# Patient Record
Sex: FEMALE | Race: WHITE | NOT HISPANIC OR LATINO | Employment: OTHER | URBAN - METROPOLITAN AREA
[De-identification: names, ages, dates, MRNs, and addresses within clinical notes are randomized per-mention and may not be internally consistent; named-entity substitution may affect disease eponyms.]

---

## 2017-01-06 ENCOUNTER — HOSPITAL ENCOUNTER (OUTPATIENT)
Dept: PHYSICAL THERAPY | Facility: CLINIC | Age: 47
Setting detail: THERAPIES SERIES
End: 2017-01-06
Attending: PHYSICIAN ASSISTANT
Payer: COMMERCIAL

## 2017-01-06 PROCEDURE — 40000718 ZZHC STATISTIC PT DEPARTMENT ORTHO VISIT: Performed by: PHYSICAL THERAPIST

## 2017-01-06 PROCEDURE — 97110 THERAPEUTIC EXERCISES: CPT | Mod: GP | Performed by: PHYSICAL THERAPIST

## 2017-01-06 PROCEDURE — 97140 MANUAL THERAPY 1/> REGIONS: CPT | Mod: GP | Performed by: PHYSICAL THERAPIST

## 2017-01-12 ENCOUNTER — OFFICE VISIT (OUTPATIENT)
Dept: DERMATOLOGY | Facility: CLINIC | Age: 47
End: 2017-01-12
Payer: COMMERCIAL

## 2017-01-12 DIAGNOSIS — L82.1 SEBORRHEIC KERATOSIS: ICD-10-CM

## 2017-01-12 DIAGNOSIS — Z85.828 HISTORY OF NONMELANOMA SKIN CANCER: Primary | ICD-10-CM

## 2017-01-12 DIAGNOSIS — L57.0 ACTINIC KERATOSIS: ICD-10-CM

## 2017-01-12 DIAGNOSIS — L98.9 SKIN LESION: ICD-10-CM

## 2017-01-12 PROCEDURE — 17003 DESTRUCT PREMALG LES 2-14: CPT | Performed by: DERMATOLOGY

## 2017-01-12 PROCEDURE — 17000 DESTRUCT PREMALG LESION: CPT | Performed by: DERMATOLOGY

## 2017-01-12 PROCEDURE — 99213 OFFICE O/P EST LOW 20 MIN: CPT | Mod: 25 | Performed by: DERMATOLOGY

## 2017-01-12 NOTE — MR AVS SNAPSHOT
After Visit Summary   1/12/2017    Jayashree MURRAY Alex    MRN: [id number]           Patient Information     Date Of Birth          1970        Visit Information        Provider Department      1/12/2017 2:15 PM Natalie Kern MD [facility]        Today's Diagnoses     History of nonmelanoma skin cancer    -  1     Actinic keratosis         Seborrheic keratosis         Skin lesion           Care Instructions    Cryotherapy    What is it?    Use of a very cold liquid, such as liquid nitrogen, to freeze and destroy abnormal skin cells that need to be removed    What should I expect?    Tenderness and redness    A small blister that might grow and fill with dark purple blood. There may be crusting.    More than one treatment may be needed if the lesions do not go away.    How do I care for the treated area?    Gently wash the area with your hands when bathing.    Use a thin layer of Vaseline to help with healing. You may use a Band-Aid.     The area should heal within 7-10 days and may leave behind a pink or lighter color.     Do not use an antibiotic or Neosporin ointment.     You may take acetaminophen (Tylenol) for pain.     Call your Doctor if you have:    Severe pain    Signs of infection (warmth, redness, cloudy yellow drainage, and or a bad smell)    Questions or concerns    Who should I call with questions?       [facility]: [phone]       [facility]: [phone]       For urgent needs outside of business hours call the [facility] at [phone]        and ask for the dermatology resident on call          Follow-ups after your visit        Your next 10 appointments already scheduled     Jan 16, 2017  1:45 PM   Treatment 45 with Jayashree Whalen, PT   [facility] ([facility])    [address] [place] [address]   [phone]            Jan 19, 2017  1:30 PM    Treatment 45 with Jayashree Whalen, PT   [facility] ([facility])    [address] [place] [address]   [phone]            Jan 23, 2017  1:30 PM   Treatment 45 with Jayashree Whalen, PT   [facility] ([facility])    [address] [place] [address]   [phone]            Jan 26, 2017  1:30 PM   Treatment 45 with Jayashree Whalen, PT   [facility] ([facility])    [address] [place] [address]   [phone]            May 16, 2017  2:00 PM   [facility]    [address] [place] [id number] [phone]            Jun 28, 2017 10:00 AM   [facility]I [facility]   [facility]    [address] [place] [id number] [phone]            Jun 28, 2017 11:00 AM   MA SCREENING BILATERAL W/ NAHED with MGMA1, MG MA TECH   [facility]    [address] [place] [address] [phone]           Do not use any powder, lotion or deodorant under your arms or on your breast. If you do, we will ask you to remove it before your exam.  Wear comfortable, two-piece clothing.  If you have any allergies, tell your care team.  Bring any previous mammograms from other facilities or have them mailed to the breast center.              Who to contact     If you have questions or need follow up information about today's clinic visit or your schedule please contact [facility] directly at [phone].  Normal or non-critical lab and imaging results will be communicated to you by MyChart, letter or phone within 4 business days after the clinic has received the results. If you do not hear from us within 7 days, please contact  the clinic through [employer] or phone. If you have a critical or abnormal lab result, we will notify you by phone as soon as possible.  Submit refill requests through [employer] or call your pharmacy and they will forward the refill request to us. Please allow 3 business days for your refill to be completed.          Additional Information About Your Visit        [employer] Information     [employer] gives you secure access to your electronic health record. If you see a primary care provider, you can also send messages to your care team and make appointments. If you have questions, please call your primary care clinic.  If you do not have a primary care provider, please call [phone] and they will assist you.      [employer] is an electronic gateway that provides easy, online access to your medical records. With [employer], you can request a clinic appointment, read your test results, renew a prescription or communicate with your care team.     To access your existing account, please contact your [facility] or call [phone] for assistance.        Care EveryWhere ID     This is your Care EveryWhere ID. This could be used by other organizations to access your [facility] medical records  [id number]        Your Vitals Were     Last Period                   03/17/2003            Blood Pressure from Last 3 Encounters:   11/02/16 137/83   10/12/16 140/79   08/22/16 138/78    Weight from Last 3 Encounters:   10/12/16 71.623 kg (157 lb 14.4 oz)   08/22/16 68.04 kg (150 lb)   06/22/16 67.631 kg (149 lb 1.6 oz)              We Performed the Following     DESTRUCT PREMALIGNANT LESION, 2-14     DESTRUCT PREMALIGNANT LESION, FIRST          Today's Medication Changes          These changes are accurate as of: 1/12/17  2:22 PM.  If you have any questions, ask your nurse or doctor.               These medicines have changed or have updated prescriptions.        Dose/Directions    DULoxetine 60 MG EC capsule    Commonly known as:  CYMBALTA   This may have changed:  additional instructions   Used for:  Depression        90 mg PO daily. Please dispense 60mg tabs and 30 mg tabs to equal 90mg daily.   Quantity:  90 capsule   Refills:  0                Primary Care Provider Office Phone # Fax #    Aneudy Jackson [phone]       [facility] [address] [place] [address]        Thank you!     Thank you for choosing [facility]  for your care. Our goal is always to provide you with excellent care. Hearing back from our patients is one way we can continue to improve our services. Please take a few minutes to complete the written survey that you may receive in the mail after your visit with us. Thank you!             Your Updated Medication List - Protect others around you: Learn how to safely use, store and throw away your medicines at www.disposemymeds.org.          This list is accurate as of: 1/12/17  2:22 PM.  Always use your most recent med list.                   Brand Name Dispense Instructions for use    DULoxetine 60 MG EC capsule    CYMBALTA    90 capsule    90 mg PO daily. Please dispense 60mg tabs and 30 mg tabs to equal 90mg daily.       fluticasone 50 MCG/ACT spray    FLONASE    16 g    Spray 1-2 sprays into both nostrils daily       loratadine 10 MG tablet    CLARITIN    30 tablet    Take 1 tablet (10 mg) by mouth daily       MULTI-VITAMIN DAILY PO      Take 1 tablet by mouth daily       temazepam 30 MG capsule    RESTORIL    30 capsule    Take 1 capsule (30 mg) by mouth nightly as needed for sleep       UNABLE TO FIND      Take 2 capsules by mouth 2 times daily Digestive enzyme       VITAMIN B-12 PO      Take 1 tablet by mouth daily       VITAMIN D-3 PO      Take 1 capsule by mouth daily       VITRON-C PO      Take 1 tablet by mouth       XANAX PO      Take 0.25 mg by mouth At Bedtime
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
## 2017-01-12 NOTE — PATIENT INSTRUCTIONS
Cryotherapy    What is it?    Use of a very cold liquid, such as liquid nitrogen, to freeze and destroy abnormal skin cells that need to be removed    What should I expect?    Tenderness and redness    A small blister that might grow and fill with dark purple blood. There may be crusting.    More than one treatment may be needed if the lesions do not go away.    How do I care for the treated area?    Gently wash the area with your hands when bathing.    Use a thin layer of Vaseline to help with healing. You may use a Band-Aid.     The area should heal within 7-10 days and may leave behind a pink or lighter color.     Do not use an antibiotic or Neosporin ointment.     You may take acetaminophen (Tylenol) for pain.     Call your Doctor if you have:    Severe pain    Signs of infection (warmth, redness, cloudy yellow drainage, and or a bad smell)    Questions or concerns    Who should I call with questions?       [facility]: [phone]       [facility]: [phone]       For urgent needs outside of business hours call the [facility] at [phone]        and ask for the dermatology resident on call

## 2017-01-12 NOTE — NURSING NOTE
Dermatology Rooming Note    Jayashree Johnson's goals for this visit include:   Chief Complaint   Patient presents with     Derm Problem     1 yr check up. concerned about mole on RT bikini area, LT back shoulder area, eyebrow area.        Is a scribe okay for this visit:YES    Are records needed for this visit(If yes, obtain release of information): No     Vitals: LMP 03/17/2003    Referring Provider:  ESTABLISHED PATIENT  No address on file
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
## 2017-01-16 ENCOUNTER — HOSPITAL ENCOUNTER (OUTPATIENT)
Dept: PHYSICAL THERAPY | Facility: CLINIC | Age: 47
Setting detail: THERAPIES SERIES
End: 2017-01-16
Attending: PHYSICIAN ASSISTANT
Payer: COMMERCIAL

## 2017-01-16 PROCEDURE — 40000718 ZZHC STATISTIC PT DEPARTMENT ORTHO VISIT: Performed by: PHYSICAL THERAPIST

## 2017-01-16 PROCEDURE — 97140 MANUAL THERAPY 1/> REGIONS: CPT | Mod: GP | Performed by: PHYSICAL THERAPIST

## 2017-01-23 ENCOUNTER — HOSPITAL ENCOUNTER (OUTPATIENT)
Dept: PHYSICAL THERAPY | Facility: CLINIC | Age: 47
Setting detail: THERAPIES SERIES
End: 2017-01-23
Attending: PHYSICIAN ASSISTANT
Payer: COMMERCIAL

## 2017-01-23 PROCEDURE — 97140 MANUAL THERAPY 1/> REGIONS: CPT | Mod: GP | Performed by: PHYSICAL THERAPIST

## 2017-01-23 PROCEDURE — 40000718 ZZHC STATISTIC PT DEPARTMENT ORTHO VISIT: Performed by: PHYSICAL THERAPIST

## 2017-01-26 ENCOUNTER — HOSPITAL ENCOUNTER (OUTPATIENT)
Dept: PHYSICAL THERAPY | Facility: CLINIC | Age: 47
Setting detail: THERAPIES SERIES
End: 2017-01-26
Attending: PHYSICIAN ASSISTANT
Payer: COMMERCIAL

## 2017-01-26 PROCEDURE — 40000718 ZZHC STATISTIC PT DEPARTMENT ORTHO VISIT: Performed by: PHYSICAL THERAPIST

## 2017-01-26 PROCEDURE — 97140 MANUAL THERAPY 1/> REGIONS: CPT | Mod: GP | Performed by: PHYSICAL THERAPIST

## 2017-02-13 DIAGNOSIS — J34.89 NASAL OBSTRUCTION: ICD-10-CM

## 2017-02-13 RX ORDER — LORATADINE 10 MG/1
10 TABLET ORAL DAILY
Qty: 30 TABLET | Refills: 1 | Status: SHIPPED | OUTPATIENT
Start: 2017-02-13 | End: 2017-05-02

## 2017-02-13 RX ORDER — ALPRAZOLAM 0.25 MG
0.25 TABLET ORAL AT BEDTIME
Qty: 90 TABLET | OUTPATIENT
Start: 2017-02-13

## 2017-02-13 NOTE — TELEPHONE ENCOUNTER
Loratidine      Last Written Prescription Date: 11/4/16  Last Fill Quantity: 30,  # refills: 1   Last Office Visit with G, [facility] or [facility] prescribing provider: 1/12/17

## 2017-02-17 ENCOUNTER — HOSPITAL ENCOUNTER (OUTPATIENT)
Dept: PHYSICAL THERAPY | Facility: CLINIC | Age: 47
Setting detail: THERAPIES SERIES
End: 2017-02-17
Attending: PHYSICIAN ASSISTANT
Payer: COMMERCIAL

## 2017-02-17 PROCEDURE — 97140 MANUAL THERAPY 1/> REGIONS: CPT | Mod: GP | Performed by: PHYSICAL THERAPIST

## 2017-02-17 PROCEDURE — 40000718 ZZHC STATISTIC PT DEPARTMENT ORTHO VISIT: Performed by: PHYSICAL THERAPIST

## 2017-02-17 NOTE — PROGRESS NOTES
[facility]      OUTPATIENT PHYSICAL THERAPY  PLAN OF TREATMENT FOR OUTPATIENT REHABILITATION    Patient's Last Name, First Name, M.I.                YOB: 1970  Jayashree Johnson                        Provider's Name  [facility] Medical Record No.  [id number]                               Onset Date: 1-1-2009 noted cervical tension   Start of Care Date: 9-22-16   Type:     _X_PT   ___OT   ___SLP Medical Diagnosis: Neck pain/cervicalgia, radiculitis/neuritis, spinal stenosis                       PT Diagnosis: neck pain with radiculopathy      _________________________________________________________________________________  Plan of Treatment:    Frequency/Duration: 1-2 times per week x 4 weeks/up to 8 visits     Goals:  Goal Identifier Driving   Goal Description Melissa will be able to drive to [place] with 1-2/10 increase in symptoms and with 1-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  (She has been driving to [place] for son, increased symptom)   Target Date 12/30/16   Date Met      Progress:     Goal Identifier Sleep   Goal Description Melissa will be able to use movement, hot/cool packs as needed and positioning so she can slee 6+ hours at night (Had been improving, varies at this time)   Target Date 12/30/16   Date Met      Progress:     Goal Identifier Symptoms   Goal Description Melissa will be able to center her L arm symptoms with exercise and positioning so she can complete her house and work tasks without increasing her symptoms.  (Improving, not constant)   Target Date 01/02/17   Date Met      Progress:           Progress Toward Goals:   Progress this reporting period: Over all, Melissa has been slowly improving. She has had to miss PT appts due to family medical issues. She has been working toward home exercise program including walking on her treadmill. She  is not reporting as much L arm symptoms. She has tightness into R SB and rotation. THere continues to be tightness in the L upper back and lower neck area.           Certification date from 2-16-17 to 3-17-17.    Jayashree Whalen, PT          I CERTIFY THE NEED FOR THESE SERVICES FURNISHED UNDER        THIS PLAN OF TREATMENT AND WHILE UNDER MY CARE .             Physician Signature               Date    X_____________________________________________________                      Referring Provider: Roque Araiza PAC

## 2017-02-17 NOTE — PROGRESS NOTES
Outpatient Physical Therapy Progress Note     Patient: Jayashree Johnson  : 1970    Beginning/End Dates of Reporting Period:  2 visits between 17 and 17 for a total of 13 visits.     Referring Provider: GABRIELA Joseph    Therapy Diagnosis: Neck pain with radiculopathy     Client Self Report: She walked on the [place] today x 10 minutes. She tries to do this daily. She is reporting neck pain at 3/10 and requested manual therapy.     Objective Measurements:  Objective Measure: Symptoms: 3/10 neck     Objective Measure: Cervical Seated AROM is still tight with R sidebending and rotation. She has a slight L SB in good posture.        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  (She has been driving to [place] for son, increased symptom)   Target Date 16   Date Met      Progress:     Goal Identifier Sleep   Goal Description Melissa will be able to use movement, hot/cool packs as needed and positioning so she can slee 6+ hours at night (Had been improving, varies at this time)   Target Date 16   Date Met      Progress:     Goal Identifier Symptoms   Goal Description Melissa will be able to center her L arm symptoms with exercise and positioning so she can complete her house and work tasks without increasing her symptoms.  (Improving, not constant)   Target Date 17   Date Met      Progress:       Progress Toward Goals:   Progress this reporting period: Over all, Melissa has been slowly improving. She has had to miss PT appts due to family medical issues. She has been working toward home exercise program including walking on her treadmill. She is not reporting as much L arm symptoms. She has tightness into R SB and rotation. THere continues to be tightness in the L upper back and lower neck area.           Plan:  Continue therapy per current  plan of care.    Discharge:   No    Thank you for referring Melissa  to [facility]    Jayashree Whalen, PT  [phone]

## 2017-03-09 ENCOUNTER — HOSPITAL ENCOUNTER (OUTPATIENT)
Dept: PHYSICAL THERAPY | Facility: CLINIC | Age: 47
Setting detail: THERAPIES SERIES
End: 2017-03-09
Attending: PHYSICIAN ASSISTANT
Payer: COMMERCIAL

## 2017-03-09 PROCEDURE — 97140 MANUAL THERAPY 1/> REGIONS: CPT | Mod: GP | Performed by: PHYSICAL THERAPIST

## 2017-03-09 PROCEDURE — 40000718 ZZHC STATISTIC PT DEPARTMENT ORTHO VISIT: Performed by: PHYSICAL THERAPIST

## 2017-03-22 DIAGNOSIS — J34.89 NASAL OBSTRUCTION: ICD-10-CM

## 2017-03-22 NOTE — TELEPHONE ENCOUNTER
Fluticasone nasal spray   Last Written Prescription Date: 11/4/16  Last Fill Quantity: 16,  # refills: 2   Last Office Visit with [facility], [facility] or [facility] prescribing provider: 1/2/17                                         Next 5 appointments (look out 90 days)     May 04, 2017 12:15 PM CDT   Return Visit with Natalie Kern MD   [facility] ([facility]    [address] [place] [id number] [phone]

## 2017-03-24 RX ORDER — FLUTICASONE PROPIONATE 50 MCG
1-2 SPRAY, SUSPENSION (ML) NASAL DAILY
Qty: 16 G | Refills: 2 | Status: ON HOLD | OUTPATIENT
Start: 2017-03-24 | End: 2017-05-30

## 2017-04-05 ENCOUNTER — HOSPITAL ENCOUNTER (OUTPATIENT)
Dept: PHYSICAL THERAPY | Facility: CLINIC | Age: 47
Setting detail: THERAPIES SERIES
End: 2017-04-05
Attending: PHYSICIAN ASSISTANT
Payer: COMMERCIAL

## 2017-04-05 PROCEDURE — 97140 MANUAL THERAPY 1/> REGIONS: CPT | Mod: GP | Performed by: PHYSICAL THERAPIST

## 2017-04-05 PROCEDURE — 40000718 ZZHC STATISTIC PT DEPARTMENT ORTHO VISIT: Performed by: PHYSICAL THERAPIST

## 2017-04-05 PROCEDURE — 97110 THERAPEUTIC EXERCISES: CPT | Mod: GP | Performed by: PHYSICAL THERAPIST

## 2017-04-06 NOTE — PROGRESS NOTES
Outpatient Physical Therapy Progress Note     Patient: Jayashree Johnson  : 1970    Beginning/End Dates of Reporting Period:  4 visits between 17 and 17 for a total of 18 visits    Referring Provider: GABRIELA Joseph Diagnosis: Neck pain with radiculopathy         Client Self Report: Melissa reports that her L arm symptoms have resolved. She is having midback andneck symptoms as well as headaches. She reports symptoms from the R neck across her chest into her L trunk.     Objective Measurements:  Objective Measure: Symptoms - Headache and Neck pain     Objective Measure: Cervical seated AROM: flexion: 30% pulling below the blades; extension: 30% wtih pressure; [id number]% R and 30% L pain and locking with L>R; SB bilaterally 30% with more difficulty L.      Objective Measure: SPecial tests: Spurlings: L is negative, R causes symptomson right that run deep and across the L chest. Distraction: positive - decreased right sided pressure.      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  (Being met)   Target Date 17   Date Met      Progress:     Goal Identifier Sleep   Goal Description Melissa will be able to use movement, hot/cool packs as needed and positioning so she can slee 6+ hours at night (improving - 6-7 hours of sleep)   Target Date 17   Date Met      Progress:     Goal Identifier Symptoms   Goal Description Melissa will be able to center her L arm symptoms with exercise and positioning so she can complete her house and work tasks without increasing her symptoms.  (Met - no L shoulder symptoms)   Target Date 17   Date Met      Progress:     Progress Toward Goals:   Progress this reporting period: Melissa has been attending PT  intermittently.She has been with her family member who has recently passed. She reports the relaxation techniques have been helpful. She is slowly working into her home program and adding weights as able. She is now reporting no L arm symptoms. She reports symptoms in the upper back. We discussed where cervical spine can refer symptoms and she was asked to continue with the cervical retraction exercises.          Plan:  Continue therapy per current plan of care.    Discharge:  No    Thank you for referring Jayashree  to [facility]    Jayashree Whalen, PT  [phone]

## 2017-04-06 NOTE — PROGRESS NOTES
[facility]      OUTPATIENT PHYSICAL THERAPY  PLAN OF TREATMENT FOR OUTPATIENT REHABILITATION    Patient's Last Name, First Name, M.I.                YOB: 1970  Jayashree Johnson                        Provider's Name  [facility] Medical Record No.  [id number]                               Onset Date: 1-1-2009 noted cervical tension   Start of Care Date: 9-22-16   Type:     _X_PT   ___OT   ___SLP Medical Diagnosis: Neck pain/cervicalgia, radiculitis/neuritis, spinal stenosis                       PT Diagnosis: neck pain with radiculopathy    _________________________________________________________________________________  Plan of Treatment:    Frequency/Duration:1-2 days per week x 4 weeks/8 visits   Goals:  Goal Identifier Driving   Goal Description Melissa will be able to drive to [employer] with 1-2/10 increase in symptoms and with 1-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  (Being met)   Target Date 04/30/17   Date Met      Progress:     Goal Identifier Sleep   Goal Description Melissa will be able to use movement, hot/cool packs as needed and positioning so she can slee 6+ hours at night (improving - 6-7 hours of sleep)   Target Date 04/30/17   Date Met      Progress:     Goal Identifier Symptoms   Goal Description Melissa will be able to center her L arm symptoms with exercise and positioning so she can complete her house and work tasks without increasing her symptoms.  (Met - no L shoulder symptoms)   Target Date 01/02/17   Date Met      Progress:         Progress Toward Goals:   Progress this reporting period: Melissa has been attending PT intermittently.She has been with her family member who has recently passed. She reports the relaxation techniques have been helpful. She is slowly working into her home program and adding weights as able. She is now reporting no L  arm symptoms. She reports symptoms in the upper back. We discussed where cervical spine can refer symptoms and she was asked to continue with the cervical retraction exercises.    Please note she has not been consistently in PT as she was with a family member who was passing.         Certification date from 4-5-17 to 5-4-17    Jayashree Whalen, PT          I CERTIFY THE NEED FOR THESE SERVICES FURNISHED UNDER        THIS PLAN OF TREATMENT AND WHILE UNDER MY CARE .             Physician Signature               Date    X_____________________________________________________                      Referring Provider: Roque Araiza PAC

## 2017-04-10 ENCOUNTER — HOSPITAL ENCOUNTER (OUTPATIENT)
Dept: PHYSICAL THERAPY | Facility: CLINIC | Age: 47
Setting detail: THERAPIES SERIES
End: 2017-04-10
Attending: PHYSICIAN ASSISTANT
Payer: COMMERCIAL

## 2017-04-10 PROCEDURE — 40000718 ZZHC STATISTIC PT DEPARTMENT ORTHO VISIT: Performed by: PHYSICAL THERAPIST

## 2017-04-10 PROCEDURE — 97140 MANUAL THERAPY 1/> REGIONS: CPT | Mod: GP | Performed by: PHYSICAL THERAPIST

## 2017-04-12 ENCOUNTER — OFFICE VISIT (OUTPATIENT)
Dept: FAMILY MEDICINE | Facility: OTHER | Age: 47
End: 2017-04-12
Payer: COMMERCIAL

## 2017-04-12 VITALS
BODY MASS INDEX: 25.31 KG/M2 | HEIGHT: 67 IN | OXYGEN SATURATION: 96 % | TEMPERATURE: 98.8 F | SYSTOLIC BLOOD PRESSURE: 132 MMHG | DIASTOLIC BLOOD PRESSURE: 80 MMHG | HEART RATE: 91 BPM | WEIGHT: 161.3 LBS

## 2017-04-12 DIAGNOSIS — F41.8 DEPRESSION WITH ANXIETY: Primary | ICD-10-CM

## 2017-04-12 DIAGNOSIS — E16.2 HYPOGLYCEMIA: ICD-10-CM

## 2017-04-12 PROCEDURE — 99213 OFFICE O/P EST LOW 20 MIN: CPT | Performed by: INTERNAL MEDICINE

## 2017-04-12 ASSESSMENT — ANXIETY QUESTIONNAIRES
3. WORRYING TOO MUCH ABOUT DIFFERENT THINGS: MORE THAN HALF THE DAYS
5. BEING SO RESTLESS THAT IT IS HARD TO SIT STILL: SEVERAL DAYS
2. NOT BEING ABLE TO STOP OR CONTROL WORRYING: NEARLY EVERY DAY
6. BECOMING EASILY ANNOYED OR IRRITABLE: NEARLY EVERY DAY
IF YOU CHECKED OFF ANY PROBLEMS ON THIS QUESTIONNAIRE, HOW DIFFICULT HAVE THESE PROBLEMS MADE IT FOR YOU TO DO YOUR WORK, TAKE CARE OF THINGS AT HOME, OR GET ALONG WITH OTHER PEOPLE: VERY DIFFICULT
GAD7 TOTAL SCORE: 16
7. FEELING AFRAID AS IF SOMETHING AWFUL MIGHT HAPPEN: NEARLY EVERY DAY
1. FEELING NERVOUS, ANXIOUS, OR ON EDGE: SEVERAL DAYS

## 2017-04-12 ASSESSMENT — PATIENT HEALTH QUESTIONNAIRE - PHQ9: 5. POOR APPETITE OR OVEREATING: NEARLY EVERY DAY

## 2017-04-12 ASSESSMENT — PAIN SCALES - GENERAL: PAINLEVEL: SEVERE PAIN (6)

## 2017-04-12 NOTE — NURSING NOTE
"Chief Complaint   Patient presents with     Establish Care       Initial /80 (BP Location: Right arm, Patient Position: Chair, Cuff Size: Adult Regular)  Pulse 91  Temp 98.8  F (37.1  C) (Temporal)  Ht 5' 7\" (1.702 m)  Wt 161 lb 4.8 oz (73.2 kg)  LMP 03/17/2003  SpO2 96%  BMI 25.26 kg/m2 Estimated body mass index is 25.26 kg/(m^2) as calculated from the following:    Height as of this encounter: 5' 7\" (1.702 m).    Weight as of this encounter: 161 lb 4.8 oz (73.2 kg).  Medication Reconciliation: complete  Elisa KING     "
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
## 2017-04-12 NOTE — LETTER
My Depression Action Plan  Name: Jayashree Johnson   Date of Birth 1970  Date: 4/12/2017    My doctor: Aneudy Jackson   My clinic: [place] [address] [facility] [address] [phone]          GREEN    ZONE   Good Control    What it looks like:     Things are going generally well. You have normal up s and down s. You may even feel depressed from time to time, but bad moods usually last less than a day.   What you need to do:  1. Continue to care for yourself (see self care plan)  2. Check your depression survival kit and update it as needed  3. Follow your physician s recommendations including any medication.  4. Do not stop taking medication unless you consult with your physician first.           YELLOW         ZONE Getting Worse    What it looks like:     Depression is starting to interfere with your life.     It may be hard to get out of bed; you may be starting to isolate yourself from others.    Symptoms of depression are starting to last most all day and this has happened for several days.     You may have suicidal thoughts but they are not constant.   What you need to do:     1. Call your care team, your response to treatment will improve if you keep your care team informed of your progress. Yellow periods are signs an adjustment may need to be made.     2. Continue your self-care, even if you have to fake it!    3. Talk to someone in your support network    4. Open up your depression survival kit           RED    ZONE Medical Alert - Get Help    What it looks like:     Depression is seriously interfering with your life.     You may experience these or other symptoms: You can t get out of bed most days, can t work or engage in other necessary activities, you have trouble taking care of basic hygiene, or basic responsibilities, thoughts of suicide or death that will not go away, self-injurious behavior.     What you need to do:  1. Call your care team and request a  same-day appointment. If they are not available (weekends or after hours) call your local crisis line, emergency room or 911.      Electronically signed by: Elisa Corrigan, April 12, 2017    Depression Self Care Plan / Survival Kit    Self-Care for Depression  Here s the deal. Your body and mind are really not as separate as most people think.  What you do and think affects how you feel and how you feel influences what you do and think. This means if you do things that people who feel good do, it will help you feel better.  Sometimes this is all it takes.  There is also a place for medication and therapy depending on how severe your depression is, so be sure to consult with your medical provider and/ or Behavioral Health Consultant if your symptoms are worsening or not improving.     In order to better manage my stress, I will:    Exercise  Get some form of exercise, every day. This will help reduce pain and release endorphins, the  feel good  chemicals in your brain. This is almost as good as taking antidepressants!  This is not the same as joining a gym and then never going! (they count on that by the way ) It can be as simple as just going for a walk or doing some gardening, anything that will get you moving.      Hygiene   Maintain good hygiene (Get out of bed in the morning, Make your bed, Brush your teeth, Take a shower, and Get dressed like you were going to work, even if you are unemployed).  If your clothes don't fit try to get ones that do.    Diet  I will strive to eat foods that are good for me, drink plenty of water, and avoid excessive sugar, caffeine, alcohol, and other mood-altering substances.  Some foods that are helpful in depression are: complex carbohydrates, B vitamins, flaxseed, fish or fish oil, fresh fruits and vegetables.    Psychotherapy  I agree to participate in Individual Therapy (if recommended).    Medication  If prescribed medications, I agree to take them.  Missing doses can result  in serious side effects.  I understand that drinking alcohol, or other illicit drug use, may cause potential side effects.  I will not stop my medication abruptly without first discussing it with my provider.    Staying Connected With Others  I will stay in touch with my friends, family members, and my primary care provider/team.    Use your imagination  Be creative.  We all have a creative side; it doesn t matter if it s oil painting, sand castles, or mud pies! This will also kick up the endorphins.    Witness Beauty  (AKA stop and smell the roses) Take a look outside, even in mid-winter. Notice colors, textures. Watch the squirrels and birds.     Service to others  Be of service to others.  There is always someone else in need.  By helping others we can  get out of ourselves  and remember the really important things.  This also provides opportunities for practicing all the other parts of the program.    Humor  Laugh and be silly!  Adjust your TV habits for less news and crime-drama and more comedy.    Control your stress  Try breathing deep, massage therapy, biofeedback, and meditation. Find time to relax each day.     My support system    Clinic Contact:  Phone number:    Contact 1:  Phone number:    Contact 2:  Phone number:    Jainism/:  Phone number:    Therapist:  Phone number:    Local crisis center:    Phone number:    Other community support:  Phone number:

## 2017-04-12 NOTE — LETTER
May 18, 2017      Jayashree Johnson  [address] [address] [place] [id number]              Dear Jayashree Johnson,    You are due for some labs. Please make a lab appt and come in fasting.     Sincerely,    Dr. Jackson / care team

## 2017-04-13 ASSESSMENT — PATIENT HEALTH QUESTIONNAIRE - PHQ9: SUM OF ALL RESPONSES TO PHQ QUESTIONS 1-9: 18

## 2017-04-13 ASSESSMENT — ANXIETY QUESTIONNAIRES: GAD7 TOTAL SCORE: 16

## 2017-04-18 ENCOUNTER — HOSPITAL ENCOUNTER (OUTPATIENT)
Dept: PHYSICAL THERAPY | Facility: CLINIC | Age: 47
Setting detail: THERAPIES SERIES
End: 2017-04-18
Attending: PHYSICIAN ASSISTANT
Payer: COMMERCIAL

## 2017-04-18 PROCEDURE — 97140 MANUAL THERAPY 1/> REGIONS: CPT | Mod: GP | Performed by: PHYSICAL THERAPIST

## 2017-04-18 PROCEDURE — 40000718 ZZHC STATISTIC PT DEPARTMENT ORTHO VISIT: Performed by: PHYSICAL THERAPIST

## 2017-04-21 ENCOUNTER — HOSPITAL ENCOUNTER (OUTPATIENT)
Dept: PHYSICAL THERAPY | Facility: CLINIC | Age: 47
Setting detail: THERAPIES SERIES
End: 2017-04-21
Attending: PHYSICIAN ASSISTANT
Payer: COMMERCIAL

## 2017-04-21 PROCEDURE — 97112 NEUROMUSCULAR REEDUCATION: CPT | Mod: GP | Performed by: PHYSICAL THERAPIST

## 2017-04-21 PROCEDURE — 97140 MANUAL THERAPY 1/> REGIONS: CPT | Mod: GP | Performed by: PHYSICAL THERAPIST

## 2017-04-21 PROCEDURE — 40000718 ZZHC STATISTIC PT DEPARTMENT ORTHO VISIT: Performed by: PHYSICAL THERAPIST

## 2017-04-24 ENCOUNTER — TELEPHONE (OUTPATIENT)
Dept: FAMILY MEDICINE | Facility: OTHER | Age: 47
End: 2017-04-24

## 2017-04-24 NOTE — TELEPHONE ENCOUNTER
The patient was recently seen and unfortunately left before stopping for her lab work.  Standing orders are in the queue.  Could she please stop by the time of her convenience for the appropriate lab work?.  Thank you.  Isaías

## 2017-04-25 ENCOUNTER — HOSPITAL ENCOUNTER (OUTPATIENT)
Dept: PHYSICAL THERAPY | Facility: CLINIC | Age: 47
Setting detail: THERAPIES SERIES
End: 2017-04-25
Attending: PHYSICIAN ASSISTANT
Payer: COMMERCIAL

## 2017-04-25 PROCEDURE — 97140 MANUAL THERAPY 1/> REGIONS: CPT | Mod: GP | Performed by: PHYSICAL THERAPIST

## 2017-04-25 PROCEDURE — 40000718 ZZHC STATISTIC PT DEPARTMENT ORTHO VISIT: Performed by: PHYSICAL THERAPIST

## 2017-04-27 ENCOUNTER — HOSPITAL ENCOUNTER (OUTPATIENT)
Dept: PHYSICAL THERAPY | Facility: CLINIC | Age: 47
Setting detail: THERAPIES SERIES
End: 2017-04-27
Attending: PHYSICIAN ASSISTANT
Payer: COMMERCIAL

## 2017-04-27 ENCOUNTER — OFFICE VISIT (OUTPATIENT)
Dept: NEUROSURGERY | Facility: CLINIC | Age: 47
End: 2017-04-27
Payer: COMMERCIAL

## 2017-04-27 VITALS — WEIGHT: 163.3 LBS | HEIGHT: 67 IN | BODY MASS INDEX: 25.63 KG/M2 | TEMPERATURE: 97.9 F

## 2017-04-27 DIAGNOSIS — M48.02 SPINAL STENOSIS IN CERVICAL REGION: Primary | ICD-10-CM

## 2017-04-27 PROCEDURE — 99204 OFFICE O/P NEW MOD 45 MIN: CPT | Performed by: NEUROLOGICAL SURGERY

## 2017-04-27 PROCEDURE — 40000718 ZZHC STATISTIC PT DEPARTMENT ORTHO VISIT: Performed by: PHYSICAL THERAPIST

## 2017-04-27 PROCEDURE — 97140 MANUAL THERAPY 1/> REGIONS: CPT | Mod: GP | Performed by: PHYSICAL THERAPIST

## 2017-04-27 ASSESSMENT — PAIN SCALES - GENERAL: PAINLEVEL: SEVERE PAIN (6)

## 2017-04-27 NOTE — NURSING NOTE
Pre-operative Education    Education included but not limited to:  - Pre-operative physical with primary care physician within 30 days of surgical date.   - Pre-operative clearance (cardiology, hematology, etc).   - Discontinue Aspirin, NSAIDs, Diclofenac x 7 days prior to surgical date.   -Do not begin taking Non-Steroidal Anti-Inflammatory Drugs or NSAIDs (Advil,Motrin, Ibuprofen,Nuprin, Diclofenac,Meloxicam, Aleve, Celebrex, Aspirin, etc.) until 6 weeks after surgery if you had a fusion. May cause bleeding and interfere with bone healing.    -May try tylenol for pain 1000 mg three times per day for pain    -Patient is not a smoker  -Forms to be completed: 6 weeks     -Surgical risks: blood clots in the leg or lung, problems urinating, nerve damage, drainage from the incision, infection, stiffness.    -Preparation timeline   When to start NPO   Special bathing procedure.    -Hospital stay:   Checking in   Surgery   Recovery room   Hospital room     - Managing pain   - Likely 1-2 night hospitalization  - Post operative incisional pain x 1-2 weeks which will require pain medications and muscle relaxants.   -Do NOT drive while taking narcotic pain medication.  -Post operative incision care:   Keep your incision clean and dry at all times. OK to remove dressing on postop day 2. OK to shower on postop day 3 and allow water to run over incision, pat dry after shower.    No bathing, swimming or submerging in water. Call immediately or come to ED if any drainage occurs, or you develop new pain.   Watch for signs of infection: redness, swelling, warmth, drainage, and fever of 101 degrees or higher. Notify clinic.   - Post operative activity limitations: 6-8 weeks, no lifting > 10 pounds, no bending, twisting, or overhead reaching.  -If a brace is required per Dr. Escalante, [facility] will fit you for the brace in the hospital. Brace type and length of time to wear it will be determined by Dr. Escalante.   - Follow up appointments: 6  week post op, 3 months post op, 6 months post op, 1 year post op. You will need to an xray before each appointment. Please call to schedule follow up appointment at [phone].   - Education book was also given to the patient for further review.      Patient verbalized understanding of above instructions. All questions were answered to the best of my ability and the patient's satisfaction. Patient advised to call with any additional questions or concerns.

## 2017-04-27 NOTE — MR AVS SNAPSHOT
After Visit Summary   4/27/2017    Jayashree Johnson    MRN: [id number]           Patient Information     Date Of Birth          1970        Visit Information        Provider Department      4/27/2017 2:20 PM Willard Escalante MD [facility]    Surgery scheduled at [facility] for C5-6 ACDF (anterior cervical discectomy and fusion)       Pre-Operative:  -Surgical risks: blood clots in the leg or lung, problems urinating, nerve damage, drainage from the incision, infection, stiffness.  - Pre-operative physical with primary care physician within 30 days of surgical date.   -Stop all solid foods 8 hours before surgery.  -Keep drinking clear liquids until 4 hours before surgery. Clear liquids include water, clear juice, black coffee, or clear tea without milk, Gatorade, clear soda.   -Shower procedure: Please shower with antibacterial soap the night before surgery and the morning of surgery. Refer to information sheet in folder.   - Discontinue Aspirin, NSAIDs (Advil/Ibuprofen, Naproxen,Nuprin, Diclofenac,Meloxicam, Aleve, Celebrex) x 7 days prior to surgical date. Do not begin taking until 6 weeks after surgery. May cause bleeding and interfere with bone healing.  - May try Tylenol for pain 1000 mg three times per day for pain.      Post-Operative:  -1-2 night hospitalization.   - Post operative incisional pain x 1-2 weeks which will require pain medications and muscle relaxants. You will receive medication upon discharge.  -Do NOT drive while taking narcotic pain medication.  -Post operative incision care- Watch for signs of infection: redness, swelling, warmth, drainage, and fever of 101 degrees or higher. Notify clinic [phone].  -No submerging incision in water such as pools, hot tubs, baths for at least 8 weeks or until incision is healed. Showers are fine.   - Post operative activity limitations: 6-8 weeks, no lifting > 10 pounds, no  bending, twisting, or overhead reaching.  -If a brace is required per Dr. Escalante, [facility] will fit you for the brace in the hospital. Brace type and length of time to wear it will be determined by Dr. Escalante.   -If you are currently employed, you will need to be off work for 4-6 weeks for post op recovery and healing.  - Follow up appointments: 6 week post op, 3 months post op, 6 months post op, 1 year post op. You will need to an xray before each appointment. Please call to schedule these appointments at [phone].              Follow-ups after your visit        Your next 10 appointments already scheduled     Apr 27, 2017  3:15 PM CDT   Treatment 45 with Jayashree Whalen, PT   [facility] ([address] [place] [address]   [phone]            May 02, 2017  3:15 PM CDT   Treatment 45 with Jayashree Whalen PT   [facility] ([facility])    [address] [place] [address] [place] [address] [phone]            May 04, 2017 12:15 PM CDT   [facility] Visit with Natalie Kern MD   [facility]    [address] [place] [id number] [phone]            Jun 28, 2017 10:00 AM CDT   Return Visit with MIMI Carver   [facility])    [address] [place] [id number] [phone]            Jun 28, 2017 11:00 AM CDT   MA SCREENING BILATERAL W/ NAHED with MGMA1, MG MA TECH   [facility] ([facility])    [address] [address] [place] [id number] [phone]           Do not use any powder, lotion or deodorant under your arms or on your breast. If you do, we will ask you to remove it before your exam.  Wear comfortable, two-piece clothing.  If you have any allergies, tell your care team.  Bring any previous mammograms from other facilities or have  "them mailed to the breast center.              Who to contact     If you have questions or need follow up information about today's clinic visit or your schedule please contact [facility] directly at [phone].  Normal or non-critical lab and imaging results will be communicated to you by MyChart, letter or phone within 4 business days after the clinic has received the results. If you do not hear from us within 7 days, please contact the clinic through MyChart or phone. If you have a critical or abnormal lab result, we will notify you by phone as soon as possible.  Submit refill requests through [employer] or call your pharmacy and they will forward the refill request to us. Please allow 3 business days for your refill to be completed.          Additional Information About Your Visit        [employer] Information     [employer] gives you secure access to your electronic health record. If you see a primary care provider, you can also send messages to your care team and make appointments. If you have questions, please call your primary care clinic.  If you do not have a primary care provider, please call [phone] and they will assist you.        Care EveryWhere ID     This is your Care EveryWhere ID. This could be used by other organizations to access your [facility] medical records  [id number]        Your Vitals Were     Temperature Height Last Period BMI (Body Mass Index)          97.9  F (36.6  C) (Temporal) 1.702 m (5' 7\") 03/17/2003 25.58 kg/m2         Blood Pressure from Last 3 Encounters:   04/12/17 132/80   11/02/16 137/83   10/12/16 140/79    Weight from Last 3 Encounters:   04/27/17 74.1 kg (163 lb 4.8 oz)   04/12/17 73.2 kg (161 lb 4.8 oz)   10/12/16 71.6 kg (157 lb 14.4 oz)              Today, you had the following     No orders found for display         Today's Medication Changes          These changes are accurate as of: 4/27/17  3:01 PM.  If you have any questions, ask your nurse or " doctor.               These medicines have changed or have updated prescriptions.        Dose/Directions    DULoxetine 60 MG EC capsule   Commonly known as:  CYMBALTA   This may have changed:  additional instructions   Used for:  Depression        90 mg PO daily. Please dispense 60mg tabs and 30 mg tabs to equal 90mg daily.   Quantity:  90 capsule   Refills:  0                Primary Care Provider Office Phone # Fax #    Aneudy Jackson [phone]       [facility] [address] [place] [address]        Thank you!     Thank you for choosing [facility]  for your care. Our goal is always to provide you with excellent care. Hearing back from our patients is one way we can continue to improve our services. Please take a few minutes to complete the written survey that you may receive in the mail after your visit with us. Thank you!             Your Updated Medication List - Protect others around you: Learn how to safely use, store and throw away your medicines at www.disposemymeds.org.          This list is accurate as of: 4/27/17  3:01 PM.  Always use your most recent med list.                   Brand Name Dispense Instructions for use    DULoxetine 60 MG EC capsule    CYMBALTA    90 capsule    90 mg PO daily. Please dispense 60mg tabs and 30 mg tabs to equal 90mg daily.       fluticasone 50 MCG/ACT spray    FLONASE    16 g    Spray 1-2 sprays into both nostrils daily       loratadine 10 MG tablet    CLARITIN    30 tablet    Take 1 tablet (10 mg) by mouth daily       MULTI-VITAMIN DAILY PO      Take 1 tablet by mouth daily       temazepam 30 MG capsule    RESTORIL    30 capsule    Take 1 capsule (30 mg) by mouth nightly as needed for sleep       UNABLE TO FIND      Take 2 capsules by mouth 2 times daily Digestive enzyme       VITAMIN B-12 PO      Take 1 tablet by mouth daily       VITAMIN D-3 PO      Take 1 capsule by mouth daily       VITRON-C PO      Take 1 tablet by  mouth       XANAX PO      Take 0.25 mg by mouth At Bedtime

## 2017-04-27 NOTE — PROGRESS NOTES
"Jayashree Johnson is a 46 year old female who presents for:  Chief Complaint   Patient presents with     Neurologic Problem     neck pain        Initial Vitals:  Temp 97.9  F (36.6  C) (Temporal)  Ht 1.702 m (5' 7\")  Wt 74.1 kg (163 lb 4.8 oz)  LMP 03/17/2003  BMI 25.58 kg/m2 Estimated body mass index is 25.58 kg/(m^2) as calculated from the following:    Height as of this encounter: 1.702 m (5' 7\").    Weight as of this encounter: 74.1 kg (163 lb 4.8 oz).. Body surface area is 1.87 meters squared. BP completed using cuff size: NA (Not Taken)  Severe Pain (6)    Do you feel safe in your environment?  Yes  Do you need any refills today? No    Nursing Comments:         Pooja Billy CMA ([facility])      "

## 2017-04-27 NOTE — PATIENT INSTRUCTIONS
Surgery scheduled at [facility] for C5-6 ACDF (anterior cervical discectomy and fusion)       Pre-Operative:  -Surgical risks: blood clots in the leg or lung, problems urinating, nerve damage, drainage from the incision, infection, stiffness.  - Pre-operative physical with primary care physician within 30 days of surgical date.   -Stop all solid foods 8 hours before surgery.  -Keep drinking clear liquids until 4 hours before surgery. Clear liquids include water, clear juice, black coffee, or clear tea without milk, Gatorade, clear soda.   -Shower procedure: Please shower with antibacterial soap the night before surgery and the morning of surgery. Refer to information sheet in folder.   - Discontinue Aspirin, NSAIDs (Advil/Ibuprofen, Naproxen,Nuprin, Diclofenac,Meloxicam, Aleve, Celebrex) x 7 days prior to surgical date. Do not begin taking until 6 weeks after surgery. May cause bleeding and interfere with bone healing.  - May try Tylenol for pain 1000 mg three times per day for pain.      Post-Operative:  -1-2 night hospitalization.   - Post operative incisional pain x 1-2 weeks which will require pain medications and muscle relaxants. You will receive medication upon discharge.  -Do NOT drive while taking narcotic pain medication.  -Post operative incision care- Watch for signs of infection: redness, swelling, warmth, drainage, and fever of 101 degrees or higher. Notify clinic [phone].  -No submerging incision in water such as pools, hot tubs, baths for at least 8 weeks or until incision is healed. Showers are fine.   - Post operative activity limitations: 6-8 weeks, no lifting > 10 pounds, no bending, twisting, or overhead reaching.  -If a brace is required per Dr. Escalante, [facility] will fit you for the brace in the hospital. Brace type and length of time to wear it will be determined by Dr. Escalante.   -If you are currently employed, you will need to be off work for 4-6 weeks for post op recovery  and healing.  - Follow up appointments: 6 week post op, 3 months post op, 6 months post op, 1 year post op. You will need to an xray before each appointment. Please call to schedule these appointments at [phone].
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
## 2017-04-28 ENCOUNTER — TELEPHONE (OUTPATIENT)
Dept: NEUROSURGERY | Facility: CLINIC | Age: 47
End: 2017-04-28

## 2017-04-28 NOTE — TELEPHONE ENCOUNTER
Surgery Scheduled    Date of Surgery 6/14 Time of Surgery   Procedure: C5-6 ACDF  Hospital/Surgical Facility: [facility]  Surgeon: Boris  Type of Anesthesia : General  Pre-op 5/24 with Dr. Jackson  2 week post op:7/27 with SHANNON Machado        Surgery packet given to patient in clinic. Patients instructed to arrive 1.5 hours prior to surgery. Patient understood and agrees to plan.    Jory Us  Surgical Scheduler

## 2017-05-02 DIAGNOSIS — J34.89 NASAL OBSTRUCTION: ICD-10-CM

## 2017-05-03 RX ORDER — LORATADINE 10 MG/1
10 TABLET ORAL DAILY
Qty: 30 TABLET | Refills: 1 | Status: ON HOLD | OUTPATIENT
Start: 2017-05-03 | End: 2017-05-30

## 2017-05-03 NOTE — TELEPHONE ENCOUNTER
Loratadine      Last Written Prescription Date: 2/13/17  Last Fill Quantity: 30,  # refills: 1   Last Office Visit with G, P or [facility] prescribing provider: 4/27/17                                         Next 5 appointments (look out 90 days)     May 04, 2017 12:15 PM CDT   [facility]    [address] [place] [id number]   [phone]            May 24, 2017  9:30 AM CDT   Pre-Op physical with Aneudy Jackson MD   [facility]    [address] [place] [id number] [phone]            Jun 28, 2017 10:00 AM CDT   [facility]    [address] [place] [id number]   [phone]            Jul 27, 2017  1:00 PM CDT   Return Visit with Teresa Machado PA-C   [facility]    [address] [facility] [id number] [phone]

## 2017-05-15 ENCOUNTER — HOSPITAL ENCOUNTER (OUTPATIENT)
Dept: PHYSICAL THERAPY | Facility: CLINIC | Age: 47
Setting detail: THERAPIES SERIES
End: 2017-05-15
Attending: PHYSICIAN ASSISTANT
Payer: COMMERCIAL

## 2017-05-15 PROCEDURE — 40000718 ZZHC STATISTIC PT DEPARTMENT ORTHO VISIT: Performed by: PHYSICAL THERAPIST

## 2017-05-15 PROCEDURE — 97140 MANUAL THERAPY 1/> REGIONS: CPT | Mod: GP | Performed by: PHYSICAL THERAPIST

## 2017-05-16 NOTE — PROGRESS NOTES
Outpatient Physical Therapy Progress Note     Patient: Jayashree Johnson  : 1970    Beginning/End Dates of Reporting Period:  4 visits between 4-10-17 and 5-15-17 for a total of 23 visits.     Referring Provider: Dr. Willard Escalante    Therapy Diagnosis: Cervical stenosis and C5-6 disc     Client Self Report: She did see Dr. Escalante and there is a surgery date for 17. In the meantime, she would like to continue with PT as she reports the treatment decreases the acute pain she experiences. She would like information about sleep positioning.    Objective Measurements:  Objective Measure: Symptoms: 5-6/10, neck     Objective Measure: Cervical seated AROM: 20% flexion, extension: 50%; rotation R: 55%, l: 40 %; SB R 30% and L: 20% approximately.      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  (Being met)   Target Date 17   Date Met      Progress:     Goal Identifier Sleep   Goal Description Melissa will be able to use movement, hot/cool packs as needed and positioning so she can slee 6+ hours at night (improving - 6-7 hours of sleep)   Target Date 17   Date Met      Progress:     Goal Identifier Symptoms   Goal Description Melissa will be able to center her L arm symptoms with exercise and positioning so she can complete her house and work tasks without increasing her symptoms.  (Met - no L shoulder symptoms)   Target Date 17   Date Met      Progress:     Goal Identifier     Goal Description     Target Date     Date Met      Progress:     Goal Identifier     Goal Description     Target Date     Date Met      Progress:     Goal Identifier     Goal Description     Target Date     Date Met      Progress:     Goal Identifier     Goal Description     Target Date     Date Met      Progress:     Goal Identifier     Goal Description      Target Date     Date Met      Progress:     Progress Toward Goals:   Progress this reporting period: Limited. She does report temporary relief with PT.  We discussed insurance, goals and plan of care. She has not had much improvement overall at this time. She did have decreased L arm symptoms early in the PT sessions. She has had gaps in her PT sessions due to family emergency.     Plan:  Continue therapy per current plan of care. She would like to continue with PT 1-2 times per week until she has surgery to assist with the pain. We agreed to work on positioning and posture.     She plans to continue her neck care under Dr. Escalante as Dr. Ayoub is no longer available.   Discharge:  No    Jayashree Whalen, PT  [phone]

## 2017-05-22 ENCOUNTER — HOSPITAL ENCOUNTER (OUTPATIENT)
Dept: PHYSICAL THERAPY | Facility: CLINIC | Age: 47
Setting detail: THERAPIES SERIES
End: 2017-05-22
Attending: PHYSICIAN ASSISTANT
Payer: COMMERCIAL

## 2017-05-22 PROCEDURE — 40000718 ZZHC STATISTIC PT DEPARTMENT ORTHO VISIT: Performed by: PHYSICAL THERAPIST

## 2017-05-22 PROCEDURE — 97140 MANUAL THERAPY 1/> REGIONS: CPT | Mod: GP | Performed by: PHYSICAL THERAPIST

## 2017-05-24 ENCOUNTER — OFFICE VISIT (OUTPATIENT)
Dept: INTERNAL MEDICINE | Facility: CLINIC | Age: 47
End: 2017-05-24
Payer: COMMERCIAL

## 2017-05-24 VITALS
WEIGHT: 160 LBS | SYSTOLIC BLOOD PRESSURE: 138 MMHG | RESPIRATION RATE: 16 BRPM | OXYGEN SATURATION: 100 % | TEMPERATURE: 97 F | BODY MASS INDEX: 25.06 KG/M2 | DIASTOLIC BLOOD PRESSURE: 84 MMHG | HEART RATE: 90 BPM

## 2017-05-24 DIAGNOSIS — Z01.818 PREOP GENERAL PHYSICAL EXAM: Primary | ICD-10-CM

## 2017-05-24 DIAGNOSIS — R14.0 ABDOMINAL BLOATING: ICD-10-CM

## 2017-05-24 DIAGNOSIS — M54.2 CERVICALGIA: ICD-10-CM

## 2017-05-24 DIAGNOSIS — E16.2 HYPOGLYCEMIA: ICD-10-CM

## 2017-05-24 LAB
ANION GAP SERPL CALCULATED.3IONS-SCNC: 7 MMOL/L (ref 3–14)
BUN SERPL-MCNC: 10 MG/DL (ref 7–30)
CALCIUM SERPL-MCNC: 8.9 MG/DL (ref 8.5–10.1)
CHLORIDE SERPL-SCNC: 105 MMOL/L (ref 94–109)
CHOLEST SERPL-MCNC: 201 MG/DL
CO2 SERPL-SCNC: 28 MMOL/L (ref 20–32)
CREAT SERPL-MCNC: 0.7 MG/DL (ref 0.52–1.04)
CREAT UR-MCNC: 144 MG/DL
ERYTHROCYTE [DISTWIDTH] IN BLOOD BY AUTOMATED COUNT: 14 % (ref 10–15)
GFR SERPL CREATININE-BSD FRML MDRD: 89 ML/MIN/1.7M2
GLUCOSE SERPL-MCNC: 100 MG/DL (ref 70–99)
HBA1C MFR BLD: 5.5 % (ref 4.3–6)
HCT VFR BLD AUTO: 40.9 % (ref 35–47)
HDLC SERPL-MCNC: 58 MG/DL
HGB BLD-MCNC: 13.4 G/DL (ref 11.7–15.7)
LDLC SERPL CALC-MCNC: 105 MG/DL
MCH RBC QN AUTO: 29.3 PG (ref 26.5–33)
MCHC RBC AUTO-ENTMCNC: 32.8 G/DL (ref 31.5–36.5)
MCV RBC AUTO: 90 FL (ref 78–100)
MICROALBUMIN UR-MCNC: 7 MG/L
MICROALBUMIN/CREAT UR: 4.52 MG/G CR (ref 0–25)
NONHDLC SERPL-MCNC: 143 MG/DL
PLATELET # BLD AUTO: 330 10E9/L (ref 150–450)
POTASSIUM SERPL-SCNC: 4.2 MMOL/L (ref 3.4–5.3)
RBC # BLD AUTO: 4.57 10E12/L (ref 3.8–5.2)
SODIUM SERPL-SCNC: 140 MMOL/L (ref 133–144)
T3FREE SERPL-MCNC: 2.5 PG/ML (ref 2.3–4.2)
T4 FREE SERPL-MCNC: 0.91 NG/DL (ref 0.76–1.46)
TRIGL SERPL-MCNC: 191 MG/DL
TSH SERPL DL<=0.05 MIU/L-ACNC: 1.1 MU/L (ref 0.4–4)
WBC # BLD AUTO: 7 10E9/L (ref 4–11)

## 2017-05-24 PROCEDURE — 36415 COLL VENOUS BLD VENIPUNCTURE: CPT | Performed by: INTERNAL MEDICINE

## 2017-05-24 PROCEDURE — 82043 UR ALBUMIN QUANTITATIVE: CPT | Performed by: INTERNAL MEDICINE

## 2017-05-24 PROCEDURE — 84439 ASSAY OF FREE THYROXINE: CPT | Performed by: INTERNAL MEDICINE

## 2017-05-24 PROCEDURE — 84481 FREE ASSAY (FT-3): CPT | Performed by: INTERNAL MEDICINE

## 2017-05-24 PROCEDURE — 85027 COMPLETE CBC AUTOMATED: CPT | Performed by: INTERNAL MEDICINE

## 2017-05-24 PROCEDURE — 84443 ASSAY THYROID STIM HORMONE: CPT | Performed by: INTERNAL MEDICINE

## 2017-05-24 PROCEDURE — 80061 LIPID PANEL: CPT | Performed by: INTERNAL MEDICINE

## 2017-05-24 PROCEDURE — 83036 HEMOGLOBIN GLYCOSYLATED A1C: CPT | Performed by: INTERNAL MEDICINE

## 2017-05-24 PROCEDURE — 99214 OFFICE O/P EST MOD 30 MIN: CPT | Performed by: INTERNAL MEDICINE

## 2017-05-24 PROCEDURE — 80048 BASIC METABOLIC PNL TOTAL CA: CPT | Performed by: INTERNAL MEDICINE

## 2017-05-24 ASSESSMENT — PAIN SCALES - GENERAL: PAINLEVEL: MODERATE PAIN (4)
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
## 2017-05-24 NOTE — MR AVS SNAPSHOT
After Visit Summary   5/24/2017    Jayashree Johnson    MRN: [id number]           Patient Information     Date Of Birth          1970        Visit Information        Provider Department      5/24/2017 9:30 AM Aneudy Jackson MD [facility]        Today's Diagnoses     Preop general physical exam    -  1      Care Instructions      Before Your Surgery      Call your surgeon if there is any change in your health. This includes signs of a cold or flu (such as a sore throat, runny nose, cough, rash or fever).    Do not smoke, drink alcohol or take over the counter medicine (unless your surgeon or primary care doctor tells you to) for the 24 hours before and after surgery.    If you take prescribed drugs: Follow your doctor s orders about which medicines to take and which to stop until after surgery.    Eating and drinking prior to surgery: follow the instructions from your surgeon    Take a shower or bath the night before surgery. Use the soap your surgeon gave you to gently clean your skin. If you do not have soap from your surgeon, use your regular soap. Do not shave or scrub the surgery site.  Wear clean pajamas and have clean sheets on your bed.           Follow-ups after your visit        Your next 10 appointments already scheduled     May 24, 2017  9:30 AM CDT   [facility] physical with [facility]y [facility]   [facility]    [address] [place] [address]   [phone]            May 30, 2017   Procedure with Willard Escalante MD   [facility] (--)    [address]  [facility] [address]   [phone]            May 31, 2017  1:45 PM CDT   Ortho Treatment with Jayashree Whalen PT   [facility] ([facility])    [address] [place] [address]   [phone]            Jun 07, 2017  3:15 PM CDT   Ortho Treatment with Jayashree Whalen PT   [facility] ([facility])    [address] [place] [address]   [phone]            Jun 13, 2017  3:15 PM CDT   Ortho Treatment with Jayashree Whalen PT   [facility] ([facility])    [address] [place] [address]   [phone]            Jun 28, 2017 10:00 AM CDT   Return Visit with MIMI Carver   [facility]    [address] [place] [id number] [phone]            Jun 28, 2017 11:00 AM CDT   MA SCREENING BILATERAL W/ NAHED with MGMADick, [facility] MA [facility])    [address] [place] [address] [phone]           Do not use any powder, lotion or deodorant under your arms or on your breast. If you do, we will ask you to remove it before your exam.  Wear comfortable, two-piece clothing.  If you have any allergies, tell your care team.  Bring any previous mammograms from other facilities or have them mailed to the breast center.            Jul 27, 2017  1:00 PM CDT   [facility] Visit with Teresa Machado PA-C   [facility]    [address] [place] [id number] [phone]            Aug 10, 2017 11:00 AM CDT   Return Visit with Natalie Kern MD   [facility])    [address] [place] [address] [phone]              Who to contact     If you have questions or need follow up information about today's clinic visit or your schedule please contact [facility] directly at [phone].  Normal or non-critical lab and imaging results will be communicated to you by MyChart, letter or phone within 4 business days after the clinic has received the results. If you do not hear from us within 7 days, please contact the clinic through MyChart or  phone. If you have a critical or abnormal lab result, we will notify you by phone as soon as possible.  Submit refill requests through [email or web address] or call your pharmacy and they will forward the refill request to us. Please allow 3 business days for your refill to be completed.          Additional Information About Your Visit        [employer] Information     [email or web address] gives you secure access to your electronic health record. If you see a primary care provider, you can also send messages to your care team and make appointments. If you have questions, please call your primary care clinic.  If you do not have a primary care provider, please call [phone] and they will assist you.        Care EveryWhere ID     This is your Care EveryWhere ID. This could be used by other organizations to access your [facility] medical records  [id number]        Your Vitals Were     Pulse Temperature Respirations Last Period Pulse Oximetry BMI (Body Mass Index)    90 97  F (36.1  C) (Temporal) 16 03/17/2003 100% 25.06 kg/m2       Blood Pressure from Last 3 Encounters:   05/24/17 138/84   04/12/17 132/80   11/02/16 137/83    Weight from Last 3 Encounters:   05/24/17 160 lb (72.6 kg)   04/27/17 163 lb 4.8 oz (74.1 kg)   04/12/17 161 lb 4.8 oz (73.2 kg)              Today, you had the following     No orders found for display         Today's Medication Changes          These changes are accurate as of: 5/24/17  9:28 AM.  If you have any questions, ask your nurse or doctor.               These medicines have changed or have updated prescriptions.        Dose/Directions    DULoxetine 60 MG EC capsule   Commonly known as:  CYMBALTA   This may have changed:  additional instructions   Used for:  Depression        90 mg PO daily. Please dispense 60mg tabs and 30 mg tabs to equal 90mg daily.   Quantity:  90 capsule   Refills:  0                Primary Care Provider Office Phone # Fax #    Aneudy Jackson [phone]       [place]  [facility] [address] [place] [address]        Thank you!     Thank you for choosing [facility]  for your care. Our goal is always to provide you with excellent care. Hearing back from our patients is one way we can continue to improve our services. Please take a few minutes to complete the written survey that you may receive in the mail after your visit with us. Thank you!             Your Updated Medication List - Protect others around you: Learn how to safely use, store and throw away your medicines at www.disposemymeds.org.          This list is accurate as of: 5/24/17  9:28 AM.  Always use your most recent med list.                   Brand Name Dispense Instructions for use    DULoxetine 60 MG EC capsule    CYMBALTA    90 capsule    90 mg PO daily. Please dispense 60mg tabs and 30 mg tabs to equal 90mg daily.       fluticasone 50 MCG/ACT spray    FLONASE    16 g    Spray 1-2 sprays into both nostrils daily       loratadine 10 MG tablet    CLARITIN    30 tablet    Take 1 tablet (10 mg) by mouth daily       MULTI-VITAMIN DAILY PO      Take 1 tablet by mouth daily       temazepam 30 MG capsule    RESTORIL    30 capsule    Take 1 capsule (30 mg) by mouth nightly as needed for sleep       UNABLE TO FIND      Take 2 capsules by mouth 2 times daily Digestive enzyme       VITAMIN B-12 PO      Take 1 tablet by mouth daily       VITAMIN D-3 PO      Take 1 capsule by mouth daily       VITRON-C PO      Take 1 tablet by mouth       XANAX PO      Take 0.25 mg by mouth At Bedtime

## 2017-05-24 NOTE — NURSING NOTE
"Chief Complaint   Patient presents with     Pre-Op Exam       Initial /84  Pulse 90  Temp 97  F (36.1  C) (Temporal)  Resp 16  Wt 160 lb (72.6 kg)  LMP 03/17/2003  SpO2 100%  BMI 25.06 kg/m2 Estimated body mass index is 25.06 kg/(m^2) as calculated from the following:    Height as of 4/27/17: 5' 7\" (1.702 m).    Weight as of this encounter: 160 lb (72.6 kg).  Medication Reconciliation: complete    "
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
## 2017-05-29 ENCOUNTER — MYC MEDICAL ADVICE (OUTPATIENT)
Dept: INTERNAL MEDICINE | Facility: CLINIC | Age: 47
End: 2017-05-29

## 2017-05-30 ENCOUNTER — HOSPITAL ENCOUNTER (OUTPATIENT)
Facility: CLINIC | Age: 47
Discharge: HOME | End: 2017-05-31
Attending: NEUROLOGICAL SURGERY | Admitting: NEUROLOGICAL SURGERY
Payer: COMMERCIAL

## 2017-05-30 ENCOUNTER — SURGERY (OUTPATIENT)
Age: 47
End: 2017-05-30

## 2017-05-30 ENCOUNTER — ANESTHESIA EVENT (OUTPATIENT)
Dept: SURGERY | Facility: CLINIC | Age: 47
End: 2017-05-30
Payer: COMMERCIAL

## 2017-05-30 ENCOUNTER — APPOINTMENT (OUTPATIENT)
Dept: GENERAL RADIOLOGY | Facility: CLINIC | Age: 47
End: 2017-05-30
Attending: NEUROLOGICAL SURGERY
Payer: COMMERCIAL

## 2017-05-30 ENCOUNTER — ANESTHESIA (OUTPATIENT)
Dept: SURGERY | Facility: CLINIC | Age: 47
End: 2017-05-30
Payer: COMMERCIAL

## 2017-05-30 DIAGNOSIS — Z98.1 S/P CERVICAL SPINAL FUSION: Primary | ICD-10-CM

## 2017-05-30 PROCEDURE — 71000012 ZZH RECOVERY PHASE 1 LEVEL 1 FIRST HR: Performed by: NEUROLOGICAL SURGERY

## 2017-05-30 PROCEDURE — 25000125 ZZHC RX 250: Performed by: ANESTHESIOLOGY

## 2017-05-30 PROCEDURE — 22853 INSJ BIOMECHANICAL DEVICE: CPT | Performed by: NEUROLOGICAL SURGERY

## 2017-05-30 PROCEDURE — 37000009 ZZH ANESTHESIA TECHNICAL FEE, EACH ADDTL 15 MIN: Performed by: NEUROLOGICAL SURGERY

## 2017-05-30 PROCEDURE — 37000008 ZZH ANESTHESIA TECHNICAL FEE, 1ST 30 MIN: Performed by: NEUROLOGICAL SURGERY

## 2017-05-30 PROCEDURE — 36000071 ZZH SURGERY LEVEL 5 W FLUORO 1ST 30 MIN: Performed by: NEUROLOGICAL SURGERY

## 2017-05-30 PROCEDURE — 27210794 ZZH OR GENERAL SUPPLY STERILE: Performed by: NEUROLOGICAL SURGERY

## 2017-05-30 PROCEDURE — 22551 ARTHRD ANT NTRBDY CERVICAL: CPT | Performed by: NEUROLOGICAL SURGERY

## 2017-05-30 PROCEDURE — 40000169 ZZH STATISTIC PRE-PROCEDURE ASSESSMENT I: Performed by: NEUROLOGICAL SURGERY

## 2017-05-30 PROCEDURE — 25000128 H RX IP 250 OP 636: Performed by: NURSE ANESTHETIST, CERTIFIED REGISTERED

## 2017-05-30 PROCEDURE — 71000013 ZZH RECOVERY PHASE 1 LEVEL 1 EA ADDTL HR: Performed by: NEUROLOGICAL SURGERY

## 2017-05-30 PROCEDURE — 25000128 H RX IP 250 OP 636: Performed by: ANESTHESIOLOGY

## 2017-05-30 PROCEDURE — 27210995 ZZH RX 272: Performed by: NEUROLOGICAL SURGERY

## 2017-05-30 PROCEDURE — 25000128 H RX IP 250 OP 636: Performed by: NEUROLOGICAL SURGERY

## 2017-05-30 PROCEDURE — 25000301 ZZH OR RX SURGIFLO W/THROMBIN KIT 2ML 1991 OPNP: Performed by: NEUROLOGICAL SURGERY

## 2017-05-30 PROCEDURE — 25000125 ZZHC RX 250: Performed by: NURSE ANESTHETIST, CERTIFIED REGISTERED

## 2017-05-30 PROCEDURE — 27810322 ZZHC OR SPINE - CAGE/SPACER/DISK/CORD/CONNECTOR OPNP: Performed by: NEUROLOGICAL SURGERY

## 2017-05-30 PROCEDURE — 40000277 XR SURGERY CARM FLUORO LESS THAN 5 MIN W STILLS

## 2017-05-30 PROCEDURE — 25000125 ZZHC RX 250: Performed by: NEUROLOGICAL SURGERY

## 2017-05-30 PROCEDURE — C1763 CONN TISS, NON-HUMAN: HCPCS | Performed by: NEUROLOGICAL SURGERY

## 2017-05-30 PROCEDURE — 25000128 H RX IP 250 OP 636: Performed by: PHYSICIAN ASSISTANT

## 2017-05-30 PROCEDURE — 25000132 ZZH RX MED GY IP 250 OP 250 PS 637: Performed by: ANESTHESIOLOGY

## 2017-05-30 PROCEDURE — 25000566 ZZH SEVOFLURANE, EA 15 MIN: Performed by: NEUROLOGICAL SURGERY

## 2017-05-30 PROCEDURE — 22551 ARTHRD ANT NTRBDY CERVICAL: CPT | Mod: AS | Performed by: PHYSICIAN ASSISTANT

## 2017-05-30 PROCEDURE — 12000000 ZZH R&B MED SURG/OB

## 2017-05-30 PROCEDURE — 25000128 H RX IP 250 OP 636

## 2017-05-30 PROCEDURE — 22853 INSJ BIOMECHANICAL DEVICE: CPT | Mod: AS | Performed by: PHYSICIAN ASSISTANT

## 2017-05-30 PROCEDURE — 36000069 ZZH SURGERY LEVEL 5 EA 15 ADDTL MIN: Performed by: NEUROLOGICAL SURGERY

## 2017-05-30 DEVICE — IMPLANTABLE DEVICE: Type: IMPLANTABLE DEVICE | Site: SPINE CERVICAL | Status: FUNCTIONAL

## 2017-05-30 RX ORDER — DULOXETIN HYDROCHLORIDE 30 MG/1
60 CAPSULE, DELAYED RELEASE ORAL DAILY
Status: DISCONTINUED | OUTPATIENT
Start: 2017-05-30 | End: 2017-05-31 | Stop reason: HOSPADM

## 2017-05-30 RX ORDER — ACETAMINOPHEN 325 MG/1
650 TABLET ORAL EVERY 4 HOURS PRN
Status: DISCONTINUED | OUTPATIENT
Start: 2017-06-02 | End: 2017-05-31 | Stop reason: HOSPADM

## 2017-05-30 RX ORDER — AMOXICILLIN 250 MG
1-2 CAPSULE ORAL 2 TIMES DAILY
Status: DISCONTINUED | OUTPATIENT
Start: 2017-05-30 | End: 2017-05-31 | Stop reason: HOSPADM

## 2017-05-30 RX ORDER — SCOLOPAMINE TRANSDERMAL SYSTEM 1 MG/1
1 PATCH, EXTENDED RELEASE TRANSDERMAL
Status: DISCONTINUED | OUTPATIENT
Start: 2017-05-30 | End: 2017-05-30 | Stop reason: HOSPADM

## 2017-05-30 RX ORDER — FENTANYL CITRATE 50 UG/ML
25-50 INJECTION, SOLUTION INTRAMUSCULAR; INTRAVENOUS
Status: DISCONTINUED | OUTPATIENT
Start: 2017-05-30 | End: 2017-05-30 | Stop reason: HOSPADM

## 2017-05-30 RX ORDER — ONDANSETRON 4 MG/1
4 TABLET, ORALLY DISINTEGRATING ORAL EVERY 6 HOURS PRN
Status: DISCONTINUED | OUTPATIENT
Start: 2017-05-30 | End: 2017-05-31 | Stop reason: HOSPADM

## 2017-05-30 RX ORDER — ACETAMINOPHEN 325 MG/1
975 TABLET ORAL EVERY 8 HOURS
Status: DISCONTINUED | OUTPATIENT
Start: 2017-05-30 | End: 2017-05-31 | Stop reason: HOSPADM

## 2017-05-30 RX ORDER — ONDANSETRON 2 MG/ML
4 INJECTION INTRAMUSCULAR; INTRAVENOUS EVERY 30 MIN PRN
Status: DISCONTINUED | OUTPATIENT
Start: 2017-05-30 | End: 2017-05-30 | Stop reason: HOSPADM

## 2017-05-30 RX ORDER — SODIUM CHLORIDE, SODIUM LACTATE, POTASSIUM CHLORIDE, CALCIUM CHLORIDE 600; 310; 30; 20 MG/100ML; MG/100ML; MG/100ML; MG/100ML
INJECTION, SOLUTION INTRAVENOUS CONTINUOUS
Status: DISCONTINUED | OUTPATIENT
Start: 2017-05-30 | End: 2017-05-30 | Stop reason: HOSPADM

## 2017-05-30 RX ORDER — CHOLECALCIFEROL (VITAMIN D3) 10(400)/ML
DROPS ORAL 2 TIMES DAILY
Status: DISCONTINUED | OUTPATIENT
Start: 2017-05-30 | End: 2017-05-30 | Stop reason: CLARIF

## 2017-05-30 RX ORDER — PROPOFOL 10 MG/ML
INJECTION, EMULSION INTRAVENOUS CONTINUOUS PRN
Status: DISCONTINUED | OUTPATIENT
Start: 2017-05-30 | End: 2017-05-30

## 2017-05-30 RX ORDER — HYDROMORPHONE HYDROCHLORIDE 1 MG/ML
.3-.5 INJECTION, SOLUTION INTRAMUSCULAR; INTRAVENOUS; SUBCUTANEOUS EVERY 5 MIN PRN
Status: DISCONTINUED | OUTPATIENT
Start: 2017-05-30 | End: 2017-05-30 | Stop reason: HOSPADM

## 2017-05-30 RX ORDER — ALPRAZOLAM 0.5 MG/1
.125-.25 TABLET, EXTENDED RELEASE ORAL
Status: DISCONTINUED | OUTPATIENT
Start: 2017-05-30 | End: 2017-05-31 | Stop reason: HOSPADM

## 2017-05-30 RX ORDER — FENTANYL CITRATE 50 UG/ML
50-100 INJECTION, SOLUTION INTRAMUSCULAR; INTRAVENOUS
Status: COMPLETED | OUTPATIENT
Start: 2017-05-30 | End: 2017-05-30

## 2017-05-30 RX ORDER — FLUTICASONE PROPIONATE 50 MCG
1 SPRAY, SUSPENSION (ML) NASAL DAILY PRN
COMMUNITY
End: 2017-09-27

## 2017-05-30 RX ORDER — HYDROXYZINE HYDROCHLORIDE 25 MG/1
25 TABLET, FILM COATED ORAL EVERY 6 HOURS PRN
Status: DISCONTINUED | OUTPATIENT
Start: 2017-05-30 | End: 2017-05-31 | Stop reason: HOSPADM

## 2017-05-30 RX ORDER — METOCLOPRAMIDE HYDROCHLORIDE 5 MG/ML
10 INJECTION INTRAMUSCULAR; INTRAVENOUS EVERY 6 HOURS PRN
Status: DISCONTINUED | OUTPATIENT
Start: 2017-05-30 | End: 2017-05-31 | Stop reason: HOSPADM

## 2017-05-30 RX ORDER — LIDOCAINE HYDROCHLORIDE 20 MG/ML
INJECTION, SOLUTION INFILTRATION; PERINEURAL PRN
Status: DISCONTINUED | OUTPATIENT
Start: 2017-05-30 | End: 2017-05-30

## 2017-05-30 RX ORDER — MULTIVITAMIN,THERAPEUTIC
1 TABLET ORAL DAILY
Status: DISCONTINUED | OUTPATIENT
Start: 2017-05-30 | End: 2017-05-31 | Stop reason: HOSPADM

## 2017-05-30 RX ORDER — ACETAMINOPHEN 325 MG/1
975 TABLET ORAL ONCE
Status: COMPLETED | OUTPATIENT
Start: 2017-05-30 | End: 2017-05-30

## 2017-05-30 RX ORDER — SODIUM CHLORIDE 9 MG/ML
INJECTION, SOLUTION INTRAVENOUS CONTINUOUS
Status: DISCONTINUED | OUTPATIENT
Start: 2017-05-30 | End: 2017-05-31 | Stop reason: HOSPADM

## 2017-05-30 RX ORDER — NEOSTIGMINE METHYLSULFATE 1 MG/ML
VIAL (ML) INJECTION PRN
Status: DISCONTINUED | OUTPATIENT
Start: 2017-05-30 | End: 2017-05-30

## 2017-05-30 RX ORDER — HYDROMORPHONE HYDROCHLORIDE 1 MG/ML
INJECTION, SOLUTION INTRAMUSCULAR; INTRAVENOUS; SUBCUTANEOUS
Status: COMPLETED
Start: 2017-05-30 | End: 2017-05-30

## 2017-05-30 RX ORDER — GLYCOPYRROLATE 0.2 MG/ML
INJECTION, SOLUTION INTRAMUSCULAR; INTRAVENOUS PRN
Status: DISCONTINUED | OUTPATIENT
Start: 2017-05-30 | End: 2017-05-30

## 2017-05-30 RX ORDER — NALOXONE HYDROCHLORIDE 0.4 MG/ML
.1-.4 INJECTION, SOLUTION INTRAMUSCULAR; INTRAVENOUS; SUBCUTANEOUS
Status: DISCONTINUED | OUTPATIENT
Start: 2017-05-30 | End: 2017-05-31 | Stop reason: HOSPADM

## 2017-05-30 RX ORDER — PROCHLORPERAZINE MALEATE 5 MG
5-10 TABLET ORAL EVERY 6 HOURS PRN
Status: DISCONTINUED | OUTPATIENT
Start: 2017-05-30 | End: 2017-05-31 | Stop reason: HOSPADM

## 2017-05-30 RX ORDER — LIDOCAINE 40 MG/G
CREAM TOPICAL
Status: DISCONTINUED | OUTPATIENT
Start: 2017-05-30 | End: 2017-05-31 | Stop reason: HOSPADM

## 2017-05-30 RX ORDER — METOCLOPRAMIDE 10 MG/1
10 TABLET ORAL EVERY 6 HOURS PRN
Status: DISCONTINUED | OUTPATIENT
Start: 2017-05-30 | End: 2017-05-31 | Stop reason: HOSPADM

## 2017-05-30 RX ORDER — PROPOFOL 10 MG/ML
INJECTION, EMULSION INTRAVENOUS PRN
Status: DISCONTINUED | OUTPATIENT
Start: 2017-05-30 | End: 2017-05-30

## 2017-05-30 RX ORDER — CEFAZOLIN SODIUM 2 G/100ML
2 INJECTION, SOLUTION INTRAVENOUS
Status: COMPLETED | OUTPATIENT
Start: 2017-05-30 | End: 2017-05-30

## 2017-05-30 RX ORDER — ONDANSETRON 2 MG/ML
INJECTION INTRAMUSCULAR; INTRAVENOUS PRN
Status: DISCONTINUED | OUTPATIENT
Start: 2017-05-30 | End: 2017-05-30

## 2017-05-30 RX ORDER — METHOCARBAMOL 750 MG/1
750 TABLET, FILM COATED ORAL EVERY 6 HOURS PRN
Status: DISCONTINUED | OUTPATIENT
Start: 2017-05-30 | End: 2017-05-31 | Stop reason: HOSPADM

## 2017-05-30 RX ORDER — ONDANSETRON 2 MG/ML
4 INJECTION INTRAMUSCULAR; INTRAVENOUS EVERY 6 HOURS PRN
Status: DISCONTINUED | OUTPATIENT
Start: 2017-05-30 | End: 2017-05-31 | Stop reason: HOSPADM

## 2017-05-30 RX ORDER — CALCIUM CARBONATE 500 MG/1
500-1000 TABLET, CHEWABLE ORAL 4 TIMES DAILY PRN
Status: DISCONTINUED | OUTPATIENT
Start: 2017-05-30 | End: 2017-05-31 | Stop reason: HOSPADM

## 2017-05-30 RX ORDER — OXYCODONE HYDROCHLORIDE 5 MG/1
5-10 TABLET ORAL EVERY 4 HOURS PRN
Qty: 80 TABLET | Refills: 0 | Status: SHIPPED | OUTPATIENT
Start: 2017-05-30 | End: 2017-08-16

## 2017-05-30 RX ORDER — CEFAZOLIN SODIUM 1 G/3ML
1 INJECTION, POWDER, FOR SOLUTION INTRAMUSCULAR; INTRAVENOUS SEE ADMIN INSTRUCTIONS
Status: DISCONTINUED | OUTPATIENT
Start: 2017-05-30 | End: 2017-05-30 | Stop reason: HOSPADM

## 2017-05-30 RX ORDER — ONDANSETRON 4 MG/1
4 TABLET, ORALLY DISINTEGRATING ORAL EVERY 30 MIN PRN
Status: DISCONTINUED | OUTPATIENT
Start: 2017-05-30 | End: 2017-05-30 | Stop reason: HOSPADM

## 2017-05-30 RX ORDER — HYDROMORPHONE HYDROCHLORIDE 1 MG/ML
.3-.5 INJECTION, SOLUTION INTRAMUSCULAR; INTRAVENOUS; SUBCUTANEOUS
Status: DISCONTINUED | OUTPATIENT
Start: 2017-05-30 | End: 2017-05-31 | Stop reason: HOSPADM

## 2017-05-30 RX ORDER — OXYCODONE HYDROCHLORIDE 5 MG/1
5-10 TABLET ORAL
Status: DISCONTINUED | OUTPATIENT
Start: 2017-05-30 | End: 2017-05-31 | Stop reason: HOSPADM

## 2017-05-30 RX ORDER — GABAPENTIN 300 MG/1
300 CAPSULE ORAL ONCE
Status: COMPLETED | OUTPATIENT
Start: 2017-05-30 | End: 2017-05-30

## 2017-05-30 RX ORDER — CYCLOBENZAPRINE HCL 10 MG
10 TABLET ORAL 3 TIMES DAILY PRN
Qty: 80 TABLET | Refills: 1 | Status: SHIPPED | OUTPATIENT
Start: 2017-05-30 | End: 2017-08-16

## 2017-05-30 RX ADMIN — THROMBIN, TOPICAL (BOVINE) 5000 UNITS: KIT at 12:22

## 2017-05-30 RX ADMIN — DEXMEDETOMIDINE HYDROCHLORIDE 0.3 MCG/KG/HR: 4 INJECTION, SOLUTION INTRAVENOUS at 11:22

## 2017-05-30 RX ADMIN — FENTANYL CITRATE 200 MCG: 50 INJECTION, SOLUTION INTRAMUSCULAR; INTRAVENOUS at 11:19

## 2017-05-30 RX ADMIN — SODIUM CHLORIDE: 9 INJECTION, SOLUTION INTRAVENOUS at 16:33

## 2017-05-30 RX ADMIN — ACETAMINOPHEN 975 MG: 325 TABLET, FILM COATED ORAL at 10:03

## 2017-05-30 RX ADMIN — GENTAMICIN SULFATE 1000 ML: 40 INJECTION, SOLUTION INTRAMUSCULAR; INTRAVENOUS at 12:23

## 2017-05-30 RX ADMIN — ROCURONIUM BROMIDE 10 MG: 10 INJECTION INTRAVENOUS at 12:19

## 2017-05-30 RX ADMIN — KETAMINE HCL-NACL SOLN PREF SY 50 MG/5ML-0.9% (10MG/ML) 10 MG: 10 SOLUTION PREFILLED SYRINGE at 12:30

## 2017-05-30 RX ADMIN — SCOPALAMINE 1 PATCH: 1 PATCH, EXTENDED RELEASE TRANSDERMAL at 10:03

## 2017-05-30 RX ADMIN — SODIUM CHLORIDE, POTASSIUM CHLORIDE, SODIUM LACTATE AND CALCIUM CHLORIDE: 600; 310; 30; 20 INJECTION, SOLUTION INTRAVENOUS at 10:04

## 2017-05-30 RX ADMIN — FENTANYL CITRATE 50 MCG: 50 INJECTION, SOLUTION INTRAMUSCULAR; INTRAVENOUS at 12:30

## 2017-05-30 RX ADMIN — CEFAZOLIN SODIUM 2 G: 2 INJECTION, SOLUTION INTRAVENOUS at 11:30

## 2017-05-30 RX ADMIN — MIDAZOLAM HYDROCHLORIDE 2 MG: 1 INJECTION, SOLUTION INTRAMUSCULAR; INTRAVENOUS at 11:10

## 2017-05-30 RX ADMIN — HYDROMORPHONE HYDROCHLORIDE 0.5 MG: 1 INJECTION, SOLUTION INTRAMUSCULAR; INTRAVENOUS; SUBCUTANEOUS at 22:43

## 2017-05-30 RX ADMIN — HYDROMORPHONE HYDROCHLORIDE 0.5 MG: 1 INJECTION, SOLUTION INTRAMUSCULAR; INTRAVENOUS; SUBCUTANEOUS at 20:29

## 2017-05-30 RX ADMIN — HYDROMORPHONE HYDROCHLORIDE 0.5 MG: 1 INJECTION, SOLUTION INTRAMUSCULAR; INTRAVENOUS; SUBCUTANEOUS at 16:18

## 2017-05-30 RX ADMIN — GABAPENTIN 300 MG: 300 CAPSULE ORAL at 10:04

## 2017-05-30 RX ADMIN — ROCURONIUM BROMIDE 10 MG: 10 INJECTION INTRAVENOUS at 12:45

## 2017-05-30 RX ADMIN — THROMBIN, TOPICAL (BOVINE) 5000 UNITS: KIT at 12:10

## 2017-05-30 RX ADMIN — MIDAZOLAM HYDROCHLORIDE 1 MG: 1 INJECTION, SOLUTION INTRAMUSCULAR; INTRAVENOUS at 10:05

## 2017-05-30 RX ADMIN — FENTANYL CITRATE 25 MCG: 50 INJECTION, SOLUTION INTRAMUSCULAR; INTRAVENOUS at 14:58

## 2017-05-30 RX ADMIN — LIDOCAINE HYDROCHLORIDE 80 MG: 20 INJECTION, SOLUTION INFILTRATION; PERINEURAL at 11:19

## 2017-05-30 RX ADMIN — NEOSTIGMINE METHYLSULFATE 3.5 MG: 1 INJECTION INTRAMUSCULAR; INTRAVENOUS; SUBCUTANEOUS at 13:13

## 2017-05-30 RX ADMIN — GLYCOPYRROLATE 0.7 MG: 0.2 INJECTION, SOLUTION INTRAMUSCULAR; INTRAVENOUS at 13:13

## 2017-05-30 RX ADMIN — HYDROMORPHONE HYDROCHLORIDE 0.5 MG: 1 INJECTION, SOLUTION INTRAMUSCULAR; INTRAVENOUS; SUBCUTANEOUS at 14:10

## 2017-05-30 RX ADMIN — HYDROMORPHONE HYDROCHLORIDE 0.5 MG: 1 INJECTION, SOLUTION INTRAMUSCULAR; INTRAVENOUS; SUBCUTANEOUS at 14:01

## 2017-05-30 RX ADMIN — PROPOFOL 100 MCG/KG/MIN: 10 INJECTION, EMULSION INTRAVENOUS at 11:22

## 2017-05-30 RX ADMIN — HYDROMORPHONE HYDROCHLORIDE 0.5 MG: 1 INJECTION, SOLUTION INTRAMUSCULAR; INTRAVENOUS; SUBCUTANEOUS at 14:31

## 2017-05-30 RX ADMIN — FENTANYL CITRATE 50 MCG: 50 INJECTION, SOLUTION INTRAMUSCULAR; INTRAVENOUS at 12:05

## 2017-05-30 RX ADMIN — ROCURONIUM BROMIDE 50 MG: 10 INJECTION INTRAVENOUS at 11:19

## 2017-05-30 RX ADMIN — SODIUM CHLORIDE, POTASSIUM CHLORIDE, SODIUM LACTATE AND CALCIUM CHLORIDE: 600; 310; 30; 20 INJECTION, SOLUTION INTRAVENOUS at 12:06

## 2017-05-30 RX ADMIN — HYDROMORPHONE HYDROCHLORIDE 0.5 MG: 1 INJECTION, SOLUTION INTRAMUSCULAR; INTRAVENOUS; SUBCUTANEOUS at 18:18

## 2017-05-30 RX ADMIN — ONDANSETRON 4 MG: 2 INJECTION INTRAMUSCULAR; INTRAVENOUS at 13:03

## 2017-05-30 RX ADMIN — PROPOFOL 150 MG: 10 INJECTION, EMULSION INTRAVENOUS at 11:19

## 2017-05-30 ASSESSMENT — COPD QUESTIONNAIRES: COPD: 0

## 2017-05-30 ASSESSMENT — LIFESTYLE VARIABLES: TOBACCO_USE: 0

## 2017-05-30 NOTE — PROGRESS NOTES
Admission medication history interview status for the 5/30/2017  admission is complete. See EPIC admission navigator for prior to admission medications     Medication history source reliability:Good    Medication history interview source(s):Patient    Medication history resources (including written lists, pill bottles, clinic record):None    [facility]    Additional medication history information not noted on PTA med list :None    Time spent in this activity: 45 minutes    Prior to Admission medications    Medication Sig Last Dose Taking? Auth Provider   DULOXETINE HCL PO Take 60 mg by mouth daily (Takes 2 x 30mg capsule = 60mg dose) 5/29/2017 at AM Yes Reported, Patient   fluticasone (FLONASE) 50 MCG/ACT spray Spray 1 spray into both nostrils daily as needed for rhinitis or allergies Past Week at PRN Yes Reported, Patient   LORATADINE PO Take 10 mg by mouth daily 5/29/2017 at AM Yes Reported, Patient   TEMAZEPAM PO Take 15-30 mg by mouth nightly as needed for sleep 5/29/2017 at 2000 Yes Reported, Patient   Digestive Enzymes (DIGESTIVE ENZYME PO) Take 2 capsules by mouth 2 times daily 5/23/2017 Yes Reported, Patient   Polyethyl Glycol-Propyl Glycol (SYSTANE OP) Place 1-2 drops into both eyes daily as needed 5/27/2017 at PRN Yes Reported, Patient   ALPRAZolam (XANAX PO) Take 0.125-0.25 mg by mouth nightly as needed  5/29/2017 at 1800 Yes Reported, Patient   Multiple Vitamin (MULTI-VITAMIN DAILY PO) Take 1 tablet by mouth daily 5/23/2017 at AM Yes Reported, Patient   Cyanocobalamin (VITAMIN B-12 PO) Take 1 tablet by mouth daily 5/23/2017 at AM Yes Reported, Patient   Cholecalciferol (VITAMIN D-3 PO) Take 1 capsule by mouth daily 5/23/2017 at AM Yes Reported, Patient

## 2017-05-30 NOTE — ANESTHESIA PREPROCEDURE EVALUATION
Anesthesia Evaluation     . Pt has had prior anesthetic. Type: General    History of anesthetic complications   - difficult intubation and PONV        ROS/MED HX    ENT/Pulmonary:      (-) tobacco use, asthma, COPD and sleep apnea   Neurologic:     (+)neuropathy - left arm radiculopathy,    (-) CVA   Cardiovascular:        (-) hypertension, CAD and dyslipidemia   METS/Exercise Tolerance:     Hematologic:         Musculoskeletal:         GI/Hepatic:     (+) Other GI/Hepatic ulcerative colitis     (-) GERD and liver disease   Renal/Genitourinary:      (-) renal disease   Endo:      (-) Type I DM and Type II DM   Psychiatric:     (+) psychiatric history anxiety and depression      Infectious Disease:         Malignancy:         Other:                     Physical Exam  Normal systems: cardiovascular, pulmonary and dental    Airway   Mallampati: IV  TM distance: >3 FB  Neck ROM: full    Dental     Cardiovascular       Pulmonary                     Anesthesia Plan      History & Physical Review  History and physical reviewed and following examination; no interval change.    ASA Status:  2 .    NPO Status:  > 8 hours    Plan for General and ETT with Intravenous induction. Maintenance will be TIVA.    PONV prophylaxis:  Ondansetron (or other 5HT-3)  Additional equipment: Videolaryngoscope No steroid  precedex and propofol infusions  Glidescope  zofran      Postoperative Care  Postoperative pain management:  Multi-modal analgesia.      Consents  Anesthetic plan, risks, benefits and alternatives discussed with:  Patient..                          .

## 2017-05-30 NOTE — IP AVS SNAPSHOT
MRN:[id number]                      After Visit Summary   5/30/2017    Jayashree Johnson    MRN: [id number]           Thank you!     Thank you for choosing [facility] for your care. Our goal is always to provide you with excellent care. Hearing back from our patients is one way we can continue to improve our services. Please take a few minutes to complete the written survey that you may receive in the mail after you visit with us. Thank you!        Patient Information     Date Of Birth          1970        Designated Caregiver       Most Recent Value    Caregiver    Will someone help with your care after discharge? yes    Name of designated caregiver Wojciech    Phone number of caregiver [phone]    Caregiver address [address] [address] [place] [address]      About your hospital stay     You were admitted on:  May 30, 2017 You last received care in the:  [facility] [address] [facility] [place]    You were discharged on:  May 31, 2017        Reason for your hospital stay       You were in the hospital for a cervical fusion.            Reason for your hospital stay       You were in the hospital for a cervical fusion.                  Who to Call     For medical emergencies, please call 911.  For non-urgent questions about your medical care, please call your primary care provider or clinic, [phone]  For questions related to your surgery, please call your surgery clinic        Attending Provider     Provider Specialty    Willard Escalante MD Neurosurgery       Primary Care Provider Office Phone # Fax #    Aneudy Jackson [phone]      After Care Instructions     Activity       Discharge instructions:  No lifting of more than 10 pounds, no bending, no twisting until follow up visit.    Ok to shampoo hair, shower or bathe, but, do not scrub or submerge incision under water until evaluated post-operatively in clinic.    Ok to walk as tolerated, avoid bed rest and prolonged  sitting.    No contact sports until after follow up visit  No high impact activities such as; running/jogging, snowmobile or 4 kowalski riding or any other recreational vehicles.    Do not take NSAIDS (ibuprofen, advil, aleve, naproxen, etc) for pain control.    Do NOT drive while taking narcotic pain medication.    Call the [facility] at [phone] for increasing redness, swelling or pus draining from the incision, increased pain or any other questions and concerns.            Activity       Discharge instructions:  No lifting of more than 10 pounds, no bending, no twisting until follow up visit.  Wear brace when out of bed.    Ok to shampoo hair, shower or bathe, but, do not scrub or submerge incision under water until evaluated post-operatively in clinic.    Ok to walk as tolerated, avoid bed rest and prolonged sitting.    No contact sports until after follow up visit  No high impact activities such as; running/jogging, snowmobile or 4 kowalski riding or any other recreational vehicles.    Do not take NSAIDS (ibuprofen, advil, aleve, naproxen, etc) for pain control.    Do NOT drive while taking narcotic pain medication.    Call the [facility] at [phone] for increasing redness, swelling or pus draining from the incision, increased pain or any other questions and concerns.            Diet       Follow this diet upon discharge: None            Diet       Follow this diet upon discharge: Regular diet.            Wound care and dressings       Keep your incision clean and dry at all times.  OK to remove dressing on postop day 2.  OK to shower on postop day 3 and allow water to run over incision, pat dry after shower.  No bathing swimming or submerging in water.  Call immediately or come to ED if any drainage occurs, or you develop new pain, redness, or swelling.            Wound care and dressings       Keep your incision clean and dry at all times.  OK to remove dressing on postop day 2.   OK to shower on postop day 3 and allow water to run over incision, pat dry after shower.  No bathing swimming or submerging in water.  Call immediately or come to ED if any drainage occurs, or you develop new pain, redness, or swelling.                  Follow-up Appointments     Follow-up and recommended labs and tests        Please follow up at [facility] in 6 weeks for routine post op check.  Please call the clinic at [phone] to schedule your appointment with Teresa Machado PA-C or William Albert PA-C.            Follow-up and recommended labs and tests        Please follow up at the [facility] in 6 weeks for routine post op with xray prior.  Please call the clinic at [phone] to schedule your appointment with Teresa Machado PA-C or William Albert PA-C.                  Your next 10 appointments already scheduled     Jun 28, 2017 10:00 AM CDT   [facility]    [address] [place] [address] [phone]            Jun 28, 2017 11:00 AM CDT   MA SCREENING BILATERAL W/ NAHED with MGMA1, MG MA TECH   [facility]    [address] [place] [address] [phone]           Do not use any powder, lotion or deodorant under your arms or on your breast. If you do, we will ask you to remove it before your exam.  Wear comfortable, two-piece clothing.  If you have any allergies, tell your care team.  Bring any previous mammograms from other facilities or have them mailed to the breast center.            Jul 27, 2017  1:00 PM CDT   Return Visit with Teresa Machado PA-C   [facility]    [address] [place] [id number] [phone]            Aug 10, 2017 11:00 AM CDT   [facility] Visit with Natalie Kern MD   [facility]     [address] [place] [address] [phone]              Future tests that were ordered for you     XR Cervical Spine 2/3 Views                 Further instructions from your care team       [facility]  Dr. Escalante Discharge Instructions Following Spine Surgery  [phone]  Monday - Friday; 8:00 AM - 4:00 PM    In General:   After you have had surgery on your spine, remember do not twist, or excessively flex or extend the area that you had surgery.  These activities can prevent healing.  Pain is normal and to be expected following surgery.  Please call our office to schedule your appointment follow up appointment.      Bowel Care:  Many people have constipation (hard stools) after surgery.  To help prevent constipation: Drink plenty of fluid (8-10 glasses/day); Eat more fiber, such as whole grain bread, bran cereal, and fruits and vegetables; Stay active by walking; Over the counter stool softener may also help.      Medications:  Spine surgery and pain management is unique to all patients.  You will generally be given medications for pain, muscle spasms or tightness, and for constipation during the immediate post op period.  It is important that you use these as prescribed.  Please remember to bring your pill bottles to all of your appointments. Avoid alcoholic beverages while taking narcotic pain medications. You can use ice to areas of pain as needed, 20 minutes at a time.  Changing positions and walking will help loosen your muscles as well.    Driving:  No driving while on narcotic pain medications.  It is state law not to drive while under the influence of a drug to a degree which renders you incapable of safely driving.  The narcotic medication you will be taking after surgery falls under this category.     Activity:   After surgery, most people feel less pain than they have had in a long time.  Walking and light activities will help you regain  the use of your muscles.  You are encouraged to walk: start with short walks 5-10 minutes at a time for 4-5 times per day and increase as tolerated.  Stair climbing as tolerated, we recommend you use the railing. No lifting greater than 10 pounds: approximately equal to one gallon of milk. No twisting, bending in the area you have had surgery. No housework, vacuuming, laundry, leaf raking, lawn mowing, or snow removal. Wear your brace (if ordered) as directed.    Showers:  If you have sutures or staples you may shower two days after surgery. It is ok to let water run over your incision but do not touch or scrub on the incision. Pat dry immediately after showering. If there is a dressing in place, you may remove it 2 days after surgery. If you were closed with Derma melendez (glue), you may shower without covering the incision. No baths, hot tubs, or pool activity for at least 6 weeks.     Nutrition:  In general, your diet restrictions will not change with your surgery.  You may need to eat small frequent meals initially until your appetite returns.  Eat plenty of high fiber foods and drink plenty of fluids. If you do not have a fluid restriction from or prior to surgery, we recommend 6-8 (8oz) glasses of water per day. Other fluids are fine, but water is best. Nausea is not uncommon; it is a common side effect to many pain medications.  We recommend that you take the pain medications with food, if this does not improve your symptoms, please call us.     Smoking:  For proper healing it is required that you quit using all tobacco products.  This includes smoking, chewing, nicotine gums, and nicotine patches.  Call Dr. Escalante if these occur: Drainage from your incision, increased pain/redness/swelling, temperatures greater than 101.5, increased leg pain or swelling or unrelieved headaches    Go to the nearest Emergency Room if you experience: chest pain, shortness of breath, neck swelling or swallowing  problems            Pending Results     No orders found for last 3 day(s).            Statement of Approval     Ordered          05/31/17 0950  I have reviewed and agree with all the recommendations and orders detailed in this document.  EFFECTIVE NOW     Approved and electronically signed by:  Teresa Machado PA-C             Admission Information     Date & Time Provider Department Dept. Phone    5/30/2017 Willard Escalante MD [address] [facility] [phone]      Your Vitals Were     Blood Pressure Pulse Temperature Respirations Last Period Pulse Oximetry    121/69 (BP Location: Left arm) 76 97.9  F (36.6  C) (Oral) 14 03/17/2003 96%      MyChart Information     [email or web address] gives you secure access to your electronic health record. If you see a primary care provider, you can also send messages to your care team and make appointments. If you have questions, please call your primary care clinic.  If you do not have a primary care provider, please call [phone] and they will assist you.        Care EveryWhere ID     This is your Care EveryWhere ID. This could be used by other organizations to access your [facility] medical records  [id number]           Review of your medicines      START taking        Dose / Directions    cyclobenzaprine 10 MG tablet   Commonly known as:  FLEXERIL        Dose:  10 mg   Take 1 tablet (10 mg) by mouth 3 times daily as needed for muscle spasms   Quantity:  80 tablet   Refills:  1       oxyCODONE 5 MG IR tablet   Commonly known as:  ROXICODONE        Dose:  5-10 mg   Take 1-2 tablets (5-10 mg) by mouth every 4 hours as needed for pain   Quantity:  80 tablet   Refills:  0         CONTINUE these medicines which have NOT CHANGED        Dose / Directions    DIGESTIVE ENZYME PO        Dose:  2 capsule   Take 2 capsules by mouth 2 times daily   Refills:  0       DULOXETINE HCL PO        Dose:  60 mg   Take 60 mg by mouth daily (Takes 2 x 30mg capsule = 60mg dose)    Refills:  0       fluticasone 50 MCG/ACT spray   Commonly known as:  FLONASE        Dose:  1 spray   Spray 1 spray into both nostrils daily as needed for rhinitis or allergies   Refills:  0       LORATADINE PO        Dose:  10 mg   Take 10 mg by mouth daily   Refills:  0       MULTI-VITAMIN DAILY PO        Dose:  1 tablet   Take 1 tablet by mouth daily   Refills:  0       SYSTANE OP        Dose:  1-2 drop   Place 1-2 drops into both eyes daily as needed   Refills:  0       TEMAZEPAM PO        Dose:  15-30 mg   Take 15-30 mg by mouth nightly as needed for sleep   Refills:  0       VITAMIN B-12 PO        Dose:  1 tablet   Take 1 tablet by mouth daily   Refills:  0       VITAMIN D-3 PO        Dose:  1 capsule   Take 1 capsule by mouth daily   Refills:  0       XANAX PO   Indication:  Trouble Sleeping        Dose:  0.125-0.25 mg   Take 0.125-0.25 mg by mouth nightly as needed for sleep   Refills:  0            Where to get your medicines      These medications were sent to [facility] - [address]  [address] [facility] [address], [place] [address]     Phone:  [phone]     cyclobenzaprine 10 MG tablet         Some of these will need a paper prescription and others can be bought over the counter. Ask your nurse if you have questions.     Bring a paper prescription for each of these medications     oxyCODONE 5 MG IR tablet                Protect others around you: Learn how to safely use, store and throw away your medicines at www.disposemymeds.org.             Medication List: This is a list of all your medications and when to take them. Check marks below indicate your daily home schedule. Keep this list as a reference.      Medications           Morning Afternoon Evening Bedtime As Needed    cyclobenzaprine 10 MG tablet   Commonly known as:  FLEXERIL   Take 1 tablet (10 mg) by mouth 3 times daily as needed for muscle spasms                                   DIGESTIVE ENZYME PO   Take 2  capsules by mouth 2 times daily   Next Dose Due:  Resume home schedule                                      DULOXETINE HCL PO   Take 60 mg by mouth daily (Takes 2 x 30mg capsule = 60mg dose)   Last time this was given:  60 mg on 5/31/2017  9:01 AM   Next Dose Due:  Tomorrow AM, 9 AM                                   fluticasone 50 MCG/ACT spray   Commonly known as:  FLONASE   Spray 1 spray into both nostrils daily as needed for rhinitis or allergies                                   LORATADINE PO   Take 10 mg by mouth daily   Next Dose Due:  Resume home schedule                                   MULTI-VITAMIN DAILY PO   Take 1 tablet by mouth daily   Next Dose Due:  Tomorrow AM, 9 AM                                   oxyCODONE 5 MG IR tablet   Commonly known as:  ROXICODONE   Take 1-2 tablets (5-10 mg) by mouth every 4 hours as needed for pain   Last time this was given:  10 mg on 5/31/2017 12:17 PM   Next Dose Due:  4:15 PM if needed                                   SYSTANE OP   Place 1-2 drops into both eyes daily as needed                                   TEMAZEPAM PO   Take 15-30 mg by mouth nightly as needed for sleep                                      VITAMIN B-12 PO   Take 1 tablet by mouth daily   Next Dose Due:  Tomorrow AM, Resume home schedule                                   VITAMIN D-3 PO   Take 1 capsule by mouth daily   Last time this was given:  5/31/2017 @ 9:04 AM   Next Dose Due:  Tomorrow AM, 9 AM                                   XANAX PO   Take 0.125-0.25 mg by mouth nightly as needed for sleep

## 2017-05-30 NOTE — ANESTHESIA CARE TRANSFER NOTE
Patient: Jayashree Johnson    Procedure(s):  CERVICAL FIVE TO CERVICAL SIX  ANTERIOR CERVICAL DISCECTOMY AND FUSION  - Wound Class: I-Clean    Diagnosis: CERVICAL STENOSIS   Diagnosis Additional Information: No value filed.    Anesthesia Type:   General, ETT     Note:  Airway :Face Mask  Patient transferred to:PACU  Comments: Transferred to PACU, spontaneous respirations, 10L oxygen via facemask.  All monitors and alarms on and functioning, VSS.  Patient awake, comfortable.  Report to PACU RN.      Vitals: (Last set prior to Anesthesia Care Transfer)    CRNA VITALS  5/30/2017 1303 - 5/30/2017 1340      5/30/2017             Pulse: 81    SpO2: 99 %    Resp Rate (observed): (!)  6                Electronically Signed By: MIMI Pike CRNA  May 30, 2017  1:40 PM

## 2017-05-30 NOTE — PHARMACY-PHARMACOTHERAPY NOTE
"The following home medications were NOT continued on inpatient admission per \"Discontinuation of nonessential home medications during hospitalization\" policy: DIGESTIVE ENZYMES    If a therapeutic holiday is deemed inappropriate per the prescriber, please notify the pharmacist regarding the medication order.    The pharmacist is available to answer any questions and/or concerns the patient may have regarding discontinuation of non-essential medications.    Please ensure that these medications are restarted as needed upon discharge via the medication reconciliation discharge process and included on the discharge medication reconciliation report.    Thank you,  Corina Bledsoe    "

## 2017-05-30 NOTE — IP AVS SNAPSHOT
[address] [facility]    [address]    [place] [address]    Phone:  [phone]                                       After Visit Summary   5/30/2017    Jayashree Johnson    MRN: [id number]           After Visit Summary Signature Page     I have received my discharge instructions, and my questions have been answered. I have discussed any challenges I see with this plan with the nurse or doctor.    ..........................................................................................................................................  Patient/Patient Representative Signature      ..........................................................................................................................................  Patient Representative Print Name and Relationship to Patient    ..................................................               ................................................  Date                                            Time    ..........................................................................................................................................  Reviewed by Signature/Title    ...................................................              ..............................................  Date                                                            Time

## 2017-05-30 NOTE — ANESTHESIA POSTPROCEDURE EVALUATION
Patient: Jayashree Johnson    Procedure(s):  CERVICAL FIVE TO CERVICAL SIX  ANTERIOR CERVICAL DISCECTOMY AND FUSION  - Wound Class: I-Clean    Diagnosis:CERVICAL STENOSIS   Diagnosis Additional Information: No value filed.    Anesthesia Type:  General, ETT    Note:  Anesthesia Post Evaluation    Patient location during evaluation: PACU  Patient participation: Able to fully participate in evaluation  Level of consciousness: awake and alert  Pain management: satisfactory to patient  Airway patency: patent  Cardiovascular status: hemodynamically stable  Respiratory status: acceptable and unassisted  Hydration status: balanced  PONV: none     Anesthetic complications: None          Last vitals:  Vitals:    05/30/17 1530 05/30/17 1540 05/30/17 1550   Resp: 14 14 12   SpO2:            Electronically Signed By: Zhang Atkinson MD  May 30, 2017  4:12 PM

## 2017-05-30 NOTE — BRIEF OP NOTE
[facility]   Brief Operative Note    Pre-operative diagnosis: CERVICAL STENOSIS    Post-operative diagnosis Same   Procedure: Procedure(s):  CERVICAL FIVE TO CERVICAL SIX  ANTERIOR CERVICAL DISCECTOMY AND FUSION  - Wound Class: I-Clean   Surgeon(s): Surgeon(s) and Role:     * Willard Escalante MD - Primary     * Teresa Machado PA-C - Assisting   Estimated blood loss: 25 mL    Specimens: * No specimens in log *   Findings: See op note

## 2017-05-31 ENCOUNTER — APPOINTMENT (OUTPATIENT)
Dept: PHYSICAL THERAPY | Facility: CLINIC | Age: 47
End: 2017-05-31
Attending: PHYSICIAN ASSISTANT
Payer: COMMERCIAL

## 2017-05-31 VITALS
DIASTOLIC BLOOD PRESSURE: 69 MMHG | SYSTOLIC BLOOD PRESSURE: 121 MMHG | OXYGEN SATURATION: 96 % | RESPIRATION RATE: 14 BRPM | TEMPERATURE: 97.9 F | HEART RATE: 76 BPM

## 2017-05-31 LAB — GLUCOSE BLDC GLUCOMTR-MCNC: 110 MG/DL (ref 70–99)

## 2017-05-31 PROCEDURE — 25000132 ZZH RX MED GY IP 250 OP 250 PS 637: Performed by: NEUROLOGICAL SURGERY

## 2017-05-31 PROCEDURE — 97161 PT EVAL LOW COMPLEX 20 MIN: CPT | Mod: GP

## 2017-05-31 PROCEDURE — 82962 GLUCOSE BLOOD TEST: CPT

## 2017-05-31 PROCEDURE — 25000132 ZZH RX MED GY IP 250 OP 250 PS 637: Performed by: PHYSICIAN ASSISTANT

## 2017-05-31 PROCEDURE — 25000128 H RX IP 250 OP 636: Performed by: PHYSICIAN ASSISTANT

## 2017-05-31 PROCEDURE — 97530 THERAPEUTIC ACTIVITIES: CPT | Mod: GP

## 2017-05-31 PROCEDURE — 40000193 ZZH STATISTIC PT WARD VISIT

## 2017-05-31 PROCEDURE — 40000894 ZZH STATISTIC OT IP EVAL DEFER

## 2017-05-31 RX ADMIN — OXYCODONE HYDROCHLORIDE 5 MG: 5 TABLET ORAL at 05:32

## 2017-05-31 RX ADMIN — ACETAMINOPHEN 975 MG: 325 TABLET, FILM COATED ORAL at 05:34

## 2017-05-31 RX ADMIN — OXYCODONE HYDROCHLORIDE 5 MG: 5 TABLET ORAL at 08:59

## 2017-05-31 RX ADMIN — SENNOSIDES AND DOCUSATE SODIUM 2 TABLET: 8.6; 5 TABLET ORAL at 09:00

## 2017-05-31 RX ADMIN — SODIUM CHLORIDE: 9 INJECTION, SOLUTION INTRAVENOUS at 03:17

## 2017-05-31 RX ADMIN — DULOXETINE 60 MG: 30 CAPSULE, DELAYED RELEASE ORAL at 09:01

## 2017-05-31 RX ADMIN — HYDROMORPHONE HYDROCHLORIDE 0.5 MG: 1 INJECTION, SOLUTION INTRAMUSCULAR; INTRAVENOUS; SUBCUTANEOUS at 01:01

## 2017-05-31 RX ADMIN — VITAMIN D, TAB 1000IU (100/BT) 1000 UNITS: 25 TAB at 09:02

## 2017-05-31 RX ADMIN — HYDROMORPHONE HYDROCHLORIDE 0.5 MG: 1 INJECTION, SOLUTION INTRAMUSCULAR; INTRAVENOUS; SUBCUTANEOUS at 03:16

## 2017-05-31 RX ADMIN — OXYCODONE HYDROCHLORIDE 10 MG: 5 TABLET ORAL at 12:17

## 2017-05-31 RX ADMIN — OXYCODONE HYDROCHLORIDE 5 MG: 5 TABLET ORAL at 06:08

## 2017-05-31 RX ADMIN — SALINE NASAL SPRAY 2 SPRAY: 1.5 SOLUTION NASAL at 09:09

## 2017-05-31 RX ADMIN — RANITIDINE 150 MG: 150 TABLET ORAL at 09:03

## 2017-05-31 NOTE — PLAN OF CARE
Problem: Goal Outcome Summary  Goal: Goal Outcome Summary  OT: Order received and chart reviewed. Pt admitted under outpatient status for cervical spine fusion. Per discussion with PT, pt presents with no skilled OT needs. PT advised to discontinue OT order at this time.

## 2017-05-31 NOTE — PLAN OF CARE
Problem: Goal Outcome Summary  Goal: Goal Outcome Summary  Initial evaluation completed and treatment initiated.  Pt is s/p C5-C6 anterior cervical discectomy and fusion.  Pt lives with her significant other and states he will be able to assist, however he works during the day.  Prior to admission pt was IND with mobility and ADL's.  Pt very lethargic at time of evaluation and was limited by pain and dizziness with ambulation.       Discharge Planner PT   Patient plan for discharge: Home  Current status: SBA with bed mobility, transfers, and gait up to 40'.  Pt unable to ambulate further due to dizziness and declined further gait or trialing stairs after rest.    Barriers to return to prior living situation: pain, 15 stairs with R handrail to access home, significant other works during the day so unable to provide assist at all times, dizziness with ambulation.      Recommendations for discharge: Home with assist.    Rationale for recommendations: pain, 15 stairs with R handrail to access home, dizziness with ambulation.         Entered by: Danielle Mitchell 05/31/2017 12:56 PM

## 2017-05-31 NOTE — DISCHARGE SUMMARY
[facility]    Discharge Summary  Neurosurgery    Date of Admission:  5/30/2017  Date of Discharge:  5/31/2017  Discharging Provider: Teresa Machado  Date of Service (when I saw the patient): 05/31/17    Discharge Diagnoses   Active Problems:    S/P cervical spinal fusion      History of Present Illness   Jayashree Johnson is an 46 year old female who presented with severe neck and arm pain, C5 6 disc herniation and stenosis.  Several months of worsening, seven out of 10, aching, neck pain, radiating to the left greater than right arm.  Worse with neck range of motion.  Has done extensive physical therapy without improvement. Underwent C5-6 ACDF with Dr. Willard Escalante on 5/30/2017. Today, she is lying in bed and states she has some incisional site pain and difficulty swallowing, but her radicular symptoms have resolved. She is able to move all extremities equally, and pain is well controlled with current pain regimen. Denies nausea. Likely to d/c to home today pending ability to tolerate regular diet. She would like a cervical collar to go home with as she would feel more comfortable with this stability.     Hospital Course   Jayashree Johnson was admitted on 5/30/2017.  The following problems were addressed during her hospitalization:    Active Problems:    S/P cervical spinal fusion    Assessment: stable s/p     Plan:   -D/c to home today  -[facility] with xray praior        I have discussed the following assessment and plan with Dr. Escalante who is in agreement with initial plan and will follow up with further consultation recommendations.    Teresa Machado PA-C  [facility]  [address] [address]    Tel [phone]  Pager [phone]      Pending Results   These results will be followed up by Dr. Escalante  Unresulted Labs Ordered in the Past 30 Days of this Admission     No orders found for last 61 day(s).           Code Status   Full Code    Primary Care Physician   Aneudy Jackson    Physical Exam   Temp: 98  F (36.7  C) Temp src: Oral BP: 139/72 Pulse: 76 Heart Rate: 81 Resp: 16 SpO2: 96 % O2 Device: None (Room air) Oxygen Delivery: 1 LPM  There were no vitals filed for this visit.  Vital Signs with Ranges  Temp:  [97.5  F (36.4  C)-98.3  F (36.8  C)] 98  F (36.7  C)  Pulse:  [] 76  Heart Rate:  [65-90] 81  Resp:  [12-20] 16  BP: (117-139)/(63-76) 139/72  SpO2:  [92 %-100 %] 96 %  I/O last 3 completed shifts:  In: 3618.75 [P.O.:740; I.V.:2878.75]  Out: 125 [Urine:100; Blood:25]    Constitutional: Awake, alert, cooperative, no apparent distress, and appears stated age.  Eyes: Lids and lashes normal, pupils equal, round, extra ocular muscles intact  ENT: Normocephalic, without obvious abnormality, atraumatic  Respiratory: No increased work of breathing  Skin: No bruising or bleeding, normal skin color, texture, turgor, no redness, warmth, or swelling.  Incision covered with dressing is clean, dry, and intact.  Musculoskeletal: There is no redness, warmth, or swelling of the joints.  Full range of motion noted.  Motor strength is 5 out of 5 all extremities bilaterally.  Tone is normal.  Neurologic: Awake, alert, oriented to name, place and time.  Cranial nerves II-XII are grossly intact.  Motor is 5 out of 5 bilaterally.     Neuropsychiatric: Calm, normal eye contact, alert, normal affect, oriented to self, place, time and situation, memory for past and recent events intact and thought process normal.       Time Spent on this Encounter   I, Teresa Machado, personally saw the patient today and spent less than or equal to 30 minutes discharging this patient.    Discharge Disposition   Discharged to home  Condition at discharge: Stable    Consultations This Hospital Stay   OCCUPATIONAL THERAPY ADULT IP CONSULT  PHYSICAL THERAPY ADULT IP CONSULT    Discharge Orders     XR Cervical Spine 2/3 Views     Reason for your  hospital stay   You were in the hospital for a cervical fusion.     Follow-up and recommended labs and tests    Please follow up at [facility] in 6 weeks for routine post op check.  Please call the clinic at [phone] to schedule your appointment with Teresa Machado PA-C or William Albert PA-C.     Activity   Discharge instructions:  No lifting of more than 10 pounds, no bending, no twisting until follow up visit.    Ok to shampoo hair, shower or bathe, but, do not scrub or submerge incision under water until evaluated post-operatively in clinic.    Ok to walk as tolerated, avoid bed rest and prolonged sitting.    No contact sports until after follow up visit  No high impact activities such as; running/jogging, snowmobile or 4 kowalski riding or any other recreational vehicles.    Do not take NSAIDS (ibuprofen, advil, aleve, naproxen, etc) for pain control.    Do NOT drive while taking narcotic pain medication.    Call the [facility] at [phone] for increasing redness, swelling or pus draining from the incision, increased pain or any other questions and concerns.     Wound care and dressings   Keep your incision clean and dry at all times.  OK to remove dressing on postop day 2.  OK to shower on postop day 3 and allow water to run over incision, pat dry after shower.  No bathing swimming or submerging in water.  Call immediately or come to ED if any drainage occurs, or you develop new pain, redness, or swelling.     Reason for your hospital stay   You were in the hospital for a cervical fusion.     Follow-up and recommended labs and tests    Please follow up at the [facility] in 6 weeks for routine post op with xray prior.  Please call the clinic at [phone] to schedule your appointment with Teresa Machado PA-C or William Albert PA-C.     Activity   Discharge instructions:  No lifting of more than 10 pounds, no bending, no twisting until follow up visit.  Wear brace when out of  bed.    Ok to shampoo hair, shower or bathe, but, do not scrub or submerge incision under water until evaluated post-operatively in clinic.    Ok to walk as tolerated, avoid bed rest and prolonged sitting.    No contact sports until after follow up visit  No high impact activities such as; running/jogging, snowmobile or 4 kowalski riding or any other recreational vehicles.    Do not take NSAIDS (ibuprofen, advil, aleve, naproxen, etc) for pain control.    Do NOT drive while taking narcotic pain medication.    Call the [facility] at [phone] for increasing redness, swelling or pus draining from the incision, increased pain or any other questions and concerns.     Wound care and dressings   Keep your incision clean and dry at all times.  OK to remove dressing on postop day 2.  OK to shower on postop day 3 and allow water to run over incision, pat dry after shower.  No bathing swimming or submerging in water.  Call immediately or come to ED if any drainage occurs, or you develop new pain, redness, or swelling.     Full Code     Full Code     Diet   Follow this diet upon discharge: None     Diet   Follow this diet upon discharge: Regular diet.       Discharge Medications   Current Discharge Medication List      START taking these medications    Details   oxyCODONE (ROXICODONE) 5 MG IR tablet Take 1-2 tablets (5-10 mg) by mouth every 4 hours as needed for pain  Qty: 80 tablet, Refills: 0    Associated Diagnoses: S/P cervical spinal fusion      cyclobenzaprine (FLEXERIL) 10 MG tablet Take 1 tablet (10 mg) by mouth 3 times daily as needed for muscle spasms  Qty: 80 tablet, Refills: 1    Associated Diagnoses: S/P cervical spinal fusion         CONTINUE these medications which have NOT CHANGED    Details   DULOXETINE HCL PO Take 60 mg by mouth daily (Takes 2 x 30mg capsule = 60mg dose)      fluticasone (FLONASE) 50 MCG/ACT spray Spray 1 spray into both nostrils daily as needed for rhinitis or allergies       LORATADINE PO Take 10 mg by mouth daily      TEMAZEPAM PO Take 15-30 mg by mouth nightly as needed for sleep      Digestive Enzymes (DIGESTIVE ENZYME PO) Take 2 capsules by mouth 2 times daily      Polyethyl Glycol-Propyl Glycol (SYSTANE OP) Place 1-2 drops into both eyes daily as needed      ALPRAZolam (XANAX PO) Take 0.125-0.25 mg by mouth nightly as needed for sleep       Multiple Vitamin (MULTI-VITAMIN DAILY PO) Take 1 tablet by mouth daily      Cyanocobalamin (VITAMIN B-12 PO) Take 1 tablet by mouth daily      Cholecalciferol (VITAMIN D-3 PO) Take 1 capsule by mouth daily           Allergies   Allergies   Allergen Reactions     No Known Drug Allergies

## 2017-05-31 NOTE — PLAN OF CARE
Problem: Goal Outcome Summary  Goal: Goal Outcome Summary  Outcome: Improving  A&Ox4. CMS intact. LUE slightly weaker. BS present, + flatus. Dressing CDI. VSS, 1L O2 via NC. Full liquid diet. Up with SBA. C/o neck pain, decreased with IV dilaudid. Will continue to monitor.

## 2017-05-31 NOTE — PROGRESS NOTES
Met with patient to inform her that she was changed from inpatient status to outpatient status and gave her observation notice. Verbalized understanding.

## 2017-05-31 NOTE — PLAN OF CARE
Problem: Goal Outcome Summary  Goal: Goal Outcome Summary  Outcome: Adequate for Discharge Date Met:  05/31/17  A/O. VSS. CMS intact except Left UE weakness. Pain controlled with tylenol and oxycodone as needed. Tolerating full liquid diet fair. Vdg. +bowel sounds, -flatus. Discharged to home with significant other. Education reviewed. Sent with belongings and medications.

## 2017-05-31 NOTE — PROGRESS NOTES
05/31/17 1000   Quick Adds   Type of Visit Initial PT Evaluation   Living Environment   Lives With significant other   Living Arrangements house   Home Accessibility stairs within home   Number of Stairs to Enter Home 0   Number of Stairs Within Home 15   Stair Railings at Home inside, present on right side   Transportation Available car;family or friend will provide   Living Environment Comment pt lives with her significant other and states he will be able to assist, however he works during the day.    Self-Care   Dominant Hand right   Usual Activity Tolerance good   Current Activity Tolerance poor   Regular Exercise yes   Activity/Exercise Type walking   Exercise Amount/Frequency other (see comments)  (5x/week )   Equipment Currently Used at Home none   Functional Level Prior   Ambulation 0-->independent   Transferring 0-->independent   Toileting 0-->independent   Bathing 0-->independent   Dressing 0-->independent   Eating 0-->independent   Communication 0-->understands/communicates without difficulty   Swallowing 0-->swallows foods/liquids without difficulty   Cognition 0 - no cognition issues reported   Fall history within last six months no   Which of the above functional risks had a recent onset or change? ambulation;transferring   General Information   Onset of Illness/Injury or Date of Surgery - Date 05/30/17   Referring Physician Teresa Machado PA-C   Patient/Family Goals Statement pt states she does not have any goals.  Says she does not quite feel ready to go home but would probably get more rest there.    Pertinent History of Current Problem (include personal factors and/or comorbidities that impact the POC) C5-C6 anterior cervical discectomy and fusion.   Precautions/Limitations fall precautions;spinal precautions   General Observations pt supine in bed upon arrival and agreeable to PT session.     General Info Comments Activity: Ambulate    Cognitive Status Examination   Orientation orientation  "to person, place and time   Level of Consciousness lethargic/somnolent   Follows Commands and Answers Questions 100% of the time   Personal Safety and Judgment intact   Pain Assessment   Patient Currently in Pain Yes, see Vital Sign flowsheet  (5/10 )   Strength   Strength Comments functional weakness as pt has difficulty with transfers and gait.   Bed Mobility   Bed Mobility Comments SBA   Transfer Skills   Transfer Comments SBA   Gait   Gait Comments SBA   Balance   Balance Comments mild instability with dynamic tasks but no overt LOB.   General Therapy Interventions   Planned Therapy Interventions bed mobility training;gait training;strengthening;transfer training;progressive activity/exercise   Clinical Impression   Criteria for Skilled Therapeutic Intervention yes, treatment indicated   PT Diagnosis difficulty with transfers and gait.   Influenced by the following impairments pain s/p C5-C6 anterior cervical discectomy and fusion.   Functional limitations due to impairments impaired functional mobility and impaired functional activity tolerance.   Clinical Presentation Stable/Uncomplicated   Clinical Presentation Rationale pt's condition is stable.   Clinical Decision Making (Complexity) Low complexity   Therapy Frequency` daily   Predicted Duration of Therapy Intervention (days/wks) 5 days   Anticipated Equipment Needs at Discharge other (see comments)  (none anticipated )   Anticipated Discharge Disposition Home with Assist   Risk & Benefits of therapy have been explained Yes   Patient, Family & other staff in agreement with plan of care Yes   [facility] [employer] TM \"6 Clicks\"   2016, Trustees of [facility], under license to [employer].  All rights reserved.   6 Clicks Short Forms Basic Mobility Inpatient Short Form   [facility] [employer]  \"6 Clicks\" V.2 Basic Mobility Inpatient Short Form   1. Turning from your back to your side while in a flat bed without using bedrails? 3 - A Little   2. " Moving from lying on your back to sitting on the side of a flat bed without using bedrails? 3 - A Little   3. Moving to and from a bed to a chair (including a wheelchair)? 3 - A Little   4. Standing up from a chair using your arms (e.g., wheelchair, or bedside chair)? 3 - A Little   5. To walk in hospital room? 3 - A Little   6. Climbing 3-5 steps with a railing? 3 - A Little   Basic Mobility Raw Score (Score out of 24.Lower scores equate to lower levels of function) 18   Total Evaluation Time   Total Evaluation Time (Minutes) 10

## 2017-05-31 NOTE — PLAN OF CARE
Problem: Goal Outcome Summary  Goal: Goal Outcome Summary  Outcome: No Change  Pt is intermittently lethargic and ox4 with VS, using 1L o2 to maintain sats in high 90%. C-spine dressing CDI. Up in room with SBA. Pain managed with IVP dilaudid and ice. LUE 4/5 with mod grasp, no n/t. BS+ and minimal gas but h/o PONV with scop patch in place on L, refusing all oral meds this alcon and only taking minimal bites of clear to full liquids. Voiding in BR. D/c plan pending.

## 2017-05-31 NOTE — OP NOTE
DATE OF PROCEDURE:  05/30/2017      SURGEON:  Willard Escalante MD      ASSISTANT:  NAVA Curtis    Note Teresa Machado was present for and assisted with the entire surgery and her role as an assistant was crucial for her aid in positioning, exposure, suctioning, retraction and closure.      PREOPERATIVE DIAGNOSIS:  Cervical radiculopathy.      POSTOPERATIVE DIAGNOSIS:  Cervical radiculopathy.      PROCEDURES:   1.  C5-C6 anterior diskectomy and interbody arthrodesis.   2.  Insertion of Alex Aero-C intervertebral graft at C5-C6.   3.  Anterior instrumentation C5-C6 with insertion of titanium anchors via titanium plating.   4.  Use of intraoperative microscope and fluoroscopy.      ESTIMATED BLOOD LOSS:  25 mL.      INDICATIONS:  Ms. Jayashree Johnson is a 46-year-old female who presented with progressive intractable neck and left arm pain.  She underwent extensive nonoperative management with failure to improve.  MRI demonstrated stenosis at C5-C6 with severe left-sided foraminal stenosis.  Risks, benefits, indications and alternatives were discussed with the patient and her family in detail all their questions answered and they wished to proceed with surgery.      DESCRIPTION OF PROCEDURE:  The patient was positioned supine.  Sterile prepping and draping procedures were performed.  Antibiotics were administered and timeout was performed.  The #15 blade was used to perform a right horizontal neck incision, monopolar was used to divide the platysma and the Metzenbaum scissors were used to create a plane in the investing fascia medial to the sternocleidomastoid muscle.  Blunt dissection was used to come down upon the anterior cervical spine.  The spinal needle was used to verify the correct level.  The monopolar was used to elevate the longus colli and the Trimline retractor was inserted.  The #15 blade was used to perform an annulotomy in the disk space at C5-C6 and a complete diskectomy was performed with a combination of the  high speed drill, pituitary rongeurs and curets.  The posterior osteophytes were removed with the high speed drill and the Kerrison rongeur was used to remove the posterior longitudinal ligament with complete bilateral neural foraminal decompression.  A Alex Aero-C intervertebral graft was delivered into the disk space.  Anterior instrumentation C5-6 was performed with insertion of titanium anchors via the titanium plating.  Fluoroscopy demonstrated excellent positioning of the hardware.  Antibiotic irrigation was performed.  The platysma and dermal layer were closed with 3-0 Vicryl sutures and the skin was closed with a running subcuticular stitch.  There were no intraprocedural complications.         YAN LUCIANO MD             D: 2017 13:15   T: 2017 22:13   MT: AMANUEL#[id number]      Name:     JEREL ORTIZ   MRN:      [id number]        Account:        [id number]   :      1970           Procedure Date: 2017      Document: [id number]

## 2017-05-31 NOTE — DISCHARGE INSTRUCTIONS
[facility]  Dr. Escalante Discharge Instructions Following Spine Surgery  [phone]  Monday - Friday; 8:00 AM - 4:00 PM    In General:   After you have had surgery on your spine, remember do not twist, or excessively flex or extend the area that you had surgery.  These activities can prevent healing.  Pain is normal and to be expected following surgery.  Please call our office to schedule your appointment follow up appointment.      Bowel Care:  Many people have constipation (hard stools) after surgery.  To help prevent constipation: Drink plenty of fluid (8-10 glasses/day); Eat more fiber, such as whole grain bread, bran cereal, and fruits and vegetables; Stay active by walking; Over the counter stool softener may also help.      Medications:  Spine surgery and pain management is unique to all patients.  You will generally be given medications for pain, muscle spasms or tightness, and for constipation during the immediate post op period.  It is important that you use these as prescribed.  Please remember to bring your pill bottles to all of your appointments. Avoid alcoholic beverages while taking narcotic pain medications. You can use ice to areas of pain as needed, 20 minutes at a time.  Changing positions and walking will help loosen your muscles as well.    Driving:  No driving while on narcotic pain medications.  It is state law not to drive while under the influence of a drug to a degree which renders you incapable of safely driving.  The narcotic medication you will be taking after surgery falls under this category.     Activity:   After surgery, most people feel less pain than they have had in a long time.  Walking and light activities will help you regain the use of your muscles.  You are encouraged to walk: start with short walks 5-10 minutes at a time for 4-5 times per day and increase as tolerated.  Stair climbing as tolerated, we recommend you use the railing.  No lifting greater than 10 pounds: approximately equal to one gallon of milk. No twisting, bending in the area you have had surgery. No housework, vacuuming, laundry, leaf raking, lawn mowing, or snow removal. Wear your brace (if ordered) as directed.    Showers:  If you have sutures or staples you may shower two days after surgery. It is ok to let water run over your incision but do not touch or scrub on the incision. Pat dry immediately after showering. If there is a dressing in place, you may remove it 2 days after surgery. If you were closed with Derma melendez (glue), you may shower without covering the incision. No baths, hot tubs, or pool activity for at least 6 weeks.     Nutrition:  In general, your diet restrictions will not change with your surgery.  You may need to eat small frequent meals initially until your appetite returns.  Eat plenty of high fiber foods and drink plenty of fluids. If you do not have a fluid restriction from or prior to surgery, we recommend 6-8 (8oz) glasses of water per day. Other fluids are fine, but water is best. Nausea is not uncommon; it is a common side effect to many pain medications.  We recommend that you take the pain medications with food, if this does not improve your symptoms, please call us.     Smoking:  For proper healing it is required that you quit using all tobacco products.  This includes smoking, chewing, nicotine gums, and nicotine patches.  Call Dr. Escalante if these occur: Drainage from your incision, increased pain/redness/swelling, temperatures greater than 101.5, increased leg pain or swelling or unrelieved headaches    Go to the nearest Emergency Room if you experience: chest pain, shortness of breath, neck swelling or swallowing problems

## 2017-06-01 NOTE — ADDENDUM NOTE
Encounter addended by: Jayashree Whalen PT on: 6/1/2017 11:44 AM<BR>     Actions taken: Sign clinical note, Episode resolved

## 2017-06-01 NOTE — PLAN OF CARE
Problem: Goal Outcome Summary  Goal: Goal Outcome Summary  Physical Therapy Discharge Summary     Reason for therapy discharge:    Discharged to home.     Progress towards therapy goal(s). See goals on Care Plan in [facility] electronic health record for goal details.  Goals not met.  Barriers to achieving goals:   discharge on same date as initial evaluation.     Therapy recommendation(s):    No further therapy is recommended.

## 2017-06-01 NOTE — PROGRESS NOTES
Dear Jayashree, your recent test results are attached.  Your microalbumin was normal.  Your cholesterol is minimally elevated with an LDL of 105.  Your blood sugar as tested was normal.  Your kidney tests are normal.    Feel free to contact me via the office or My Chart if you have any questions regarding the above.  Sincerely,  Brandyn Metz DO FACOI

## 2017-06-01 NOTE — DISCHARGE SUMMARY
Outpatient Physical Therapy Discharge Note     Patient: Jayashree Johnson  : 1970    Beginning/End Dates of Reporting Period:  1 visit - 17 for a total of 24 visits.     Referring Provider: Dr. Willard Escalante    Therapy Diagnosis: Cervical stenosis and C5-6 disc     Client Self Report: Please see 17 note. She has undergone cervical surgery so she did not make her last appt.     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  (Being met)   Target Date 17   Date Met      Progress:     Goal Identifier Sleep   Goal Description Melissa will be able to use movement, hot/cool packs as needed and positioning so she can slee 6+ hours at night (improving - 6-7 hours of sleep)   Target Date 17   Date Met      Progress:     Goal Identifier Symptoms   Goal Description Melissa will be able to center her L arm symptoms with exercise and positioning so she can complete her house and work tasks without increasing her symptoms.  (Met - no L shoulder symptoms)   Target Date 17   Date Met      Progress:     Progress Toward Goals:   Not assessed this period.    Plan:  Discharge from therapy.    Discharge:    Reason for Discharge: Underwent cervical surgery    Equipment Issued: none    Discharge Plan: Per her surgeon's recommendations.     Thank you for referring Melissa  to [facility]    Jayashree Whalen, PT  [phone]

## 2017-06-27 ENCOUNTER — MYC MEDICAL ADVICE (OUTPATIENT)
Dept: OBGYN | Facility: CLINIC | Age: 47
End: 2017-06-27

## 2017-06-27 DIAGNOSIS — N83.209 OVARIAN CYST: Primary | ICD-10-CM

## 2017-06-28 NOTE — TELEPHONE ENCOUNTER
Orders entered for pelvic ultrasound will mychart a message to let patient know Dr. Tim mata'd order.

## 2017-07-13 ENCOUNTER — TELEPHONE (OUTPATIENT)
Dept: NEUROSURGERY | Facility: CLINIC | Age: 47
End: 2017-07-13

## 2017-07-13 NOTE — TELEPHONE ENCOUNTER
Patient called worried, because she is having a very hard time swallowing and is having sharp pain all around her neck front/back. She is wondering if she can still get her xray done tomorrow even though we are not in clinic tomorrow in .

## 2017-07-13 NOTE — TELEPHONE ENCOUNTER
Spoke with patient. She is 6 weeks s/p ACDF. She reports that she fell yesterday 7/12/17 but did not call the clinic or visit ED. She feels as though she's experiencing muscular pain.     Patient also reports some difficulty swallowing that started approximately a week ago. No difficulties breathing or hoarseness. Dr. Escalante would recommend to continue to monitor and f/u with PA next week.    Cervical XR scheduled for tomorrow at 1pm, we will call with results.     Patient will call our clinic if symptoms change or worsen.

## 2017-07-14 ENCOUNTER — HOSPITAL ENCOUNTER (OUTPATIENT)
Dept: GENERAL RADIOLOGY | Facility: CLINIC | Age: 47
Discharge: HOME | End: 2017-07-14
Attending: PHYSICIAN ASSISTANT | Admitting: PHYSICIAN ASSISTANT
Payer: COMMERCIAL

## 2017-07-14 DIAGNOSIS — Z98.1 S/P CERVICAL SPINAL FUSION: ICD-10-CM

## 2017-07-14 PROCEDURE — 72040 X-RAY EXAM NECK SPINE 2-3 VW: CPT | Mod: TC

## 2017-07-14 NOTE — TELEPHONE ENCOUNTER
Larisa Allen CNP reviewed patient's cervical XR and reports hardware is intact. Advised patient to continue with current activity restrictions and use pain meds/muscle relaxants as needed. She will f/u in clinic on 7/20/17. Patient notified and voiced understanding.

## 2017-07-17 ENCOUNTER — TELEPHONE (OUTPATIENT)
Dept: NEUROSURGERY | Facility: CLINIC | Age: 47
End: 2017-07-17

## 2017-07-17 DIAGNOSIS — Z98.1 S/P CERVICAL SPINAL FUSION: Primary | ICD-10-CM

## 2017-07-17 NOTE — TELEPHONE ENCOUNTER
Patient is s/p ACDF on 5/30/17. Reports hoarseness and difficulty swallowing. Dr. Escalante recommends referral to Dr. Pandya for evaluation. She will call to schedule appt. She's also scheduled to f/u with Teresa Machado PA-C on 7/20/17.  Patient contacted and voiced agreement with plan.

## 2017-07-20 ENCOUNTER — OFFICE VISIT (OUTPATIENT)
Dept: NEUROSURGERY | Facility: CLINIC | Age: 47
End: 2017-07-20
Payer: COMMERCIAL

## 2017-07-20 VITALS — WEIGHT: 158 LBS | BODY MASS INDEX: 24.8 KG/M2 | TEMPERATURE: 98.3 F | HEIGHT: 67 IN

## 2017-07-20 DIAGNOSIS — Z98.1 STATUS POST CERVICAL SPINAL ARTHRODESIS: Primary | ICD-10-CM

## 2017-07-20 PROCEDURE — 99024 POSTOP FOLLOW-UP VISIT: CPT | Performed by: PHYSICIAN ASSISTANT

## 2017-07-20 NOTE — NURSING NOTE
"Jayashree Johnson is a 47 year old female who presents for:  Chief Complaint   Patient presents with     Surgical Followup     CERVICAL FIVE TO CERVICAL SIX  ANTERIOR CERVICAL DISCECTOMY AND FUSION DOS 05/30/17     Neurologic Problem        Initial Vitals:  Temp 98.3  F (36.8  C) (Temporal)  Ht 1.702 m (5' 7\")  Wt 71.7 kg (158 lb)  LMP 03/17/2003  BMI 24.75 kg/m2 Estimated body mass index is 24.75 kg/(m^2) as calculated from the following:    Height as of this encounter: 1.702 m (5' 7\").    Weight as of this encounter: 71.7 kg (158 lb).. Body surface area is 1.84 meters squared. BP completed using cuff size: NA (Not Taken)  Data Unavailable    Do you feel safe in your environment?  Yes  Do you need any refills today? No    Nursing Comments:         Guanakito Farris    "

## 2017-07-20 NOTE — MR AVS SNAPSHOT
After Visit Summary   7/20/2017    Jayashree Johnson    MRN: [id number]           Patient Information     Date Of Birth          1970        Visit Information        Provider Department      7/20/2017 2:20 PM Teresa Machado PA-C [facility]        Today's Diagnoses     Status post cervical spinal arthrodesis    -  1      Care Instructions    - May increase lifting restriction to 20 pounds until 3 months post-op.  - followup in 6 weeks with xray prior     - Call the clinic at [phone] for increased pain or any other questions and concerns.          Follow-ups after your visit        Your next 10 appointments already scheduled     Aug 10, 2017 11:00 AM CDT   Return Visit with Natalie Kern MD   [facility] ([facility])    [address] [place] [id number] [phone]            Aug 16, 2017 10:30 AM CDT   ULTRASOUND with [facility]   [facility] ([facility])    [facility] [address] [place] [id number] [phone]            Aug 16, 2017 11:00 AM CDT   Return Visit with Lisa Dumont MD   [facility] ([facility])    [facility] [address]  [address] [place] [id number] [phone]              Future tests that were ordered for you today     Open Future Orders        Priority Expected Expires Ordered    XR Cervical Spine 2/3 Views Routine 8/31/2017 7/20/2018 7/20/2017            Who to contact     If you have questions or need follow up information about today's clinic visit or your schedule please contact [facility] directly at [phone].  Normal or non-critical lab and imaging results will be communicated to you by MyChart, letter or phone within 4 business days after the clinic has received the results. If you do not hear from us within 7 days,  "please contact the clinic through Allmoxy or phone. If you have a critical or abnormal lab result, we will notify you by phone as soon as possible.  Submit refill requests through Allmoxy or call your pharmacy and they will forward the refill request to us. Please allow 3 business days for your refill to be completed.          Additional Information About Your Visit        SoFihart Information     Allmoxy gives you secure access to your electronic health record. If you see a primary care provider, you can also send messages to your care team and make appointments. If you have questions, please call your primary care clinic.  If you do not have a primary care provider, please call [phone] and they will assist you.        Care EveryWhere ID     This is your Care EveryWhere ID. This could be used by other organizations to access your [facility] medical records  [id number]        Your Vitals Were     Temperature Height Last Period BMI (Body Mass Index)          98.3  F (36.8  C) (Temporal) 5' 7\" (1.702 m) 03/17/2003 24.75 kg/m2         Blood Pressure from Last 3 Encounters:   05/31/17 121/69   05/24/17 138/84   04/12/17 132/80    Weight from Last 3 Encounters:   07/20/17 158 lb (71.7 kg)   05/24/17 160 lb (72.6 kg)   04/27/17 163 lb 4.8 oz (74.1 kg)               Primary Care Provider Office Phone # Fax #    Aneudy Jackson [phone]       [facility] [address] [place] [address]        Equal Access to Services     [facility] AH: Hadii aad ku hadasho Soomaali, waaxda luqadaha, qaybta kaalmada adeegyada, waxay hemanth trevino. So [facility] [phone].    ATENCIÓN: Si habla español, tiene a torres disposición servicios gratuitos de asistencia lingüística. Llame al [phone].    We comply with applicable federal civil rights laws and [place] laws. We do not discriminate on the basis of race, color, national origin, age, disability sex, sexual orientation or " gender identity.            Thank you!     Thank you for choosing [facility]  for your care. Our goal is always to provide you with excellent care. Hearing back from our patients is one way we can continue to improve our services. Please take a few minutes to complete the written survey that you may receive in the mail after your visit with us. Thank you!             Your Updated Medication List - Protect others around you: Learn how to safely use, store and throw away your medicines at www.disposemymeds.org.          This list is accurate as of: 7/20/17  2:34 PM.  Always use your most recent med list.                   Brand Name Dispense Instructions for use Diagnosis    cyclobenzaprine 10 MG tablet    FLEXERIL    80 tablet    Take 1 tablet (10 mg) by mouth 3 times daily as needed for muscle spasms    S/P cervical spinal fusion       DIGESTIVE ENZYME PO      Take 2 capsules by mouth 2 times daily        DULOXETINE HCL PO      Take 60 mg by mouth daily (Takes 2 x 30mg capsule = 60mg dose)        fluticasone 50 MCG/ACT spray    FLONASE     Spray 1 spray into both nostrils daily as needed for rhinitis or allergies        LORATADINE PO      Take 10 mg by mouth daily        MULTI-VITAMIN DAILY PO      Take 1 tablet by mouth daily        oxyCODONE 5 MG IR tablet    ROXICODONE    80 tablet    Take 1-2 tablets (5-10 mg) by mouth every 4 hours as needed for pain    S/P cervical spinal fusion       SYSTANE OP      Place 1-2 drops into both eyes daily as needed        TEMAZEPAM PO      Take 15-30 mg by mouth nightly as needed for sleep        VITAMIN B-12 PO      Take 1 tablet by mouth daily        VITAMIN D-3 PO      Take 1 capsule by mouth daily        XANAX PO      Take 0.125-0.25 mg by mouth nightly as needed for sleep

## 2017-07-20 NOTE — PROGRESS NOTES
[facility]  Neurosurgery followup:    HPI: 6 weeks s/p C5-6 ACDF. She continues to have some stiffness in the neck and deltoid region, but denies radicular symptoms. She states she feels as though she is moving in the right direction, but is frustrated how slow she is moving.    Exam:  Constitutional:  Alert, well nourished, NAD.  HEENT: Normocephalic, atraumatic.   Pulm:  Without shortness of breath   CV:  No pitting edema of BLE.     Neurological:  Awake  Alert  Oriented x 3  Motor exam:     Shoulder Abduction:  Right:  5/5    Left:  5/5  Biceps:                      Right:  5/5    Left:  5/5  Triceps:                     Right:  5/5    Left:  5/5  Wrist Extensors:       Right:  5/5    Left:  5/5  Wrist Flexors:           Right:  5/5    Left:  5/5  Intrinsics:                  Right:  5/5    Left:  5/5     Able to spontaneously move U/E bilaterally  Sensation intact throughout all U/E dermatomes  Incision: Healing nicely.  Imaging: AP and lateral cervical films reveal stable arthrodesis.  A/P: 6 weeks s/p C5-6 ACDF. Overall, she continues to improve even though it is at a rate slower than she would like. Imaging looks good.   - May increase lifting restriction to 20 pounds until 3 months post-op.  - followup in 6 weeks with xray prior     - Call the clinic at [phone] for increased pain or any other questions and concerns.        Teresa Machado PA-C  [facility]  [address] [address]    Tel [phone]  Pager [phone]

## 2017-07-20 NOTE — PATIENT INSTRUCTIONS
- May increase lifting restriction to 20 pounds until 3 months post-op.  - followup in 6 weeks with xray prior     - Call the clinic at [phone] for increased pain or any other questions and concerns.

## 2017-08-15 ENCOUNTER — MYC MEDICAL ADVICE (OUTPATIENT)
Dept: INTERNAL MEDICINE | Facility: CLINIC | Age: 47
End: 2017-08-15

## 2017-08-16 ENCOUNTER — OFFICE VISIT (OUTPATIENT)
Dept: OBGYN | Facility: CLINIC | Age: 47
End: 2017-08-16
Attending: OBSTETRICS & GYNECOLOGY
Payer: COMMERCIAL

## 2017-08-16 VITALS — BODY MASS INDEX: 25.25 KG/M2 | HEIGHT: 67 IN | WEIGHT: 160.9 LBS

## 2017-08-16 DIAGNOSIS — N83.209 OVARIAN CYST: ICD-10-CM

## 2017-08-16 DIAGNOSIS — Z01.419 ENCOUNTER FOR GYNECOLOGICAL EXAMINATION WITHOUT ABNORMAL FINDING: ICD-10-CM

## 2017-08-16 DIAGNOSIS — Z80.3 FAMILY HISTORY OF MALIGNANT NEOPLASM OF BREAST: Primary | ICD-10-CM

## 2017-08-16 PROCEDURE — 76830 TRANSVAGINAL US NON-OB: CPT | Mod: ZF

## 2017-08-16 PROCEDURE — 99212 OFFICE O/P EST SF 10 MIN: CPT | Mod: ZF

## 2017-08-16 NOTE — LETTER
8/16/2017       RE: Jayashree Johnson  [id number] [place] [id number]     Dear Colleague,    Thank you for referring your patient, Jayashree Johnson, to the [facility] at [facility]. Please see a copy of my visit note below.    47 year old female presents for gynecologic ultrasound indicated by family h/o breast and ovarian cancer, h/o left ovarian cyst.  This study was done transvaginally.    Uterine findings: surgically absent       Pelvic findings:    Right Adnexa: Normal   Left Adnexa: Normal   Bladder:  Normal         Cul - de - sac fluid: None    Ovarian follicles:   Right ovary:  2.8x2.4x1.5cm.     2 follicles     Size(s):  Less than 1cm     Left ovary:  2.7x1.4x1.1cm.     2 follicles     Size(s):  7 x 6 x 7mm, 11 x 10 x 9mm      Comments: No sonographic abnormalities.  Small anechoic cysts noted on both ovaries consistent with follicles.     DHEERAJ Tejeda MD        Again, thank you for allowing me to participate in the care of your patient.      Sincerely,    [facility]

## 2017-08-16 NOTE — MR AVS SNAPSHOT
After Visit Summary   8/16/2017    Jayashree Johnson    MRN: [id number]           Patient Information     Date Of Birth          1970        Visit Information        Provider Department      8/16/2017 11:00 AM Lisa Dumont MD [facility]        Today's Diagnoses     Family history of malignant neoplasm of breast    -  1    Encounter for gynecological examination without abnormal finding           Follow-ups after your visit        Follow-up notes from your care team     Return in 1 year (on 8/16/2018) for Preventative Health Visit.      Your next 10 appointments already scheduled     Aug 16, 2017  2:15 PM CDT   [facility] Visit with Alondra Borja MD   [facility]    [address] [place] [id number] [phone]            Dec 05, 2017  3:45 PM CST   Return Visit with Natalie Kern MD   [facility])    [address] [place] [address] [phone]              Future tests that were ordered for you today     Open Future Orders        Priority Expected Expires Ordered    Mammo Screening digital (bilateral) Routine  8/16/2018 8/16/2017            Who to contact     Please call your clinic at [phone] to:    Ask questions about your health    Make or cancel appointments    Discuss your medicines    Learn about your test results    Speak to your doctor   If you have compliments or concerns about an experience at your clinic, or if you wish to file a complaint, please contact [facility] Patient Relations at [phone] or email us at [email or web address]         Additional Information About Your Visit        MyChart Information     EverPowerhart gives you secure access to your electronic health record. If you see a primary care provider, you can also send messages to your care team and make appointments. If you have  "questions, please call your primary care clinic.  If you do not have a primary care provider, please call [phone] and they will assist you.      Oris4 is an electronic gateway that provides easy, online access to your medical records. With Oris4, you can request a clinic appointment, read your test results, renew a prescription or communicate with your care team.     To access your existing account, please contact your [facility] or call [phone] for assistance.        Care EveryWhere ID     This is your Care EveryWhere ID. This could be used by other organizations to access your [facility] medical records  [id number]        Your Vitals Were     Height Last Period BMI (Body Mass Index)             1.702 m (5' 7\") 03/17/2003 25.2 kg/m2          Blood Pressure from Last 3 Encounters:   05/31/17 121/69   05/24/17 138/84   04/12/17 132/80    Weight from Last 3 Encounters:   08/16/17 73 kg (160 lb 14.4 oz)   07/20/17 71.7 kg (158 lb)   05/24/17 72.6 kg (160 lb)              We Performed the Following     Pelvic and Breast Exam Procedure []          Today's Medication Changes          These changes are accurate as of: 8/16/17  1:21 PM.  If you have any questions, ask your nurse or doctor.               Stop taking these medicines if you haven't already. Please contact your care team if you have questions.     cyclobenzaprine 10 MG tablet   Commonly known as:  FLEXERIL   Stopped by:  Lisa Dumont MD           oxyCODONE 5 MG IR tablet   Commonly known as:  ROXICODONE   Stopped by:  Lisa Dumont MD                    Primary Care Provider Office Phone # Fax #    Aneudy Jackson [phone]       [facility] [address] [place] [address]        Equal Access to Services     [facility] AH: Hadii sal bustillos hadasho Soomaali, waaxda luqadaha, qaybta kaalmada adeegyada, hilary trevino. So wa " [phone].    ATENCIÓN: Si lashawn denise, tiene a torres disposición servicios gratuitos de asistencia lingüística. Enrico oliver [phone].    We comply with applicable federal civil rights laws and [place] laws. We do not discriminate on the basis of race, color, national origin, age, disability sex, sexual orientation or gender identity.            Thank you!     Thank you for choosing [facility]  for your care. Our goal is always to provide you with excellent care. Hearing back from our patients is one way we can continue to improve our services. Please take a few minutes to complete the written survey that you may receive in the mail after your visit with us. Thank you!             Your Updated Medication List - Protect others around you: Learn how to safely use, store and throw away your medicines at www.disposemymeds.org.          This list is accurate as of: 8/16/17  1:21 PM.  Always use your most recent med list.                   Brand Name Dispense Instructions for use Diagnosis    DIGESTIVE ENZYME PO      Take 2 capsules by mouth 2 times daily        DULOXETINE HCL PO      Take 60 mg by mouth daily (Takes 2 x 30mg capsule = 60mg dose)        fluticasone 50 MCG/ACT spray    FLONASE     Spray 1 spray into both nostrils daily as needed for rhinitis or allergies        LORATADINE PO      Take 10 mg by mouth daily        MULTI-VITAMIN DAILY PO      Take 1 tablet by mouth daily        SYSTANE OP      Place 1-2 drops into both eyes daily as needed        TEMAZEPAM PO      Take 15-30 mg by mouth nightly as needed for sleep        VITAMIN B-12 PO      Take 1 tablet by mouth daily        VITAMIN D-3 PO      Take 1 capsule by mouth daily        XANAX PO      Take 0.125-0.25 mg by mouth nightly as needed for sleep

## 2017-08-16 NOTE — PROGRESS NOTES
47 year old female presents for gynecologic ultrasound indicated by family h/o breast and ovarian cancer, h/o left ovarian cyst.  This study was done transvaginally.    Uterine findings: surgically absent       Pelvic findings:    Right Adnexa: Normal   Left Adnexa: Normal   Bladder:  Normal         Cul - de - sac fluid: None    Ovarian follicles:   Right ovary:  2.8x2.4x1.5cm.     2 follicles     Size(s):  Less than 1cm     Left ovary:  2.7x1.4x1.1cm.     2 follicles     Size(s):  7 x 6 x 7mm, 11 x 10 x 9mm      Comments: No sonographic abnormalities.  Small anechoic cysts noted on both ovaries consistent with follicles.     DHEERAJ Tejeda MD
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
## 2017-08-16 NOTE — MR AVS SNAPSHOT
After Visit Summary   8/16/2017    Jayashree Johnson    MRN: [id number]           Patient Information     Date Of Birth          1970        Visit Information        Provider Department      8/16/2017 10:30 AM [facility] ULTRASOUND [facility]        Today's Diagnoses     Ovarian cyst           Follow-ups after your visit        Your next 10 appointments already scheduled     Dec 05, 2017  3:45 PM CST   Return Visit with Natalie Kern MD   [facility] ([facility])    [address] [place] [id number] [phone]              Who to contact     Please call your clinic at [phone] to:    Ask questions about your health    Make or cancel appointments    Discuss your medicines    Learn about your test results    Speak to your doctor   If you have compliments or concerns about an experience at your clinic, or if you wish to file a complaint, please contact [facility] Patient Relations at [phone] or email us at [email or web address]         Additional Information About Your Visit        MyChart Information     [employer] gives you secure access to your electronic health record. If you see a primary care provider, you can also send messages to your care team and make appointments. If you have questions, please call your primary care clinic.  If you do not have a primary care provider, please call [phone] and they will assist you.      [employer] is an electronic gateway that provides easy, online access to your medical records. With [employer], you can request a clinic appointment, read your test results, renew a prescription or communicate with your care team.     To access your existing account, please contact your [facility] or call [phone] for assistance.        Care EveryWhere ID     This is your Care EveryWhere ID. This could be used by other organizations  to access your [place] medical records  [id number]        Your Vitals Were     Last Period                   03/17/2003            Blood Pressure from Last 3 Encounters:   05/31/17 121/69   05/24/17 138/84   04/12/17 132/80    Weight from Last 3 Encounters:   08/16/17 73 kg (160 lb 14.4 oz)   07/20/17 71.7 kg (158 lb)   05/24/17 72.6 kg (160 lb)              We Performed the Following     US GYN Complete Transvaginal - 15371 (In Clinic)          Today's Medication Changes          These changes are accurate as of: 8/16/17 11:59 PM.  If you have any questions, ask your nurse or doctor.               Stop taking these medicines if you haven't already. Please contact your care team if you have questions.     cyclobenzaprine 10 MG tablet   Commonly known as:  FLEXERIL   Stopped by:  Lisa Dumont MD           oxyCODONE 5 MG IR tablet   Commonly known as:  ROXICODONE   Stopped by:  Lisa Dumont MD                    Primary Care Provider Office Phone # Fax #    Aneudy Jackson [phone]       [facility] [place] [facility] [address] [place] [address]        Equal Access to Services     KRISTI CUNNINGHAM AH: Hadii sal bustillos hadasho Soomaali, waaxda luqadaha, qaybta kaalmada adeegyada, hilary albaradon suzan trevino. So [facility] [place] [phone].    ATENCIÓN: Si habla español, tiene a torres disposición servicios gratuitos de asistencia lingüística. Enrico al [phone].    We comply with applicable federal civil rights laws and [place] laws. We do not discriminate on the basis of race, color, national origin, age, disability sex, sexual orientation or gender identity.            Thank you!     Thank you for choosing [facility]  for your care. Our goal is always to provide you with excellent care. Hearing back from our patients is one way we can continue to improve our services. Please take a few minutes to complete the written survey that you may receive in the mail  after your visit with us. Thank you!             Your Updated Medication List - Protect others around you: Learn how to safely use, store and throw away your medicines at www.disposemymeds.org.          This list is accurate as of: 8/16/17 11:59 PM.  Always use your most recent med list.                   Brand Name Dispense Instructions for use Diagnosis    DIGESTIVE ENZYME PO      Take 2 capsules by mouth 2 times daily        DULOXETINE HCL PO      Take 60 mg by mouth daily (Takes 2 x 30mg capsule = 60mg dose)        fluticasone 50 MCG/ACT spray    FLONASE     Spray 1 spray into both nostrils daily as needed for rhinitis or allergies        LORATADINE PO      Take 10 mg by mouth daily        MULTI-VITAMIN DAILY PO      Take 1 tablet by mouth daily        SYSTANE OP      Place 1-2 drops into both eyes daily as needed        TEMAZEPAM PO      Take 15-30 mg by mouth nightly as needed for sleep        VITAMIN B-12 PO      Take 1 tablet by mouth daily        VITAMIN D-3 PO      Take 1 capsule by mouth daily        XANAX PO      Take 0.125-0.25 mg by mouth nightly as needed for sleep

## 2017-09-07 ENCOUNTER — OFFICE VISIT (OUTPATIENT)
Dept: NEUROSURGERY | Facility: CLINIC | Age: 47
End: 2017-09-07
Payer: COMMERCIAL

## 2017-09-07 ENCOUNTER — HOSPITAL ENCOUNTER (OUTPATIENT)
Dept: GENERAL RADIOLOGY | Facility: CLINIC | Age: 47
End: 2017-09-07
Attending: PHYSICIAN ASSISTANT
Payer: COMMERCIAL

## 2017-09-07 VITALS — BODY MASS INDEX: 25.07 KG/M2 | TEMPERATURE: 97.8 F | HEIGHT: 67 IN | WEIGHT: 159.7 LBS

## 2017-09-07 DIAGNOSIS — R13.10 DYSPHAGIA: ICD-10-CM

## 2017-09-07 DIAGNOSIS — J30.89 CHRONIC NONSEASONAL ALLERGIC RHINITIS DUE TO POLLEN: Primary | ICD-10-CM

## 2017-09-07 DIAGNOSIS — Z98.1 STATUS POST CERVICAL SPINAL ARTHRODESIS: ICD-10-CM

## 2017-09-07 DIAGNOSIS — Z98.1 S/P CERVICAL SPINAL FUSION: Primary | ICD-10-CM

## 2017-09-07 PROCEDURE — 72040 X-RAY EXAM NECK SPINE 2-3 VW: CPT | Mod: TC

## 2017-09-07 PROCEDURE — 99213 OFFICE O/P EST LOW 20 MIN: CPT | Performed by: NEUROLOGICAL SURGERY

## 2017-09-07 ASSESSMENT — PAIN SCALES - GENERAL: PAINLEVEL: MILD PAIN (3)

## 2017-09-07 NOTE — TELEPHONE ENCOUNTER
Loratadine 10mg      Last Written Prescription Date: 06/23/2017  Last Fill Quantity: 30,  # refills: 1   Last Office Visit with [facility], [facility] or [facility] prescribing provider: 05/24/2017                                         Next 5 appointments (look out 90 days)     Dec 05, 2017  3:45 PM CST   Return Visit with Natalie Kern MD   [facility] ([facility])    [address] [place] [address] [phone]                  Madelin Jackson, Pharmacy Technician  [facility]  [phone]

## 2017-09-07 NOTE — MR AVS SNAPSHOT
"              After Visit Summary   9/7/2017    Jayashree Johnson    MRN: [id number]           Patient Information     Date Of Birth          1970        Visit Information        Provider Department      9/7/2017 1:00 PM Willard Escalante MD [facility]        Today's Diagnoses     S/P cervical spinal fusion    -  1      Care Instructions    Referrals for:    Physical therapy  Speech/Swallow therapy    Follow up in 3 months with repeat xray. Call our clinic with any questions or concerns: [phone]          Follow-ups after your visit        Additional Services     PHYSICAL THERAPY REFERRAL       *This therapy referral will be filtered to a centralized scheduling office at [facility] and the patient will receive a call to schedule an appointment at a [facility] location most convenient for them. *     [facility] provides Physical Therapy evaluation and treatment and many specialty services across the [facility] system.  If requesting a specialty program, please choose from the list below.    If you have not heard from the scheduling office within 2 business days, please call [phone] for all locations, with the exception of [place], please call [phone].  Treatment: Evaluation & Treatment  Special Instructions/Modalities:   Special Programs: None    Please be aware that coverage of these services is subject to the terms and limitations of your health insurance plan.  Call member services at your health plan with any benefit or coverage questions.      **Note to Provider:  If you are referring outside of [facility] for the therapy appointment, please list the name of the location in the \"special instructions\" above, print the referral and give to the patient to schedule the appointment.            SPEECH THERAPY REFERRAL       *This therapy referral will be filtered to a centralized scheduling office at [facility] and the patient " "will receive a call to schedule an appointment at a [facility] location most convenient for them. *     [facility] provides Speech Therapy evaluation and treatment and many specialty services across the [facility] system.  If requesting a specialty program, please choose from the list below.  If you have not heard from the scheduling office within 2 business days, please call [phone] for all locations, with the exception of [place], please call [phone].       Treatment: Evaluation & Treatment  Speech Treatment Diagnosis: s/p cervical fusion  Special Instructions: speech and swallow therapy  Special Programs:     Please be aware that coverage of these services is subject to the terms and limitations of your health insurance plan.  Call member services at your health plan with any benefit or coverage questions.      **Note to Provider:  If you are referring outside of [place] for the therapy appointment, please list the name of the location in the \"special instructions\" above, print the referral and give to the patient to schedule the appointment.                  Your next 10 appointments already scheduled     Dec 05, 2017  3:45 PM CST   Return Visit with Natalie Kern MD   [facility]    [address] [place] [id number] [phone]              Who to contact     If you have questions or need follow up information about today's clinic visit or your schedule please contact [facility] directly at [phone].  Normal or non-critical lab and imaging results will be communicated to you by MyChart, letter or phone within 4 business days after the clinic has received the results. If you do not hear from us within 7 days, please contact the clinic through MyChart or phone. If you have a critical or abnormal lab result, we will notify you by phone as soon as possible.  Submit refill requests through [employer] or call " "your pharmacy and they will forward the refill request to us. Please allow 3 business days for your refill to be completed.          Additional Information About Your Visit        MyChart Information     [employer] gives you secure access to your electronic health record. If you see a primary care provider, you can also send messages to your care team and make appointments. If you have questions, please call your primary care clinic.  If you do not have a primary care provider, please call [phone] and they will assist you.        Care EveryWhere ID     This is your Care EveryWhere ID. This could be used by other organizations to access your [facility] medical records  [id number]        Your Vitals Were     Temperature Height Last Period BMI (Body Mass Index)          97.8  F (36.6  C) (Temporal) 5' 7\" (1.702 m) 03/17/2003 25.01 kg/m2         Blood Pressure from Last 3 Encounters:   05/31/17 121/69   05/24/17 138/84   04/12/17 132/80    Weight from Last 3 Encounters:   09/07/17 159 lb 11.2 oz (72.4 kg)   08/16/17 160 lb 14.4 oz (73 kg)   07/20/17 158 lb (71.7 kg)              We Performed the Following     PHYSICAL THERAPY REFERRAL     SPEECH THERAPY REFERRAL        Primary Care Provider Office Phone # Fax #    Aneudy Jackson [phone]       [facility] [address] [place] [address]        Equal Access to Services     [facility] Hospital[facility]ALE[facility] AH: Hadii aad ku hadasho Soomaali, waaxda luqadaha, qaybta kaalmada adeegyada, hilary hardin ah. So [facility] [phone].    ATENCIÓN: Si habla español, tiene a torres disposición servicios gratuitos de asistencia lingüística. Enrico al [phone].    We comply with applicable federal civil rights laws and [place] laws. We do not discriminate on the basis of race, color, national origin, age, disability sex, sexual orientation or gender identity.            Thank you!     Thank you for choosing [facility]  for your " care. Our goal is always to provide you with excellent care. Hearing back from our patients is one way we can continue to improve our services. Please take a few minutes to complete the written survey that you may receive in the mail after your visit with us. Thank you!             Your Updated Medication List - Protect others around you: Learn how to safely use, store and throw away your medicines at www.disposemymeds.org.          This list is accurate as of: 9/7/17  1:26 PM.  Always use your most recent med list.                   Brand Name Dispense Instructions for use Diagnosis    DIGESTIVE ENZYME PO      Take 2 capsules by mouth 2 times daily        DULOXETINE HCL PO      Take 60 mg by mouth daily (Takes 2 x 30mg capsule = 60mg dose)        fluticasone 50 MCG/ACT spray    FLONASE     Spray 1 spray into both nostrils daily as needed for rhinitis or allergies        LORATADINE PO      Take 10 mg by mouth daily        MULTI-VITAMIN DAILY PO      Take 1 tablet by mouth daily        SYSTANE OP      Place 1-2 drops into both eyes daily as needed        TEMAZEPAM PO      Take 15-30 mg by mouth nightly as needed for sleep        VITAMIN B-12 PO      Take 1 tablet by mouth daily        VITAMIN D-3 PO      Take 1 capsule by mouth daily        XANAX PO      Take 0.125-0.25 mg by mouth nightly as needed for sleep

## 2017-09-07 NOTE — PROGRESS NOTES
"Jayashree Johnson is a 47 year old female who presents for:  Chief Complaint   Patient presents with     Surgical Followup     s/p cervical fusion DOS: 5/30/17        Initial Vitals:  Temp 97.8  F (36.6  C) (Temporal)  Ht 1.702 m (5' 7\")  Wt 72.4 kg (159 lb 11.2 oz)  LMP 03/17/2003  BMI 25.01 kg/m2 Estimated body mass index is 25.01 kg/(m^2) as calculated from the following:    Height as of this encounter: 1.702 m (5' 7\").    Weight as of this encounter: 72.4 kg (159 lb 11.2 oz).. Body surface area is 1.85 meters squared. BP completed using cuff size: NA (Not Taken)  Mild Pain (3)    Do you feel safe in your environment?  Yes  Do you need any refills today? No    Nursing Comments:         Pooja Billy CMA    "
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
## 2017-09-07 NOTE — PATIENT INSTRUCTIONS
Referrals for:    Physical therapy  Speech/Swallow therapy    Follow up in 3 months with repeat xray. Call our clinic with any questions or concerns: [phone]

## 2017-09-11 RX ORDER — LORATADINE 10 MG/1
10 TABLET ORAL DAILY
Qty: 30 TABLET | Refills: 3 | Status: SHIPPED | OUTPATIENT
Start: 2017-09-11 | End: 2018-04-18

## 2017-09-11 NOTE — TELEPHONE ENCOUNTER
Routing refill request to provider for review/approval because:  Medication is reported/historical    Nia Cleveland RN

## 2017-09-12 ENCOUNTER — TELEPHONE (OUTPATIENT)
Dept: ONCOLOGY | Facility: CLINIC | Age: 47
End: 2017-09-12

## 2017-09-12 NOTE — TELEPHONE ENCOUNTER
Per In basket patient needs a follow up appointment with Sherice Vincent and mammo scheduled, I have called this pt on several occasions and left messages about the appointments needing to be schedule pt has not returned any phone calls to schedule.

## 2017-09-27 ENCOUNTER — HOSPITAL ENCOUNTER (OUTPATIENT)
Dept: PHYSICAL THERAPY | Facility: CLINIC | Age: 47
Setting detail: THERAPIES SERIES
End: 2017-09-27
Attending: NEUROLOGICAL SURGERY
Payer: COMMERCIAL

## 2017-09-27 ENCOUNTER — TELEPHONE (OUTPATIENT)
Dept: FAMILY MEDICINE | Facility: CLINIC | Age: 47
End: 2017-09-27

## 2017-09-27 DIAGNOSIS — J34.89 NASAL OBSTRUCTION: Primary | ICD-10-CM

## 2017-09-27 PROCEDURE — 97161 PT EVAL LOW COMPLEX 20 MIN: CPT | Mod: GP | Performed by: PHYSICAL THERAPIST

## 2017-09-27 PROCEDURE — 97140 MANUAL THERAPY 1/> REGIONS: CPT | Mod: GP | Performed by: PHYSICAL THERAPIST

## 2017-09-27 PROCEDURE — 40000718 ZZHC STATISTIC PT DEPARTMENT ORTHO VISIT: Performed by: PHYSICAL THERAPIST

## 2017-09-27 RX ORDER — FLUTICASONE PROPIONATE 50 MCG
1 SPRAY, SUSPENSION (ML) NASAL DAILY PRN
Qty: 1 BOTTLE | Refills: 2 | Status: SHIPPED | OUTPATIENT
Start: 2017-09-27 | End: 2018-04-18

## 2017-09-27 NOTE — PROGRESS NOTES
09/27/17 1415   General Information   Type of Visit Initial OP Ortho PT Evaluation   Start of Care Date 09/27/17   Referring Physician Dr. Willard Escalante   Patient/Family Goals Statement Return to work, decrease pain, normal swallowing, drive   Orders Evaluate and Treat   Date of Order 09/07/17   Insurance Type Other   Insurance Comments/Visits Authorized [employer] 26 visits   Medical Diagnosis SP cervical spinal fusion   Surgical/Medical history reviewed Yes   Precautions/Limitations no known precautions/limitations   Weight-Bearing Status - LUE full weight-bearing   Weight-Bearing Status - RUE full weight-bearing   Weight-Bearing Status - LLE full weight-bearing   Weight-Bearing Status - RLE full weight-bearing       Present No   Body Part(s)   Body Part(s) Cervical Spine   Presentation and Etiology   Pertinent history of current problem (include personal factors and/or comorbidities that impact the POC) 48 yo female with a ACDF C5-6 on May 30, 2017 by Dr. Lamar. She had been in PT prior to the surgery with limited success. She is feeling overall better with arm symptoms with activity. She is on a 20# lift restriction. She is walking the dog but no other activity. Difficulty with swallowing and vocal changes - has SLP referral   Impairments A. Pain;C. Swelling   Functional Limitations perform activities of daily living;perform required work activities;perform desired leisure / sports activities   Symptom Location cervical paraspinal muscles, tightness in throat  - hard to swallow pills and vocal changes   How/Where did it occur Other  (surgery)   Onset date of current episode/exacerbation 05/30/17   Chronicity New   Pain/symptoms are: Other   Pain symptoms comment activity related   Pain/symptoms exacerbated by K. Home tasks;J. ADL;I. Bending;C. Lifting;D. Carrying;L. Work tasks   Pain/symptoms eased by C. Rest;I. OTC medication(s);H. Cold;G. Heat  (biofreeze)   Prior Level of Function    Functional Level Prior Comment independent driving 62 miles one way to work and working oncomputer   Current Level of Function   Current Community Support Family/friend caregiver   Patient role/employment history A. Employed   Employment Comments realestate   Living environment House/townhome   Home/community accessibility no concerns other than driving to work site   Current equipment-Gait/Locomotion None   Current equipment-ADL None   Fall Risk Screen   Fall screen completed by PT   Per patient - Fall 2 or more times in past year? No   Per patient - Fall with injury in past year? No   Is patient a fall risk? No   Functional Scales   Functional Scales Other   Other Scales  NDI: 58   Cervical Spine   Observation no acute distress - turn body to look at PT straight   Integumentary  well healed but tight in the anterior incision, hyoid pulled up to the R   Posture slight forward head   Cervical Flexion ROM 34 degrees   Cervical Extension ROM 14 degrees - painful and locks   Cervical Right Side Bending ROM 30 degrees    Cervical Left Side Bending ROM 26 degrees   Cervical Right Rotation ROM 60%    Cervical Left Rotation ROM 20%   Shoulder/Wrist/Hand Strength Comments WNL except triceps 4-/5. 4/5 wrist flexion and extension, 4+/5 ulnar and radial deviation   Neurological Testing Comments equal sensation to light touch.   Planned Therapy Interventions   Planned Therapy Interventions Comment Manual therapy, ROM, endurance, strength, pain education   Planned Modality Interventions   Planned Modality Interventions Comments as needed   Clinical Impression   Criteria for Skilled Therapeutic Interventions Met yes, treatment indicated   PT Diagnosis Decreased AROM, strength and endurance following neck surgery   Influenced by the following impairments chronic cervical symptoms with weakness   Functional limitations due to impairments ROM of neck driving   Clinical Presentation Stable/Uncomplicated   Clinical Presentation  Rationale neck surgery   Clinical Decision Making (Complexity) Low complexity   Predicted Duration of Therapy Intervention (days/wks) 2 times per week x 6 weeks   Risk & Benefits of therapy have been explained Yes   Patient, Family & other staff in agreement with plan of care Yes   Education Assessment   Preferred Learning Style Reading   Barriers to Learning No barriers   ORTHO GOALS   PT Ortho Eval Goals 1;2;3   Ortho Goal 1   Goal Identifier Driving   Goal Description Melissa will be able to drive to her job site x 62 miles one way without symptoms increasing and using good positioning with 1-2 breaks->no breaks   Target Date 12/25/17   Ortho Goal 2   Goal Identifier and when navigating her e   Goal Description Melissa will maximize AROM for the her neck for looking around when driving for safety, and when navigating around her community and home especially to look down   Target Date 11/15/17   Ortho Goal 3   Goal Identifier Endurance   Goal Description Melissa will improve her endurance so she can drive to work, work and return home without feeling fatigued, be able to walk her dog, use her computer for work 30 + minutes at a time without increasing symptoms   Target Date 11/30/17   Total Evaluation Time   Total Evaluation Time 25     Thank you for referring Melissa  to [facility]    Jayashree Whalen, PT  [phone]

## 2017-09-27 NOTE — TELEPHONE ENCOUNTER
Patient requested refill on Flonase. Last filled 6-26-17, thanks    Tony Washburn  Pharmacy [facility]  On Behalf of [facility]

## 2017-10-02 ENCOUNTER — HOSPITAL ENCOUNTER (OUTPATIENT)
Dept: PHYSICAL THERAPY | Facility: CLINIC | Age: 47
Setting detail: THERAPIES SERIES
End: 2017-10-02
Attending: NEUROLOGICAL SURGERY
Payer: COMMERCIAL

## 2017-10-02 ENCOUNTER — HOSPITAL ENCOUNTER (OUTPATIENT)
Dept: SPEECH THERAPY | Facility: CLINIC | Age: 47
Setting detail: THERAPIES SERIES
End: 2017-10-02
Attending: NEUROLOGICAL SURGERY
Payer: COMMERCIAL

## 2017-10-02 PROCEDURE — 97010 HOT OR COLD PACKS THERAPY: CPT | Mod: GP | Performed by: PHYSICAL THERAPIST

## 2017-10-02 PROCEDURE — 92610 EVALUATE SWALLOWING FUNCTION: CPT | Mod: GN | Performed by: SPEECH-LANGUAGE PATHOLOGIST

## 2017-10-02 PROCEDURE — 92526 ORAL FUNCTION THERAPY: CPT | Mod: GN | Performed by: SPEECH-LANGUAGE PATHOLOGIST

## 2017-10-02 PROCEDURE — 97110 THERAPEUTIC EXERCISES: CPT | Mod: GP | Performed by: PHYSICAL THERAPIST

## 2017-10-02 PROCEDURE — 97530 THERAPEUTIC ACTIVITIES: CPT | Mod: GP | Performed by: PHYSICAL THERAPIST

## 2017-10-02 PROCEDURE — 40000718 ZZHC STATISTIC PT DEPARTMENT ORTHO VISIT: Performed by: PHYSICAL THERAPIST

## 2017-10-02 PROCEDURE — 40000211 ZZHC STATISTIC SLP  DEPARTMENT VISIT: Performed by: SPEECH-LANGUAGE PATHOLOGIST

## 2017-10-11 ENCOUNTER — HOSPITAL ENCOUNTER (OUTPATIENT)
Dept: PHYSICAL THERAPY | Facility: CLINIC | Age: 47
Setting detail: THERAPIES SERIES
End: 2017-10-11
Attending: NEUROLOGICAL SURGERY
Payer: COMMERCIAL

## 2017-10-11 ENCOUNTER — HOSPITAL ENCOUNTER (OUTPATIENT)
Dept: SPEECH THERAPY | Facility: CLINIC | Age: 47
Setting detail: THERAPIES SERIES
End: 2017-10-11
Attending: NEUROLOGICAL SURGERY
Payer: COMMERCIAL

## 2017-10-11 PROCEDURE — 40000211 ZZHC STATISTIC SLP  DEPARTMENT VISIT: Performed by: SPEECH-LANGUAGE PATHOLOGIST

## 2017-10-11 PROCEDURE — 92507 TX SP LANG VOICE COMM INDIV: CPT | Mod: GN | Performed by: SPEECH-LANGUAGE PATHOLOGIST

## 2017-10-11 PROCEDURE — 97140 MANUAL THERAPY 1/> REGIONS: CPT | Mod: GP | Performed by: PHYSICAL THERAPIST

## 2017-10-11 PROCEDURE — 40000718 ZZHC STATISTIC PT DEPARTMENT ORTHO VISIT: Performed by: PHYSICAL THERAPIST

## 2017-10-18 ENCOUNTER — HOSPITAL ENCOUNTER (OUTPATIENT)
Dept: PHYSICAL THERAPY | Facility: CLINIC | Age: 47
Setting detail: THERAPIES SERIES
End: 2017-10-18
Attending: NEUROLOGICAL SURGERY
Payer: COMMERCIAL

## 2017-10-18 ENCOUNTER — HOSPITAL ENCOUNTER (OUTPATIENT)
Dept: SPEECH THERAPY | Facility: CLINIC | Age: 47
Setting detail: THERAPIES SERIES
End: 2017-10-18
Attending: NEUROLOGICAL SURGERY
Payer: COMMERCIAL

## 2017-10-18 PROCEDURE — 97140 MANUAL THERAPY 1/> REGIONS: CPT | Mod: GP | Performed by: PHYSICAL THERAPIST

## 2017-10-18 PROCEDURE — 40000718 ZZHC STATISTIC PT DEPARTMENT ORTHO VISIT: Performed by: PHYSICAL THERAPIST

## 2017-10-18 PROCEDURE — 92507 TX SP LANG VOICE COMM INDIV: CPT | Mod: GN | Performed by: SPEECH-LANGUAGE PATHOLOGIST

## 2017-10-18 PROCEDURE — 40000211 ZZHC STATISTIC SLP  DEPARTMENT VISIT: Performed by: SPEECH-LANGUAGE PATHOLOGIST

## 2017-10-23 ENCOUNTER — HOSPITAL ENCOUNTER (OUTPATIENT)
Dept: SPEECH THERAPY | Facility: CLINIC | Age: 47
Setting detail: THERAPIES SERIES
End: 2017-10-23
Attending: NEUROLOGICAL SURGERY
Payer: COMMERCIAL

## 2017-10-23 ENCOUNTER — HOSPITAL ENCOUNTER (OUTPATIENT)
Dept: PHYSICAL THERAPY | Facility: CLINIC | Age: 47
Setting detail: THERAPIES SERIES
End: 2017-10-23
Attending: NEUROLOGICAL SURGERY
Payer: COMMERCIAL

## 2017-10-23 PROCEDURE — 40000211 ZZHC STATISTIC SLP  DEPARTMENT VISIT: Performed by: SPEECH-LANGUAGE PATHOLOGIST

## 2017-10-23 PROCEDURE — 97140 MANUAL THERAPY 1/> REGIONS: CPT | Mod: GP | Performed by: PHYSICAL THERAPIST

## 2017-10-23 PROCEDURE — 97110 THERAPEUTIC EXERCISES: CPT | Mod: GP | Performed by: PHYSICAL THERAPIST

## 2017-10-23 PROCEDURE — 92507 TX SP LANG VOICE COMM INDIV: CPT | Mod: GN | Performed by: SPEECH-LANGUAGE PATHOLOGIST

## 2017-10-23 PROCEDURE — 40000718 ZZHC STATISTIC PT DEPARTMENT ORTHO VISIT: Performed by: PHYSICAL THERAPIST

## 2017-11-07 ENCOUNTER — HOSPITAL ENCOUNTER (OUTPATIENT)
Dept: PHYSICAL THERAPY | Facility: CLINIC | Age: 47
Setting detail: THERAPIES SERIES
End: 2017-11-07
Attending: NEUROLOGICAL SURGERY
Payer: COMMERCIAL

## 2017-11-07 PROCEDURE — 97110 THERAPEUTIC EXERCISES: CPT | Mod: GP | Performed by: PHYSICAL THERAPIST

## 2017-11-07 PROCEDURE — 40000718 ZZHC STATISTIC PT DEPARTMENT ORTHO VISIT: Performed by: PHYSICAL THERAPIST

## 2017-11-07 NOTE — PROGRESS NOTES
Outpatient Physical Therapy Progress Note     Patient: Jayashree Johnson  : 1970    Beginning/End Dates of Reporting Period:  6 visits between 17 and 17    Referring Provider: Dr. Willard Escalante    Therapy Diagnosis: Decreased AROM, strength and endurance following neck surgery     Client Self Report: She is noting it harder to keep shoulders down with cold weather. Her headaches are back. SHe feels her strength is improving and her ROM for driving is increased. She is able to drive 62 miles to work and move her head easier taking one break but has pain after driving. She is able to complete work, work on computer as noted in goal #3, but it does increase her symptoms. Flexion is still difficult. Neck pain: 4/10, range: 2/10 increasing to 8/10. Increases at the end of the day.     Objective Measurements:  Objective Measure: Seated cervical AROM: Flexion: 20 degrees; Extension: 30 degrees, Side bending: R: 25 degrees, L: 22 degrees and feels tighter today; rotation: R: 35 degrees, L: 30 degrees     Objective Measure: NDI  Details: 56 today improved from 70       Goal Identifier Cervical AROM   Goal Description Melissa will maximize AROM for the her neck for looking around when driving for safety, and when navigating around her community and home especially to look down (Improving, flexion is still terrible)   Target Date 11/15/17   Date Met      Progress:     Goal Identifier Endurance   Goal Description Melissa will improve her endurance so she can drive to work, work and return home without feeling fatigued, be able to walk her dog, use her computer for work 30 + minutes at a time without increasing symptoms (She has completed these but notes increase in symptoms)   Target Date 17   Date Met      Progress:         Progress Toward Goals:   Progress this reporting period: Melissa has been working on her exercises. The exercises were written tonight and we worked on lifting positions in sitting and standing.  She was to nudge symptoms but not push through, use pillows for support at night to relax muscles from long days. She is very tight with scapular elevation. Her NDI improved. She is able to complete her home exercises. She plans to work on her exercises x 2 weeks and then will contact PT as needed for questions.     Plan:  Changes to therapy plan of care: Will recheck in 2 weeks as she works on her home program.     Discharge:  No    Thank you for referring Melissa  to [facility]    Jayashree Whalen, PT  [phone]

## 2017-12-05 ENCOUNTER — OFFICE VISIT (OUTPATIENT)
Dept: DERMATOLOGY | Facility: CLINIC | Age: 47
End: 2017-12-05
Payer: COMMERCIAL

## 2017-12-05 DIAGNOSIS — L81.4 SOLAR LENTIGINOSIS: ICD-10-CM

## 2017-12-05 DIAGNOSIS — L21.9 SEBORRHEIC DERMATITIS: Primary | ICD-10-CM

## 2017-12-05 DIAGNOSIS — Z85.828 HISTORY OF NONMELANOMA SKIN CANCER: ICD-10-CM

## 2017-12-05 DIAGNOSIS — L82.1 SEBORRHEIC KERATOSIS: ICD-10-CM

## 2017-12-05 PROCEDURE — 99213 OFFICE O/P EST LOW 20 MIN: CPT | Performed by: DERMATOLOGY

## 2017-12-05 RX ORDER — HYDROCORTISONE 2.5 %
CREAM (GRAM) TOPICAL
Qty: 30 G | Refills: 1 | Status: SHIPPED | OUTPATIENT
Start: 2017-12-05 | End: 2020-05-26
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
## 2017-12-05 NOTE — MR AVS SNAPSHOT
"              After Visit Summary   12/5/2017    Jayashree Johnson    MRN: [id number]           Patient Information     Date Of Birth          1970        Visit Information        Provider Department      12/5/2017 3:45 PM Natalie Kern MD [facility]        Today's Diagnoses     Seborrheic dermatitis    -  1    History of nonmelanoma skin cancer        Seborrheic keratosis        Solar lentiginosis           Follow-ups after your visit        Follow-up notes from your care team     Return in about 1 year (around 12/5/2018) for hx of NMSC.      Your next 10 appointments already scheduled     Dec 13, 2017  1:30 PM CST   Return Visit with MIMI Carver   [facility]    [address] [place] [id number] [phone]            Dec 13, 2017  2:30 PM CST   MA SCREENING DIGITAL BILATERAL with MGMA1, MG MA TECH   [facility] ([facility])    [address] [place] [id number] [phone]           Do not use any powder, lotion or deodorant under your arms or on your breast. If you do, we will ask you to remove it before your exam.  Wear comfortable, two-piece clothing.  If you have any allergies, tell your care team.  Bring any previous mammograms from other facilities or have them mailed to the breast center. Three-dimensional (3D) mammograms are available at [facility] locations in [facility], [place], [place], [facility], [place], [place], and [place]. [facility] locations include [place] [facility] in [place]. Benefits of 3D mammograms include: - Improved rate of cancer detection - Decreases your chance of having to go back for more tests, which means fewer: - \"False-positive\" results (This means that there is an abnormal area but it isn't cancer.) - Invasive testing procedures, such as a biopsy or surgery - Can provide clearer images " of the breast if you have dense breast tissue. 3D mammography is an optional exam that anyone can have with a 2D mammogram. It doesn't replace or take the place of a 2D mammogram. 2D mammograms remain an effective screening test for all women.  Not all insurance companies cover the cost of a 3D mammogram. Check with your insurance.              Who to contact     If you have questions or need follow up information about today's clinic visit or your schedule please contact [facility] directly at [phone].  Normal or non-critical lab and imaging results will be communicated to you by Mera[employer] India[employer], letter or phone within 4 business days after the clinic has received the results. If you do not hear from us within 7 days, please contact the clinic through Appiriot or phone. If you have a critical or abnormal lab result, we will notify you by phone as soon as possible.  Submit refill requests through P[employer]Desk or call your pharmacy and they will forward the refill request to us. Please allow 3 business days for your refill to be completed.          Additional Information About Your Visit        P[employer]Desk Information     P[employer]Desk gives you secure access to your electronic health record. If you see a primary care provider, you can also send messages to your care team and make appointments. If you have questions, please call your primary care clinic.  If you do not have a primary care provider, please call [phone] and they will assist you.      P[employer]Desk is an electronic gateway that provides easy, online access to your medical records. With P[employer]Desk, you can request a clinic appointment, read your test results, renew a prescription or communicate with your care team.     To access your existing account, please contact your [facility] or call [phone] for assistance.        Care EveryWhere ID     This is your Care EveryWhere ID. This could be used by other organizations to  access your [facility] medical records  [id number]        Your Vitals Were     Last Period                   03/17/2003            Blood Pressure from Last 3 Encounters:   05/31/17 121/69   05/24/17 138/84   04/12/17 132/80    Weight from Last 3 Encounters:   09/07/17 72.4 kg (159 lb 11.2 oz)   08/16/17 73 kg (160 lb 14.4 oz)   07/20/17 71.7 kg (158 lb)              Today, you had the following     No orders found for display         Today's Medication Changes          These changes are accurate as of: 12/5/17  4:25 PM.  If you have any questions, ask your nurse or doctor.               Start taking these medicines.        Dose/Directions    hydrocortisone 2.5 % cream   Used for:  Seborrheic dermatitis   Started by:  Natalie Kern MD        Apply two times daily to the red areas on the face and body and the scalp for 1 week, take breaks from use   Quantity:  30 g   Refills:  1            Where to get your medicines      These medications were sent to [facility] [address]   [address] [place] [address] [facility] [address]     Phone:  [phone]     hydrocortisone 2.5 % cream                Primary Care Provider Office Phone # Fax #    Aneudy Jackson [phone]       [address]   [facility] [address]        Equal Access to Services     BOB CUNNINGHAM AH: Hadii sal bustillos hadasho Soloali, waaxda luqadaha, qaybta kaalmada tricia, hilary trevino. So [facility] [phone].    ATENCIÓN: Si habla español, tiene a torres disposición servicios gratuitos de asistencia lingüística. Llame al [phone].    We comply with applicable federal civil rights laws and [place] laws. We do not discriminate on the basis of race, color, national origin, age, disability, sex, sexual orientation, or gender identity.            Thank you!     Thank you for choosing [facility]  for your care. Our goal is always to provide you with  excellent care. Hearing back from our patients is one way we can continue to improve our services. Please take a few minutes to complete the written survey that you may receive in the mail after your visit with us. Thank you!             Your Updated Medication List - Protect others around you: Learn how to safely use, store and throw away your medicines at www.disposemymeds.org.          This list is accurate as of: 12/5/17  4:25 PM.  Always use your most recent med list.                   Brand Name Dispense Instructions for use Diagnosis    DIGESTIVE ENZYME PO      Take 2 capsules by mouth 2 times daily        DULOXETINE HCL PO      Take 60 mg by mouth daily (Takes 2 x 30mg capsule = 60mg dose)        fluticasone 50 MCG/ACT spray    FLONASE    1 Bottle    Spray 1 spray into both nostrils daily as needed for rhinitis or allergies    Nasal obstruction       hydrocortisone 2.5 % cream     30 g    Apply two times daily to the red areas on the face and body and the scalp for 1 week, take breaks from use    Seborrheic dermatitis       loratadine 10 MG tablet    CLARITIN    30 tablet    Take 1 tablet (10 mg) by mouth daily    Chronic nonseasonal allergic rhinitis due to pollen       MULTI-VITAMIN DAILY PO      Take 1 tablet by mouth daily        SYSTANE OP      Place 1-2 drops into both eyes daily as needed        TEMAZEPAM PO      Take 15-30 mg by mouth nightly as needed for sleep        VITAMIN B-12 PO      Take 1 tablet by mouth daily        VITAMIN D-3 PO      Take 1 capsule by mouth daily        XANAX PO      Take 0.125-0.25 mg by mouth nightly as needed for sleep

## 2017-12-05 NOTE — NURSING NOTE
Dermatology Rooming Note    Jayashree Johnson's goals for this visit include:   Chief Complaint   Patient presents with     Skin Check     areas of concern eye brows and right cheek and right inner thigh hx SCC       Is a scribe okay for this visit:YES    Are records needed for this visit(If yes, obtain release of information): No     Vitals: LMP 03/17/2003    Referring Provider:  ESTABLISHED PATIENT  No address on file    Maria Del Rosario Carmona LPN

## 2017-12-22 ENCOUNTER — HEALTH MAINTENANCE LETTER (OUTPATIENT)
Age: 47
End: 2017-12-22

## 2018-01-15 ENCOUNTER — OFFICE VISIT (OUTPATIENT)
Dept: OTOLARYNGOLOGY | Facility: CLINIC | Age: 48
End: 2018-01-15
Payer: COMMERCIAL

## 2018-01-15 VITALS — HEART RATE: 95 BPM | OXYGEN SATURATION: 100 %

## 2018-01-15 DIAGNOSIS — R49.0 DYSPHONIA: ICD-10-CM

## 2018-01-15 DIAGNOSIS — R13.10 DYSPHAGIA, UNSPECIFIED TYPE: Primary | ICD-10-CM

## 2018-01-15 PROCEDURE — 99204 OFFICE O/P NEW MOD 45 MIN: CPT | Mod: 25 | Performed by: OTOLARYNGOLOGY

## 2018-01-15 PROCEDURE — 31575 DIAGNOSTIC LARYNGOSCOPY: CPT | Performed by: OTOLARYNGOLOGY
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
## 2018-01-15 NOTE — NURSING NOTE
"Chief Complaint   Patient presents with     Consult     Referring      Throat Problem     Trouble swollowing        Initial Pulse 95  LMP 03/17/2003  SpO2 100% Estimated body mass index is 25.01 kg/(m^2) as calculated from the following:    Height as of 9/7/17: 1.702 m (5' 7\").    Weight as of 9/7/17: 72.4 kg (159 lb 11.2 oz).  Medication Reconciliation: complete  "

## 2018-01-15 NOTE — MR AVS SNAPSHOT
"              After Visit Summary   1/15/2018    Jayashree Johnson    MRN: [id number]           Patient Information     Date Of Birth          1970        Visit Information        Provider Department      1/15/2018 3:00 PM Harpal Pandya MD [facility]        Today's Diagnoses     Dysphagia, unspecified type    -  1    Dysphonia           Follow-ups after your visit        Additional Services     SPEECH THERAPY REFERRAL       *This therapy referral will be filtered to a centralized scheduling office at [facility] and the patient will receive a call to schedule an appointment at a [place] location most convenient for them. *     [facility] provides Speech Therapy evaluation and treatment and many specialty services across the [place] system.  If requesting a specialty program, please choose from the list below.  If you have not heard from the scheduling office within 2 business days, please call [phone] for all locations, with the exception of [place], please call [phone].       Treatment: Evaluation & Treatment  Speech Treatment Diagnosis: Dysphonia  Special Instructions: deconditioning therapy  Special Programs: Voice     Please be aware that coverage of these services is subject to the terms and limitations of your health insurance plan.  Call member services at your health plan with any benefit or coverage questions.      **Note to Provider:  If you are referring outside of [place] for the therapy appointment, please list the name of the location in the \"special instructions\" above, print the referral and give to the patient to schedule the appointment.            Speech Therapy Referral       *This order will print in the [facility] Central Scheduling Office*    [facility] provides Speech Therapy evaluation and treatment and many specialty services across the [place] system.  If requesting a " specialty program, please choose from the list below.    Call [phone] to schedule [facility] at all locations, with the exception of [facility], please call [phone].     Treatment: Evaluation & Treatment  Speech Treatment Diagnosis: Dysphagia  Special Instructions: post cervical disc surgery dysphagia  Special Programs: Video Swallow Study    Please be aware that coverage of these services is subject to the terms and limitations of your health insurance plan.  Call member services at your health plan with any benefit or coverage questions.      **Note to Provider** To refer patients to therapy outside of the location list, change the order class to External Referral in the order composer.                  Future tests that were ordered for you today     Open Future Orders        Priority Expected Expires Ordered    XR Video Swallow w/o Esophagram - Order with Speech Therapy Referral Routine 1/15/2018 1/15/2019 1/15/2018            Who to contact     If you have questions or need follow up information about today's clinic visit or your schedule please contact [facility] directly at [phone].  Normal or non-critical lab and imaging results will be communicated to you by [employer], letter or phone within 4 business days after the clinic has received the results. If you do not hear from us within 7 days, please contact the clinic through [employer] or phone. If you have a critical or abnormal lab result, we will notify you by phone as soon as possible.  Submit refill requests through [employer] or call your pharmacy and they will forward the refill request to us. Please allow 3 business days for your refill to be completed.          Additional Information About Your Visit        [employer] Information     [employer] gives you secure access to your electronic health record. If you see a primary care provider, you can also send messages to your care team and make appointments.  If you have questions, please call your primary care clinic.  If you do not have a primary care provider, please call [phone] and they will assist you.        Care EveryWhere ID     This is your Care EveryWhere ID. This could be used by other organizations to access your [place] medical records  [id number]        Your Vitals Were     Pulse Last Period Pulse Oximetry             95 03/17/2003 100%          Blood Pressure from Last 3 Encounters:   05/31/17 121/69   05/24/17 138/84   04/12/17 132/80    Weight from Last 3 Encounters:   09/07/17 72.4 kg (159 lb 11.2 oz)   08/16/17 73 kg (160 lb 14.4 oz)   07/20/17 71.7 kg (158 lb)              We Performed the Following     Laryngoscopy, Fiber     Speech Therapy Referral     SPEECH THERAPY REFERRAL        Primary Care Provider Office Phone # Fax #    Aneudy Jackson [phone]       [facility] [address] [place] [address]        Equal Access to Services     BOB CUNNINGHAM : Hadii aad ku hadasho Soomaali, waaxda luqadaha, qaybta kaalmada adeegyada, waxay idiin hayaan suzan quintanillaaramiguel hardin . So [facility] [phone].    ATENCIÓN: Si habla español, tiene a torres disposición servicios gratuitos de asistencia lingüística. [facility] [phone].    We comply with applicable federal civil rights laws and [place] laws. We do not discriminate on the basis of race, color, national origin, age, disability, sex, sexual orientation, or gender identity.            Thank you!     Thank you for choosing [facility]  for your care. Our goal is always to provide you with excellent care. Hearing back from our patients is one way we can continue to improve our services. Please take a few minutes to complete the written survey that you may receive in the mail after your visit with us. Thank you!             Your Updated Medication List - Protect others around you: Learn how to safely use, store and throw away your medicines at  www.disposemymeds.org.          This list is accurate as of: 1/15/18  4:23 PM.  Always use your most recent med list.                   Brand Name Dispense Instructions for use Diagnosis    DIGESTIVE ENZYME PO      Take 2 capsules by mouth 2 times daily        DULOXETINE HCL PO      Take 60 mg by mouth daily (Takes 2 x 30mg capsule = 60mg dose)        fluticasone 50 MCG/ACT spray    FLONASE    1 Bottle    Spray 1 spray into both nostrils daily as needed for rhinitis or allergies    Nasal obstruction       hydrocortisone 2.5 % cream     30 g    Apply two times daily to the red areas on the face and body and the scalp for 1 week, take breaks from use    Seborrheic dermatitis       loratadine 10 MG tablet    CLARITIN    30 tablet    Take 1 tablet (10 mg) by mouth daily    Chronic nonseasonal allergic rhinitis due to pollen       MULTI-VITAMIN DAILY PO      Take 1 tablet by mouth daily        SYSTANE OP      Place 1-2 drops into both eyes daily as needed        TEMAZEPAM PO      Take 15-30 mg by mouth nightly as needed for sleep        VITAMIN B-12 PO      Take 1 tablet by mouth daily        VITAMIN D-3 PO      Take 1 capsule by mouth daily        XANAX PO      Take 0.125-0.25 mg by mouth nightly as needed for sleep
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
## 2018-01-23 ENCOUNTER — HOSPITAL ENCOUNTER (EMERGENCY)
Facility: CLINIC | Age: 48
Discharge: HOME | End: 2018-01-23
Attending: EMERGENCY MEDICINE | Admitting: EMERGENCY MEDICINE
Payer: COMMERCIAL

## 2018-01-23 VITALS
TEMPERATURE: 98.5 F | BODY MASS INDEX: 25.06 KG/M2 | DIASTOLIC BLOOD PRESSURE: 100 MMHG | WEIGHT: 160 LBS | OXYGEN SATURATION: 99 % | HEART RATE: 89 BPM | SYSTOLIC BLOOD PRESSURE: 165 MMHG | RESPIRATION RATE: 18 BRPM

## 2018-01-23 DIAGNOSIS — J02.9 PHARYNGITIS, UNSPECIFIED ETIOLOGY: ICD-10-CM

## 2018-01-23 LAB
DEPRECATED S PYO AG THROAT QL EIA: NORMAL
SPECIMEN SOURCE: NORMAL

## 2018-01-23 PROCEDURE — 99283 EMERGENCY DEPT VISIT LOW MDM: CPT | Performed by: EMERGENCY MEDICINE

## 2018-01-23 PROCEDURE — 99282 EMERGENCY DEPT VISIT SF MDM: CPT | Mod: Z6 | Performed by: EMERGENCY MEDICINE

## 2018-01-23 PROCEDURE — 87880 STREP A ASSAY W/OPTIC: CPT | Performed by: FAMILY MEDICINE

## 2018-01-23 PROCEDURE — 87081 CULTURE SCREEN ONLY: CPT | Performed by: FAMILY MEDICINE

## 2018-01-23 NOTE — ED AVS SNAPSHOT
[facility]    [address] [place] [address]    Phone:  [phone]    Fax:  [phone]                                       Jayashree Johnson   MRN: [id number]    Department:  [facility]   Date of Visit:  1/23/2018           After Visit Summary Signature Page     I have received my discharge instructions, and my questions have been answered. I have discussed any challenges I see with this plan with the nurse or doctor.    ..........................................................................................................................................  Patient/Patient Representative Signature      ..........................................................................................................................................  Patient Representative Print Name and Relationship to Patient    ..................................................               ................................................  Date                                            Time    ..........................................................................................................................................  Reviewed by Signature/Title    ...................................................              ..............................................  Date                                                            Time

## 2018-01-23 NOTE — ED AVS SNAPSHOT
[facility] Emergency Department    [address] [place] [address]    Phone:  [phone]    Fax:  [phone]                                       Jayashree Johnson   MRN: [id number]    Department:  [facility] Emergency Department   Date of Visit:  1/23/2018           Patient Information     Date Of Birth          1970        Your diagnoses for this visit were:     Pharyngitis, unspecified etiology        You were seen by Nikita Briseno MD.      Follow-up Information     Follow up with Aneudy Jackson MD.    Specialty:  Internal Medicine    Why:  If not improving in 2 days    Contact information:    [facility]  [address] [place] [address] [phone]        Discharge References/Attachments     PHARYNGITIS, VIRAL (ENGLISH)      Future Appointments        Provider Department Dept Phone Center    2/2/2018 1:45 PM Allison E. Alpers, SLP [place] -[phone] [facility]    2/9/2018 9:15 AM [place] [facility] Speech Therapy [phone] [place]    2/9/2018 9:15 AM  Radiologist; [facility] [phone] [facility]      24 Hour Appointment Hotline       To make an appointment at any [facility], call [phone] ([phone]). If you don't have a family doctor or clinic, we will help you find one. [facility] are conveniently located to serve the needs of you and your family.             Review of your medicines      Our records show that you are taking the medicines listed below. If these are incorrect, please call your family doctor or clinic.        Dose / Directions Last dose taken    DIGESTIVE ENZYME PO   Dose:  2 capsule        Take 2 capsules by mouth 2 times daily   Refills:  0        DULOXETINE HCL PO   Dose:  60 mg        Take 60 mg by mouth daily (Takes 2 x 30mg capsule = 60mg dose)   Refills:  0        fluticasone 50 MCG/ACT spray   Commonly known as:   FLONASE   Dose:  1 spray   Quantity:  1 Bottle        Spray 1 spray into both nostrils daily as needed for rhinitis or allergies   Refills:  2        hydrocortisone 2.5 % cream   Quantity:  30 g        Apply two times daily to the red areas on the face and body and the scalp for 1 week, take breaks from use   Refills:  1        loratadine 10 MG tablet   Commonly known as:  CLARITIN   Dose:  10 mg   Quantity:  30 tablet        Take 1 tablet (10 mg) by mouth daily   Refills:  3        MULTI-VITAMIN DAILY PO   Dose:  1 tablet        Take 1 tablet by mouth daily   Refills:  0        SYSTANE OP   Dose:  1-2 drop        Place 1-2 drops into both eyes daily as needed   Refills:  0        TEMAZEPAM PO   Dose:  15-30 mg        Take 15-30 mg by mouth nightly as needed for sleep   Refills:  0        VITAMIN B-12 PO   Dose:  1 tablet        Take 1 tablet by mouth daily   Refills:  0        VITAMIN D-3 PO   Dose:  1 capsule        Take 1 capsule by mouth daily   Refills:  0        XANAX PO   Dose:  0.125-0.25 mg   Indication:  Trouble Sleeping        Take 0.125-0.25 mg by mouth nightly as needed for sleep   Refills:  0                Procedures and tests performed during your visit     Beta strep group A culture    Rapid strep screen      Orders Needing Specimen Collection     None      Pending Results     Date and Time Order Name Status Description    1/23/2018 2208 Beta strep group A culture In process             Pending Culture Results     Date and Time Order Name Status Description    1/23/2018 2208 Beta strep group A culture In process             Pending Results Instructions     If you had any lab results that were not finalized at the time of your Discharge, you can call the ED Lab Result RN at [phone]. You will be contacted by this team for any positive Lab results or changes in treatment. The nurses are available 7 days a week from 10A to 6:30P.  You can leave a message 24 hours per day and they will return your  call.        Thank you for choosing [facility]       Thank you for choosing [facility] for your care. Our goal is always to provide you with excellent care. Hearing back from our patients is one way we can continue to improve our services. Please take a few minutes to complete the written survey that you may receive in the mail after you visit with us. Thank you!        [facility] Information     [facility] gives you secure access to your electronic health record. If you see a primary care provider, you can also send messages to your care team and make appointments. If you have questions, please call your primary care clinic.  If you do not have a primary care provider, please call [phone] and they will assist you.        Care EveryWhere ID     This is your Care EveryWhere ID. This could be used by other organizations to access your [place] medical records  [id number]        Equal Access to Services     BOB [place] : Nirali Tse, lazara moreno, hilary luque. So [facility] [phone].    ATENCIÓN: Si habla español, tiene a torres disposición servicios gratuitos de asistencia lingüística. Llame al [phone].    We comply with applicable federal civil rights laws and [place] laws. We do not discriminate on the basis of race, color, national origin, age, disability, sex, sexual orientation, or gender identity.            After Visit Summary       This is your record. Keep this with you and show to your community pharmacist(s) and doctor(s) at your next visit.

## 2018-01-24 LAB
BACTERIA SPEC CULT: NORMAL
SPECIMEN SOURCE: NORMAL
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
## 2018-01-25 ENCOUNTER — MYC MEDICAL ADVICE (OUTPATIENT)
Dept: INTERNAL MEDICINE | Facility: CLINIC | Age: 48
End: 2018-01-25

## 2018-01-25 NOTE — TELEPHONE ENCOUNTER
Eileen Blanc, My name is Kristy Corrigan RN. I just wanted to let you know Dr. Jackson is NOT in clinic on Thursdays. He will be in tomorrow. I will forward your message to him. You may want to call and schedule an appt with the float nurse , to come have your blood pressure checked today. Just because it was high while in the ER, doesn't mean it stays high. It fluctuates throughout the day.  IF you are having a severe headache or blurred vision, feeling confused or drowsy, or having chest pains then you need to FU in the ER.......................LEYDI Alanis

## 2018-02-07 ENCOUNTER — TELEPHONE (OUTPATIENT)
Dept: FAMILY MEDICINE | Facility: CLINIC | Age: 48
End: 2018-02-07

## 2018-02-07 NOTE — TELEPHONE ENCOUNTER
The medication the pt is requesting is not on medication list or on D/C list. Routed to Dr. Jackson to advise.

## 2018-02-07 NOTE — TELEPHONE ENCOUNTER
Patient is trying to get a script for sumatriptan 50 mg. Any questions please contact patient at [phone]      Thank You,  Kannan García, Pharmacy [facility]

## 2018-02-07 NOTE — TELEPHONE ENCOUNTER
I don't see her on this before. She could do an e visit and then see why she needs it, what for, warn her of side effects and do a script.

## 2018-02-09 ENCOUNTER — HOSPITAL ENCOUNTER (OUTPATIENT)
Dept: GENERAL RADIOLOGY | Facility: CLINIC | Age: 48
Discharge: HOME | End: 2018-02-09
Attending: OTOLARYNGOLOGY | Admitting: OTOLARYNGOLOGY
Payer: COMMERCIAL

## 2018-02-09 ENCOUNTER — HOSPITAL ENCOUNTER (OUTPATIENT)
Dept: SPEECH THERAPY | Facility: CLINIC | Age: 48
Setting detail: THERAPIES SERIES
End: 2018-02-09
Attending: OTOLARYNGOLOGY
Payer: COMMERCIAL

## 2018-02-09 DIAGNOSIS — R13.10 DYSPHAGIA, UNSPECIFIED TYPE: ICD-10-CM

## 2018-02-09 PROCEDURE — 40000211 ZZHC STATISTIC SLP  DEPARTMENT VISIT

## 2018-02-09 PROCEDURE — 92611 MOTION FLUOROSCOPY/SWALLOW: CPT | Mod: GN

## 2018-02-09 PROCEDURE — 74230 X-RAY XM SWLNG FUNCJ C+: CPT | Mod: TC

## 2018-02-09 RX ORDER — BARIUM SULFATE 400 MG/ML
SUSPENSION ORAL ONCE
Status: COMPLETED | OUTPATIENT
Start: 2018-02-09 | End: 2018-02-09

## 2018-02-09 RX ADMIN — BARIUM SULFATE 1 ML: 400 SUSPENSION ORAL at 09:39

## 2018-02-09 RX ADMIN — BARIUM SULFATE 1 ML: 400 SUSPENSION ORAL at 09:42

## 2018-02-09 NOTE — PROGRESS NOTES
Impressions: The patient trialed thin, nectar, honey, pudding, pill and cracker. Swallow was WNL in today's exam. The patient reports esophageal symptoms including regurgitation of food/drink back into the pharyngeal and oral cavity. It is recommended the patient follow up with you PCP to determine a plan of care for these symptoms.      02/09/18  VFSS Evaluation        Present No   General Information   Type Of Visit Initial   Start Of Care Date 02/09/18   Referring Physician Harpal Pandya MD   Orders Evaluate And Treat   Orders Comment S/p cervical disc fusion.    Medical Diagnosis Dysphagia   Onset Of Illness/injury Or Date Of Surgery 05/31/17  (Per patient report. )   Precautions/limitations No Known Precautions/limitations   Hearing WFL   Pertinent History of Current Problem/OT: Additional Occupational Profile Info The patient had a C4 C5 fusion in May of 2017. She reports that following this procedure, she had increased difficulty with swallowing. The patient reports that she is not able to swallow dry or tough foods. She also reports that since the surgery she has regurgitated foods back into her pharyngeal and oral cavity.    Respiratory Status Room air   Pain Assessment   Pain Reported Yes   Pain Location throat when swallowing on occasion.    Fall Risk Screen   Fall screen completed by SLP   Have you fallen 2 or more times in the past year? Yes   Have you fallen and had an injury in the past year? No   Is patient a fall risk? Yes;Referral initiated   Fall screen comments The patient reports that she has fallen several times over last year. No energies aside from bruising sustained.    Clinical Swallow Evaluation   Oral Musculature generally intact   Structural Abnormalities none present   Dentition present and adequate   Mucosal Quality good   Mandibular Strength and Mobility intact   Oral Labial Strength and Mobility WFL   Lingual Strength and Mobility WFL   Velar Elevation  intact   Buccal Strength and Mobility intact   Laryngeal Function Swallow;Voicing initiated   VFSS Evaluation   VFSS Additional Documentation Yes   VFSS Eval: Radiology   Views Taken left lateral   Physical Location of Procedure [facility]   VFSS Eval: Thin Liquid Texture Trial   Mode of Presentation, Thin Liquid cup;self-fed   Order of Presentation 1   Preparatory Phase WFL   Oral Phase, Thin Liquid WFL   Pharyngeal Phase, Thin Liquid WFL   Rosenbek's Penetration Aspiration Scale: Thin Liquid Trial Results 1 - no aspiration, contrast does not enter airway   Diagnostic Statement The patient trialed thin liquids with a swallow that is WNL. No penetration, aspiration, residue, or premature entry noted.    VFSS Eval: Nectar Thick Liquid Texture Trial   Mode of Presentation, Nectar cup   Order of Presentation 2   Preparatory Phase WFL   Oral Phase, Nectar WFL   Pharyngeal Phase, [place] WFL   Rosenbek's Penetration Aspiration Scale: Nectar-Thick Liquid Trial Results 1 - no aspiration, contrast does not enter airway   Diagnostic Statement The patient trialed nectar liquids with a swallow that is WNL. No penetration, aspiration, residue, or premature entry noted.    VFSS Eval: Honey Thick Texture Trial   Mode of Presentation, Honey cup   Order of Presentation 3   Preparatory Phase WFL   Oral Phase, Honey WFL   Pharyngeal Phase, Honey WFL   Rosenbek's Penetration Aspiration Scale: Honey Trial Results 1 - no aspiration, contrast does not enter airway   Diagnostic Statement The patient honey liquids with a swallow that is WNL. No penetration, aspiration, residue, or premature entry noted.    VFSS Eval: Pudding Thick Liquid Texture Trial   Mode of Presentation, Pudding spoon;self-fed   Order of Presentation 4   Preparatory Phase WFL   Oral Phase, Pudding WFL   Pharyngeal Phase, Pudding WFL   Rosenbek's Penetration Aspiration Scale: Pudding-Thick Liquid Trial Results 1 - no aspiration, contrast does not enter airway    Diagnostic Statement The patient trialed pudding liquids with a swallow that is WNL. No penetration, aspiration, residue, or premature entry noted.    VFSS Eval: Solid Food Texture Trial   Mode of Presentation, Solid self-fed   Order of Presentation 5   Preparatory Phase WFL   Oral Phase, Solid WFL   Pharyngeal Phase, Solid WFL   Rosenbek's Penetration Aspiration Scale: Solid Food Trial Results 1 - no aspiration, contrast does not enter airway   Diagnostic Statement The patient trialed pill and cracker consistency with a swallow that is WNL. No penetration, aspiration, residue, or premature entry noted.    FEES Evaluation   Additional Documentation No   Swallow Compensations   Swallow Compensations No compensations were used   Results No difficulties noted   Educational Assessment   Barriers to Learning No barriers   Preferred Learning Style Pictures/video   Esophageal Phase of Swallow   Patient reports or presents with symptoms of esophageal dysphagia Yes   Esophageal sweep performed during today s vidofluoroscopic exam  No;Other (Comments)  (Need orders for esophagram)   Swallow Eval: Clinical Impressions   Skilled Criteria for Therapy Intervention No problems identified which require skilled intervention   Functional Assessment Scale (FAS) 7   Dysphagia Outcome Severity Scale (SALLY) Level 7 - SALLY   Diet texture recommendations Regular diet;Thin liquids   Recommended Feeding/Eating Techniques maintain upright posture during/after eating for 30 mins   Demonstrates Need for Referral to Another Service physical therapy   Anticipated Discharge Disposition home   Risks and Benefits of Treatment have been explained. Yes   Patient, family and/or staff in agreement with Plan of Care Yes   Clinical Impression Comments The patient trialed thin, nectar, honey, pudding, pills and cracker. Swallow was WNL in today's exam. The patient reports esophageal symptoms including regurgitation of food/drink back into the pharyngeal  and oral cavity. It is recommended the patient follow up with you PCP to determine a plan of care for these symptoms.    Total Session Time   Total Session Time 0   Total Evaluation Time 45

## 2018-02-28 ENCOUNTER — TELEPHONE (OUTPATIENT)
Dept: INTERNAL MEDICINE | Facility: CLINIC | Age: 48
End: 2018-02-28

## 2018-02-28 ENCOUNTER — ALLIED HEALTH/NURSE VISIT (OUTPATIENT)
Dept: FAMILY MEDICINE | Facility: CLINIC | Age: 48
End: 2018-02-28
Payer: COMMERCIAL

## 2018-02-28 DIAGNOSIS — Z01.30 BLOOD PRESSURE CHECK: Primary | ICD-10-CM

## 2018-02-28 PROCEDURE — 99207 ZZC NO CHARGE NURSE ONLY: CPT

## 2018-02-28 NOTE — TELEPHONE ENCOUNTER
Patient was in today for a blood pressure check.  Appointment schedule for 3/2/2018 with Dr. Jackson.  The patient was informed of the appointment.

## 2018-02-28 NOTE — MR AVS SNAPSHOT
After Visit Summary   2/28/2018    Jayashree Johnson    MRN: [id number]           Patient Information     Date Of Birth          1970        Visit Information        Provider Department      2/28/2018 1:45 PM ED JU[facility]RE[facility]        Today's Diagnoses     Blood pressure check    -  1       Follow-ups after your visit        Your next 10 appointments already scheduled     Mar 02, 2018  8:15 AM CST   Voice Eval with GUERO Goetz   [facility] ([facility])    [address] [place] [id number] [phone]            Mar 02, 2018 10:15 AM CST   Office Visit with Aneudy Jackson MD   [facility]    [address] [address] [phone]           Bring a current list of meds and any records pertaining to this visit. For Physicals, please bring immunization records and any forms needing to be filled out. Please arrive 10 minutes early to complete paperwork.              Who to contact     If you have questions or need follow up information about today's clinic visit or your schedule please contact [facility] directly at [phone].  Normal or non-critical lab and imaging results will be communicated to you by MyChart, letter or phone within 4 business days after the clinic has received the results. If you do not hear from us within 7 days, please contact the clinic through [employer] or phone. If you have a critical or abnormal lab result, we will notify you by phone as soon as possible.  Submit refill requests through [employer] or call your pharmacy and they will forward the refill request to us. Please allow 3 business days for your refill to be completed.          Additional Information About Your Visit        MyChart Information     [employer] gives you secure access to your electronic health record. If you see a primary care provider, you can also send  messages to your care team and make appointments. If you have questions, please call your primary care clinic.  If you do not have a primary care provider, please call [phone] and they will assist you.        Care EveryWhere ID     This is your Care EveryWhere ID. This could be used by other organizations to access your [place] medical records  [id number]        Your Vitals Were     Last Period                   03/17/2003            Blood Pressure from Last 3 Encounters:   02/28/18 (P) 140/80   01/23/18 (!) 165/100   05/31/17 121/69    Weight from Last 3 Encounters:   01/23/18 160 lb (72.6 kg)   09/07/17 159 lb 11.2 oz (72.4 kg)   08/16/17 160 lb 14.4 oz (73 kg)              Today, you had the following     No orders found for display       Primary Care Provider Office Phone # Fax #    Aneudy Jackson [phone]       [facility] [address] [place] [address]        Equal Access to Services     BOB CNUNINGHAM : Hadii aad ku hadasho Soomaali, waaxda luqadaha, qaybta kaalmada adeegyada, waxay idiin hayaan suzan hardin . So [facility] [phone].    ATENCIÓN: Si habla español, tiene a torres disposición servicios gratuitos de asistencia lingüística. Llame al [phone].    We comply with applicable federal civil rights laws and [place] laws. We do not discriminate on the basis of race, color, national origin, age, disability, sex, sexual orientation, or gender identity.            Thank you!     Thank you for choosing [facility]  for your care. Our goal is always to provide you with excellent care. Hearing back from our patients is one way we can continue to improve our services. Please take a few minutes to complete the written survey that you may receive in the mail after your visit with us. Thank you!             Your Updated Medication List - Protect others around you: Learn how to safely use, store and throw away your medicines at  www.disposemymeds.org.          This list is accurate as of 2/28/18  2:07 PM.  Always use your most recent med list.                   Brand Name Dispense Instructions for use Diagnosis    DIGESTIVE ENZYME PO      Take 2 capsules by mouth 2 times daily        DULOXETINE HCL PO      Take 60 mg by mouth daily (Takes 2 x 30mg capsule = 60mg dose)        fluticasone 50 MCG/ACT spray    FLONASE    1 Bottle    Spray 1 spray into both nostrils daily as needed for rhinitis or allergies    Nasal obstruction       hydrocortisone 2.5 % cream     30 g    Apply two times daily to the red areas on the face and body and the scalp for 1 week, take breaks from use    Seborrheic dermatitis       loratadine 10 MG tablet    CLARITIN    30 tablet    Take 1 tablet (10 mg) by mouth daily    Chronic nonseasonal allergic rhinitis due to pollen       MULTI-VITAMIN DAILY PO      Take 1 tablet by mouth daily        SYSTANE OP      Place 1-2 drops into both eyes daily as needed        TEMAZEPAM PO      Take 15-30 mg by mouth nightly as needed for sleep        VITAMIN B-12 PO      Take 1 tablet by mouth daily        VITAMIN D-3 PO      Take 1 capsule by mouth daily        XANAX PO      Take 0.125-0.25 mg by mouth nightly as needed for sleep

## 2018-02-28 NOTE — TELEPHONE ENCOUNTER
"Patient is calling to report she was in to the ED on 1/23/18 for chest pain and SOB.  She reports her BP was high in the ED (175/105), but they told her that her symptoms were viral and nothing to worry about.    Notes from ED:  Assessments & Plan (with Medical Decision Making)  47-year-old female with a sore throat that started today.  No fever today.  Rapid strep negative.  Breathing well and handling secretions without difficulty.  This may be viral. Recommended follow-up if not improved in 2 days.    Palpitations over the last 4-5 months she states.  Cardiac auscultation normal.  Offered further workup which she declined.  Recommend follow-up in primary care.    Patient is reporting having the same symptoms continually from the ED.  She states she is not going back to the ED as they were not worried about it before.  She is offered an appointment today, but states she cannot make it in at the time offered.  She states she can come in for a BP check as she is driving through [employer] at 1:45.  She is reporting she has been feeling \"off\" every since she went to the ED.  She is again stating she is not going to go to the ED as they were not worried about her high BP before.  She is informed a message can be sent to PCP about working her in on Friday as she will be in the clinic at 9:00 for a swallow study that is supposed to last 30 minutes, so she is hoping for an appointment close to this time.  Routing to PCP for further advice.    Kelly Burgess RN      "

## 2018-02-28 NOTE — NURSING NOTE
I talked with Dr. Jackson.  Per Dr. Jackson keep appointment on Friday and will check blood pressure again.

## 2018-03-02 ENCOUNTER — OFFICE VISIT (OUTPATIENT)
Dept: INTERNAL MEDICINE | Facility: CLINIC | Age: 48
End: 2018-03-02
Payer: COMMERCIAL

## 2018-03-02 ENCOUNTER — HOSPITAL ENCOUNTER (OUTPATIENT)
Dept: SPEECH THERAPY | Facility: CLINIC | Age: 48
Setting detail: THERAPIES SERIES
End: 2018-03-02
Attending: OTOLARYNGOLOGY
Payer: COMMERCIAL

## 2018-03-02 VITALS
HEART RATE: 100 BPM | WEIGHT: 165 LBS | OXYGEN SATURATION: 98 % | BODY MASS INDEX: 25.9 KG/M2 | RESPIRATION RATE: 20 BRPM | SYSTOLIC BLOOD PRESSURE: 136 MMHG | DIASTOLIC BLOOD PRESSURE: 74 MMHG | TEMPERATURE: 98.4 F | HEIGHT: 67 IN

## 2018-03-02 DIAGNOSIS — R07.89 ATYPICAL CHEST PAIN: Primary | ICD-10-CM

## 2018-03-02 DIAGNOSIS — R63.5 WEIGHT GAIN: ICD-10-CM

## 2018-03-02 DIAGNOSIS — N95.1 SYMPTOMATIC MENOPAUSAL OR FEMALE CLIMACTERIC STATES: ICD-10-CM

## 2018-03-02 LAB
ALBUMIN SERPL-MCNC: 4.1 G/DL (ref 3.4–5)
ALP SERPL-CCNC: 110 U/L (ref 40–150)
ALT SERPL W P-5'-P-CCNC: 16 U/L (ref 0–50)
ANION GAP SERPL CALCULATED.3IONS-SCNC: 9 MMOL/L (ref 3–14)
AST SERPL W P-5'-P-CCNC: 36 U/L (ref 0–45)
BILIRUB SERPL-MCNC: 0.3 MG/DL (ref 0.2–1.3)
BUN SERPL-MCNC: 6 MG/DL (ref 7–30)
CALCIUM SERPL-MCNC: 8.9 MG/DL (ref 8.5–10.1)
CHLORIDE SERPL-SCNC: 103 MMOL/L (ref 94–109)
CO2 SERPL-SCNC: 27 MMOL/L (ref 20–32)
CREAT SERPL-MCNC: 0.66 MG/DL (ref 0.52–1.04)
ERYTHROCYTE [DISTWIDTH] IN BLOOD BY AUTOMATED COUNT: 14.8 % (ref 10–15)
FSH SERPL-ACNC: 8.1 IU/L
GFR SERPL CREATININE-BSD FRML MDRD: >90 ML/MIN/1.7M2
GLUCOSE SERPL-MCNC: 107 MG/DL (ref 70–99)
HCT VFR BLD AUTO: 42.7 % (ref 35–47)
HGB BLD-MCNC: 13.7 G/DL (ref 11.7–15.7)
LH SERPL-ACNC: 4 IU/L
MCH RBC QN AUTO: 29.1 PG (ref 26.5–33)
MCHC RBC AUTO-ENTMCNC: 32.1 G/DL (ref 31.5–36.5)
MCV RBC AUTO: 91 FL (ref 78–100)
PLATELET # BLD AUTO: 348 10E9/L (ref 150–450)
POTASSIUM SERPL-SCNC: 3.7 MMOL/L (ref 3.4–5.3)
PROT SERPL-MCNC: 7.8 G/DL (ref 6.8–8.8)
RBC # BLD AUTO: 4.71 10E12/L (ref 3.8–5.2)
SODIUM SERPL-SCNC: 139 MMOL/L (ref 133–144)
TSH SERPL DL<=0.005 MIU/L-ACNC: 0.64 MU/L (ref 0.4–4)
WBC # BLD AUTO: 9.2 10E9/L (ref 4–11)

## 2018-03-02 PROCEDURE — 85027 COMPLETE CBC AUTOMATED: CPT | Performed by: INTERNAL MEDICINE

## 2018-03-02 PROCEDURE — 80053 COMPREHEN METABOLIC PANEL: CPT | Performed by: INTERNAL MEDICINE

## 2018-03-02 PROCEDURE — 99214 OFFICE O/P EST MOD 30 MIN: CPT | Performed by: INTERNAL MEDICINE

## 2018-03-02 PROCEDURE — 92524 BEHAVRAL QUALIT ANALYS VOICE: CPT | Mod: GN | Performed by: SPEECH-LANGUAGE PATHOLOGIST

## 2018-03-02 PROCEDURE — 92507 TX SP LANG VOICE COMM INDIV: CPT | Mod: GN | Performed by: SPEECH-LANGUAGE PATHOLOGIST

## 2018-03-02 PROCEDURE — 36415 COLL VENOUS BLD VENIPUNCTURE: CPT | Performed by: INTERNAL MEDICINE

## 2018-03-02 PROCEDURE — 84443 ASSAY THYROID STIM HORMONE: CPT | Performed by: INTERNAL MEDICINE

## 2018-03-02 PROCEDURE — 83002 ASSAY OF GONADOTROPIN (LH): CPT | Performed by: INTERNAL MEDICINE

## 2018-03-02 PROCEDURE — 40000211 ZZHC STATISTIC SLP  DEPARTMENT VISIT: Performed by: SPEECH-LANGUAGE PATHOLOGIST

## 2018-03-02 PROCEDURE — 83001 ASSAY OF GONADOTROPIN (FSH): CPT | Performed by: INTERNAL MEDICINE

## 2018-03-02 ASSESSMENT — ANXIETY QUESTIONNAIRES
7. FEELING AFRAID AS IF SOMETHING AWFUL MIGHT HAPPEN: SEVERAL DAYS
6. BECOMING EASILY ANNOYED OR IRRITABLE: NOT AT ALL
GAD7 TOTAL SCORE: 6
1. FEELING NERVOUS, ANXIOUS, OR ON EDGE: SEVERAL DAYS
2. NOT BEING ABLE TO STOP OR CONTROL WORRYING: SEVERAL DAYS
3. WORRYING TOO MUCH ABOUT DIFFERENT THINGS: SEVERAL DAYS
IF YOU CHECKED OFF ANY PROBLEMS ON THIS QUESTIONNAIRE, HOW DIFFICULT HAVE THESE PROBLEMS MADE IT FOR YOU TO DO YOUR WORK, TAKE CARE OF THINGS AT HOME, OR GET ALONG WITH OTHER PEOPLE: SOMEWHAT DIFFICULT
5. BEING SO RESTLESS THAT IT IS HARD TO SIT STILL: NOT AT ALL

## 2018-03-02 ASSESSMENT — PATIENT HEALTH QUESTIONNAIRE - PHQ9: 5. POOR APPETITE OR OVEREATING: MORE THAN HALF THE DAYS

## 2018-03-02 ASSESSMENT — PAIN SCALES - GENERAL: PAINLEVEL: MODERATE PAIN (4)

## 2018-03-02 NOTE — ADDENDUM NOTE
Encounter addended by: Daniela Lazo, SLP on: 3/2/2018 10:33 AM<BR>     Actions taken: Pend clinical note, Sign clinical note
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
## 2018-03-02 NOTE — NURSING NOTE
"Estimated body mass index is 25.06 kg/(m^2) as calculated from the following:    Height as of 9/7/17: 5' 7\" (1.702 m).    Weight as of 1/23/18: 160 lb (72.6 kg).  BP Readings from Last 1 Encounters:   02/28/18 (P) 140/80   ]  BP cuff size:  regular  Do you feel safe in your environment?  Yes  Does the patient need any medication refills today? no    "

## 2018-03-02 NOTE — MR AVS SNAPSHOT
After Visit Summary   3/2/2018    Jayashree Johnson    MRN: [id number]           Patient Information     Date Of Birth          1970        Visit Information        Provider Department      3/2/2018 10:15 AM Aneudy Jackson MD [facility]        Today's Diagnoses     Atypical chest pain    -  1    Weight gain        Symptomatic menopausal or female climacteric states           Follow-ups after your visit        Your next 10 appointments already scheduled     Mar 07, 2018 10:00 AM CST   Ech Stress Test with PHECHR2   [facility] ([facility])    [address] [place] [address]   [phone]           1. Please bring or wear a comfortable two-piece outfit and walking shoes. 2. Stop eating 3 hours before the test. You may drink water or juice. 3. Stop all caffeine 12 hours before the test. This includes coffee, tea, soda pop, chocolate and certain medicines (such as Anacin and Excederin). Also avoid decaf coffee and tea, as these contain small amounts of caffeine. 4. No alcohol, smoking or use of other tobacco products for 12 hours before the test. 5. Refer to your provider instructions to see if you need to stop any medications (such as beta-blockers or nitrates) for this test. 6. For patients with diabetes: - If you take insulin, call your diabetes care team. Ask if you should take a   dose the morning of your test. - If you take diabetes medicine by mouth, dont take it on the morning of your test. Bring it with you to take after the test. (If you have questions, call your diabetes care team) 7. When you arrive, please tell us if: - You have diabetes. - You have taken Viagra, Cialis or Levitra in the past 48 hours. 8. For any questions that cannot be answered, please contact the ordering physician            Mar 23, 2018  1:00 PM CDT   Voice Treatment with GUERO Goetz   [facility] ([facility])    [address] [place] [address]   [phone]            Apr 02, 2018  9:45 AM CDT   Voice Treatment with Daniela Lazo, SLP   [facility] ([facility])    [address] [place] [address]   [phone]            Apr 09, 2018  1:45 PM CDT   Voice Treatment with Daniela Lazo, SLP   [facility] ([facility])    [address] [place] [address]   [phone]            Apr 16, 2018  1:45 PM CDT   Voice Treatment with Daniela Lazo, SLP   [facility] ([facility])    [address] [place] [address]   [phone]            Apr 23, 2018  1:45 PM CDT   Voice Treatment with Daniela Lazo, SLP   [facility] ([facility])    [address] [place] [address]   [phone]              Future tests that were ordered for you today     Open Future Orders        Priority Expected Expires Ordered    Exercise Stress Echocardiogram Routine  3/2/2019 3/2/2018            Who to contact     If you have questions or need follow up information about today's clinic visit or your schedule please contact [facility] directly at [phone].  Normal or non-critical lab and imaging results will be communicated to you by [employer], letter or phone within 4 business days after the clinic has received the results. If you do not hear from us within 7 days, please contact the clinic through [employer] or phone. If you have a critical or abnormal lab result, we will notify you by phone as soon as possible.  Submit refill requests through [employer] or call your pharmacy and they will forward the refill request to us. Please allow 3 business days for your refill to be completed.          Additional Information About Your Visit        [employer] Information     [employer] gives you secure access to your electronic health record. If  "you see a primary care provider, you can also send messages to your care team and make appointments. If you have questions, please call your primary care clinic.  If you do not have a primary care provider, please call [phone] and they will assist you.        Care EveryWhere ID     This is your Care EveryWhere ID. This could be used by other organizations to access your [facility] medical records  [id number]        Your Vitals Were     Pulse Temperature Respirations Height Last Period Pulse Oximetry    100 98.4  F (36.9  C) (Temporal) 20 5' 7\" (1.702 m) 03/17/2003 98%    Breastfeeding? BMI (Body Mass Index)                No 25.84 kg/m2           Blood Pressure from Last 3 Encounters:   03/02/18 136/74   02/28/18 (P) 140/80   01/23/18 (!) 165/100    Weight from Last 3 Encounters:   03/02/18 165 lb (74.8 kg)   01/23/18 160 lb (72.6 kg)   09/07/17 159 lb 11.2 oz (72.4 kg)              We Performed the Following     CBC with platelets     Comprehensive metabolic panel     Follicle stimulating hormone     Lutropin     TSH        Primary Care Provider Office Phone # Fax #    Aneudy Jackson [phone]       [facility] [address] [place] [address]        Equal Access to Services     BOB CUNNINGHAM : Hadii aad ku hadasho Soomaali, waaxda luqadaha, qaybta kaalmada adeegyada, waxay idiin hayaan adenano hardin . So [facility] [phone].    ATENCIÓN: Si habla español, tiene a torres disposición servicios gratuitos de asistencia lingüística. Llame al [phone].    We comply with applicable federal civil rights laws and [place] laws. We do not discriminate on the basis of race, color, national origin, age, disability, sex, sexual orientation, or gender identity.            Thank you!     Thank you for choosing [facility]  for your care. Our goal is always to provide you with excellent care. Hearing back from our patients is one way we can continue to improve our " services. Please take a few minutes to complete the written survey that you may receive in the mail after your visit with us. Thank you!             Your Updated Medication List - Protect others around you: Learn how to safely use, store and throw away your medicines at www.disposemymeds.org.          This list is accurate as of 3/2/18 11:14 AM.  Always use your most recent med list.                   Brand Name Dispense Instructions for use Diagnosis    DIGESTIVE ENZYME PO      Take 2 capsules by mouth 2 times daily        DULOXETINE HCL PO      Take 60 mg by mouth daily (Takes 2 x 30mg capsule = 60mg dose)        fluticasone 50 MCG/ACT spray    FLONASE    1 Bottle    Spray 1 spray into both nostrils daily as needed for rhinitis or allergies    Nasal obstruction       hydrocortisone 2.5 % cream     30 g    Apply two times daily to the red areas on the face and body and the scalp for 1 week, take breaks from use    Seborrheic dermatitis       loratadine 10 MG tablet    CLARITIN    30 tablet    Take 1 tablet (10 mg) by mouth daily    Chronic nonseasonal allergic rhinitis due to pollen       MULTI-VITAMIN DAILY PO      Take 1 tablet by mouth daily        SYSTANE OP      Place 1-2 drops into both eyes daily as needed        TEMAZEPAM PO      Take 15-30 mg by mouth nightly as needed for sleep        VITAMIN B-12 PO      Take 1 tablet by mouth daily        VITAMIN D-3 PO      Take 1 capsule by mouth daily        XANAX PO      Take 0.125-0.25 mg by mouth nightly as needed for sleep

## 2018-03-02 NOTE — PROGRESS NOTES
"Outpatient Speech Language Pathology Discharge Note     Patient: Jayashree Johnson  : 1970    Beginning/End Dates of Reporting Period:  10/23/2017 to 3/2/2018    Referring Provider: Dr. Harpal Pandya    Therapy Diagnosis: Dysphagia, Dysphonia    Client Self Report: Melissa reports that her pain is 3/10 and that she feels that her voice is improving.  Sh feels she is \"definetly benefiting from PT and Voice therapy.\"    Objective Measurements:      Goals:  Goal Identifier Vocal Fold Hygiene   Goal Description Patient will be able to discuss and demonstrate knowledge of good vocal fold hygiene and use without cues   Target Date 17   Date Met NA     Progress: Patient did not return for therapy.     Goal Identifier Voice   Goal Description Patient will produce desired voice with 90% accuracy using voicing strategies at conversation level   Target Date 17   Date Met NA     Progress:  Patient did not return for therapy.     Goal Identifier Voice   Goal Description Patient will complete straw voicing techniquies to maximize effectiveness of voicing to 90% accuracy with minimal cues   Target Date 17   Date Met NA     Progress:  Patient did not return for therapy.       Progress Toward Goals:   Progress limited due to patient not returning to  Therapy.    Plan:  Discharge from therapy.    Discharge:    Reason for Discharge: Patient chooses to discontinue therapy.    Equipment Issued: none    Discharge Plan: Patient to continue home program.  "

## 2018-03-03 ENCOUNTER — MYC MEDICAL ADVICE (OUTPATIENT)
Dept: INTERNAL MEDICINE | Facility: CLINIC | Age: 48
End: 2018-03-03

## 2018-03-03 ASSESSMENT — PATIENT HEALTH QUESTIONNAIRE - PHQ9: SUM OF ALL RESPONSES TO PHQ QUESTIONS 1-9: 14

## 2018-03-03 ASSESSMENT — ANXIETY QUESTIONNAIRES: GAD7 TOTAL SCORE: 6

## 2018-03-05 ENCOUNTER — TELEPHONE (OUTPATIENT)
Dept: OTOLARYNGOLOGY | Facility: CLINIC | Age: 48
End: 2018-03-05

## 2018-03-05 DIAGNOSIS — R49.0 DYSPHONIA: Primary | ICD-10-CM

## 2018-03-05 NOTE — TELEPHONE ENCOUNTER
I talked to Mary, speech therapist about pt. Pt did see her on 3/2/18. Petra reviewed the swallow study results with her in depth. Pt's dysphonia is resolved or her progress has plateaued. Pt will need to continue to see therapist for her voice.  Mary would like an order placed for voice eval and treat. LEYDI Swift

## 2018-03-06 NOTE — DISCHARGE SUMMARY
Outpatient Physical Therapy Discharge Note     Patient: Jayashree Johnson  : 1970    Beginning/End Dates of Reporting Period:  6 visits between 17 and 17    Referring Provider:Dr. Willard Escalante    Therapy Diagnosis:  Decreased AROM, strength and endurance following neck surgery     Client Self Report: PLease see 17 note    Objective Measurements:  Please see 17 note    Goals:  Goal Identifier Driving   Goal Description Melissa will be able to drive to her job site x 62 miles one way without symptoms increasing and using good positioning with 1-2 breaks->no breaks (62 miles with 1 break, increased symptoms after the drive)   Target Date 17   Date Met      Progress:     Goal Identifier Cervical AROM   Goal Description Melissa will maximize AROM for the her neck for looking around when driving for safety, and when navigating around her community and home especially to look down (Improving, flexion is still terrible)   Target Date 11/15/17   Date Met      Progress:     Goal Identifier Endurance   Goal Description Melissa will improve her endurance so she can drive to work, work and return home without feeling fatigued, be able to walk her dog, use her computer for work 30 + minutes at a time without increasing symptoms (She has completed these but notes increase in symptoms)   Target Date 17   Date Met      Progress:       Progress Toward Goals:   Not assessed this period.    Plan:  Discharge from therapy.    Discharge:    Reason for Discharge: canceled future appts and did not reschedule.     Equipment Issued: none    Discharge Plan: Patient to continue home program.    Thank you for referring Melissa  to [facility]    Jayashree Whalen, PT  [phone]

## 2018-03-06 NOTE — ADDENDUM NOTE
Encounter addended by: Jayashree Whalen PT on: 3/6/2018 10:57 AM<BR>     Actions taken: Sign clinical note, Episode resolved

## 2018-03-07 ENCOUNTER — HOSPITAL ENCOUNTER (OUTPATIENT)
Dept: CARDIOLOGY | Facility: CLINIC | Age: 48
Discharge: HOME | End: 2018-03-07
Attending: INTERNAL MEDICINE | Admitting: INTERNAL MEDICINE
Payer: COMMERCIAL

## 2018-03-07 ENCOUNTER — MYC MEDICAL ADVICE (OUTPATIENT)
Dept: INTERNAL MEDICINE | Facility: CLINIC | Age: 48
End: 2018-03-07

## 2018-03-07 DIAGNOSIS — R07.89 ATYPICAL CHEST PAIN: ICD-10-CM

## 2018-03-07 PROCEDURE — 93350 STRESS TTE ONLY: CPT | Mod: 26 | Performed by: INTERNAL MEDICINE

## 2018-03-07 PROCEDURE — 93325 DOPPLER ECHO COLOR FLOW MAPG: CPT | Mod: 26 | Performed by: INTERNAL MEDICINE

## 2018-03-07 PROCEDURE — 93321 DOPPLER ECHO F-UP/LMTD STD: CPT | Mod: 26 | Performed by: INTERNAL MEDICINE

## 2018-03-07 PROCEDURE — 93325 DOPPLER ECHO COLOR FLOW MAPG: CPT | Mod: TC

## 2018-03-07 PROCEDURE — 93018 CV STRESS TEST I&R ONLY: CPT | Performed by: INTERNAL MEDICINE

## 2018-03-07 PROCEDURE — 93017 CV STRESS TEST TRACING ONLY: CPT | Performed by: REHABILITATION PRACTITIONER

## 2018-03-07 PROCEDURE — 93016 CV STRESS TEST SUPVJ ONLY: CPT | Performed by: INTERNAL MEDICINE

## 2018-03-07 NOTE — TELEPHONE ENCOUNTER
Dr. Pandya I am aware you put in order for speech therapy but it states it is for dysphagia. The speech therapist feels she has dealt with the dysphagia. She requested this new order for voice eval and treat. LEYDI Swift

## 2018-03-07 NOTE — TELEPHONE ENCOUNTER
Per the speech therapist, pt has plateaued with her dysphagia issue. Pt now is having some sort of voice concern and the therapist is helping her with that but just needs the correct order put in.LEYDI Swift

## 2018-04-09 ENCOUNTER — HOSPITAL ENCOUNTER (OUTPATIENT)
Dept: SPEECH THERAPY | Facility: CLINIC | Age: 48
Setting detail: THERAPIES SERIES
End: 2018-04-09
Attending: OTOLARYNGOLOGY
Payer: COMMERCIAL

## 2018-04-09 PROCEDURE — 40000211 ZZHC STATISTIC SLP  DEPARTMENT VISIT: Performed by: SPEECH-LANGUAGE PATHOLOGIST

## 2018-04-09 PROCEDURE — 92507 TX SP LANG VOICE COMM INDIV: CPT | Mod: GN | Performed by: SPEECH-LANGUAGE PATHOLOGIST

## 2018-04-18 ENCOUNTER — OFFICE VISIT (OUTPATIENT)
Dept: INTERNAL MEDICINE | Facility: CLINIC | Age: 48
End: 2018-04-18
Payer: COMMERCIAL

## 2018-04-18 VITALS
RESPIRATION RATE: 16 BRPM | BODY MASS INDEX: 26.16 KG/M2 | DIASTOLIC BLOOD PRESSURE: 84 MMHG | OXYGEN SATURATION: 98 % | HEART RATE: 86 BPM | TEMPERATURE: 96.7 F | SYSTOLIC BLOOD PRESSURE: 138 MMHG | WEIGHT: 167 LBS

## 2018-04-18 DIAGNOSIS — J34.89 NASAL OBSTRUCTION: ICD-10-CM

## 2018-04-18 DIAGNOSIS — M79.7 FIBROMYALGIA: Primary | ICD-10-CM

## 2018-04-18 DIAGNOSIS — J30.89 CHRONIC NONSEASONAL ALLERGIC RHINITIS DUE TO POLLEN: ICD-10-CM

## 2018-04-18 PROCEDURE — 99214 OFFICE O/P EST MOD 30 MIN: CPT | Performed by: INTERNAL MEDICINE

## 2018-04-18 RX ORDER — FLUTICASONE PROPIONATE 50 MCG
1 SPRAY, SUSPENSION (ML) NASAL DAILY PRN
Qty: 1 BOTTLE | Refills: 2 | Status: SHIPPED | OUTPATIENT
Start: 2018-04-18 | End: 2018-08-09

## 2018-04-18 RX ORDER — LORATADINE 10 MG/1
10 TABLET ORAL DAILY
Qty: 30 TABLET | Refills: 3 | Status: SHIPPED | OUTPATIENT
Start: 2018-04-18 | End: 2018-09-11

## 2018-04-18 RX ORDER — PREDNISONE 20 MG/1
TABLET ORAL
Qty: 21 TABLET | Refills: 0 | Status: SHIPPED | OUTPATIENT
Start: 2018-04-18 | End: 2019-05-06

## 2018-04-18 ASSESSMENT — PAIN SCALES - GENERAL: PAINLEVEL: MODERATE PAIN (4)
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
## 2018-04-18 NOTE — NURSING NOTE
"Chief Complaint   Patient presents with     RECHECK     follow up on labs and next step       Initial /84  Pulse 86  Temp 96.7  F (35.9  C) (Temporal)  Resp 16  Wt 167 lb (75.8 kg)  LMP 03/17/2003  SpO2 98%  BMI 26.16 kg/m2 Estimated body mass index is 26.16 kg/(m^2) as calculated from the following:    Height as of 3/2/18: 5' 7\" (1.702 m).    Weight as of this encounter: 167 lb (75.8 kg).  Medication Reconciliation: complete    "

## 2018-04-18 NOTE — MR AVS SNAPSHOT
After Visit Summary   4/18/2018    Jayashree Johnson    MRN: [id number]           Patient Information     Date Of Birth          1970        Visit Information        Provider Department      4/18/2018 10:00 AM Aneudy Jackson MD [facility]         Follow-ups after your visit        Your next 10 appointments already scheduled     Apr 19, 2018  3:00 PM CDT   Return Visit with Willard Escalante MD   [facility] ([facility]    [address] [facility] [id number] [phone]            Apr 23, 2018  1:45 PM CDT   Voice Treatment with GUERO Goetz   [facility] ([facility] [place])    [address] [place] [id number] [phone]              Who to contact     If you have questions or need follow up information about today's clinic visit or your schedule please contact [facility] directly at [phone].  Normal or non-critical lab and imaging results will be communicated to you by LocaModahart, letter or phone within 4 business days after the clinic has received the results. If you do not hear from us within 7 days, please contact the clinic through Karma Snapt or phone. If you have a critical or abnormal lab result, we will notify you by phone as soon as possible.  Submit refill requests through [facility] or call your pharmacy and they will forward the refill request to us. Please allow 3 business days for your refill to be completed.          Additional Information About Your Visit        MyChart Information     [facility] gives you secure access to your electronic health record. If you see a primary care provider, you can also send messages to your care team and make appointments. If you have questions, please call your primary care clinic.  If you do not have a primary care provider, please call [phone] and they will assist you.        Care EveryWhere ID     This is your Care EveryWhere  ID. This could be used by other organizations to access your [place] medical records  [id number]        Your Vitals Were     Pulse Temperature Respirations Last Period Pulse Oximetry BMI (Body Mass Index)    86 96.7  F (35.9  C) (Temporal) 16 03/17/2003 98% 26.16 kg/m2       Blood Pressure from Last 3 Encounters:   04/18/18 138/84   03/02/18 136/74   02/28/18 (P) 140/80    Weight from Last 3 Encounters:   04/18/18 167 lb (75.8 kg)   03/02/18 165 lb (74.8 kg)   01/23/18 160 lb (72.6 kg)              Today, you had the following     No orders found for display       Primary Care Provider Office Phone # Fax #    Aneudy Jackson [phone] [address] [facility] [address]        Equal Access to Services     [facility] : Hadii sal bustillos hadasho Soomaali, waaxda luqadaha, qaybta kaalmada adeegyada, hilary saxena hayyony hardin . So [facility] [phone].    ATENCIÓN: Si habla español, tiene a torres disposición servicios gratuitos de asistencia lingüística. Llame al [phone].    We comply with applicable federal civil rights laws and [place] laws. We do not discriminate on the basis of race, color, national origin, age, disability, sex, sexual orientation, or gender identity.            Thank you!     Thank you for choosing [facility]  for your care. Our goal is always to provide you with excellent care. Hearing back from our patients is one way we can continue to improve our services. Please take a few minutes to complete the written survey that you may receive in the mail after your visit with us. Thank you!             Your Updated Medication List - Protect others around you: Learn how to safely use, store and throw away your medicines at www.disposemymeds.org.          This list is accurate as of 4/18/18 10:18 AM.  Always use your most recent med list.                   Brand Name Dispense Instructions for use Diagnosis    DIGESTIVE ENZYME PO      Take 2  capsules by mouth 2 times daily        DULOXETINE HCL PO      Take 60 mg by mouth daily (Takes 2 x 30mg capsule = 60mg dose)        fluticasone 50 MCG/ACT spray    FLONASE    1 Bottle    Spray 1 spray into both nostrils daily as needed for rhinitis or allergies    Nasal obstruction       hydrocortisone 2.5 % cream     30 g    Apply two times daily to the red areas on the face and body and the scalp for 1 week, take breaks from use    Seborrheic dermatitis       loratadine 10 MG tablet    CLARITIN    30 tablet    Take 1 tablet (10 mg) by mouth daily    Chronic nonseasonal allergic rhinitis due to pollen       MULTI-VITAMIN DAILY PO      Take 1 tablet by mouth daily        SYSTANE OP      Place 1-2 drops into both eyes daily as needed        TEMAZEPAM PO      Take 15-30 mg by mouth nightly as needed for sleep        VITAMIN B-12 PO      Take 1 tablet by mouth daily        VITAMIN D-3 PO      Take 1 capsule by mouth daily        XANAX PO      Take 0.125-0.25 mg by mouth nightly as needed for sleep

## 2018-04-19 ENCOUNTER — OFFICE VISIT (OUTPATIENT)
Dept: NEUROSURGERY | Facility: CLINIC | Age: 48
End: 2018-04-19
Payer: COMMERCIAL

## 2018-04-19 ENCOUNTER — RADIANT APPOINTMENT (OUTPATIENT)
Dept: GENERAL RADIOLOGY | Facility: CLINIC | Age: 48
End: 2018-04-19
Attending: NEUROLOGICAL SURGERY
Payer: COMMERCIAL

## 2018-04-19 VITALS
SYSTOLIC BLOOD PRESSURE: 122 MMHG | HEIGHT: 67 IN | BODY MASS INDEX: 26.21 KG/M2 | WEIGHT: 167 LBS | DIASTOLIC BLOOD PRESSURE: 80 MMHG | TEMPERATURE: 98.4 F

## 2018-04-19 DIAGNOSIS — Z98.1 S/P CERVICAL SPINAL FUSION: Primary | ICD-10-CM

## 2018-04-19 DIAGNOSIS — Z98.1 S/P CERVICAL SPINAL FUSION: ICD-10-CM

## 2018-04-19 PROCEDURE — 99213 OFFICE O/P EST LOW 20 MIN: CPT | Performed by: NEUROLOGICAL SURGERY

## 2018-04-19 PROCEDURE — 72040 X-RAY EXAM NECK SPINE 2-3 VW: CPT | Mod: TC

## 2018-04-19 NOTE — PATIENT INSTRUCTIONS
1. Referral to [facility]. Here is their number: [phone]  2. Please call our clinic with any questions or concerns: [phone]

## 2018-04-19 NOTE — NURSING NOTE
"Jayashree Johnson is a 47 year old female who presents for:  Chief Complaint   Patient presents with     Neurologic Problem     C5-6 DOS 05/30/17        Initial Vitals:  /80  Temp 98.4  F (36.9  C) (Temporal)  Ht 5' 7\" (1.702 m)  Wt 167 lb (75.8 kg)  LMP 03/17/2003  BMI 26.16 kg/m2 Estimated body mass index is 26.16 kg/(m^2) as calculated from the following:    Height as of this encounter: 5' 7\" (1.702 m).    Weight as of this encounter: 167 lb (75.8 kg).. Body surface area is 1.89 meters squared. BP completed using cuff size: regular  Data Unavailable    Do you feel safe in your environment?  Yes  Do you need any refills today? No    Nursing Comments:         Guanakito Farris    "

## 2018-04-19 NOTE — MR AVS SNAPSHOT
After Visit Summary   4/19/2018    Jayashree Johnson    MRN: [id number]           Patient Information     Date Of Birth          1970        Visit Information        Provider Department      4/19/2018 2:00 PM Willard Escalante MD [facility]        Today's Diagnoses     S/P cervical spinal fusion    -  1      Care Instructions    1. Referral to [facility]. Here is their number: [phone]  2. Routine follow up in 3 months with xray prior  3. Please call our clinic with any questions or concerns: [phone]            Follow-ups after your visit        Additional Services     PHYSICAL THERAPY REFERRAL       Referral to [facility]                  Your next 10 appointments already scheduled     Apr 23, 2018  1:45 PM CDT   Voice Treatment with GUERO Goetz   [facility] Speech [facility] ([facility])    [address] [place] [id number] [phone]              Who to contact     If you have questions or need follow up information about today's clinic visit or your schedule please contact [facility] directly at [phone].  Normal or non-critical lab and imaging results will be communicated to you by OyaGenhart, letter or phone within 4 business days after the clinic has received the results. If you do not hear from us within 7 days, please contact the clinic through OyaGenhart or phone. If you have a critical or abnormal lab result, we will notify you by phone as soon as possible.  Submit refill requests through [employer]Be[employer] or call your pharmacy and they will forward the refill request to us. Please allow 3 business days for your refill to be completed.          Additional Information About Your Visit        MyChart Information     [employer] gives you secure access to your electronic health record. If you see a primary care provider, you can also send messages to your care team and make appointments. If you have questions, please call your primary care  "clinic.  If you do not have a primary care provider, please call [phone] and they will assist you.        Care EveryWhere ID     This is your Care EveryWhere ID. This could be used by other organizations to access your [place] medical records  [id number]        Your Vitals Were     Temperature Height Last Period BMI (Body Mass Index)          98.4  F (36.9  C) (Temporal) 5' 7\" (1.702 m) 03/17/2003 26.16 kg/m2         Blood Pressure from Last 3 Encounters:   04/19/18 122/80   04/18/18 138/84   03/02/18 136/74    Weight from Last 3 Encounters:   04/19/18 167 lb (75.8 kg)   04/18/18 167 lb (75.8 kg)   03/02/18 165 lb (74.8 kg)              We Performed the Following     PHYSICAL THERAPY REFERRAL        Primary Care Provider Office Phone # Fax #    Aneudy Jackson [phone]       [address] [facility] [address]        Equal Access to Services     [facility] : Hadii aad ku hadasho Soomaali, waaxda luqadaha, qaybta kaalmada adeegyada, waxay hemanth hayyony hardin . So [facility] [phone].    ATENCIÓN: Si habla español, tiene a torres disposición servicios gratuitos de asistencia lingüística. Ll[facility] [phone].    We comply with applicable federal civil rights laws and [place] laws. We do not discriminate on the basis of race, color, national origin, age, disability, sex, sexual orientation, or gender identity.            Thank you!     Thank you for choosing [facility]  for your care. Our goal is always to provide you with excellent care. Hearing back from our patients is one way we can continue to improve our services. Please take a few minutes to complete the written survey that you may receive in the mail after your visit with us. Thank you!             Your Updated Medication List - Protect others around you: Learn how to safely use, store and throw away your medicines at www.disposemymeds.org.          This list is accurate as of 4/19/18  2:44 PM.  " Always use your most recent med list.                   Brand Name Dispense Instructions for use Diagnosis    DIGESTIVE ENZYME PO      Take 2 capsules by mouth 2 times daily        DULOXETINE HCL PO      Take 60 mg by mouth daily (Takes 2 x 30mg capsule = 60mg dose)        fluticasone 50 MCG/ACT spray    FLONASE    1 Bottle    Spray 1 spray into both nostrils daily as needed for rhinitis or allergies    Nasal obstruction       hydrocortisone 2.5 % cream     30 g    Apply two times daily to the red areas on the face and body and the scalp for 1 week, take breaks from use    Seborrheic dermatitis       loratadine 10 MG tablet    CLARITIN    30 tablet    Take 1 tablet (10 mg) by mouth daily    Chronic nonseasonal allergic rhinitis due to pollen       MULTI-VITAMIN DAILY PO      Take 1 tablet by mouth daily        predniSONE 20 MG tablet    DELTASONE    21 tablet    2 a day for a week then 1 a day for a week    Fibromyalgia       SYSTANE OP      Place 1-2 drops into both eyes daily as needed        TEMAZEPAM PO      Take 15-30 mg by mouth nightly as needed for sleep        VITAMIN B-12 PO      Take 1 tablet by mouth daily        VITAMIN D-3 PO      Take 1 capsule by mouth daily        XANAX PO      Take 0.125-0.25 mg by mouth nightly as needed for sleep
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
## 2018-04-25 NOTE — PROGRESS NOTES
Outpatient Speech Language Pathology Discharge Note     Patient: Jayashree Johnson  : 1970    Beginning/End Dates of Reporting Period:  2018 to 2018    Referring Provider: Dr. Harpal Pandya    Therapy Diagnosis: Dysphonia    Client Self Report: Melissa reports that she is wanting PT and waiting for orders for MD for eval and treat.  She reports significant pain in neck and tension as well and feels PT would be beneficial.  Patient is being discharged from therapy at this time due to missed/cancelled sessions.    Objective Measurements:     Goals:  Goal Identifier Vocal Fold Hygiene   Goal Description Patient will be able to discuss and demonstrate knowledge of good vocal fold hygiene , including voice conservation, to 90% accuracy without cues   Target Date 18   Date Met  18   Progress: Goal met      Goal Identifier Vocal Quality   Goal Description Patient will produce good voice with 90% accuracy using voicing strategies at conversation level   Target Date 18   Date Met      Progress: Did not meet goal prior to discharge     Goal Identifier Voice Fold Adduction   Goal Description Patient will complete straw voicing techniquies to maximize vocal fold adduction to 90% accuracy with minimal cues   Target Date 18   Date Met  18   Progress: Goal met     Goal Identifier Vocal Volume   Goal Description Patient will be able to produce voice to average 75 dB at sentence level to 90% accuracy with visual cues   Target Date 18   Date Met      Progress: Did not meet goal prior to discharge     Goal Identifier Vocal Range   Goal Description Patient will be able to achieve 10 note vocal range with good quality to 90% accuracy without cues   Target Date 18   Date Met      Progress:Did not meet goal prior to discharge       Progress Toward Goals:   Progress limited due to missed/cancelled sessions.    Plan:  Discharge from therapy.    Discharge:    Reason for Discharge:  Patient chooses to discontinue therapy.  Patient has failed to schedule further appointments.    Equipment Issued: none    Discharge Plan: unknown; patient failed to return

## 2018-05-03 ENCOUNTER — MYC MEDICAL ADVICE (OUTPATIENT)
Dept: INTERNAL MEDICINE | Facility: CLINIC | Age: 48
End: 2018-05-03

## 2018-05-04 DIAGNOSIS — B00.9 HSV (HERPES SIMPLEX VIRUS) INFECTION: ICD-10-CM

## 2018-05-04 RX ORDER — VALACYCLOVIR HYDROCHLORIDE 1 G/1
1000 TABLET, FILM COATED ORAL 2 TIMES DAILY
Qty: 20 TABLET | Refills: 6 | Status: SHIPPED | OUTPATIENT
Start: 2018-05-04 | End: 2019-05-06

## 2018-05-04 NOTE — TELEPHONE ENCOUNTER
Spoke with Melissa who is currently having outbreak and needing refill of Valtrex. Dr. Dumont approved in clinic. Rx sent.

## 2018-05-21 ENCOUNTER — TRANSFERRED RECORDS (OUTPATIENT)
Dept: HEALTH INFORMATION MANAGEMENT | Facility: CLINIC | Age: 48
End: 2018-05-21

## 2018-09-11 DIAGNOSIS — J30.89 CHRONIC NONSEASONAL ALLERGIC RHINITIS DUE TO POLLEN: ICD-10-CM

## 2018-09-11 NOTE — TELEPHONE ENCOUNTER
"Requested Prescriptions   Pending Prescriptions Disp Refills     loratadine (CLARITIN) 10 MG tablet [Pharmacy Med Name: LORATADINE 10MG TABS] 30 tablet 3    Last Written Prescription Date:  4/18/18  Last Fill Quantity: 30,  # refills: 3   Last office visit: 4/18/2018 with prescribing provider:  3/2/18  Future Office Visit:     Sig: TAKE ONE TABLET BY MOUTH EVERY DAY    Antihistamines Protocol Passed    9/11/2018 11:03 AM       Passed - Patient is 3-64 years of age    Apply weight-based dosing for peds patients age 3 - 12 years of age.    Forward request to provider for patients under the age of 3 or over the age of 64.         Passed - Recent (12 mo) or future (30 days) visit within the authorizing provider's specialty    Patient had office visit in the last 12 months or has a visit in the next 30 days with authorizing provider or within the authorizing provider's specialty.  See \"Patient Info\" tab in inbasket, or \"Choose Columns\" in Meds & Orders section of the refill encounter.              "

## 2018-09-12 RX ORDER — LORATADINE 10 MG/1
TABLET ORAL
Qty: 90 TABLET | Refills: 1 | Status: SHIPPED | OUTPATIENT
Start: 2018-09-12 | End: 2019-05-07

## 2018-09-12 NOTE — TELEPHONE ENCOUNTER
Prescription approved per [facility] Refill Protocol.  Sissy Ratliff RN. . .  9/12/2018, 2:26 PM

## 2019-01-02 DIAGNOSIS — Z98.1 S/P CERVICAL SPINAL FUSION: Primary | ICD-10-CM

## 2019-01-07 ENCOUNTER — MYC MEDICAL ADVICE (OUTPATIENT)
Dept: INTERNAL MEDICINE | Facility: CLINIC | Age: 49
End: 2019-01-07

## 2019-01-11 ENCOUNTER — HOSPITAL ENCOUNTER (OUTPATIENT)
Dept: MAMMOGRAPHY | Facility: CLINIC | Age: 49
Discharge: HOME | End: 2019-01-11
Attending: INTERNAL MEDICINE | Admitting: INTERNAL MEDICINE
Payer: COMMERCIAL

## 2019-01-11 ENCOUNTER — OFFICE VISIT (OUTPATIENT)
Dept: NEUROSURGERY | Facility: CLINIC | Age: 49
End: 2019-01-11
Payer: COMMERCIAL

## 2019-01-11 ENCOUNTER — ANCILLARY PROCEDURE (OUTPATIENT)
Dept: GENERAL RADIOLOGY | Facility: CLINIC | Age: 49
End: 2019-01-11
Payer: COMMERCIAL

## 2019-01-11 DIAGNOSIS — Z12.31 VISIT FOR SCREENING MAMMOGRAM: ICD-10-CM

## 2019-01-11 DIAGNOSIS — Z98.1 S/P CERVICAL SPINAL FUSION: ICD-10-CM

## 2019-01-11 DIAGNOSIS — M54.2 CERVICALGIA: ICD-10-CM

## 2019-01-11 DIAGNOSIS — Z98.1 S/P CERVICAL SPINAL FUSION: Primary | ICD-10-CM

## 2019-01-11 PROCEDURE — 77063 BREAST TOMOSYNTHESIS BI: CPT

## 2019-01-11 PROCEDURE — 99214 OFFICE O/P EST MOD 30 MIN: CPT | Performed by: PHYSICIAN ASSISTANT

## 2019-01-11 PROCEDURE — 72040 X-RAY EXAM NECK SPINE 2-3 VW: CPT | Mod: TC

## 2019-01-11 NOTE — LETTER
1/11/2019         RE: Jayashree Johnson  [address]  [place] [address]        Dear Colleague,    Thank you for referring your patient, Jayashree Johnson, to the [facility]. Please see a copy of my visit note below.    [facility]  Neurosurgery:    HPI: Jayashree Johnson is a 48 year old female who presents for follow up in regards to neck pain.  She is more than 18 months out from C5-6 ACDF with Dr. Escalante.  She continues to have anterior neck pain.  She also gets some numbness in her hands she notes when she is reading.  She has worked with physical therapy with no symptomatic improvement.    The patient denies any changes in bowel or bladder function, or any new problems with balance or coordination.     ROS negative for fever, chills, chest pain or shortness of breath.     Exam:  LMP 03/17/2003   Constitutional:  Alert, well nourished, NAD.  HEENT: Normocephalic, atraumatic.   Pulm:  Without shortness of breath, normal effort.   CV:  No pitting edema of BLE.   Skin: No rashes or lesions.     Neurological:  Awake  Alert  Oriented x 3  CN II-XII grossly intact.     Motor exam:     Shoulder Abduction:  Right:  5/5    Left:  5/5  Biceps:                      Right:  5/5    Left:  5/5  Triceps:                     Right:  5/5    Left:  5/5  Wrist Extensors:       Right:  5/5    Left:  5/5  Wrist Flexors:           Right:  5/5    Left:  5/5  Intrinsics:                  Right:  5/5    Left:  5/5     Able to spontaneously move U/E bilaterally  Sensation intact throughout all U/E dermatomes    There is no tenderness to palpation of the cervical/lumbar paraspinous musculature. No tenderness to SI joints or greater trochanters bilaterally.     Imaging:   AP and lateral views show the C5-6 instrumentation to be in stable position, with no concern for complication.    A/P:   1) cervicalgia  2) 18 months out from C5-6 ACDF      I did have a discussion with the patient regarding her symptoms.  She is  more than 18 months out from C5-6 ACDF.  She does continue with some anterior neck pain, she feels mostly around the incision.  She has done some physical therapy, and has had some massage and some myofascial release.  I did encourage her to schedule consultation with Dr. Eason, who can possibly provide her with some trigger points or some additional therapies.  She did wish to proceed with this option.  If she does not improve symptomatically she will return to our office, and we can then consider a new cervical spine MRI.    The patient voiced agreement and understanding of the plan of care. All questions were answered today.         William Albert PA-C  [facility]  [address] [address]    Tel [phone]      Again, thank you for allowing me to participate in the care of your patient.        Sincerely,        William Albert PA-C

## 2019-01-11 NOTE — PROGRESS NOTES
[facility]  Neurosurgery:    HPI: Jayashree Johnson is a 48 year old female who presents for follow up in regards to neck pain.  She is more than 18 months out from C5-6 ACDF with Dr. Escalante.  She continues to have anterior neck pain.  She also gets some numbness in her hands she notes when she is reading.  She has worked with physical therapy with no symptomatic improvement.    The patient denies any changes in bowel or bladder function, or any new problems with balance or coordination.     ROS negative for fever, chills, chest pain or shortness of breath.     Exam:  LMP 03/17/2003   Constitutional:  Alert, well nourished, NAD.  HEENT: Normocephalic, atraumatic.   Pulm:  Without shortness of breath, normal effort.   CV:  No pitting edema of BLE.   Skin: No rashes or lesions.     Neurological:  Awake  Alert  Oriented x 3  CN II-XII grossly intact.     Motor exam:     Shoulder Abduction:  Right:  5/5    Left:  5/5  Biceps:                      Right:  5/5    Left:  5/5  Triceps:                     Right:  5/5    Left:  5/5  Wrist Extensors:       Right:  5/5    Left:  5/5  Wrist Flexors:           Right:  5/5    Left:  5/5  Intrinsics:                  Right:  5/5    Left:  5/5     Able to spontaneously move U/E bilaterally  Sensation intact throughout all U/E dermatomes    There is no tenderness to palpation of the cervical/lumbar paraspinous musculature. No tenderness to SI joints or greater trochanters bilaterally.     Imaging:   AP and lateral views show the C5-6 instrumentation to be in stable position, with no concern for complication.    A/P:   1) cervicalgia  2) 18 months out from C5-6 ACDF      I did have a discussion with the patient regarding her symptoms.  She is more than 18 months out from C5-6 ACDF.  She does continue with some anterior neck pain, she feels mostly around the incision.  She has done some physical therapy, and has had some massage and some myofascial release.  I did encourage her  to schedule consultation with Dr. Eason, who can possibly provide her with some trigger points or some additional therapies.  She did wish to proceed with this option.  If she does not improve symptomatically she will return to our office, and we can then consider a new cervical spine MRI.    The patient voiced agreement and understanding of the plan of care. All questions were answered today.         William Albert PA-C  [facility]  [address] [address]    Tel [phone]

## 2019-01-15 ENCOUNTER — MYC MEDICAL ADVICE (OUTPATIENT)
Dept: NEUROSURGERY | Facility: CLINIC | Age: 49
End: 2019-01-15

## 2019-01-16 ENCOUNTER — MYC MEDICAL ADVICE (OUTPATIENT)
Dept: NEUROSURGERY | Facility: CLINIC | Age: 49
End: 2019-01-16

## 2019-01-16 DIAGNOSIS — M79.601 PARESTHESIA AND PAIN OF BOTH UPPER EXTREMITIES: Primary | ICD-10-CM

## 2019-01-16 DIAGNOSIS — M79.602 PARESTHESIA AND PAIN OF BOTH UPPER EXTREMITIES: Primary | ICD-10-CM

## 2019-01-16 DIAGNOSIS — R20.2 PARESTHESIA AND PAIN OF BOTH UPPER EXTREMITIES: Primary | ICD-10-CM

## 2019-01-23 ENCOUNTER — HOSPITAL ENCOUNTER (OUTPATIENT)
Dept: MRI IMAGING | Facility: CLINIC | Age: 49
Discharge: HOME | End: 2019-01-23
Admitting: PHYSICIAN ASSISTANT
Payer: COMMERCIAL

## 2019-01-23 DIAGNOSIS — M79.602 PARESTHESIA AND PAIN OF BOTH UPPER EXTREMITIES: ICD-10-CM

## 2019-01-23 DIAGNOSIS — M79.601 PARESTHESIA AND PAIN OF BOTH UPPER EXTREMITIES: ICD-10-CM

## 2019-01-23 DIAGNOSIS — R20.2 PARESTHESIA AND PAIN OF BOTH UPPER EXTREMITIES: ICD-10-CM

## 2019-01-23 PROCEDURE — A9585 GADOBUTROL INJECTION: HCPCS | Performed by: RADIOLOGY

## 2019-01-23 PROCEDURE — 72156 MRI NECK SPINE W/O & W/DYE: CPT

## 2019-01-23 PROCEDURE — 25500064 ZZH RX 255 OP 636: Performed by: RADIOLOGY

## 2019-01-23 RX ORDER — GADOBUTROL 604.72 MG/ML
7.5 INJECTION INTRAVENOUS ONCE
Status: COMPLETED | OUTPATIENT
Start: 2019-01-23 | End: 2019-01-23

## 2019-01-23 RX ADMIN — GADOBUTROL 7.5 ML: 604.72 INJECTION INTRAVENOUS at 14:44

## 2019-01-24 ENCOUNTER — MYC MEDICAL ADVICE (OUTPATIENT)
Dept: INTERNAL MEDICINE | Facility: CLINIC | Age: 49
End: 2019-01-24

## 2019-01-31 ENCOUNTER — DOCUMENTATION ONLY (OUTPATIENT)
Dept: OBGYN | Facility: CLINIC | Age: 49
End: 2019-01-31

## 2019-01-31 NOTE — PROGRESS NOTES
Updating old messages from genetic counselor:  BSO not indicated  No need to repeat genetic testing from 2012 unless new familial disorder emerges.   Lisa Dumont

## 2019-04-05 DIAGNOSIS — J34.89 NASAL OBSTRUCTION: ICD-10-CM

## 2019-04-08 RX ORDER — FLUTICASONE PROPIONATE 50 MCG
SPRAY, SUSPENSION (ML) NASAL
Qty: 16 G | Refills: 0 | Status: SHIPPED | OUTPATIENT
Start: 2019-04-08 | End: 2019-05-20

## 2019-04-08 NOTE — TELEPHONE ENCOUNTER
"Juani refill given per RN protocol.   Please contact patient to have them schedule the following:  Due for office visit: due after 4/18/2019  Torri Couch RN, BSN      Flonase  Last Written Prescription Date:  8/13/2018  Last Fill Quantity: 16 g,  # refills: 7   Last office visit: 4/18/2018 with prescribing provider:  4/18/2018   Future Office Visit:      Requested Prescriptions   Pending Prescriptions Disp Refills     fluticasone (FLONASE) 50 MCG/ACT nasal spray [Pharmacy Med Name: FLUTICASONE PROPIONATE 50 SUSP] 16 g 2     Sig: USE ONE SPRAY IN EACH NOSTRIL EVERY DAY AS NEEDED FOR RHINITIS OR ALLERGIES       Inhaled Steroids Protocol Passed - 4/5/2019 11:51 AM        Passed - Patient is age 12 or older        Passed - Recent (12 mo) or future (30 days) visit within the authorizing provider's specialty     Patient had office visit in the last 12 months or has a visit in the next 30 days with authorizing provider or within the authorizing provider's specialty.  See \"Patient Info\" tab in inbasket, or \"Choose Columns\" in Meds & Orders section of the refill encounter.              Passed - Medication is active on med list          Torri Couch RN on 4/8/2019 at 2:57 PM  "

## 2019-04-24 DIAGNOSIS — Z80.3 FAMILY HISTORY OF MALIGNANT NEOPLASM OF BREAST: Primary | ICD-10-CM

## 2019-05-06 ENCOUNTER — OFFICE VISIT (OUTPATIENT)
Dept: ORTHOPEDICS | Facility: CLINIC | Age: 49
End: 2019-05-06
Payer: COMMERCIAL

## 2019-05-06 ENCOUNTER — ANCILLARY PROCEDURE (OUTPATIENT)
Dept: GENERAL RADIOLOGY | Facility: CLINIC | Age: 49
End: 2019-05-06
Attending: PHYSICIAN ASSISTANT
Payer: COMMERCIAL

## 2019-05-06 VITALS
BODY MASS INDEX: 25.74 KG/M2 | WEIGHT: 164 LBS | DIASTOLIC BLOOD PRESSURE: 84 MMHG | HEART RATE: 82 BPM | HEIGHT: 67 IN | SYSTOLIC BLOOD PRESSURE: 142 MMHG

## 2019-05-06 DIAGNOSIS — M25.511 RIGHT SHOULDER PAIN, UNSPECIFIED CHRONICITY: ICD-10-CM

## 2019-05-06 DIAGNOSIS — M54.2 CERVICALGIA: ICD-10-CM

## 2019-05-06 DIAGNOSIS — M79.18 ACUTE MYOFASCIAL PAIN: ICD-10-CM

## 2019-05-06 DIAGNOSIS — M25.511 RIGHT SHOULDER PAIN, UNSPECIFIED CHRONICITY: Primary | ICD-10-CM

## 2019-05-06 PROCEDURE — 99203 OFFICE O/P NEW LOW 30 MIN: CPT | Performed by: PHYSICIAN ASSISTANT

## 2019-05-06 PROCEDURE — 73030 X-RAY EXAM OF SHOULDER: CPT | Mod: TC | Performed by: FAMILY MEDICINE

## 2019-05-06 RX ORDER — ZOLPIDEM TARTRATE 10 MG/1
10 TABLET ORAL
COMMUNITY
End: 2021-10-25

## 2019-05-06 ASSESSMENT — PAIN SCALES - GENERAL: PAINLEVEL: MILD PAIN (2)

## 2019-05-06 ASSESSMENT — MIFFLIN-ST. JEOR: SCORE: 1396.53
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
## 2019-05-06 NOTE — LETTER
May 6, 2019      Jayashree Johnson  [address] [address] [place] [id number]        To Whom It May Concern:    Jayashree Johnson was seen in our clinic today 5/6/2019. She may return to working half of her normal scheduled hours for the next 6 weeks to attend needed treatments.      Sincerely,        Diana Kinney PA-C, ANNA MARIE
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
## 2019-05-14 ENCOUNTER — HOSPITAL ENCOUNTER (OUTPATIENT)
Dept: PHYSICAL THERAPY | Facility: OTHER | Age: 49
Setting detail: THERAPIES SERIES
End: 2019-05-14
Attending: PHYSICIAN ASSISTANT
Payer: COMMERCIAL

## 2019-05-14 DIAGNOSIS — M25.511 RIGHT SHOULDER PAIN, UNSPECIFIED CHRONICITY: ICD-10-CM

## 2019-05-14 DIAGNOSIS — M54.2 CERVICALGIA: ICD-10-CM

## 2019-05-14 PROCEDURE — 97162 PT EVAL MOD COMPLEX 30 MIN: CPT | Mod: GP | Performed by: PHYSICAL THERAPIST

## 2019-05-14 PROCEDURE — 97110 THERAPEUTIC EXERCISES: CPT | Mod: GP | Performed by: PHYSICAL THERAPIST

## 2019-05-14 PROCEDURE — 97140 MANUAL THERAPY 1/> REGIONS: CPT | Mod: GP | Performed by: PHYSICAL THERAPIST

## 2019-05-15 NOTE — PROGRESS NOTES
"   05/14/19 1405   General Information   Type of Visit Initial OP Ortho PT Evaluation   Start of Care Date 05/14/19   Referring Physician Diana Kinney PAC   Patient/Family Goals Statement get strength and motion back in her shoulder   Orders Evaluate and Treat   Orders Comment cranio therapy, myofascial release for periscapular area   Date of Order 05/14/19   Certification Required? No   Medical Diagnosis Right shoulder pain, unspecified chronicity M25.511 , Cervicalgia M54.2    Surgical/Medical history reviewed Yes   Precautions/Limitations no known precautions/limitations   Weight-Bearing Status - LUE full weight-bearing   Weight-Bearing Status - RUE full weight-bearing   Weight-Bearing Status - LLE full weight-bearing   Weight-Bearing Status - RLE full weight-bearing   General Information Comments history cervical surgery       Present No   Body Part(s)   Body Part(s) Shoulder   Presentation and Etiology   Pertinent history of current problem (include personal factors and/or comorbidities that impact the POC) 47 yo female slipped and fell in the mud and tried to catch herself. Her neighbors cows got into her yd and left ruts. Massage therapy 1 x per week, chiropractor for accupuncture and \"realign\" muscles and bones every other week. She is completing exercises at home: wall climbs/spiders, wall push ups and pectoralis stretches in corner/doorway and continuing with ehr daily activities such as writing. She is having issues fixing her hair and other activities as she is R handed. She is to return health provider in 2 months. She is using heat, cold and ibuprofen. No change with swallowing, no change with deep breathing, no nausea or vomiting, tinnitus, no visual changes. She drives a distance to work and sits at computer/stands at computer and this increases her symptoms as well. She has difficulty with reading when she has to hold her papers up. She has a board to hold papers at home " "but is not using it at this time. She plans to try it. She wants soft tissue work as she is completing her exercises at home. She has increased pain with the wall slides. She does not feel she should be moving her arm much at this time.    Impairments A. Pain;D. Decreased ROM;E. Decreased flexibility;F. Decreased strength and endurance;J. Burning;K. Numbness;L. Tingling;B. Decreased WB tolerance   Functional Limitations perform activities of daily living;perform required work activities;perform desired leisure / sports activities   Symptom Location R shoulder: upper shoulder and anterior shoulder, across the R side of upper chest, the L upper chest is tight, Symptoms go into 2-5 fingers R and not 1 st. 5th digit is numb on the L. HEadaches: back of head and frontal - 2 -3 times per week and last until she \"takes something\". Scapular tightness L and pain more on the right   How/Where did it occur With a fall   Onset date of current episode/exacerbation   (4-2-19 or 4-3-19)   Chronicity Chronic   Pain rating (0-10 point scale) Other   Pain rating comment Neck: now: 3/10, range: 1/10 to 8/10. R Shoulder: now: 2/10, range: 2/10 to 10/10   Pain quality   (see above, very sharp pain with use of the R shoulder)   Frequency of pain/symptoms A. Constant   Pain/symptoms are: Other   Pain symptoms comment activity and position related   Pain/symptoms exacerbated by M. Other   Pain/symptoms eased by K. Other;I. OTC medication(s);G. Heat;H. Cold   Pain eased by comment Chiropractic care and massage - hurts but heps in the long run.    Progression of symptoms since onset: Improved  (now that it loosened up last week)   Current / Previous Interventions   Diagnostic Tests: X-ray  (R shoulder)   X-ray Results unremarkable   Prior Level of Function   Functional Level Prior Comment independent   Current Level of Function   Current Community Support Family/friend caregiver   Patient role/employment history A. Employed   Employment " Comments real estate/sales   [facility]   Home/community accessibility independent with pain noted since fall with driving   Current equipment-Gait/Locomotion None   Fall Risk Screen   Fall screen completed by PT   Have you fallen 2 or more times in the past year? No   Have you fallen and had an injury in the past year? Yes   Is patient a fall risk? Yes   Fall screen comments situational - mud   Abuse Screen (yes response referral indicated)   Feels Unsafe at Home or Work/School no   Feels Threatened by Someone yes   Does Anyone Try to Keep You From Having Contact with Others or Doing Things Outside Your Home? no   Physical Signs of Abuse Present no   Safety Plan no plan - neighbor is a concern   Functional Scales   Functional Scales Other   Other Scales  NDI/SPADI   Shoulder Objective Findings   Observation no acute distress noted   Integumentary  well healed incision cervical spine   Posture fair with slight cervical retraction, R shoulder protraction more than the L in sitting and standing   Cervical Screen (ROM, quadrant) held today as she was more concerned about her R shoulder issues   Shoulder ROM Comment Standing shoulder AROM L/R: flexion: 0-148 degrees/96 degrees; extension: 0-51 degrees/46 degrees; abduction: 0-169 degrees/103 degrees   Scapulothoracic Rhythm decreased ability to contract the lower trapezius R>L   Pec Minor (supine) Flexibility moderte tightness R   Palpation tender along biceps, tightness from R suboccipital base into the R T 8-9 level   Planned Therapy Interventions   Planned Therapy Interventions Comment complete neck assessment, advance home program - ROM, scapular stabilization, core strengthening, neck positioning, posture, manual therapy    Planned Modality Interventions   Planned Modality Interventions Comments as needed   Clinical Impression   Criteria for Skilled Therapeutic Interventions Met yes, treatment indicated   PT Diagnosis cervical and R shoulder  decreased AROM, biceps issues and pain following slip.    Influenced by the following impairments pain, decreased AROM neck and R shoulder    Functional limitations due to impairments reaching, dressing, driving, holding items, ADLS   Clinical Presentation Evolving/Changing   Clinical Presentation Rationale chronic, history of cervical surgery, pain with movement and she is not sure she should move it too much   Clinical Decision Making (Complexity) Moderate complexity   Predicted Duration of Therapy Intervention (days/wks) 1-2 times per week based on her other appts for treatment x 6 weeks   Risk & Benefits of therapy have been explained Yes   Patient, Family & other staff in agreement with plan of care Yes   Clinical Impression Comments Melissa is looking for passive modalities such as massage to help her symptoms. She feels that the exercises she is completing at home is enough. However, these exercises are painful (specifically the wall climb) and may be feeding into her sensitivity. We discussed the importance of moving her R shoulder in ways that are graded. She was willing to try table slides vs wall slides to increase ROM without increased pain.    Education Assessment   Preferred Learning Style Listening;Demonstration   Barriers to Learning No barriers   ORTHO GOALS   PT Ortho Eval Goals 1;2   Ortho Goal 1   Goal Identifier Shoulder AROM   Goal Description Melissa will be able to report a decrease in her acute symptoms so she can achieve full R shoulder AROM compared to her L side   Target Date 06/05/19   Ortho Goal 2   Goal Identifier Cervical assessment   Goal Description Melissa will have completed her cervical assessment next session   Total Evaluation Time   PT Derian Moderate Complexity Minutes ([id number]) 22     Thank you for referring Melissa  to [facility] - Geovanna Whalen, PT  [phone]

## 2019-05-16 ENCOUNTER — TELEPHONE (OUTPATIENT)
Dept: INTERNAL MEDICINE | Facility: CLINIC | Age: 49
End: 2019-05-16

## 2019-05-16 NOTE — TELEPHONE ENCOUNTER
Reason for Call:  Same Day Appointment, Requested Provider:  Aneudy Jackson MD    PCP: Aneudy Jackson    Reason for visit: Physical/ Med Check      Duration of symptoms:      Have you been treated for this in the past?     Additional comments: Patient calling stating she has a physical scheduled on Monday the 20th and has a mandatory work meeting. Patient requesting an appt after 1230pm on 05/20/2019 or later in the week. Please advise on reschedule     Can we leave a detailed message on this number? YES    Phone number patient can be reached at: Cell number on file:    Telephone Information:   Mobile [phone]       Best Time: any     Call taken on 5/16/2019 at 3:46 PM by Larisa Santizo

## 2019-05-20 ENCOUNTER — OFFICE VISIT (OUTPATIENT)
Dept: INTERNAL MEDICINE | Facility: CLINIC | Age: 49
End: 2019-05-20
Payer: COMMERCIAL

## 2019-05-20 VITALS
SYSTOLIC BLOOD PRESSURE: 128 MMHG | TEMPERATURE: 97.1 F | OXYGEN SATURATION: 97 % | BODY MASS INDEX: 25.84 KG/M2 | HEART RATE: 84 BPM | DIASTOLIC BLOOD PRESSURE: 84 MMHG | RESPIRATION RATE: 16 BRPM | WEIGHT: 165 LBS

## 2019-05-20 DIAGNOSIS — J30.1 SEASONAL ALLERGIC RHINITIS DUE TO POLLEN: ICD-10-CM

## 2019-05-20 DIAGNOSIS — J30.89 CHRONIC NONSEASONAL ALLERGIC RHINITIS DUE TO POLLEN: ICD-10-CM

## 2019-05-20 DIAGNOSIS — J34.89 NASAL OBSTRUCTION: ICD-10-CM

## 2019-05-20 DIAGNOSIS — R73.09 ELEVATED GLUCOSE: ICD-10-CM

## 2019-05-20 DIAGNOSIS — F41.8 DEPRESSION WITH ANXIETY: ICD-10-CM

## 2019-05-20 DIAGNOSIS — Z00.00 ENCOUNTER FOR ROUTINE ADULT HEALTH EXAMINATION WITHOUT ABNORMAL FINDINGS: Primary | ICD-10-CM

## 2019-05-20 DIAGNOSIS — M79.7 FIBROMYALGIA: ICD-10-CM

## 2019-05-20 LAB
ALBUMIN SERPL-MCNC: 4 G/DL (ref 3.4–5)
ALP SERPL-CCNC: 116 U/L (ref 40–150)
ALT SERPL W P-5'-P-CCNC: 23 U/L (ref 0–50)
ANION GAP SERPL CALCULATED.3IONS-SCNC: 7 MMOL/L (ref 3–14)
AST SERPL W P-5'-P-CCNC: 34 U/L (ref 0–45)
BILIRUB SERPL-MCNC: 0.3 MG/DL (ref 0.2–1.3)
BUN SERPL-MCNC: 8 MG/DL (ref 7–30)
CALCIUM SERPL-MCNC: 8.5 MG/DL (ref 8.5–10.1)
CHLORIDE SERPL-SCNC: 106 MMOL/L (ref 94–109)
CHOLEST SERPL-MCNC: 179 MG/DL
CO2 SERPL-SCNC: 27 MMOL/L (ref 20–32)
CREAT SERPL-MCNC: 0.69 MG/DL (ref 0.52–1.04)
GFR SERPL CREATININE-BSD FRML MDRD: >90 ML/MIN/{1.73_M2}
GLUCOSE SERPL-MCNC: 90 MG/DL (ref 70–99)
HBA1C MFR BLD: 5.5 % (ref 0–5.6)
HDLC SERPL-MCNC: 50 MG/DL
LDLC SERPL CALC-MCNC: 84 MG/DL
NONHDLC SERPL-MCNC: 129 MG/DL
POTASSIUM SERPL-SCNC: 3.8 MMOL/L (ref 3.4–5.3)
PROT SERPL-MCNC: 7.6 G/DL (ref 6.8–8.8)
SODIUM SERPL-SCNC: 140 MMOL/L (ref 133–144)
TRIGL SERPL-MCNC: 227 MG/DL

## 2019-05-20 PROCEDURE — 83036 HEMOGLOBIN GLYCOSYLATED A1C: CPT | Performed by: INTERNAL MEDICINE

## 2019-05-20 PROCEDURE — 80053 COMPREHEN METABOLIC PANEL: CPT | Performed by: INTERNAL MEDICINE

## 2019-05-20 PROCEDURE — 36415 COLL VENOUS BLD VENIPUNCTURE: CPT | Performed by: INTERNAL MEDICINE

## 2019-05-20 PROCEDURE — 80061 LIPID PANEL: CPT | Performed by: INTERNAL MEDICINE

## 2019-05-20 PROCEDURE — 99396 PREV VISIT EST AGE 40-64: CPT | Performed by: INTERNAL MEDICINE

## 2019-05-20 RX ORDER — LORATADINE 10 MG/1
1 TABLET ORAL DAILY
Qty: 30 TABLET | Refills: 11 | Status: SHIPPED | OUTPATIENT
Start: 2019-05-20 | End: 2020-06-15

## 2019-05-20 RX ORDER — AZELASTINE 1 MG/ML
1 SPRAY, METERED NASAL 2 TIMES DAILY
Qty: 1 BOTTLE | Refills: 1 | Status: SHIPPED | OUTPATIENT
Start: 2019-05-20 | End: 2024-09-04

## 2019-05-20 RX ORDER — FLUTICASONE PROPIONATE 50 MCG
SPRAY, SUSPENSION (ML) NASAL
Qty: 16 G | Refills: 11 | Status: SHIPPED | OUTPATIENT
Start: 2019-05-20 | End: 2022-03-29

## 2019-05-20 ASSESSMENT — ENCOUNTER SYMPTOMS
DIZZINESS: 1
PARESTHESIAS: 0
MYALGIAS: 1
HEMATOCHEZIA: 0
CHILLS: 1
ARTHRALGIAS: 1
HEADACHES: 1
HEARTBURN: 0
CONSTIPATION: 1
HEMATURIA: 0
PALPITATIONS: 1
BREAST MASS: 0
FEVER: 0
FREQUENCY: 1
JOINT SWELLING: 1
ABDOMINAL PAIN: 1
NERVOUS/ANXIOUS: 1
SHORTNESS OF BREATH: 1
EYE PAIN: 0
NAUSEA: 0
DIARRHEA: 0
SORE THROAT: 1
COUGH: 1
WEAKNESS: 0

## 2019-05-20 ASSESSMENT — PATIENT HEALTH QUESTIONNAIRE - PHQ9: SUM OF ALL RESPONSES TO PHQ QUESTIONS 1-9: 10

## 2019-05-20 ASSESSMENT — PAIN SCALES - GENERAL: PAINLEVEL: NO PAIN (0)

## 2019-05-20 NOTE — PROGRESS NOTES
SUBJECTIVE:   CC: Jayashree Johnson is an 48 year old woman who presents for preventive health visit.     Healthy Habits:     Getting at least 3 servings of Calcium per day:  NO    Bi-annual eye exam:  NO    Dental care twice a year:  NO    Sleep apnea or symptoms of sleep apnea:  Daytime drowsiness    Diet:  Gluten-free/reduced    Frequency of exercise:  4-5 days/week    Duration of exercise:  15-30 minutes    Taking medications regularly:  Yes    Medication side effects:  Muscle aches and Lightheadedness    PHQ-2 Total Score: 2    Additional concerns today:  Yes    Seasonal allergies but takes claritin almost everyday.  Gets tired, would like more treatment.    Fell and had right frozen shoulder, working with massage, cupping, PT, and chiropracter.      Neck pains after fusion still. Taking tylenol and ibuprofen.  Does 650 mg tylenol and 600mg ibuprofen.     Seeing psychiatry and Albert, cymbalta, xanax, and ambien.      Bowels are constipated and then diarrhea. Wants to wait on Linzess.    Active with exercise dog and cows.      Today's PHQ-2 Score:   PHQ-2 ( 1999 [employer]) 5/20/2019   Q1: Little interest or pleasure in doing things 1   Q2: Feeling down, depressed or hopeless 1   PHQ-2 Score 2   Q1: Little interest or pleasure in doing things Several days   Q2: Feeling down, depressed or hopeless Several days   PHQ-2 Score 2     Abuse: Current or Past(Physical, Sexual or Emotional)- No  Do you feel safe in your environment? Yes    Social History     Tobacco Use     Smoking status: Never Smoker     Smokeless tobacco: Never Used   Substance Use Topics     Alcohol use: No     Alcohol/week: 0.0 oz     If you drink alcohol do you typically have >3 drinks per day or >7 drinks per week? No    Alcohol Use 5/20/2019   Prescreen: >3 drinks/day or >7 drinks/week? No     Reviewed orders with patient.  Reviewed health maintenance and updated orders accordingly - Yes      Mammogram Screening: Patient over age 50, mutual  decision to screen reflected in health maintenance.    Pertinent mammograms are reviewed under the imaging tab.  History of abnormal Pap smear: seeing gyn  PAP / HPV 1/29/2015 9/19/2012 5/3/2011   PAP NIL NIL NIL   HPVSUR RESULT - - -     Reviewed and updated as needed this visit by clinical staff  Allergies         Reviewed and updated as needed this visit by Provider            Review of Systems   Constitutional: Positive for chills. Negative for fever.   HENT: Positive for ear pain and sore throat. Negative for congestion and hearing loss.    Eyes: Positive for visual disturbance. Negative for pain.   Respiratory: Positive for cough and shortness of breath.    Cardiovascular: Positive for palpitations and peripheral edema. Negative for chest pain.   Gastrointestinal: Positive for abdominal pain and constipation. Negative for diarrhea, heartburn, hematochezia and nausea.   Breasts:  Negative for tenderness, breast mass and discharge.   Genitourinary: Positive for frequency and urgency. Negative for genital sores, hematuria, pelvic pain, vaginal bleeding and vaginal discharge.   Musculoskeletal: Positive for arthralgias, joint swelling and myalgias.   Skin: Negative for rash.   Neurological: Positive for dizziness and headaches. Negative for weakness and paresthesias.   Psychiatric/Behavioral: Positive for mood changes. The patient is nervous/anxious.         OBJECTIVE:   LMP 03/17/2003   Physical Exam  GENERAL: healthy, alert and no distress  EYES: Eyes grossly normal to inspection, PERRL and conjunctivae and sclerae normal  HENT: ear canals and TM's normal, nose and mouth without ulcers or lesions  NECK: no adenopathy, no asymmetry, masses, or scars and thyroid normal to palpation  RESP: lungs clear to auscultation - no rales, rhonchi or wheezes  CV: regular rate and rhythm, normal S1 S2, no S3 or S4, no murmur, click or rub, no peripheral edema and peripheral pulses strong  ABDOMEN: soft, nontender, no  "hepatosplenomegaly, no masses and bowel sounds normal  MS: no gross musculoskeletal defects noted, no edema  SKIN: no suspicious lesions or rashes  NEURO: Normal strength and tone, mentation intact and speech normal  PSYCH: mentation appears normal, affect normal/bright        ASSESSMENT/PLAN:       ICD-10-CM    1. Encounter for routine adult health examination without abnormal findings Z00.00    2. Seasonal allergic rhinitis due to pollen J30.1 azelastine (ASTELIN) 0.1 % nasal spray   3. Nasal obstruction J34.89 fluticasone (FLONASE) 50 MCG/ACT nasal spray   4. Chronic nonseasonal allergic rhinitis due to pollen J30.89 loratadine (CLARITIN) 10 MG tablet   5. Elevated glucose R73.09 Lipid Profile     Comprehensive metabolic panel     Hemoglobin A1c   6. Fibromyalgia M79.7    7. Depression with anxiety F41.8      For seasonal allergies she will continue Claritin, she will continue Flonase and we will try her on Astelin to assist with those medications.    Depression she is followed by psychiatry she is on Cymbalta, Ambien Xanax as needed.    Fibromyalgia she is stable continues to have some issues with irritable bowel does not want to try Linzess she is dealing with her fibromyalgia pain appropriately.    COUNSELING:  Reviewed preventive health counseling, as reflected in patient instructions       Regular exercise       Healthy diet/nutrition    Estimated body mass index is 25.69 kg/m  as calculated from the following:    Height as of 5/6/19: 1.702 m (5' 7\").    Weight as of 5/6/19: 74.4 kg (164 lb).         reports that she has never smoked. She has never used smokeless tobacco.      Counseling Resources:  ATP IV Guidelines  Pooled Cohorts Equation Calculator  Breast Cancer Risk Calculator  FRAX Risk Assessment  ICSI Preventive Guidelines  Dietary Guidelines for Americans, 2010  USDA's MyPlate  ASA Prophylaxis  Lung CA Screening    Aneudy Jackson MD  [employer]  "

## 2019-06-06 DIAGNOSIS — G43.909 MIGRAINE WITHOUT STATUS MIGRAINOSUS, NOT INTRACTABLE, UNSPECIFIED MIGRAINE TYPE: Primary | ICD-10-CM

## 2019-06-06 NOTE — TELEPHONE ENCOUNTER
Patient is requesting refill on imitrex - previously written by Psych provider. They have denied refill and requested it sent to [facility]. Previously received 50 mg tablets #6

## 2019-06-06 NOTE — TELEPHONE ENCOUNTER
Dr. Jackson is out of office tomorrow, will forward to Dr. Heath to review  And approve or deny refill request.    Imitrex 50 mg- originally prescribed by Psychologist Routing refill request to provider for review/approval because: LOV with Dr. Jackson was 5/20/19 for Physical exam  Drug not active on patient's medication list  Pt is asking for Rx from Dr. Jackson for her migraines.  LEYDI Alanis

## 2019-06-07 RX ORDER — SUMATRIPTAN 50 MG/1
50 TABLET, FILM COATED ORAL
Qty: 8 TABLET | Refills: 6 | Status: SHIPPED | OUTPATIENT
Start: 2019-06-07

## 2019-06-25 ENCOUNTER — MYC MEDICAL ADVICE (OUTPATIENT)
Dept: INTERNAL MEDICINE | Facility: CLINIC | Age: 49
End: 2019-06-25

## 2019-06-25 DIAGNOSIS — H81.90 VESTIBULOPATHY, UNSPECIFIED LATERALITY: Primary | ICD-10-CM

## 2019-06-26 RX ORDER — PREDNISONE 20 MG/1
40 TABLET ORAL DAILY
Qty: 14 TABLET | Refills: 0 | Status: SHIPPED | OUTPATIENT
Start: 2019-06-26 | End: 2020-03-04

## 2019-07-17 NOTE — DISCHARGE SUMMARY
Outpatient Physical Therapy Discharge Note     Patient: Jayashree Johnson  : 1970    Beginning/End Dates of Reporting Period:  One visit - 19    Referring Provider: Diana Kinney PAC    Therapy Diagnosis: cervical and R shoulder decreased AROM, biceps issues and pain following slip     Client Self Report: please see initial evaluation    Objective Measurements:  Please see initial evaluation    Goals:  Goal Identifier Shoulder AROM   Goal Description Melissa will be able to report a decrease in her acute symptoms so she can achieve full R shoulder AROM compared to her L side   Target Date 19   Date Met      Progress:     Goal Identifier Cervical assessment   Goal Description Melissa will have completed her cervical assessment next session   Target Date     Date Met      Progress:     Progress Toward Goals:   Not assessed this period.    Plan:  Discharge from therapy.    Discharge:    Reason for Discharge: Patient has failed to schedule further appointments.    Equipment Issued: none    Discharge Plan: Patient to continue home program.    Thank you for referring Melissa  to [facility]    Jayashree Whalen, PT  [phone]

## 2019-07-17 NOTE — ADDENDUM NOTE
Encounter addended by: Jayashree Whalen, PT on: 7/17/2019 2:14 PM   Actions taken: Sign clinical note, Episode resolved

## 2019-08-09 ENCOUNTER — OFFICE VISIT (OUTPATIENT)
Dept: OBGYN | Facility: CLINIC | Age: 49
End: 2019-08-09
Attending: OBSTETRICS & GYNECOLOGY
Payer: COMMERCIAL

## 2019-08-09 VITALS — DIASTOLIC BLOOD PRESSURE: 94 MMHG | HEART RATE: 81 BPM | SYSTOLIC BLOOD PRESSURE: 171 MMHG

## 2019-08-09 DIAGNOSIS — K50.819 CROHN'S DISEASE OF SMALL AND LARGE INTESTINES WITH COMPLICATION (H): Primary | ICD-10-CM

## 2019-08-09 DIAGNOSIS — Z12.4 SCREENING FOR MALIGNANT NEOPLASM OF CERVIX: ICD-10-CM

## 2019-08-09 PROCEDURE — G0145 SCR C/V CYTO,THINLAYER,RESCR: HCPCS | Performed by: OBSTETRICS & GYNECOLOGY

## 2019-08-09 PROCEDURE — G0476 HPV COMBO ASSAY CA SCREEN: HCPCS | Performed by: OBSTETRICS & GYNECOLOGY

## 2019-08-09 PROCEDURE — G0463 HOSPITAL OUTPT CLINIC VISIT: HCPCS

## 2019-08-09 ASSESSMENT — ANXIETY QUESTIONNAIRES
5. BEING SO RESTLESS THAT IT IS HARD TO SIT STILL: NOT AT ALL
GAD7 TOTAL SCORE: 7
6. BECOMING EASILY ANNOYED OR IRRITABLE: NOT AT ALL
2. NOT BEING ABLE TO STOP OR CONTROL WORRYING: SEVERAL DAYS
1. FEELING NERVOUS, ANXIOUS, OR ON EDGE: SEVERAL DAYS
7. FEELING AFRAID AS IF SOMETHING AWFUL MIGHT HAPPEN: SEVERAL DAYS
3. WORRYING TOO MUCH ABOUT DIFFERENT THINGS: SEVERAL DAYS

## 2019-08-09 ASSESSMENT — PATIENT HEALTH QUESTIONNAIRE - PHQ9: 5. POOR APPETITE OR OVEREATING: NEARLY EVERY DAY
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
## 2019-08-10 ASSESSMENT — ANXIETY QUESTIONNAIRES: GAD7 TOTAL SCORE: 7

## 2019-08-14 ENCOUNTER — TELEPHONE (OUTPATIENT)
Dept: PSYCHOLOGY | Facility: CLINIC | Age: 49
End: 2019-08-14

## 2019-08-14 LAB
COPATH REPORT: NORMAL
PAP: NORMAL

## 2019-08-14 NOTE — TELEPHONE ENCOUNTER
Called pt per request, Dr Dumont.  Provided pt info re therapist referral (tele-therapy) and also suggestions re contacting her insurance carrier for further info and referrals.  Pt expressed understanding, appreciation; Intent to followup.  Provided pt with my direct # to provide any further assistance as needed.

## 2019-08-16 ENCOUNTER — MYC MEDICAL ADVICE (OUTPATIENT)
Dept: INTERNAL MEDICINE | Facility: CLINIC | Age: 49
End: 2019-08-16

## 2019-08-16 LAB
FINAL DIAGNOSIS: NORMAL
HPV HR 12 DNA CVX QL NAA+PROBE: NEGATIVE
HPV16 DNA SPEC QL NAA+PROBE: NEGATIVE
HPV18 DNA SPEC QL NAA+PROBE: NEGATIVE
SPECIMEN DESCRIPTION: NORMAL
SPECIMEN SOURCE CVX/VAG CYTO: NORMAL

## 2019-08-21 ENCOUNTER — E-VISIT (OUTPATIENT)
Dept: INTERNAL MEDICINE | Facility: CLINIC | Age: 49
End: 2019-08-21
Payer: COMMERCIAL

## 2019-08-21 DIAGNOSIS — I10 ESSENTIAL HYPERTENSION: ICD-10-CM

## 2019-08-21 DIAGNOSIS — G44.209 TENSION HEADACHE: Primary | ICD-10-CM

## 2019-08-21 PROCEDURE — 99444 ZZC PHYSICIAN ONLINE EVALUATION & MANAGEMENT SERVICE: CPT | Performed by: INTERNAL MEDICINE

## 2019-08-21 RX ORDER — METOPROLOL SUCCINATE 50 MG/1
50 TABLET, EXTENDED RELEASE ORAL DAILY
Qty: 30 TABLET | Refills: 3 | Status: SHIPPED | OUTPATIENT
Start: 2019-08-21 | End: 2019-08-28

## 2019-08-28 ENCOUNTER — MYC MEDICAL ADVICE (OUTPATIENT)
Dept: INTERNAL MEDICINE | Facility: CLINIC | Age: 49
End: 2019-08-28

## 2019-08-28 DIAGNOSIS — I10 ESSENTIAL HYPERTENSION: Primary | ICD-10-CM

## 2019-08-28 RX ORDER — METOPROLOL SUCCINATE 25 MG/1
25 TABLET, EXTENDED RELEASE ORAL DAILY
Qty: 30 TABLET | Refills: 3 | Status: SHIPPED | OUTPATIENT
Start: 2019-08-28 | End: 2020-04-29

## 2019-09-18 DIAGNOSIS — B00.9 HSV (HERPES SIMPLEX VIRUS) INFECTION: ICD-10-CM

## 2019-09-18 RX ORDER — VALACYCLOVIR HYDROCHLORIDE 1 G/1
1000 TABLET, FILM COATED ORAL 2 TIMES DAILY
Qty: 20 TABLET | Refills: 6 | Status: SHIPPED | OUTPATIENT
Start: 2019-09-18 | End: 2020-05-26

## 2019-09-28 ENCOUNTER — HEALTH MAINTENANCE LETTER (OUTPATIENT)
Age: 49
End: 2019-09-28

## 2020-03-04 ENCOUNTER — TELEPHONE (OUTPATIENT)
Dept: INTERNAL MEDICINE | Facility: CLINIC | Age: 50
End: 2020-03-04

## 2020-03-04 ENCOUNTER — OFFICE VISIT (OUTPATIENT)
Dept: INTERNAL MEDICINE | Facility: CLINIC | Age: 50
End: 2020-03-04
Payer: COMMERCIAL

## 2020-03-04 VITALS
RESPIRATION RATE: 15 BRPM | HEIGHT: 67 IN | HEART RATE: 93 BPM | BODY MASS INDEX: 26.53 KG/M2 | OXYGEN SATURATION: 98 % | SYSTOLIC BLOOD PRESSURE: 118 MMHG | TEMPERATURE: 98.9 F | WEIGHT: 169.06 LBS | DIASTOLIC BLOOD PRESSURE: 60 MMHG

## 2020-03-04 DIAGNOSIS — R53.83 FATIGUE, UNSPECIFIED TYPE: ICD-10-CM

## 2020-03-04 DIAGNOSIS — R23.2 FLUSHING: Primary | ICD-10-CM

## 2020-03-04 DIAGNOSIS — I10 ESSENTIAL HYPERTENSION: ICD-10-CM

## 2020-03-04 DIAGNOSIS — G44.209 TENSION HEADACHE: ICD-10-CM

## 2020-03-04 LAB
ALBUMIN SERPL-MCNC: 3.7 G/DL (ref 3.4–5)
ALP SERPL-CCNC: 110 U/L (ref 40–150)
ALT SERPL W P-5'-P-CCNC: 17 U/L (ref 0–50)
ANION GAP SERPL CALCULATED.3IONS-SCNC: 6 MMOL/L (ref 3–14)
AST SERPL W P-5'-P-CCNC: 29 U/L (ref 0–45)
BILIRUB SERPL-MCNC: 0.3 MG/DL (ref 0.2–1.3)
BUN SERPL-MCNC: 9 MG/DL (ref 7–30)
CALCIUM SERPL-MCNC: 8.4 MG/DL (ref 8.5–10.1)
CHLORIDE SERPL-SCNC: 108 MMOL/L (ref 94–109)
CO2 SERPL-SCNC: 25 MMOL/L (ref 20–32)
CREAT SERPL-MCNC: 0.74 MG/DL (ref 0.52–1.04)
ERYTHROCYTE [DISTWIDTH] IN BLOOD BY AUTOMATED COUNT: 13.1 % (ref 10–15)
FSH SERPL-ACNC: 4.6 IU/L
GFR SERPL CREATININE-BSD FRML MDRD: >90 ML/MIN/{1.73_M2}
GLUCOSE SERPL-MCNC: 99 MG/DL (ref 70–99)
HCT VFR BLD AUTO: 37.3 % (ref 35–47)
HGB BLD-MCNC: 12.2 G/DL (ref 11.7–15.7)
LH SERPL-ACNC: 3.1 IU/L
MCH RBC QN AUTO: 30.1 PG (ref 26.5–33)
MCHC RBC AUTO-ENTMCNC: 32.7 G/DL (ref 31.5–36.5)
MCV RBC AUTO: 92 FL (ref 78–100)
PLATELET # BLD AUTO: 313 10E9/L (ref 150–450)
POTASSIUM SERPL-SCNC: 3.8 MMOL/L (ref 3.4–5.3)
PROT SERPL-MCNC: 7.2 G/DL (ref 6.8–8.8)
RBC # BLD AUTO: 4.05 10E12/L (ref 3.8–5.2)
SODIUM SERPL-SCNC: 139 MMOL/L (ref 133–144)
TSH SERPL DL<=0.005 MIU/L-ACNC: 0.53 MU/L (ref 0.4–4)
WBC # BLD AUTO: 9.1 10E9/L (ref 4–11)

## 2020-03-04 PROCEDURE — 83001 ASSAY OF GONADOTROPIN (FSH): CPT | Performed by: INTERNAL MEDICINE

## 2020-03-04 PROCEDURE — 80053 COMPREHEN METABOLIC PANEL: CPT | Performed by: INTERNAL MEDICINE

## 2020-03-04 PROCEDURE — 85027 COMPLETE CBC AUTOMATED: CPT | Performed by: INTERNAL MEDICINE

## 2020-03-04 PROCEDURE — 84443 ASSAY THYROID STIM HORMONE: CPT | Performed by: INTERNAL MEDICINE

## 2020-03-04 PROCEDURE — 99214 OFFICE O/P EST MOD 30 MIN: CPT | Performed by: INTERNAL MEDICINE

## 2020-03-04 PROCEDURE — 83002 ASSAY OF GONADOTROPIN (LH): CPT | Performed by: INTERNAL MEDICINE

## 2020-03-04 PROCEDURE — 36415 COLL VENOUS BLD VENIPUNCTURE: CPT | Performed by: INTERNAL MEDICINE

## 2020-03-04 ASSESSMENT — MIFFLIN-ST. JEOR: SCORE: 1406.55

## 2020-03-04 ASSESSMENT — PATIENT HEALTH QUESTIONNAIRE - PHQ9: SUM OF ALL RESPONSES TO PHQ QUESTIONS 1-9: 0

## 2020-03-04 ASSESSMENT — PAIN SCALES - GENERAL: PAINLEVEL: NO PAIN (0)
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
## 2020-03-04 NOTE — TELEPHONE ENCOUNTER
"Reason for Call:  Same Day Appointment, Requested Provider:  Aneudy Jackson MD    PCP: Aneudy Jackson    Reason for visit: feels faint, not feeling well,  BP was low on Saturday 60/40.  She is on BP medication, and has been taking smaller does then what was prescribed.  has been having chest pain. Refuses to talk to triage, and  is not going to the ED.  Wants to see her provider, Dr Jackson.     Duration of symptoms:  has been going on \"for a while\" Low BP was checked this past Saturday while with her daughter at a hospital.     Have you been treated for this in the past? No    Additional comments: please advise    Can we leave a detailed message on this number? YES    Phone number patient can be reached at: Cell number on file:    Telephone Information:   Mobile [phone]       Best Time: any time    Call taken on 3/4/2020 at 10:48 AM by Denise Harding    "

## 2020-03-09 ENCOUNTER — TELEPHONE (OUTPATIENT)
Dept: BEHAVIORAL HEALTH | Facility: CLINIC | Age: 50
End: 2020-03-09

## 2020-03-09 NOTE — TELEPHONE ENCOUNTER
"Phone Encounter   [facility] spoke with patient, by PCP request. [facility] informed and explained integrated health model, use of brief therapy interventions, as well as referrals and support services for ongoing long-term therapy.  Patient states that she feels like she is \"78\" even though she's in her 50's. She reports that she finds that she worries much of the day about her health. She asked this writer if we would be able to review her medications. Patient was informed that this writer does not do any medication prescriptions or adjustments and that patient can express concerns and they can be routed to her PCP. Patient stated that she wonders if there is some interaction with medications that is causing her symptoms. Patient understands that this will be passed on to her PCP and if there are concerns she will be contacted.   Patient is scheduled for an initial visit with this writer on 3/13/2020.    SALVADOR Bueno, Behavioral Health Clinician      "

## 2020-03-15 ENCOUNTER — HEALTH MAINTENANCE LETTER (OUTPATIENT)
Age: 50
End: 2020-03-15

## 2020-03-27 ENCOUNTER — MYC MEDICAL ADVICE (OUTPATIENT)
Dept: INTERNAL MEDICINE | Facility: CLINIC | Age: 50
End: 2020-03-27

## 2020-04-03 ENCOUNTER — HOSPITAL ENCOUNTER (EMERGENCY)
Facility: CLINIC | Age: 50
Discharge: HOME | End: 2020-04-03
Attending: FAMILY MEDICINE | Admitting: FAMILY MEDICINE
Payer: COMMERCIAL

## 2020-04-03 ENCOUNTER — APPOINTMENT (OUTPATIENT)
Dept: GENERAL RADIOLOGY | Facility: CLINIC | Age: 50
End: 2020-04-03
Attending: FAMILY MEDICINE
Payer: COMMERCIAL

## 2020-04-03 VITALS
RESPIRATION RATE: 10 BRPM | DIASTOLIC BLOOD PRESSURE: 71 MMHG | SYSTOLIC BLOOD PRESSURE: 123 MMHG | HEART RATE: 72 BPM | TEMPERATURE: 97.9 F

## 2020-04-03 DIAGNOSIS — I10 BENIGN ESSENTIAL HYPERTENSION: ICD-10-CM

## 2020-04-03 DIAGNOSIS — R07.89 ATYPICAL CHEST PAIN: ICD-10-CM

## 2020-04-03 LAB
ANION GAP SERPL CALCULATED.3IONS-SCNC: 5 MMOL/L (ref 3–14)
BASOPHILS # BLD AUTO: 0.1 10E9/L (ref 0–0.2)
BASOPHILS NFR BLD AUTO: 1.2 %
BUN SERPL-MCNC: 11 MG/DL (ref 7–30)
CALCIUM SERPL-MCNC: 9 MG/DL (ref 8.5–10.1)
CHLORIDE SERPL-SCNC: 107 MMOL/L (ref 94–109)
CO2 SERPL-SCNC: 27 MMOL/L (ref 20–32)
CREAT SERPL-MCNC: 0.78 MG/DL (ref 0.52–1.04)
DIFFERENTIAL METHOD BLD: NORMAL
EOSINOPHIL NFR BLD AUTO: 0.7 %
ERYTHROCYTE [DISTWIDTH] IN BLOOD BY AUTOMATED COUNT: 13 % (ref 10–15)
GFR SERPL CREATININE-BSD FRML MDRD: 87 ML/MIN/{1.73_M2}
GLUCOSE SERPL-MCNC: 107 MG/DL (ref 70–99)
HCT VFR BLD AUTO: 38.3 % (ref 35–47)
HGB BLD-MCNC: 12.9 G/DL (ref 11.7–15.7)
IMM GRANULOCYTES # BLD: 0 10E9/L (ref 0–0.4)
IMM GRANULOCYTES NFR BLD: 0.3 %
LYMPHOCYTES # BLD AUTO: 2.3 10E9/L (ref 0.8–5.3)
LYMPHOCYTES NFR BLD AUTO: 34.5 %
MCH RBC QN AUTO: 30.3 PG (ref 26.5–33)
MCHC RBC AUTO-ENTMCNC: 33.7 G/DL (ref 31.5–36.5)
MCV RBC AUTO: 90 FL (ref 78–100)
MONOCYTES # BLD AUTO: 0.5 10E9/L (ref 0–1.3)
MONOCYTES NFR BLD AUTO: 8 %
NEUTROPHILS # BLD AUTO: 3.7 10E9/L (ref 1.6–8.3)
NEUTROPHILS NFR BLD AUTO: 55.3 %
NRBC # BLD AUTO: 0 10*3/UL
NRBC BLD AUTO-RTO: 0 /100
NT-PROBNP SERPL-MCNC: 27 PG/ML (ref 0–900)
PLATELET # BLD AUTO: 301 10E9/L (ref 150–450)
POTASSIUM SERPL-SCNC: 3.8 MMOL/L (ref 3.4–5.3)
RBC # BLD AUTO: 4.26 10E12/L (ref 3.8–5.2)
SODIUM SERPL-SCNC: 139 MMOL/L (ref 133–144)
TROPONIN I SERPL-MCNC: <0.015 UG/L (ref 0–0.04)
WBC # BLD AUTO: 6.8 10E9/L (ref 4–11)

## 2020-04-03 PROCEDURE — 71045 X-RAY EXAM CHEST 1 VIEW: CPT | Mod: TC

## 2020-04-03 PROCEDURE — 99285 EMERGENCY DEPT VISIT HI MDM: CPT | Mod: 25 | Performed by: FAMILY MEDICINE

## 2020-04-03 PROCEDURE — 80048 BASIC METABOLIC PNL TOTAL CA: CPT | Performed by: FAMILY MEDICINE

## 2020-04-03 PROCEDURE — 93005 ELECTROCARDIOGRAM TRACING: CPT | Performed by: FAMILY MEDICINE

## 2020-04-03 PROCEDURE — 84484 ASSAY OF TROPONIN QUANT: CPT | Performed by: FAMILY MEDICINE

## 2020-04-03 PROCEDURE — 93010 ELECTROCARDIOGRAM REPORT: CPT | Mod: Z6 | Performed by: FAMILY MEDICINE

## 2020-04-03 PROCEDURE — 83880 ASSAY OF NATRIURETIC PEPTIDE: CPT | Performed by: FAMILY MEDICINE

## 2020-04-03 PROCEDURE — 85025 COMPLETE CBC W/AUTO DIFF WBC: CPT | Performed by: FAMILY MEDICINE

## 2020-04-03 NOTE — ED TRIAGE NOTES
Pt arrives with SOB and occasional pinches to left upper chest. She has had big variations in her BP and some sweating and flushing.

## 2020-04-03 NOTE — ED AVS SNAPSHOT
[facility]  [address]   [place] [address]  Phone:  [phone]  Fax:  [phone]                                    Jayashree Johnson   MRN: [id number]    Department:  [facility] Emergency Department   Date of Visit:  4/3/2020           After Visit Summary Signature Page    I have received my discharge instructions, and my questions have been answered. I have discussed any challenges I see with this plan with the nurse or doctor.    ..........................................................................................................................................  Patient/Patient Representative Signature      ..........................................................................................................................................  Patient Representative Print Name and Relationship to Patient    ..................................................               ................................................  Date                                   Time    ..........................................................................................................................................  Reviewed by Signature/Title    ...................................................              ..............................................  Date                                               Time          22EPIC Rev 08/18
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
## 2020-05-22 ENCOUNTER — MYC MEDICAL ADVICE (OUTPATIENT)
Dept: DERMATOLOGY | Facility: CLINIC | Age: 50
End: 2020-05-22

## 2020-05-26 ENCOUNTER — TELEPHONE (OUTPATIENT)
Dept: INTERNAL MEDICINE | Facility: CLINIC | Age: 50
End: 2020-05-26

## 2020-05-26 ENCOUNTER — MYC MEDICAL ADVICE (OUTPATIENT)
Dept: DERMATOLOGY | Facility: CLINIC | Age: 50
End: 2020-05-26

## 2020-05-26 ENCOUNTER — VIRTUAL VISIT (OUTPATIENT)
Dept: DERMATOLOGY | Facility: CLINIC | Age: 50
End: 2020-05-26
Payer: COMMERCIAL

## 2020-05-26 DIAGNOSIS — D48.5 NEOPLASM OF UNCERTAIN BEHAVIOR OF SKIN: Primary | ICD-10-CM

## 2020-05-26 DIAGNOSIS — R10.9 STOMACH PAIN: Primary | ICD-10-CM

## 2020-05-26 DIAGNOSIS — Z85.828 HISTORY OF NONMELANOMA SKIN CANCER: ICD-10-CM

## 2020-05-26 DIAGNOSIS — Z80.8 FAMILY HISTORY OF MELANOMA: ICD-10-CM

## 2020-05-26 DIAGNOSIS — L82.1 SEBORRHEIC KERATOSIS: ICD-10-CM

## 2020-05-26 DIAGNOSIS — R14.0 BLOATING: ICD-10-CM

## 2020-05-26 PROCEDURE — 99213 OFFICE O/P EST LOW 20 MIN: CPT | Mod: 95 | Performed by: DERMATOLOGY

## 2020-05-26 NOTE — PATIENT INSTRUCTIONS
[facility] Teledermatology Visit    Thank you for allowing us to participate in your care. Your findings, instructions and follow-up plan are as follows:    Left temple: Seborrheic keratosis (normal skin growth with aging)  Right thigh: Seborrheic keratoses (normal skin growth with aging)  Left shin: I would like to evaluate this one in person to make sure it does not have any features of a type of skin cancer called a basal cell carcinoma. I suspect it is a dermatofibroma (scar tissue bump), but cannot fully rule out basal cell carcinoma from the photo.  Genital skin: I will need to evaluate this in person    When should I call my doctor?    If you are worsening or not improving, please, contact us or seek urgent care as noted below.     Who should I call with questions (adults)?    [facility] (adult and pediatric): [phone]     [facility] (adult): [phone]    For urgent needs outside of business hours call the [facility] at [phone] and ask for the dermatology resident on call    If this is a medical emergency and you are unable to reach an ER, Call [phone]      Who should I call with questions (pediatric)?  [facility]- Pediatric Dermatology  Dr. Patria Roque, Dr. Lilly Skinner, Dr. Stefani Figueroa, Shruti Retana, NVAA Magallanes, Dr. Ina Richards & Dr. Richard Bangura  Non Urgent  Nurse Triage Line; [phone]- Latia and Netta HOLLEY Care Coordinators   Tri (/Complex ) [phone]    If you need a prescription refill, please contact your pharmacy. Refills are approved or denied by our Physicians during normal business hours, Monday through Fridays  Per office policy, refills will not be granted if you have not been seen within the past year (or sooner depending on your child's condition)    Scheduling Information:  Pediatric Appointment Scheduling  and Call Center [phone]  Radiology Scheduling- [phone]  Sedation Unit Scheduling- [phone]  [place] Scheduling- General [phone]; Pediatric Dermatology [phone]  Main  Services: [phone]  Malagasy: [phone]  Dominican: [phone]  Hmong/Portuguese/Qatari: [phone]  Preadmission Nursing Department Fax Number: [phone] (Fax all pre-operative paperwork to this number)    For urgent matters arising during evenings, weekends, or holidays that cannot wait for normal business hours please call [phone] and ask for the Dermatology Resident On-Call to be paged.

## 2020-05-26 NOTE — Clinical Note
Patient needs in person appt within the next month sometime to evaluate genital lesion (no photos sent) and lesion on left shin.    Joyti Quintanilla MD    Department of Dermatology  [facility]: Phone: [phone], Fax:[phone]  [facility]: Phone: [phone], Fax: [phone]
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
## 2020-06-05 ENCOUNTER — MYC MEDICAL ADVICE (OUTPATIENT)
Dept: INTERNAL MEDICINE | Facility: CLINIC | Age: 50
End: 2020-06-05

## 2020-06-05 NOTE — TELEPHONE ENCOUNTER
Tried calling Melissa to schedule a consult for her with Dr Lee. No answer and mail box is full. Will try again later.

## 2020-06-09 ENCOUNTER — VIRTUAL VISIT (OUTPATIENT)
Dept: GASTROENTEROLOGY | Facility: CLINIC | Age: 50
End: 2020-06-09
Payer: COMMERCIAL

## 2020-06-09 DIAGNOSIS — K58.1 IRRITABLE BOWEL SYNDROME WITH CONSTIPATION: Primary | ICD-10-CM

## 2020-06-09 PROCEDURE — 99203 OFFICE O/P NEW LOW 30 MIN: CPT | Mod: TEL | Performed by: INTERNAL MEDICINE

## 2020-06-09 NOTE — PROGRESS NOTES
Visit Date:   06/09/2020      Virtual telephone visit during COVID outbreak.      REFERRAL PHYSICIAN:  Dr. Aneudy Jackson      REASON FOR CONSULTATION:  Chronic constipation.      HISTORY OF PRESENT ILLNESS:  The patient is a 49-year-old woman with a longstanding history of constipation dating back she believes to the time of her pregnancies.  During those pregnancies, she developed hemorrhoids which yielded some problems in later years with hemorrhoid thrombosis and need for incision and drainage on at least 2 occasions.  The patient states that both of her labors were prolonged and difficult.  The babies were large.  She did require an episiotomy.  Since those pregnancies decades ago, she has had constipation, which has gradually worsened.  At the present time, without the benefit of laxatives, a week would pass between small ineffective difficult bowel movements.  She does use laxatives, including Colace and an occasional Bisacodyl.  She has tried MiraLax in the past, without much improvement.  She believes she has also tried Senokot.  She recently added a probiotic  She denies melena or hematochezia.  She does not have episodes of diarrhea.  Even with the benefit of laxatives, she feels as though her results are inadequate.  She reports constant uncomfortable abdominal bloating and distention, which is eased somewhat by bowel movement.  She denies excessive burping.  She does report some flatulence and uses Gas-X for that.  She denies weight loss.  Her appetite is good.  She denies nausea, vomiting, heartburn or dysphagia.      The patient has had several colonoscopies over the years, including 2003, 2013, and most recently 2016.  That last procedure was notable for diverticulosis and external hemorrhoids.  The patient does have a remote history of microscopic colitis, but that was not evident on biopsies taken in 2016.  As mentioned, the patient denies diarrhea.  The patient had previously seen my former colleague  Dr. Lee, in consultation regarding constipation.  She seems to think that the possibility of pelvic floor dysfunction was raised, but no testing was ever pursued.      The patient's past medical history with regard to intra-abdominal and pelvic surgery is extensive.  Apparently, she had squamous cell carcinoma of the vulva, which ultimately required extensive surgical resection.  She also has a history of cervical dysplasia which required cervical conization.  Ultimately, she proceeded to a vaginal hysterectomy, but ovaries were retained.  The patient also has a history of endometriosis and has undergone diagnostic and therapeutic laparoscopic procedures on several occasions.      PAST MEDICAL HISTORY:   1.  Depression with anxiety.   2.  Insomnia.   3.  Cervical disc disease, status post fusion.   4.  Gestational diabetes.   5.  Type 2 herpes simplex.   6.  History of endometriosis, status post therapeutic laparoscopic procedures.  7.  Status post vaginal hysterectomy, ovaries retained, for cervical dysplasia.   8.  History of squamous cell carcinoma of the vulva, status post resection.   9.  Diverticulosis of the colon.   10.  Bladder suspension.      CURRENT MEDICATIONS:   1.  Toprol-XL.   2.  Imitrex.   3.  Astelin.   4.  Flonase.   5.  Claritin.   6.  Xanax.   7.  Vitamin D.   8.  Vitamin B12.   9.  Probiotic.   10.  Cymbalta.   11.  Multivitamin.   12.  Ambien.      MEDICATION ALLERGIES:  NONE.      FAMILY HISTORY:  Father  at age 90 with complications of dementia.  Mother  at age 88 with kidney failure.  There are several second and third degree relatives with colon cancer.      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  She does not smoke or drink.  She has 2 grown children.      PHYSICAL EXAMINATION:  No exam was done today, as this was a virtual visit.      LABORATORY DATA:  Reviewed.  TSH on 2020 was normal, as was a  CBC and comprehensive metabolic panel, including calcium level.      Colonoscopy from 2016 was reviewed.      CT scan of the abdomen and pelvis done in 2015 demonstrated a moderate degree of fecal retention, but the GI tract was otherwise unremarkable.      ASSESSMENT:     1.  Chronic constipation.  Differential diagnosis would include pelvic floor dysfunction, constipation-dominant irritable bowel syndrome, or colon inertia.   2.  History of extensive pelvic and perineal surgery.   3. Two difficult labors and deliveries.     PLAN:   1.  Discussed diagnostic considerations with her.   2.  Because of the possibility of constipation dominant irritable bowel syndrome, I did recommend Linzess 145 mcg daily.  Discussed its mechanism of action and potential side effects.  A trial of 2 weeks was given.   3.  In the interim, a referral will be made to the [facility] for appropriate testing.  My highest suspicion is that the patient actually does have pelvic floor dysfunction for reasons detailed extensively in the history noted above.  We discussed the pathophysiology of that condition and the testing that might be necessary to confirm a diagnosis.  We also discussed biofeedback retraining as therapy for that particular problem.   4.  She seems enthusiastic about this, and all of her questions were answered to her satisfaction.         ALESHA ESPINOZA MD             D: 2020   T: 2020   MT: MILI      Name:     EJREL ORTIZ   MRN:      [id number]        Account:      [id number]   :      1970           Visit Date:   2020      Document: [id number]       cc: Aneudy Jackson MD

## 2020-06-09 NOTE — PROGRESS NOTES
"Jayashree Johnson is a 49 year old female who is being evaluated via a billable telephone visit.      The patient has been notified of following:     \"This telephone visit will be conducted via a call between you and your physician/provider. We have found that certain health care needs can be provided without the need for a physical exam.  This service lets us provide the care you need with a short phone conversation.  If a prescription is necessary we can send it directly to your pharmacy.  If lab work is needed we can place an order for that and you can then stop by our lab to have the test done at a later time.    Telephone visits are billed at different rates depending on your insurance coverage. During this emergency period, for some insurers they may be billed the same as an in-person visit.  Please reach out to your insurance provider with any questions.    If during the course of the call the physician/provider feels a telephone visit is not appropriate, you will not be charged for this service.\"    Patient has given verbal consent for Telephone visit?  Yes    What phone number would you like to be contacted at? [phone]    How would you like to [facility] your [facility]? [facility]    Phone call duration: 30 minutes    Keegan Lee MD      "

## 2020-06-11 ENCOUNTER — TELEPHONE (OUTPATIENT)
Dept: GASTROENTEROLOGY | Facility: CLINIC | Age: 50
End: 2020-06-11

## 2020-06-11 DIAGNOSIS — J30.89 CHRONIC NONSEASONAL ALLERGIC RHINITIS DUE TO POLLEN: ICD-10-CM

## 2020-06-11 NOTE — TELEPHONE ENCOUNTER
Central Prior Authorization Team   Phone: [phone]      PA Initiation    Medication: linaclotide (LINZESS) 145 MCG capsule   Insurance Company: [employer] - Phone [phone] Fax [phone]  Pharmacy Filling the Rx: [facility] - [place], [place] - [facility] [address]  Filling Pharmacy Phone: [phone]  Filling Pharmacy Fax:    Start Date: 6/11/2020

## 2020-06-12 NOTE — TELEPHONE ENCOUNTER
PRIOR AUTHORIZATION DENIED    Medication: linaclotide (LINZESS) 145 MCG capsule - DENIED    Denial Date: 6/12/2020    Denial Rational:             Appeal Information:

## 2020-06-15 RX ORDER — LORATADINE 10 MG/1
TABLET ORAL
Qty: 90 TABLET | Refills: 0 | Status: SHIPPED | OUTPATIENT
Start: 2020-06-15 | End: 2020-09-02

## 2020-06-15 NOTE — TELEPHONE ENCOUNTER
Prescription approved per [facility] Refill Protocol.    MYRNA LevyN, RN  [facility]

## 2020-06-22 ENCOUNTER — TELEPHONE (OUTPATIENT)
Dept: INTERNAL MEDICINE | Facility: CLINIC | Age: 50
End: 2020-06-22

## 2020-06-22 ENCOUNTER — TELEPHONE (OUTPATIENT)
Dept: DERMATOLOGY | Facility: CLINIC | Age: 50
End: 2020-06-22

## 2020-06-22 NOTE — TELEPHONE ENCOUNTER
Do you have any of the following symptoms:  a)      Fever (or reported chills) No  b)      Shortness of Breath No  c)      Rash No  d)      Cough in the last 14 days No    Pt notified that a mask is required and that no visitors are allowed on site unless a legal guardian is needed.  She verbalized understanding.  Zaida Veliz RN

## 2020-06-23 ENCOUNTER — OFFICE VISIT (OUTPATIENT)
Dept: DERMATOLOGY | Facility: CLINIC | Age: 50
End: 2020-06-23
Payer: COMMERCIAL

## 2020-06-23 DIAGNOSIS — D48.5 NEOPLASM OF UNCERTAIN BEHAVIOR OF SKIN: Primary | ICD-10-CM

## 2020-06-23 DIAGNOSIS — L72.0 MILIA: ICD-10-CM

## 2020-06-23 DIAGNOSIS — L82.1 SEBORRHEIC KERATOSIS: ICD-10-CM

## 2020-06-23 PROCEDURE — 11102 TANGNTL BX SKIN SINGLE LES: CPT | Performed by: DERMATOLOGY

## 2020-06-23 PROCEDURE — 99213 OFFICE O/P EST LOW 20 MIN: CPT | Mod: 25 | Performed by: DERMATOLOGY

## 2020-06-23 PROCEDURE — 88305 TISSUE EXAM BY PATHOLOGIST: CPT | Mod: TC | Performed by: DERMATOLOGY

## 2020-06-23 ASSESSMENT — PAIN SCALES - GENERAL: PAINLEVEL: NO PAIN (0)
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
## 2020-06-23 NOTE — PROGRESS NOTES
Jayashree Johnson's goals for this visit include:   Chief Complaint   Patient presents with     RECHECK     Spot on left shin, groin and spot on face. Hx NMSC, mother with hx of melanoma, father with hx NMSC. Not immunosuppressed.       She requests these members of her care team be copied on today's visit information: no    PCP: Aneudy Jackson    Referring Provider:  No referring provider defined for this encounter.    LMP 03/17/2003     Do you need any medication refills at today's visit? No  Suzanne Abraham LPN

## 2020-06-23 NOTE — PATIENT INSTRUCTIONS

## 2020-06-26 LAB — COPATH REPORT: NORMAL

## 2020-07-01 ENCOUNTER — TELEPHONE (OUTPATIENT)
Dept: DERMATOLOGY | Facility: CLINIC | Age: 50
End: 2020-07-01

## 2020-07-01 DIAGNOSIS — Z79.2 PROPHYLACTIC ANTIBIOTIC: Primary | ICD-10-CM

## 2020-07-01 RX ORDER — CEPHALEXIN 500 MG/1
CAPSULE ORAL
Qty: 4 CAPSULE | Refills: 0 | Status: SHIPPED | OUTPATIENT
Start: 2020-07-01 | End: 2020-07-08

## 2020-07-01 NOTE — TELEPHONE ENCOUNTER
Melissa returned call.  [facility] rescheduled for 7/9/20 at 8:00.  6 month skin exam scheduled as well.    Zaida Veliz RN

## 2020-07-01 NOTE — TELEPHONE ENCOUNTER
I spoke with Melissa and notified her of the results and treatment plan.  She verbalized understanding and agreed with the plan.  MMS scheduled for 7/22/20.  Zaida Veliz RN      FINAL DIAGNOSIS:   Skin, left shin, shave:   - Basal cell carcinoma, superficial and nodular types, extending to the   lateral and deep margins - (see   description)     I agree with Mohs.   If the patient would like a consult visit, please schedule.   If she is comfortable scheduling after telephone screening, that is ok too.   Thank you.      Zaida Veliz RN   6/30/2020  9:12 AM       Dr. Meyers - please review and advise on if consult is needed prior to schedule MMS.  Lesion measured 1 cm on exam. Photo in chart.   LEYDI Hill Lori, MD   6/30/2020  8:37 AM       Please let patient know biopsy showed BCC. She has not had a BCC before, so explain diagnosis in detail.       Because of the location (shin), I recommend Mohs surgery to take smallest amount of skin possible. Please explain procedure and help her schedule with Dr. Meyers.       Next skin check in 6 months.       Thanks,       Jyoti Quintanilla MD

## 2020-07-01 NOTE — TELEPHONE ENCOUNTER
Melissa had a meeting she needed to get to.  She will call back to finish conversation.  Mohs pre- and post-care reviewed.  Please schedule patient for a 6 month full body skin exam with Dr. Quintanilla.  Zaida Veliz RN      [facility] Dermatologic Surgery Pre-Surgery Screening Note     Pre-screening Information:  Hx of Skin Cancer: Yes  Hx of Mohs Surgery: No  Transplant: No  Immunocompromised: No  Current Anticoagulant(s): None  Bleeding Disorder(s): No  Stent: No  Pacemaker: No  Defibrillator: No  Brain/Nerve Stimulator: No  Endocarditis/Rheumatic Fever Hx: No  Vascular graft: No  Prophylactic Antibiotic Needed: Yes  Prophylactic Antibotic: Keflex  Prophylactic Antibiotic Indication: Other (Comments) (lesion on lower leg)  Congenital heart defect: No  Prosthetic Heart Valve: No  Lesion on Leg/Groin: Yes  Prosthetic Joint : No  Diabetic: No  HIV/AIDS: No  Hepatitis: No  Pregnant: No  Prior Problem with Local Anesthesia: No  Current Tobacco Use: No  Current Alcohol Use: Yes  Extended Care Facility: No  Occupation:   Referring MD: Dr. Quintanilla   needed?: No  Do you have mobility issues?: No  Do you use any assistive devices/DME?: No  Do you have any issues with lying for long periods of time?: No      Medications/Allergies  Medications and allergies review with patient: Yes     Current Outpatient Medications   Medication Sig Dispense Refill     ALPRAZolam (XANAX PO) Take 0.125-0.25 mg by mouth nightly as needed for sleep        azelastine (ASTELIN) 0.1 % nasal spray Spray 1 spray into both nostrils 2 times daily 1 Bottle 1     Cholecalciferol (VITAMIN D-3 PO) Take 1 capsule by mouth daily       Cyanocobalamin (VITAMIN B-12 PO) Take 1 tablet by mouth daily       Digestive Enzymes (DIGESTIVE ENZYME PO) Take 2 capsules by mouth 2 times daily       DULOXETINE HCL PO Take 60 mg by mouth daily (Takes 2 x 30mg capsule = 60mg dose)       fluticasone (FLONASE) 50 MCG/ACT nasal spray  USE ONE SPRAY IN EACH NOSTRIL EVERY DAY AS NEEDED FOR RHINITIS OR ALLERGIES 16 g 11     loratadine (CLARITIN) 10 MG tablet TAKE ONE TABLET BY MOUTH ONCE DAILY 90 tablet 0     metoprolol succinate ER (TOPROL-XL) 25 MG 24 hr tablet TAKE ONE TABLET BY MOUTH ONCE DAILY 30 tablet 10     Multiple Vitamin (MULTI-VITAMIN DAILY PO) Take 1 tablet by mouth daily       Polyethyl Glycol-Propyl Glycol (SYSTANE OP) Place 1-2 drops into both eyes daily as needed       SUMAtriptan (IMITREX) 50 MG tablet Take 1 tablet (50 mg) by mouth at onset of headache for migraine 8 tablet 6     zolpidem (AMBIEN) 10 MG tablet Take 10 mg by mouth nightly as needed for sleep       Allergies   Allergen Reactions     No Known Drug Allergies        Pertinent Labs:  Last Creatine:   Creatinine   Date Value Ref Range Status   04/03/2020 0.78 0.52 - 1.04 mg/dL Final     Last INR: No results found for: INR    Other Questions:    Reminded patient to take any order prophylactic antibiotics 1 hour prior to appointment: Yes    If on a prescribed anticoagulant, advised patient to continue or check with prescribing provider: No    If on an OTC anticoagulant/supplement, advised patient to hold these medications 10-14 days prior to surgery and resume 48 hrs after surgery: No     Please be aware that this can be an all day procedure. Please bring your daily medications and food/cash. Encourage patient to eat prior to procedure(s). After care instructions were reviewed with patient.    Zaida Veliz RN

## 2020-07-08 RX ORDER — CEPHALEXIN 500 MG/1
CAPSULE ORAL
Qty: 4 CAPSULE | Refills: 0 | Status: SHIPPED | OUTPATIENT
Start: 2020-07-08 | End: 2020-08-10

## 2020-07-08 NOTE — TELEPHONE ENCOUNTER
Pt called to state she never received her the cephalexin by mail.  Prescription e-prescribed to [facility] pharmacy and she will pick it up.    Zaida Veliz RN

## 2020-07-09 ENCOUNTER — OFFICE VISIT (OUTPATIENT)
Dept: DERMATOLOGY | Facility: CLINIC | Age: 50
End: 2020-07-09
Payer: COMMERCIAL

## 2020-07-09 VITALS — HEART RATE: 80 BPM | DIASTOLIC BLOOD PRESSURE: 81 MMHG | SYSTOLIC BLOOD PRESSURE: 131 MMHG

## 2020-07-09 DIAGNOSIS — C44.719 BASAL CELL CARCINOMA OF LEFT LOWER LEG: Primary | ICD-10-CM

## 2020-07-09 PROCEDURE — 12032 INTMD RPR S/A/T/EXT 2.6-7.5: CPT | Performed by: DERMATOLOGY

## 2020-07-09 PROCEDURE — 17314 MOHS ADDL STAGE T/A/L: CPT | Performed by: DERMATOLOGY

## 2020-07-09 PROCEDURE — 17313 MOHS 1 STAGE T/A/L: CPT | Performed by: DERMATOLOGY

## 2020-07-09 RX ORDER — MUPIROCIN 20 MG/G
OINTMENT TOPICAL ONCE
Status: COMPLETED | OUTPATIENT
Start: 2020-07-09 | End: 2020-07-09

## 2020-07-09 RX ADMIN — MUPIROCIN: 20 OINTMENT TOPICAL at 10:45

## 2020-07-09 ASSESSMENT — PAIN SCALES - GENERAL: PAINLEVEL: NO PAIN (0)

## 2020-07-09 NOTE — NURSING NOTE
Jayashree Johnson's goals for this visit include:   Chief Complaint   Patient presents with     Procedure     MOHS BCC left shin       She requests these members of her care team be copied on today's visit information:     PCP: Aneudy Jackson    Referring Provider:  Jyoti Quintanilla MD  [address] [place] [id number]    [facility] 03/17/2005     Do you need any medication refills at today's visit? Chapis Menchaca LPN

## 2020-07-09 NOTE — LETTER
7/9/2020         RE: Jayashree Johnson  [address] [address]        Dear Colleague,    Thank you for referring your patient, Jayashree Johnson, to the [facility]. Please see a copy of my visit note below.    [facility] Mohs Dermatologic Surgery Procedure Note    Date of Service:  Jul 9, 2020  Surgery: Mohs micrographic surgery    Case 1  Repair Type: intermediate  Repair Size: 3.0 cm  Suture Material: Fast Absorbing Gut 5-0  Tumor Type: BCC - Basal cell carcinoma  Location: left shin  Derm-Path Accession #: [id number]  PreOp Size: 1.0X0.7 cm  PostOp Size: 1.2X1.5 cm  Mohs Accession #: [id number]  Level of Defect: fat      Procedure:  We discussed the principles of treatment and most likely complications including scarring, bleeding, infection, swelling, pain, crusting, nerve damage, large wound,  incomplete excision, wound dehiscence,  nerve damage, recurrence, and a second procedure may be recommended to obtain the best cosmetic or functional result.    Informed consent was obtained and the patient underwent the procedure as follows:  The patient was placed supine on the operating table.  The cancer was identified, outlined with a marker, and verified by the patient.  The entire surgical field was prepped with Hibiclens.  The surgical site was anesthetized using Lidocaine 1% with epi 1:100,000.      The area of clinically apparent tumor was debulked. The layer of tissue was then surgically excised using a #15 blade and was then transferred onto a specimen sheet maintaining the orientation of the specimen. Hemostasis was obtained using bipolar electrocoagulation. The wound site was then covered with a dressing while the tissue samples were processed for examination.    The excised tissue was transported to the [facility] histology laboratory maintaining the tissue orientation.  The tissue specimen was relaxed so that the entire surgical margin was in a a single horizontal  plane for sectioning and inked for precise mapping.  A precise reference map was drawn to reflect the sectioning of the specimen, colored inking of the margins, and orientation on the patient.  The tissue was processed using horizontal sectioning of the base and continuous peripheral margins.  The histopathologic sections were reviewed in conjunction with the reference map.    Total blocks: 1    Total slides:  1    Residual tumor was identified and indicated in red on the reference map, identifying the location where further tissue excision was necessary. Therefore, an additional stage of Mohs Micrographic surgery was deemed necessary.     Stage II   The patient was returned to the operating room, and the area prepped in the usual manner. The residual tumor was excised using the reference map as a guide. The specimen was transfered to a labeled specimen sheet maintaining the orientation of the specimen. Hemostasis was obtained and the wound site was covered with a dressing while the tissue was processed for examination.     The excised tissue was transported to the Mohs histology laboratory maintaining orientation. The specimen margins were inked for precise mapping and a reference map was prepared for the is additional stage to maintain precise orientation as described above. The tissue was processed using horizontal sectioning of the base and continuous peripheral margins. The histopathologic sections were reviewed in conjunction with the reference map.     Total blocks: 1    Total slides:  1    There were no cancer cells visualized on examination, therefore Mohs surgery was complete.     REPAIR: An intermediate layered linear closure was selected as the procedure which would maximally preserve both function and cosmesis.    After the excision of the tumor, the area was carefully undermined. Hemostasis was obtained with electrocoagulation.  Closure was oriented so that the wound was in the patient's natural skin  tension lines. The subcutaneous and dermal layers were then closed with 4-0 Monocryl sutures. The epidermis was then carefully approximated along the length of the wound using 5-0 fast absorbing gut simple running sutures.     The final wound length was 3.0 cm. A total of 6 ml of anesthesia was administered for all surgical sites. Estimated blood loss was less than 10 ml for all surgical sites. A sterile pressure dressing was applied and wound care instructions, with a written handout, were given. The patient was discharged from the [facility] alert and ambulatory.    Follow-up as needed for wound evaluation.     I personally performed the procedures today.    Kevin Meyers DO    Department of Dermatology  [facility] [place][note 2]: Phone: [phone], Fax:[phone]  [facility]: Phone: [phone], Fax: [phone]        Again, thank you for allowing me to participate in the care of your patient.        Sincerely,        Kevin Meyers MD

## 2020-07-09 NOTE — PATIENT INSTRUCTIONS
MOHS Wound Care Instructions  I will experience scar, altered skin color, bleeding, swelling, pain, crusting and redness. I may experience altered sensation. Risks are excessive bleeding, infection, muscle weakness, thick (hypertrophic or keloidal) scar, and recurrence,. A second procedure may be recommended to obtain the best cosmetic or functional result.  Possible complications of any surgical procedure are bleeding, infection, scarring, alteration in skin color and sensation, muscle weakness in the area, wound dehiscence or seperation, or recurrence of the lesion or disease. On occasion, after healing, a secondary procedure or revision may be recommended in order to obtain the best cosmetic or functional result.   After your surgery, a pressure bandage will be placed over the area that has sutures. This will help prevent bleeding. Please follow these instructions as they will help you to prevent complications as your wound heals.  For the First 48 hours After Surgery:  1. Leave the pressure bandage on and keep it dry. If it should come loose, you may retape it, but do not take it off.  2. Relax and take it easy. Do not do any vigorous exercise, heavy lifting, or bending forward. This could cause the wound to bleed.  3. Post-operative pain is usually mild. You may take plain or extra strength Tylenol every 4 hours as needed (do not take more than 4,000mg in one day). Do not take any medicine that contains aspirin, ibuprofen or motrin unless you have been recommended these by a doctor.  Avoid alcohol and vitamin E as these may increase your tendency to bleed.  4. You may put an ice pack around the bandaged area for 20 minutes every 2-3 hours. This may help reduce swelling, bruising, and pain. Make sure the ice pack is waterproof so that the pressure bandage does not get wet.   5. You may see a small amount of drainage or blood on your pressure bandage. This is normal. However, if drainage or bleeding continues or  saturates the bandage, you will need to apply firm pressure over the bandage with a washcloth for 15 minutes. If bleeding continues after applying pressure for 15 minutes then go to the nearest emergency room.  48 Hours After Surgery  Carefully remove the bandage and start daily wound care and dressing changes. You may also now shower and get the wound wet. Wash wound with a mild soap and water.  Use caution when washing the wound. Be gentle and do not let the forceful shower stream hit the wound directly.  PAT dry.  Daily Wound Care:  1. Wash wound with a mild soap and water.  Use caution when washing the wound, be gentle and do not let the forceful shower stream hit the wound directly.  2. PAT DRY.  3. Apply Vaseline (from a new container or tube) over the suture line with a Q-tip. It is very important to keep the wound continuously moist, as wounds heal best in a moist environment.  4.  Keep the site covered until sutures are removed, you can cover it with a Telfa (non-stick) dressing and tape or a band-aid.    5. If you are unable to keep wound covered, you must apply Vaseline every 2 - 3 hours (while awake) to ensure it is being kept moist for optimal healing. A dressing overnight is recommended to keep the area moist.   Call Us If:  1. You have pain that is not controlled with Tylenol.  2. You have signs or symptoms of an infection, such as: fever over 100 degrees F, redness, warmth, or foul-smelling or yellow/creamy drainage from the wound.  Who should I call with questions?    [facility]: [phone]     [facility]: [phone]    For urgent needs outside of business hours call the [facility] at [phone] and ask for the dermatology resident on call

## 2020-07-09 NOTE — NURSING NOTE
The following medication was given:     MEDICATION:  Lidocaine with epinephrine 1% 1:[id number]  ROUTE: SQ  SITE: see procedure note  DOSE: 6cc  LOT #: -[id number]  : Hospira  EXPIRATION DATE: 1-1-2021  NDC#: 1494-7715-79   Was there drug waste? none  Multi-dose vial: Yes    Maria Del Rosario Menchaca LPN  July 9, 2020      Mupirocin, Vaseline and pressure dressing applied to Mohs site on left shin.  Wound care instructions reviewed with patient and AVS provided.  Patient verbalized understanding. No further questions or concerns at this time.      Maria Del Rosario Menchaca LPN

## 2020-07-20 ENCOUNTER — MYC MEDICAL ADVICE (OUTPATIENT)
Dept: DERMATOLOGY | Facility: CLINIC | Age: 50
End: 2020-07-20

## 2020-07-23 ENCOUNTER — VIRTUAL VISIT (OUTPATIENT)
Dept: DERMATOLOGY | Facility: CLINIC | Age: 50
End: 2020-07-23
Payer: COMMERCIAL

## 2020-07-23 DIAGNOSIS — Z98.890 STATUS POST MOHS SURGERY: Primary | ICD-10-CM

## 2020-07-23 DIAGNOSIS — Z85.828 HISTORY OF NONMELANOMA SKIN CANCER: ICD-10-CM

## 2020-07-23 DIAGNOSIS — D48.5 NEOPLASM OF UNCERTAIN BEHAVIOR OF SKIN OF CHEEK: ICD-10-CM

## 2020-07-23 DIAGNOSIS — D48.5 NEOPLASM OF UNCERTAIN BEHAVIOR OF SKIN OF EYEBROW: ICD-10-CM

## 2020-07-23 PROCEDURE — 99213 OFFICE O/P EST LOW 20 MIN: CPT | Mod: 95 | Performed by: DERMATOLOGY

## 2020-07-23 ASSESSMENT — PAIN SCALES - GENERAL: PAINLEVEL: MILD PAIN (2)

## 2020-07-23 NOTE — PATIENT INSTRUCTIONS
[facility] Teledermatology Visit    Thank you for allowing us to participate in your care. Your findings, instructions and follow-up plan are as follows:    Schedule follow up for possible biopsies of concerning lesions.     When should I call my doctor?    If you are worsening or not improving, please, contact us or seek urgent care as noted below.     Who should I call with questions (adults)?    [facility] (adult and pediatric): [phone]     [facility] (adult): [phone]    For urgent needs outside of business hours call the [facility] at [phone] and ask for the dermatology resident on call    If this is a medical emergency and you are unable to reach an ER, Call [phone]      Who should I call with questions (pediatric)?  [facility]- Pediatric Dermatology  Dr. Patria Roque, Dr. Lilly Skinner, Dr. Stefani Figueroa, Shruti Retana, PA  Dr. Trang Magallanes, Dr. Ina Richards & Dr. Richard Bangura  Non Urgent  Nurse Triage Line; [phone]- Latia and Netta RN Care Coordinators   rTi (/Complex ) [phone]    If you need a prescription refill, please contact your pharmacy. Refills are approved or denied by our Physicians during normal business hours, Monday through Fridays  Per office policy, refills will not be granted if you have not been seen within the past year (or sooner depending on your child's condition)    Scheduling Information:  Pediatric Appointment Scheduling and Call Center [phone]  Radiology Scheduling- [phone]  Sedation Unit Scheduling- [phone]  [place] Scheduling- General [phone]; Pediatric Dermatology [phone]  Main  Services: [phone]  [place]: [phone]  Nepalese: [phone]  Hmong/Georgian/Georgian: [phone]  Preadmission Nursing Department Fax Number: [phone] (Fax all pre-operative  paperwork to this number)    For urgent matters arising during evenings, weekends, or holidays that cannot wait for normal business hours please call [phone] and ask for the Dermatology Resident On-Call to be paged.

## 2020-07-23 NOTE — Clinical Note
Can we please schedule Ms. Johnson for an in person visit in the next 2-4 weeks. She has two lesions of concern that we would like to see - right cheek, right eyebrow.    Thank you!    Shiraz

## 2020-07-23 NOTE — NURSING NOTE
"Teledermatology Nurse Call for RETURN patients seen within the last 3 years:      The patient was contacted by phone and we reviewed they have a visit in teledermatology upcoming with an MD or ANNA MARIE;  Importantly, \"a teledermatology visit may not be as thorough as an in-person visit, and the quality of the photograph and/or video sent may not be of the same quality as that taken by the dermatology clinic.\"     We have found that certain health care needs can be provided without the need for an in-person physical exam.   If a prescription is necessary we can send it directly to your pharmacy.  If lab work is needed we can place an order for that and you can then stop by our lab to have the test done at a later time.If during the course of the call the physician/provider feels a video visit is not appropriate, you will not be charged for this service.Visits are billed at different rates depending on your insurance coverage. Please reach out to your insurance provider with any questions.    The patient chose to:                                                                                                                                                                                                                    Consent to a teledermatology visit with mychart photos. The patient understood they must upload a mychart photo for this visit to be completed. They indicated that the photo will be taken at their home address(if other address please document here). Patient told nursing these are already uploaded .  The patient was instructed to take photos of all all areas of concern and all areas of any rash from near and far away.                                                                                                                                                                                                                               The patient will send photographs via [employer] for review. Instructions " for photography are being sent to the patient via [employer].       The patient verified the location of the photo/video visit to be [place].(The physician must be notified by nurse if the patient is not in the state of [place] during the encounter)    The patient denied skin pain, fever, mucosal symptoms(lesions, blisters, sores in the mouth, nose, eyes, or genitals)  IF PATIENT ENDORSES ANY OF THESE STOP AND PAGE ON CALL ATTENDING                                                                                                                                                                                                                       Maria Del Rosario Menchaca LPN
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
## 2020-08-07 NOTE — PROGRESS NOTES
[facility] Dermatologic Surgery Procedure Note    Date of Service:  Jul 9, 2020  Surgery: Mohs micrographic surgery    Case 1  Repair Type: intermediate  Repair Size: 3.0 cm  Suture Material: Fast Absorbing Gut 5-0  Tumor Type: BCC - Basal cell carcinoma  Location: left shin  Derm-Path Accession #: [id number]  PreOp Size: 1.0X0.7 cm  PostOp Size: 1.2X1.5 cm  Mohs Accession #: [id number]  Level of Defect: fat      Procedure:  We discussed the principles of treatment and most likely complications including scarring, bleeding, infection, swelling, pain, crusting, nerve damage, large wound,  incomplete excision, wound dehiscence,  nerve damage, recurrence, and a second procedure may be recommended to obtain the best cosmetic or functional result.    Informed consent was obtained and the patient underwent the procedure as follows:  The patient was placed supine on the operating table.  The cancer was identified, outlined with a marker, and verified by the patient.  The entire surgical field was prepped with Hibiclens.  The surgical site was anesthetized using Lidocaine 1% with epi 1:100,000.      The area of clinically apparent tumor was debulked. The layer of tissue was then surgically excised using a #15 blade and was then transferred onto a specimen sheet maintaining the orientation of the specimen. Hemostasis was obtained using bipolar electrocoagulation. The wound site was then covered with a dressing while the tissue samples were processed for examination.    The excised tissue was transported to the Mohs histology laboratory maintaining the tissue orientation.  The tissue specimen was relaxed so that the entire surgical margin was in a a single horizontal plane for sectioning and inked for precise mapping.  A precise reference map was drawn to reflect the sectioning of the specimen, colored inking of the margins, and orientation on the patient.  The tissue was processed using horizontal  sectioning of the base and continuous peripheral margins.  The histopathologic sections were reviewed in conjunction with the reference map.    Total blocks: 1    Total slides:  1    Residual tumor was identified and indicated in red on the reference map, identifying the location where further tissue excision was necessary. Therefore, an additional stage of Mohs Micrographic surgery was deemed necessary.     Stage II   The patient was returned to the operating room, and the area prepped in the usual manner. The residual tumor was excised using the reference map as a guide. The specimen was transfered to a labeled specimen sheet maintaining the orientation of the specimen. Hemostasis was obtained and the wound site was covered with a dressing while the tissue was processed for examination.     The excised tissue was transported to the Mohs histology laboratory maintaining orientation. The specimen margins were inked for precise mapping and a reference map was prepared for the is additional stage to maintain precise orientation as described above. The tissue was processed using horizontal sectioning of the base and continuous peripheral margins. The histopathologic sections were reviewed in conjunction with the reference map.     Total blocks: 1    Total slides:  1    There were no cancer cells visualized on examination, therefore Mohs surgery was complete.     REPAIR: An intermediate layered linear closure was selected as the procedure which would maximally preserve both function and cosmesis.    After the excision of the tumor, the area was carefully undermined. Hemostasis was obtained with electrocoagulation.  Closure was oriented so that the wound was in the patient's natural skin tension lines. The subcutaneous and dermal layers were then closed with 4-0 Monocryl sutures. The epidermis was then carefully approximated along the length of the wound using 5-0 fast absorbing gut simple running sutures.     The final  wound length was 3.0 cm. A total of 6 ml of anesthesia was administered for all surgical sites. Estimated blood loss was less than 10 ml for all surgical sites. A sterile pressure dressing was applied and wound care instructions, with a written handout, were given. The patient was discharged from the [facility] alert and ambulatory.    Follow-up as needed for wound evaluation.     I personally performed the procedures today.    Kevin Meyers DO    Department of Dermatology  [facility]: Phone: [phone], Fax:[phone]  [facility]: Phone: [phone], Fax: [phone]

## 2020-08-10 ENCOUNTER — OFFICE VISIT (OUTPATIENT)
Dept: DERMATOLOGY | Facility: CLINIC | Age: 50
End: 2020-08-10
Payer: COMMERCIAL

## 2020-08-10 DIAGNOSIS — L08.9 SKIN PUSTULE: ICD-10-CM

## 2020-08-10 DIAGNOSIS — Z85.828 HISTORY OF NONMELANOMA SKIN CANCER: ICD-10-CM

## 2020-08-10 DIAGNOSIS — L82.1 SEBORRHEIC KERATOSIS: ICD-10-CM

## 2020-08-10 DIAGNOSIS — D48.5 NEOPLASM OF UNCERTAIN BEHAVIOR OF SKIN OF EYEBROW: Primary | ICD-10-CM

## 2020-08-10 PROCEDURE — 11102 TANGNTL BX SKIN SINGLE LES: CPT | Mod: GC | Performed by: DERMATOLOGY

## 2020-08-10 PROCEDURE — 88305 TISSUE EXAM BY PATHOLOGIST: CPT | Mod: TC | Performed by: DERMATOLOGY

## 2020-08-10 PROCEDURE — 99213 OFFICE O/P EST LOW 20 MIN: CPT | Mod: 25 | Performed by: DERMATOLOGY

## 2020-08-10 ASSESSMENT — PAIN SCALES - GENERAL: PAINLEVEL: NO PAIN (0)
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
## 2020-08-10 NOTE — NURSING NOTE
Jayashree Johnson's goals for this visit include:   Chief Complaint   Patient presents with     Skin Spots     Right cheek, right brow, left forehead       She requests these members of her care team be copied on today's visit information: Aneudy Jackson      PCP: Aneudy Jackson    Referring Provider:  No referring provider defined for this encounter.    @[facility]@
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
## 2020-08-10 NOTE — PATIENT INSTRUCTIONS

## 2020-08-11 ENCOUNTER — MYC MEDICAL ADVICE (OUTPATIENT)
Dept: OBGYN | Facility: CLINIC | Age: 50
End: 2020-08-11

## 2020-08-12 LAB — COPATH REPORT: NORMAL

## 2020-08-13 ENCOUNTER — TELEPHONE (OUTPATIENT)
Dept: DERMATOLOGY | Facility: CLINIC | Age: 50
End: 2020-08-13

## 2020-08-13 NOTE — TELEPHONE ENCOUNTER
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     Left a message for the patient. Will call back.  Will recommend Mohs surgery for this spot.     Doug Olvera MD  PGY-6    Micrographic Surgery and Dermatologic Oncology Fellow  August 13, 2020

## 2020-08-14 NOTE — TELEPHONE ENCOUNTER
Called patient and discussed pathology result.  See result encounter. Patient scheduled for telephone consult.     [id number], MEDICAL ASSISTANT       Kevin Meyers MD sent to [facility] [place]               I think Dr. Olvera got the patient's VM.   Please call the patient with pathology results.     SCCIS right eyebrow, my treatment recommendation is Mohs.   Ok to schedule after telephone screening.     Thank you.

## 2020-08-17 ENCOUNTER — TELEPHONE (OUTPATIENT)
Dept: DERMATOLOGY | Facility: CLINIC | Age: 50
End: 2020-08-17

## 2020-08-17 NOTE — TELEPHONE ENCOUNTER
[facility] Pre-Surgery Screening Note     Pre-screening Information:  Hx of Skin Cancer: Yes  Hx of Mohs Surgery: Yes  Transplant: No  Immunocompromised: No  Current Anticoagulant(s): None  Bleeding Disorder(s): No  Stent: No  Pacemaker: No  Defibrillator: No  Brain/Nerve Stimulator: No  Endocarditis/Rheumatic Fever Hx: No  Vascular graft: No  Prophylactic Antibiotic Needed: No  Congenital heart defect: No  Prosthetic Heart Valve: No  Lesion on Leg/Groin: No  Prosthetic Joint : No  Diabetic: No  HIV/AIDS: No  Hepatitis: No  Pregnant: No  Prior Problem with Local Anesthesia: No  Patient wears CPAP mask: No  Current Tobacco Use: No  Current Alcohol Use: Yes   needed?: No  Do you have mobility issues?: No  Do you use any assistive devices/DME?: No  Do you have any issues with lying for long periods of time?: No      Medications/Allergies  Medications and allergies review with patient: Yes, No     Current Outpatient Medications   Medication Sig Dispense Refill     ALPRAZolam (XANAX PO) Take 0.125-0.25 mg by mouth nightly as needed for sleep        azelastine (ASTELIN) 0.1 % nasal spray Spray 1 spray into both nostrils 2 times daily 1 Bottle 1     Cholecalciferol (VITAMIN D-3 PO) Take 1 capsule by mouth daily       Cyanocobalamin (VITAMIN B-12 PO) Take 1 tablet by mouth daily       Digestive Enzymes (DIGESTIVE ENZYME PO) Take 2 capsules by mouth 2 times daily       DULOXETINE HCL PO Take 60 mg by mouth daily (Takes 2 x 30mg capsule = 60mg dose)       fluticasone (FLONASE) 50 MCG/ACT nasal spray USE ONE SPRAY IN EACH NOSTRIL EVERY DAY AS NEEDED FOR RHINITIS OR ALLERGIES 16 g 11     loratadine (CLARITIN) 10 MG tablet TAKE ONE TABLET BY MOUTH ONCE DAILY 90 tablet 0     metoprolol succinate ER (TOPROL-XL) 25 MG 24 hr tablet TAKE ONE TABLET BY MOUTH ONCE DAILY 30 tablet 10     Multiple Vitamin (MULTI-VITAMIN DAILY PO) Take 1 tablet by mouth daily       Polyethyl Glycol-Propyl  Glycol (SYSTANE OP) Place 1-2 drops into both eyes daily as needed       SUMAtriptan (IMITREX) 50 MG tablet Take 1 tablet (50 mg) by mouth at onset of headache for migraine 8 tablet 6     zolpidem (AMBIEN) 10 MG tablet Take 10 mg by mouth nightly as needed for sleep       Allergies   Allergen Reactions     No Known Drug Allergies        Pertinent Labs:  Last Creatine:   Creatinine   Date Value Ref Range Status   04/03/2020 0.78 0.52 - 1.04 mg/dL Final     Last INR: No results found for: INR    Other Questions:    Reminded patient to take any order prophylactic antibiotics 1 hour prior to appointment: Yes, No    If on a prescribed anticoagulant, advised patient to continue or check with prescribing provider:     If on an OTC anticoagulant/supplement, advised patient to hold these medications 10-14 days prior to surgery and resume 48 hrs after surgery:      Please be aware that this can be an all day procedure. Please bring your daily medications and food/cash. Encourage patient to eat prior to procedure(s). After care instructions were reviewed with patient.    If any positives, send to RN to initiate antibiotic prophylaxis protocol and/or anticoagulation management protocol    Reviewed by:    Maria Del Rosario Menchaca LPN

## 2020-08-27 ENCOUNTER — MYC MEDICAL ADVICE (OUTPATIENT)
Dept: GASTROENTEROLOGY | Facility: CLINIC | Age: 50
End: 2020-08-27

## 2020-08-27 ENCOUNTER — TELEPHONE (OUTPATIENT)
Dept: INTERNAL MEDICINE | Facility: CLINIC | Age: 50
End: 2020-08-27

## 2020-08-27 DIAGNOSIS — K59.00 CONSTIPATION, UNSPECIFIED CONSTIPATION TYPE: Primary | ICD-10-CM

## 2020-08-27 RX ORDER — PLECANATIDE 3 MG/1
3 TABLET ORAL DAILY
Qty: 30 TABLET | Refills: 0 | Status: SHIPPED | OUTPATIENT
Start: 2020-08-27 | End: 2020-09-26

## 2020-08-27 NOTE — TELEPHONE ENCOUNTER
Prior Authorization Retail Medication Request    Medication/Dose: Prior Auth needed for Trulance  ICD code (if different than what is on RX):  --  Previously Tried and Failed:  --  Rationale:  Prior Auth needed for Trulance    Insurance Name:  [employer] [facility] [phone]  Insurance ID:  [id number]      Pharmacy Information (if different than what is on RX)  Name:  [facility]  Phone:  [phone]    Madelin Jackson, Pharmacy Technician  [facility]  [phone]

## 2020-08-27 NOTE — TELEPHONE ENCOUNTER
Dr. LOWE, your last message in June to patient mentioned a medication we were trying to get covered. I saw linzess was denied, however, I dont see trulance or any other medication was sent.  I pended the trulance if you want to complete the RX and we can get it sent to her pharmacy to see if it is covered.       Thank you!    Sissy YORK, Lead RN, BSN. . .  8/27/2020, 1:40 PM

## 2020-08-27 NOTE — TELEPHONE ENCOUNTER
Will route to PA team - The medication linzess was denied and stated must try this medication first but now this one is denied too.     Sissy YORK, Lead RN, BSN. . .  8/27/2020, 2:57 PM

## 2020-08-27 NOTE — TELEPHONE ENCOUNTER
Central Prior Authorization Team   Phone: [phone]      PA Initiation    Medication: Prior Auth needed for Trulance  Insurance Company: [employer] [place] - Phone [phone] Fax [phone]  Pharmacy Filling the Rx: [address]   Filling Pharmacy Phone: [phone]  Filling Pharmacy Fax:    Start Date: 8/27/2020

## 2020-08-28 DIAGNOSIS — N64.4 PAIN OF BOTH BREASTS: Primary | ICD-10-CM

## 2020-08-28 NOTE — TELEPHONE ENCOUNTER
Prior Authorization Approval    Authorization Effective Date: 5/28/2020  Authorization Expiration Date: 8/28/2021  Medication: Prior Auth needed for Trulance  Approved Dose/Quantity:    Reference #:     Insurance Company: [employer] [place] - Phone [phone] Fax [phone]  Expected CoPay:       CoPay Card Available:      Foundation Assistance Needed:    Which Pharmacy is filling the prescription (Not needed for infusion/clinic administered): [facility] [address]   Pharmacy Notified: Yes  Patient Notified: Yes **Instructed pharmacy to notify patient when script is ready to /ship.**

## 2020-08-31 ENCOUNTER — HOSPITAL ENCOUNTER (OUTPATIENT)
Dept: BONE DENSITY | Facility: CLINIC | Age: 50
End: 2020-08-31
Attending: OBSTETRICS & GYNECOLOGY
Payer: COMMERCIAL

## 2020-08-31 ENCOUNTER — HOSPITAL ENCOUNTER (OUTPATIENT)
Dept: ULTRASOUND IMAGING | Facility: CLINIC | Age: 50
End: 2020-08-31
Attending: OBSTETRICS & GYNECOLOGY
Payer: COMMERCIAL

## 2020-08-31 DIAGNOSIS — Z80.3 FAMILY HISTORY OF MALIGNANT NEOPLASM OF BREAST: ICD-10-CM

## 2020-08-31 DIAGNOSIS — K50.819 CROHN'S DISEASE OF SMALL AND LARGE INTESTINES WITH COMPLICATION (H): ICD-10-CM

## 2020-08-31 PROCEDURE — 77080 DXA BONE DENSITY AXIAL: CPT

## 2020-08-31 PROCEDURE — 76856 US EXAM PELVIC COMPLETE: CPT

## 2020-09-02 DIAGNOSIS — J30.89 CHRONIC NONSEASONAL ALLERGIC RHINITIS DUE TO POLLEN: ICD-10-CM

## 2020-09-02 RX ORDER — LORATADINE 10 MG/1
TABLET ORAL
Qty: 90 TABLET | Refills: 1 | Status: SHIPPED | OUTPATIENT
Start: 2020-09-02 | End: 2021-02-23

## 2020-09-02 NOTE — TELEPHONE ENCOUNTER
"  Requested Prescriptions   Pending Prescriptions Disp Refills     loratadine (CLARITIN) 10 MG tablet [Pharmacy Med Name: LORATADINE 10MG TABS] 90 tablet 0     Sig: TAKE ONE TABLET BY MOUTH ONCE DAILY   Last Written Prescription Date:  6/15/2020  Last Fill Quantity: 90,  # refills: 0   Last office visit: 3/4/2020 with prescribing provider:     Future Office Visit:      Antihistamines Protocol Passed - 9/2/2020  5:02 AM        Passed - Patient is 3-64 years of age     Apply weight-based dosing for peds patients age 3 - 12 years of age.    Forward request to provider for patients under the age of 3 or over the age of 64.          Passed - Recent (12 mo) or future (30 days) visit within the authorizing provider's specialty     Patient has had an office visit with the authorizing provider or a provider within the authorizing providers department within the previous 12 mos or has a future within next 30 days. See \"Patient Info\" tab in inbasket, or \"Choose Columns\" in Meds & Orders section of the refill encounter.              Passed - Medication is active on med list         Prescription approved per [facility] [place] Refill Protocol.  Saida Cortez RN      "

## 2020-09-09 ENCOUNTER — ANCILLARY PROCEDURE (OUTPATIENT)
Dept: ULTRASOUND IMAGING | Facility: CLINIC | Age: 50
End: 2020-09-09
Attending: OBSTETRICS & GYNECOLOGY
Payer: COMMERCIAL

## 2020-09-09 ENCOUNTER — ANCILLARY PROCEDURE (OUTPATIENT)
Dept: MAMMOGRAPHY | Facility: CLINIC | Age: 50
End: 2020-09-09
Attending: OBSTETRICS & GYNECOLOGY
Payer: COMMERCIAL

## 2020-09-09 ENCOUNTER — TELEPHONE (OUTPATIENT)
Dept: PSYCHOLOGY | Facility: CLINIC | Age: 50
End: 2020-09-09

## 2020-09-09 ENCOUNTER — TELEPHONE (OUTPATIENT)
Dept: DERMATOLOGY | Facility: CLINIC | Age: 50
End: 2020-09-09

## 2020-09-09 ENCOUNTER — OFFICE VISIT (OUTPATIENT)
Dept: DERMATOLOGY | Facility: CLINIC | Age: 50
End: 2020-09-09
Payer: COMMERCIAL

## 2020-09-09 VITALS — SYSTOLIC BLOOD PRESSURE: 130 MMHG | DIASTOLIC BLOOD PRESSURE: 84 MMHG | HEART RATE: 73 BPM

## 2020-09-09 DIAGNOSIS — N64.4 PAIN OF BOTH BREASTS: ICD-10-CM

## 2020-09-09 DIAGNOSIS — D04.39 SQUAMOUS CELL CARCINOMA IN SITU (SCCIS) OF SKIN OF EYEBROW: Primary | ICD-10-CM

## 2020-09-09 PROCEDURE — 76642 ULTRASOUND BREAST LIMITED: CPT | Mod: LT

## 2020-09-09 PROCEDURE — 12051 INTMD RPR FACE/MM 2.5 CM/<: CPT | Performed by: DERMATOLOGY

## 2020-09-09 PROCEDURE — G0279 TOMOSYNTHESIS, MAMMO: HCPCS

## 2020-09-09 PROCEDURE — 77066 DX MAMMO INCL CAD BI: CPT

## 2020-09-09 PROCEDURE — 17311 MOHS 1 STAGE H/N/HF/G: CPT | Performed by: DERMATOLOGY

## 2020-09-09 ASSESSMENT — PATIENT HEALTH QUESTIONNAIRE - PHQ9: SUM OF ALL RESPONSES TO PHQ QUESTIONS 1-9: 12

## 2020-09-09 ASSESSMENT — PAIN SCALES - GENERAL: PAINLEVEL: NO PAIN (0)

## 2020-09-09 NOTE — LETTER
9/9/2020         RE: Jayashree Johnson  [id number] [address] [id number]        Dear Colleague,    Thank you for referring your patient, Jayashree Johnson, to the [facility]. Please see a copy of my visit note below.    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    Informed consent was obtained and the patient underwent the procedure as follows:  The patient was placed supine on the operating table.  The cancer was identified, outlined with a marker, and verified by the patient.  The entire surgical field was prepped with Hibiclens.  The surgical site was anesthetized using Lidocaine 1% with epi 1:100,000.      The area of clinically apparent tumor was debulked. The layer of tissue was then surgically excised using a #15 blade and was then transferred onto a specimen sheet maintaining the orientation of the specimen. Hemostasis was obtained using bipolar electrocoagulation. The wound site was then covered with a dressing while the tissue samples were processed for examination.    The excised tissue was transported to the [facility] histology laboratory maintaining the tissue orientation.  The tissue specimen was relaxed so that the entire surgical margin  was in a a single horizontal plane for sectioning and inked for precise mapping.  A precise reference map was drawn to reflect the sectioning of the specimen, colored inking of the margins, and orientation on the patient.  The tissue was processed using horizontal sectioning of the base and continuous peripheral margins.  The histopathologic sections were reviewed in conjunction with the reference map.    Total blocks: 1    Total slides:  1    There were no cancer cells visualized on examination, therefore Mohs surgery was complete.     REPAIR: An intermediate layered linear closure was selected as the procedure which would maximally preserve both function and cosmesis.    After the excision of the tumor, the area was undermined. Hemostasis was obtained with electrocoagulation.  Closure was oriented so that the wound was in the patient's natural skin tension lines. The subcutaneous and dermal layers were then closed with buried vertical mattress 5-0 Monocryl sutures. The epidermis was then carefully approximated along the length of the wound using 5-0 Fast Absorbing Gut simple running sutures.     The final wound length was 2.5 cm. A total of 4 ml of anesthesia was administered for all surgical sites. Estimated blood loss was less than 10 ml for all surgical sites. A sterile pressure dressing was applied and wound care instructions, with a written handout, were given. The patient was [facility] from the [facility] alert and ambulatory.    Follow-up as needed for wound evaluation.     I personally performed the procedures today.    Kevin Meyers DO    Department of Dermatology  [facility]: Phone: [phone], Fax:[phone]  [facility]: Phone: [phone], Fax: [phone]      Again, thank you for allowing me to participate in the care of your patient.         Sincerely,        Kevin Meyers MD

## 2020-09-09 NOTE — NURSING NOTE
Jayashree Johnson's goals for this visit include:   Chief Complaint   Patient presents with     Derm Problem     Jayashree is here today for MOHS procedure on her right eyebrow due to SCCIS       She requests these members of her care team be copied on today's visit information:     PCP: Aneudy Jackson    Referring Provider:  No referring provider defined for this encounter.    /84 (BP Location: Right arm, Patient Position: Sitting, Cuff Size: Adult Regular)   Pulse 73   LMP 03/17/2003     Do you need any medication refills at today's visit? No    Abigail Casillas LPN

## 2020-09-09 NOTE — TELEPHONE ENCOUNTER
Reached out to patient to assess level of depression and mental health treatment needs. No answer and a brief voicemail was left informing her I would try to call again tomorrow.

## 2020-09-09 NOTE — PATIENT INSTRUCTIONS
MOHS Wound Care Instructions  I will experience scar, altered skin color, bleeding, swelling, pain, crusting and redness. I may experience altered sensation. Risks are excessive bleeding, infection, muscle weakness, thick (hypertrophic or keloidal) scar, and recurrence,. A second procedure may be recommended to obtain the best cosmetic or functional result.  Possible complications of any surgical procedure are bleeding, infection, scarring, alteration in skin color and sensation, muscle weakness in the area, wound dehiscence or seperation, or recurrence of the lesion or disease. On occasion, after healing, a secondary procedure or revision may be recommended in order to obtain the best cosmetic or functional result.   After your surgery, a pressure bandage will be placed over the area that has sutures. This will help prevent bleeding. Please follow these instructions as they will help you to prevent complications as your wound heals.  For the First 48 hours After Surgery:  1. Leave the pressure bandage on and keep it dry. If it should come loose, you may retape it, but do not take it off.  2. Relax and take it easy. Do not do any vigorous exercise, heavy lifting, or bending forward. This could cause the wound to bleed.  3. Post-operative pain is usually mild. You may take plain or extra strength Tylenol every 4 hours as needed (do not take more than 4,000mg in one day). Do not take any medicine that contains aspirin, ibuprofen or motrin unless you have been recommended these by a doctor.  Avoid alcohol and vitamin E as these may increase your tendency to bleed.  4. You may put an ice pack around the bandaged area for 20 minutes every 2-3 hours. This may help reduce swelling, bruising, and pain. Make sure the ice pack is waterproof so that the pressure bandage does not get wet.   5. You may see a small amount of drainage or blood on your pressure bandage. This is normal. However, if drainage or bleeding continues or  saturates the bandage, you will need to apply firm pressure over the bandage with a washcloth for 15 minutes. If bleeding continues after applying pressure for 15 minutes then go to the nearest emergency room.  48 Hours After Surgery  Carefully remove the bandage and start daily wound care and dressing changes. You may also now shower and get the wound wet. Wash wound with a mild soap and water.  Use caution when washing the wound. Be gentle and do not let the forceful shower stream hit the wound directly.  PAT dry.  Daily Wound Care:  1. Wash wound with a mild soap and water.  Use caution when washing the wound, be gentle and do not let the forceful shower stream hit the wound directly.  2. PAT DRY.  3. Apply Vaseline (from a new container or tube) over the suture line with a Q-tip. It is very important to keep the wound continuously moist, as wounds heal best in a moist environment.  4.  Keep the site covered until sutures are dissolved, you can cover it with a Telfa (non-stick) dressing and tape or a band-aid.    5. If you are unable to keep wound covered, you must apply Vaseline every 2 - 3 hours (while awake) to ensure it is being kept moist for optimal healing. A dressing overnight is recommended to keep the area moist.   Call Us If:  1. You have pain that is not controlled with Tylenol.  2. You have signs or symptoms of an infection, such as: fever over 100 degrees F, redness, warmth, or foul-smelling or yellow/creamy drainage from the wound.  Who should I call with questions?    [facility]: [phone]     [facility]: [phone]    For urgent needs outside of business hours call the [facility] at [phone] and ask for the dermatology resident on call      PLAN        Home Care Instructions:   If currently in counseling, call counselor for appointment     Report the following to your PCP:   Depression interferes with daily  activities  Persistent inability to sleep     Seek emergency care immediately if any of the following occur:   pt notified and going to schedule appt with chuy in      BEHAVIORAL HEALTH TEAMS        [facility] - Behavioral Health Team    [facility] Pager: [phone]     [place]  - Behavioral Health Team    Pager number: [phone]     Referral to Behavioral Health    BEHAVIORAL / SPIRITUAL HEALTH SOWQ [54814}     RESOURCES        - 24/7 Crisis Hotlines: National Suicide Prevention Hotline  847-215-GJXJ (2595)     Adelaide Eden RN

## 2020-09-09 NOTE — NURSING NOTE
"[facility]:  PHQ-9 Screening Note    SITUATION/BACKGROUND                                                    Jayashree Johnson is a 50 year old female who completed the PHQ-9 assessment for depression and Score is >9.    Onset of symptoms: waxing and waning  Trigger: Many . Pt has been on ambien for sleeping and its not working. Weather has contributed to her aches and pains   Recent related events: Pt reports putting marker on moms gravesite. Mom passed November 29th.  Prior history of suicide attempt or self harm: No   Risk Factors: age, single status, anxiety, recent loss of mother and comorbid medical condition of crohns and possible arthritis  History of depression or mental illness: Yes  Medications reviewed: Yes     ASSESSMENT      A. Are any of the following present?      Suicidal thoughts with a plan and means to carry out the plan?    Intent to harm others    Altered mental status: confusion, delusional, psychotic YES  - Patient should be seen in the ED.  If patient is willing to go to the ED, call [facility] Emergent Transportation at [phone].  If patient is unwilling to go to the ED, call [phone].   Clinic staff to fill out the  Transportation Hold  form.    Place order for referral to behavioral health team for  regular  follow-up.    NO - go to B   B. Are any of the following present?      Suicidal thoughts without a plan or means to carry out the plan    New onset of delusional ideas    Past inpatient admission for depression    New onset and recent change or addition of new medication YES  - Patient should receive crises care within 2-4 hours. Offer emergency room care or connect with any of the *crisis resources.     Place referral to behavioral health team for \"regular\" follow-up.    NO - go to C   C. Are any of the following present?      Previous suicide attempts    Depression interfering with ability to work or function    Loss of appetite and eating poorly    Abrupt " "cessation of drugs (OTC or RX), alcohol or caffeine    Drug or alcohol abuse YES -  [facility] behavior health team. If no response, patient should receive crisis care within 24 hours.     Place referral to behavioral health team for \"regular\" follow-up.     NO - go to D   D. Are several of the following present?      Difficulty concentrating    Difficulty sleeping    Reduced interest in sexual activity or impotency    Irregular or absent menstruation    No interest in activity    Change in interpersonal relationships    Increased use/abuse of alcohol or drugs    Pregnant or recent child birth    Recent major life change    History of depression YES -  Follow-up with PCP for appointment and follow home care instructions.    Place referral to behavioral health team for \"regular\" follow-up.    NO - provide home care instructions.        PLAN      Home Care Instructions:   If currently in counseling, call counselor for appointment    Report the following to your PCP:   Depression interferes with daily activities  Persistent inability to sleep    Seek emergency care immediately if any of the following occur:   pt notified and going to schedule appt with chuy in     BEHAVIORAL HEALTH TEAMS      [facility] - Behavioral Health Team    [facility] Pager: [phone]    [place]  - Behavioral Health Team    Pager number: [phone]    Referral to Behavioral Health    BEHAVIORAL / SPIRITUAL HEALTH SOWQ [17943}    RESOURCES      - 24/7 Crisis Hotlines: National Suicide Prevention Hotline  734-661-VJUX (3436)    Adelaide Eden RN        Copyright 2016 [employer]      "

## 2020-09-09 NOTE — TELEPHONE ENCOUNTER
Pt here today for a Mohs procedure with  and during PHQ2 pt expressed to staff feeling down and depressed. RN performed PHQ 9 and pt's scored a 12. Further depression questions completed and pt denies being suicidal or having a suicide plan. Pt did express [facility]. RN reached out to Yoseph who advised sending an encounter and he would follow up with pt or to schedule a VV today if pt prefers. After Mohs procedure pt denied wanting to schedule right now but is okay with Yoseph reaching out to schedule a visit with pt. AVS given to pt with resource contact information in the event that it is needed. RN will also update  when he is out of a procedure.  Routing encounter to Yoseph to follow up.  Thank you....Adelaide Eden RN

## 2020-09-23 ENCOUNTER — VIRTUAL VISIT (OUTPATIENT)
Dept: DERMATOLOGY | Facility: CLINIC | Age: 50
End: 2020-09-23
Payer: COMMERCIAL

## 2020-09-23 DIAGNOSIS — D04.39 SQUAMOUS CELL CARCINOMA IN SITU (SCCIS) OF SKIN OF EYEBROW: Primary | ICD-10-CM

## 2020-09-23 PROCEDURE — 99024 POSTOP FOLLOW-UP VISIT: CPT | Mod: 95 | Performed by: DERMATOLOGY

## 2020-09-23 ASSESSMENT — PAIN SCALES - GENERAL: PAINLEVEL: MILD PAIN (2)

## 2020-09-23 NOTE — NURSING NOTE
Jayashree Johnson's goals for this visit include:   Chief Complaint   Patient presents with     Derm Problem     Melissa is following up post mohs on right medial eyebrow, pt complains of pain.        She requests these members of her care team be copied on today's visit information:     PCP: Aneudy Jackson    Referring Provider:  No referring provider defined for this encounter.    LMP 03/17/2003     Do you need any medication refills at today's visit? No    Abigail Casillas LPN
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
## 2020-09-23 NOTE — PATIENT INSTRUCTIONS
[facility] Dermatology Visit    Thank you for allowing us to participate in your care. Your findings, instructions and follow-up plan are as follows:    Schedule face-to-face scar evaluation  Discontinue plucking eyebrows  Could consider Latisse drops for improved eye brow growth      When should I call my doctor?    If you are worsening or not improving, please, contact us or seek urgent care as noted below.     Who should I call with questions (adults)?    [facility] (adult and pediatric): [phone]     [facility] (adult): [phone]    For urgent needs outside of business hours call the [facility] at [phone] and ask for the dermatology resident on call    If this is a medical emergency and you are unable to reach an ER, Call 911      Who should I call with questions (pediatric)?  [facility]- Pediatric Dermatology  Dr. Patria Roque, Dr. Lilly Skinner, Dr. Stefani Figueroa, Shruti Retana, PA  Dr. Trang Magallanes, Dr. Ina Richards & Dr. Richard Bangura  Non Urgent  Nurse Triage Line; [phone]- Latia and Netta RN Care Coordinators   Tri (/Complex ) [phone]    If you need a prescription refill, please contact your pharmacy. Refills are approved or denied by our Physicians during normal business hours, Monday through Fridays  Per office policy, refills will not be granted if you have not been seen within the past year (or sooner depending on your child's condition)    Scheduling Information:  Pediatric Appointment Scheduling and Call Center [phone]  Radiology Scheduling- [phone]  Sedation Unit Scheduling- [phone]  [place] Scheduling- General [phone]; Pediatric Dermatology [phone]  Main  Services: [phone]  Tunisian: [phone]  Emirati: [phone]  Hmong/Montserratian/Burmese: [phone]  Preadmission Nursing  Department Fax Number: [phone] (Fax all pre-operative paperwork to this number)    For urgent matters arising during evenings, weekends, or holidays that cannot wait for normal business hours please call [phone] and ask for the Dermatology Resident On-Call to be paged.
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
## 2020-09-25 ENCOUNTER — VIRTUAL VISIT (OUTPATIENT)
Dept: PSYCHOLOGY | Facility: CLINIC | Age: 50
End: 2020-09-25
Payer: COMMERCIAL

## 2020-09-25 DIAGNOSIS — F43.10 PTSD (POST-TRAUMATIC STRESS DISORDER): Primary | ICD-10-CM

## 2020-09-25 DIAGNOSIS — F32.1 MODERATE MAJOR DEPRESSION, SINGLE EPISODE (H): ICD-10-CM

## 2020-09-25 DIAGNOSIS — F41.1 GAD (GENERALIZED ANXIETY DISORDER): ICD-10-CM

## 2020-09-25 PROCEDURE — 90791 PSYCH DIAGNOSTIC EVALUATION: CPT | Mod: 95 | Performed by: PSYCHOLOGIST
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
## 2020-09-30 ENCOUNTER — MYC MEDICAL ADVICE (OUTPATIENT)
Dept: INTERNAL MEDICINE | Facility: CLINIC | Age: 50
End: 2020-09-30

## 2020-10-01 ENCOUNTER — TELEPHONE (OUTPATIENT)
Dept: INTERNAL MEDICINE | Facility: CLINIC | Age: 50
End: 2020-10-01

## 2020-10-01 NOTE — TELEPHONE ENCOUNTER
"Jayashree Johnson is a 50 year old female-   Pt calls today regarding right foot swelling and pain. Pt states that swelling has been happening over some time, but is now to the point where it feels like 'grinding\" when she walks on it and it is very painful. Pt denies trauma or injury to the foot. DENIES heart hx, chest pain or difficulty breathing. Pt sent a My Chart message yesterday with photo. Provider advised an appt in clinic and xrays to start. Pt is requesting a work in with provider tomorrow. Advised that he is not in clinic today. She verbalizes an understanding and is able to come in anytime tomorrow.   Allergies:   Allergies   Allergen Reactions     No Known Drug Allergies        Torri Couch, RN, BSN      "

## 2020-10-01 NOTE — TELEPHONE ENCOUNTER
I called and left a msg that we can see her tomorrow at 11 and to call if that will not work.  Kelly Cohen MA

## 2020-10-01 NOTE — TELEPHONE ENCOUNTER
Reason for Call:  Same Day Appointment, Requested Provider:  Aneudy Jackson MD    PCP: Aneudy Jackson    Reason for visit: foot swelling and pain can not bare weight     Duration of symptoms: 3 days very painful    Have you been treated for this in the past? No    Additional comments: per pt would like to come in asap - pt is aware provider out of clinic today -     Can we leave a detailed message on this number? YES    Phone number patient can be reached at: Home number on file [phone] (home)    Best Time: anytime     Call taken on 10/1/2020 at 11:38 AM by Maddie Santizo

## 2020-10-02 ENCOUNTER — MYC MEDICAL ADVICE (OUTPATIENT)
Dept: INTERNAL MEDICINE | Facility: CLINIC | Age: 50
End: 2020-10-02

## 2020-10-05 NOTE — PROGRESS NOTES
[facility] Mohs Dermatologic Surgery Procedure Note    Date of Service:  Sep 9, 2020  Surgery: Mohs micrographic surgery    Case 1  Repair Type: intermediate  Repair Size: 2.5 cm  Suture Material: Fast Absorbing Gut 5-0  Tumor Type: SCCIS - Squamous cell carcinoma in situ  Location: Right medial eyebrow  Derm-Path Accession #: [id number]  PreOp Size: 0.4x0.3 cm  PostOp Size: 0.9x0.8 cm  Mohs Accession #: [id number]  Level of Defect: fat    Procedure:  We discussed the principles of treatment and most likely complications including scarring, bleeding, infection, swelling, pain, crusting, nerve damage, large wound,  incomplete excision, wound dehiscence,  nerve damage, recurrence, and a second procedure may be recommended to obtain the best cosmetic or functional result.    Informed consent was obtained and the patient underwent the procedure as follows:  The patient was placed supine on the operating table.  The cancer was identified, outlined with a marker, and verified by the patient.  The entire surgical field was prepped with Hibiclens.  The surgical site was anesthetized using Lidocaine 1% with epi 1:100,000.      The area of clinically apparent tumor was debulked. The layer of tissue was then surgically excised using a #15 blade and was then transferred onto a specimen sheet maintaining the orientation of the specimen. Hemostasis was obtained using bipolar electrocoagulation. The wound site was then covered with a dressing while the tissue samples were processed for examination.    The excised tissue was transported to the Mohs histology laboratory maintaining the tissue orientation.  The tissue specimen was relaxed so that the entire surgical margin was in a a single horizontal plane for sectioning and inked for precise mapping.  A precise reference map was drawn to reflect the sectioning of the specimen, colored inking of the margins, and orientation on the patient.  The tissue was processed  using horizontal sectioning of the base and continuous peripheral margins.  The histopathologic sections were reviewed in conjunction with the reference map.    Total blocks: 1    Total slides:  1    There were no cancer cells visualized on examination, therefore Mohs surgery was complete.     REPAIR: An intermediate layered linear closure was selected as the procedure which would maximally preserve both function and cosmesis.    After the excision of the tumor, the area was undermined. Hemostasis was obtained with electrocoagulation.  Closure was oriented so that the wound was in the patient's natural skin tension lines. The subcutaneous and dermal layers were then closed with buried vertical mattress 5-0 Monocryl sutures. The epidermis was then carefully approximated along the length of the wound using 5-0 Fast Absorbing Gut simple running sutures.     The final wound length was 2.5 cm. A total of 4 ml of anesthesia was administered for all surgical sites. Estimated blood loss was less than 10 ml for all surgical sites. A sterile pressure dressing was applied and wound care instructions, with a written handout, were given. The [facility] alert and ambulatory.    Follow-up as needed for wound evaluation.     I personally performed the procedures today.    Kevin Meyers DO    Department of Dermatology  [facility]: Phone: [phone], Fax:[phone]  [facility]: Phone: [phone], Fax: [phone]
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
## 2020-10-09 ENCOUNTER — ANCILLARY PROCEDURE (OUTPATIENT)
Dept: GENERAL RADIOLOGY | Facility: CLINIC | Age: 50
End: 2020-10-09
Attending: PHYSICAL MEDICINE & REHABILITATION
Payer: COMMERCIAL

## 2020-10-09 ENCOUNTER — VIRTUAL VISIT (OUTPATIENT)
Dept: PSYCHOLOGY | Facility: CLINIC | Age: 50
End: 2020-10-09
Payer: COMMERCIAL

## 2020-10-09 ENCOUNTER — OFFICE VISIT (OUTPATIENT)
Dept: ORTHOPEDICS | Facility: CLINIC | Age: 50
End: 2020-10-09
Payer: COMMERCIAL

## 2020-10-09 ENCOUNTER — TELEPHONE (OUTPATIENT)
Dept: OBGYN | Facility: CLINIC | Age: 50
End: 2020-10-09

## 2020-10-09 VITALS
WEIGHT: 174 LBS | HEART RATE: 102 BPM | DIASTOLIC BLOOD PRESSURE: 58 MMHG | SYSTOLIC BLOOD PRESSURE: 131 MMHG | BODY MASS INDEX: 27.66 KG/M2

## 2020-10-09 DIAGNOSIS — B00.9 HSV (HERPES SIMPLEX VIRUS) INFECTION: Primary | ICD-10-CM

## 2020-10-09 DIAGNOSIS — F43.10 PTSD (POST-TRAUMATIC STRESS DISORDER): ICD-10-CM

## 2020-10-09 DIAGNOSIS — R29.898 WEAKNESS OF BOTH LOWER EXTREMITIES: ICD-10-CM

## 2020-10-09 DIAGNOSIS — R20.2 PARESTHESIA OF RIGHT FOOT: ICD-10-CM

## 2020-10-09 DIAGNOSIS — F32.1 MODERATE MAJOR DEPRESSION, SINGLE EPISODE (H): Primary | ICD-10-CM

## 2020-10-09 DIAGNOSIS — G89.29 CHRONIC FOOT PAIN, RIGHT: ICD-10-CM

## 2020-10-09 DIAGNOSIS — M79.671 CHRONIC FOOT PAIN, RIGHT: Primary | ICD-10-CM

## 2020-10-09 DIAGNOSIS — G89.29 CHRONIC FOOT PAIN, RIGHT: Primary | ICD-10-CM

## 2020-10-09 DIAGNOSIS — M79.671 CHRONIC FOOT PAIN, RIGHT: ICD-10-CM

## 2020-10-09 PROCEDURE — 73630 X-RAY EXAM OF FOOT: CPT | Mod: RT | Performed by: RADIOLOGY

## 2020-10-09 PROCEDURE — 99201 PR OFFICE/OUTPT VISIT, NEW, LEVEL I: CPT | Performed by: PHYSICAL MEDICINE & REHABILITATION

## 2020-10-09 PROCEDURE — 90834 PSYTX W PT 45 MINUTES: CPT | Mod: 95 | Performed by: PSYCHOLOGIST

## 2020-10-09 RX ORDER — DULOXETIN HYDROCHLORIDE 30 MG/1
CAPSULE, DELAYED RELEASE ORAL
COMMUNITY
Start: 2020-07-21 | End: 2021-10-20

## 2020-10-09 RX ORDER — VALACYCLOVIR HYDROCHLORIDE 500 MG/1
1000 TABLET, FILM COATED ORAL DAILY
Qty: 90 TABLET | Refills: 3 | Status: SHIPPED | OUTPATIENT
Start: 2020-10-09 | End: 2021-10-20

## 2020-10-09 NOTE — PATIENT INSTRUCTIONS
-EMG ordered. EMG of the bilateral lower extremity - [facility]  [address] [place] [address]   [phone]  -Physical therapy referral.  Please do 5-6 days of exercises per week between formal sessions and the home exercises they provide.  -Ice or heat 15-20 minutes as needed (Avoid sleeping on a heating pad or ice)  -Patient's preferred over the counter medications as directed on packaging as needed for pain or soreness.  Please take ibuprofen with food.   -Over the counter lidocaine cream or Voltaren gel as needed.    -Wear supportives shoes.  Avoid barefoot walking.    -Avoid aggravating activities.    -Follow up after completion of EMG  Can be in person or virtual appointment.  Please schedule at least 1-2 business days after EMG is completed to ensure we have the results of the EMG.
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
## 2020-10-09 NOTE — PROGRESS NOTES
"[facility]: Integrated Behavioral Health  October 9, 2020      Behavioral Health Clinician Progress Note    Patient Name: Jayashree Johnson is a 50 year old female who is being evaluated via a telephone visit.      The patient has been notified of the following:     \"We have found that certain health care needs can be provided without the need for a face to face visit.  This service lets us provide the care you need with a short phone conversation.      I will have full access to your [place] medical record during this entire phone call.   I will be taking notes for your medical record.     Since this is like an office visit, we will bill your insurance company for this service.  Please check with your medical insurance if this type of telephone visit/virtual care is covered.  You may be responsible for the cost of this service if insurance coverage is denied.      There are potential benefits and risks of telephone visits (e.g. limits to patient confidentiality) that differ from in-person visits.?  Confidentiality still applies for telephone services, and nobody will record the visit.  It is important to be in a quiet, private space that is free of distractions (including cell phone or other devices) during the visit.??     If during the course of the call I believe a telephone visit is not appropriate, you will not be charged for this service\"    Consent has been obtained for this service by care team member: yes.    Start time: 01:00pm    End time: 01:50pm    As the provider I attest to compliance with applicable laws and regulations related to telemedicine.         Service Type:  Individual      Service Location:   Phone call (patient / identified key support person reached)     Session Start Time: 01:00pm  Session End Time: 01:50pm      Session Length: 38 - 52      Attendees: Patient    Visit Activities (Refresh list every visit): [facility] Only    Diagnostic Assessment Date: " 09/25/2020  Treatment Plan Review Date: 01/09/2020  See Flowsheets for today's PHQ-9 and UMANG-7 results  Previous PHQ-9:   PHQ-9 SCORE 5/20/2019 3/4/2020 9/9/2020   PHQ-9 Total Score - - -   PHQ-9 Total Score MyChart - - -   PHQ-9 Total Score 10 0 12     Previous UMANG-7:   UMANG-7 SCORE 4/12/2017 3/2/2018 8/9/2019   Total Score 16 6 7       AYESHA LEVEL:  AYESHA Score (Last Two) 9/18/2012   AYESHA Raw Score 39   Activation Score 56.4   AYESHA Level 3       DATA  Extended Session (60+ minutes): No  Interactive Complexity: No  Crisis: No  H Patient: No    Treatment Objective(s) Addressed in This Session:  Target Behavior(s): disease management/lifestyle changes related to pain    Psychological distress related to Pain    Current Stressors / Issues:  Patient participated in setting treatment goals. She was introduced to the choice point worksheet. She indicated it was helpful to understand her experiences. She was taught a brief mindfulness skill and encouraged to practice between sessions.      Progress on Treatment Objective(s) / Homework:  New Objective established this session - PREPARATION (Decided to change - considering how); Intervened by negotiating a change plan and determining options / strategies for behavior change, identifying triggers, exploring social supports, and working towards setting a date to begin behavior change    Motivational Interviewing    MI Intervention: Co-Developed Goal: see care plan, Expressed Empathy/Understanding, Supported Autonomy, Collaboration, Evocation, Permission to raise concern or advise, Open-ended questions, Reflections: simple and complex, Change talk (evoked) and Reframe     Change Talk Expressed by the Patient: Desire to change Ability to change Reasons to change Need to change Committment to change Activation Taking steps    Provider Response to Change Talk: E - Evoked more info from patient about behavior change, A - Affirmed patient's thoughts, decisions, or attempts at behavior  change, R - Reflected patient's change talk and S - Summarized patient's change talk statements      Care Plan review completed: Yes    Medication Review:  No changes to current psychiatric medication(s)    Medication Compliance:  Yes    Changes in Health Issues:   None reported    Chemical Use Review:   Substance Use: Chemical use reviewed, no active concerns identified      Tobacco Use: No current tobacco use.      Assessment: Current Emotional / Mental Status (status of significant symptoms):  Risk status (Self / Other harm or suicidal ideation)  Patient denies a history of suicidal ideation, suicide attempts, self-injurious behavior, homicidal ideation, homicidal behavior and and other safety concerns  Patient denies current fears or concerns for personal safety.  Patient denies current or recent suicidal ideation or behaviors.  Patient denies current or recent homicidal ideation or behaviors.  Patient denies current or recent self injurious behavior or ideation.  Patient denies other safety concerns.  A safety and risk management plan has not been developed at this time, however patient was encouraged to call [place] [phone] should there be a change in any of these risk factors.    Appearance:   Unable to assess over the phone   Eye Contact:   Unable to assess over the phone   Psychomotor Behavior: Unable to assess over the phone   Attitude:   Cooperative   Orientation:   All  Speech   Rate / Production: Normal    Volume:  Normal   Mood:    Normal  Affect:    Unable to assess over the phone   Thought Content:  Clear   Thought Form:  Coherent  Logical   Insight:    Good     Diagnoses:  1. Moderate major depression, single episode (H)    2. PTSD (post-traumatic stress disorder)        Collateral Reports Completed:  Not Applicable    Plan: (Homework, other):  Patient was given information about behavioral services and encouraged to schedule a follow up appointment with the clinic [facility] in 2 weeks.  She was also  encouraged to practice skills taught in session. CD Recommendations: No indications of CD issues.      Yoseph Buenrostro, PsyD, LP      ______________________________________________________________________    Integrated Primary Care Behavioral Health Treatment Plan    Patient's Name: Jayashree Johnson  YOB: 1970    Date: 10/09/2020    DSM-V Diagnoses: 296.22 (F32.1)  Major Depressive Disorder, Single Episode, Moderate _ or 309.81 (F43.10) Posttraumatic Stress Disorder (includes Posttraumatic Stress Disorder for Children 6 Years and Younger)  Without dissociative symptoms  Psychosocial / Contextual Factors: chronic pain    No flowsheet data found.    Referral / Collaboration:  Referral to another professional/service is not indicated at this time..    Anticipated number of session or this episode of care: 8-12      MeasurableTreatment Goal(s) related to diagnosis / functional impairment(s)  Goal 1: Patient will report increased value directed behaviors    I will know I've met my goal when I'm feeling better.       Objective #A (Patient Action)                          Patient will identify the cost of control/avoidance behaviors as a coping strategy.  Status: New - Date: 10/09/2020     Intervention(s)  [facility] will utilize exercises/worksheets to teach initial concepts (e.g., Choice Point, Life Turnaround, D.O.T.S.)     Objective #B  Patient will practice mindfulness skills.  Status: New - Date: 10/09/2020    Intervention(s)  [facility] will teach various mindfulness techniques (e.g., Notice 5 Things, 10 Mindful breaths, Leaves on a Stream)    Objective #C  Patient will clarify personal values for her life that positively impact behavior choices.  Status: New - Date: 10/09/2020     Intervention(s)  [facility] will help clarify values via exercises (e.g., values card sort) and worksheets (e.g., Oskar)     Objective #D  Patient will report increased value determined behaviors with decreased negative impact of anxiety.  Status:  New - Date: 10/09/2020     Intervention(s)  [facility] will assign goal setting/committed action homework in service of values      Mohit Buenrostro PsyD  October 9, 2020

## 2020-10-14 ENCOUNTER — VIRTUAL VISIT (OUTPATIENT)
Dept: PSYCHOLOGY | Facility: CLINIC | Age: 50
End: 2020-10-14
Payer: COMMERCIAL

## 2020-10-14 DIAGNOSIS — F32.1 MODERATE MAJOR DEPRESSION, SINGLE EPISODE (H): Primary | ICD-10-CM

## 2020-10-14 DIAGNOSIS — F43.10 PTSD (POST-TRAUMATIC STRESS DISORDER): ICD-10-CM

## 2020-10-14 PROCEDURE — 90832 PSYTX W PT 30 MINUTES: CPT | Mod: 95 | Performed by: PSYCHOLOGIST

## 2020-10-14 NOTE — PROGRESS NOTES
"[facility] [place]: Integrated Behavioral Health  October 14, 2020      Behavioral Health Clinician Progress Note    Patient Name: Jayashree Johnson is a 50 year old female who is being evaluated via a telephone visit.      The patient has been notified of the following:     \"We have found that certain health care needs can be provided without the need for a face to face visit.  This service lets us provide the care you need with a short phone conversation.      I will have full access to your [place] medical record during this entire phone call.   I will be taking notes for your medical record.     Since this is like an office visit, we will bill your insurance company for this service.  Please check with your medical insurance if this type of telephone visit/virtual care is covered.  You may be responsible for the cost of this service if insurance coverage is denied.      There are potential benefits and risks of telephone visits (e.g. limits to patient confidentiality) that differ from in-person visits.?  Confidentiality still applies for telephone services, and nobody will record the visit.  It is important to be in a quiet, private space that is free of distractions (including cell phone or other devices) during the visit.??     If during the course of the call I believe a telephone visit is not appropriate, you will not be charged for this service\"    Consent has been obtained for this service by care team member: yes.    As the provider I attest to compliance with applicable laws and regulations related to telemedicine.         Service Type:  Individual      Service Location:   Phone call (patient / identified key support person reached)     Session Start Time: 09:30am  Session End Time: 10:20am      Session Length: 38 - 52      Attendees: Patient    Visit Activities (Refresh list every visit): [facility] Only    Diagnostic Assessment Date: 09/25/2020  Treatment Plan Review Date: " 01/09/2020  See Flowsheets for today's PHQ-9 and UMANG-7 results  Previous PHQ-9:   PHQ-9 SCORE 5/20/2019 3/4/2020 9/9/2020   PHQ-9 Total Score - - -   PHQ-9 Total Score MyChart - - -   PHQ-9 Total Score 10 0 12     Previous UMANG-7:   UMANG-7 SCORE 4/12/2017 3/2/2018 8/9/2019   Total Score 16 6 7       AYESHA LEVEL:  AYESHA Score (Last Two) 9/18/2012   AYESHA Raw Score 39   Activation Score 56.4   AYESHA Level 3       DATA  Extended Session (60+ minutes): No  Interactive Complexity: No  Crisis: No  H Patient: No    Treatment Objective(s) Addressed in This Session:  Target Behavior(s): disease management/lifestyle changes related to pain    Psychological distress related to Pain    Current Stressors / Issues:  Patient reported that she found last session helpful and has been doing more meaningful. She noted difficulty with negative thoughts. She was guided through some exercises to introduce obtaining perspective/distance from her thoughts. She indicated it was helpful and would practice between sessions.      Progress on Treatment Objective(s) / Homework:  Minimal progress - ACTION (Actively working towards change); Intervened by reinforcing change plan / affirming steps taken    Motivational Interviewing    MI Intervention: Expressed Empathy/Understanding, Supported Autonomy, Collaboration, Evocation, Permission to raise concern or advise, Open-ended questions, Reflections: simple and complex, Change talk (evoked) and Reframe     Change Talk Expressed by the Patient: Desire to change Ability to change Reasons to change Need to change Committment to change Activation Taking steps    Provider Response to Change Talk: E - Evoked more info from patient about behavior change, A - Affirmed patient's thoughts, decisions, or attempts at behavior change, R - Reflected patient's change talk and S - Summarized patient's change talk statements    Care Plan review completed: Yes    Medication Review:  No changes to current psychiatric  medication(s)    Medication Compliance:  Yes    Changes in Health Issues:   None reported    Chemical Use Review:   Substance Use: Chemical use reviewed, no active concerns identified      Tobacco Use: No current tobacco use.      Assessment: Current Emotional / Mental Status (status of significant symptoms):  Risk status (Self / Other harm or suicidal ideation)  Patient denies a history of suicidal ideation, suicide attempts, self-injurious behavior, homicidal ideation, homicidal behavior and and other safety concerns  Patient denies current fears or concerns for personal safety.  Patient denies current or recent suicidal ideation or behaviors.  Patient denies current or recent homicidal ideation or behaviors.  Patient denies current or recent self injurious behavior or ideation.  Patient denies other safety concerns.  A safety and risk management plan has not been developed at this time, however patient was encouraged to call [place] [phone] should there be a change in any of these risk factors.    Appearance:   Unable to assess over the phone   Eye Contact:   Unable to assess over the phone   Psychomotor Behavior: Unable to assess over the phone   Attitude:   Cooperative   Orientation:   All  Speech   Rate / Production: Normal    Volume:  Normal   Mood:    Normal  Affect:    Unable to assess over the phone   Thought Content:  Clear   Thought Form:  Coherent  Logical   Insight:    Good     Diagnoses:  1. Moderate major depression, single episode (H)    2. PTSD (post-traumatic stress disorder)        Collateral Reports Completed:  Not Applicable    Plan: (Homework, other):  Patient was given information about behavioral services and encouraged to schedule a follow up appointment with the clinic [facility] in 2 weeks.  She was also encouraged to practice skills taught in session. CD Recommendations: No indications of CD issues.      Yoseph Buenrostro PsyD,  LP      ______________________________________________________________________    Integrated Primary Care Behavioral Health Treatment Plan    Patient's Name: Jayashree Johnson  YOB: 1970    Date: 10/09/2020    DSM-V Diagnoses: 296.22 (F32.1)  Major Depressive Disorder, Single Episode, Moderate _ or 309.81 (F43.10) Posttraumatic Stress Disorder (includes Posttraumatic Stress Disorder for Children 6 Years and Younger)  Without dissociative symptoms  Psychosocial / Contextual Factors: chronic pain    No flowsheet data found.    Referral / Collaboration:  Referral to another professional/service is not indicated at this time..    Anticipated number of session or this episode of care: 8-12      MeasurableTreatment Goal(s) related to diagnosis / functional impairment(s)  Goal 1: Patient will report increased value directed behaviors    I will know I've met my goal when I'm feeling better.       Objective #A (Patient Action)                          Patient will identify the cost of control/avoidance behaviors as a coping strategy.  Status: New - Date: 10/09/2020     Intervention(s)  [facility] will utilize exercises/worksheets to teach initial concepts (e.g., Choice Point, Life Turnaround, D.O.T.S.)     Objective #B  Patient will practice mindfulness skills.  Status: New - Date: 10/09/2020    Intervention(s)  [facility] will teach various mindfulness techniques (e.g., Notice 5 Things, 10 Mindful breaths, Leaves on a Stream)    Objective #C  Patient will clarify personal values for her life that positively impact behavior choices.  Status: New - Date: 10/09/2020     Intervention(s)  [facility] will help clarify values via exercises (e.g., values card sort) and worksheets (e.g., Oskar)     Objective #D  Patient will report increased value determined behaviors with decreased negative impact of anxiety.  Status: New - Date: 10/09/2020     Intervention(s)  [facility] will assign goal setting/committed action homework in service of  values      Mohit Buenrostro PsTar  October 9, 2020

## 2020-10-21 ENCOUNTER — VIRTUAL VISIT (OUTPATIENT)
Dept: PSYCHOLOGY | Facility: CLINIC | Age: 50
End: 2020-10-21
Payer: COMMERCIAL

## 2020-10-21 DIAGNOSIS — F43.10 PTSD (POST-TRAUMATIC STRESS DISORDER): ICD-10-CM

## 2020-10-21 DIAGNOSIS — F41.1 GAD (GENERALIZED ANXIETY DISORDER): Primary | ICD-10-CM

## 2020-10-21 DIAGNOSIS — F32.1 MODERATE MAJOR DEPRESSION, SINGLE EPISODE (H): ICD-10-CM

## 2020-10-21 PROCEDURE — 90832 PSYTX W PT 30 MINUTES: CPT | Mod: 95 | Performed by: PSYCHOLOGIST

## 2020-10-21 NOTE — PROGRESS NOTES
"[facility] [place] [facility]  October 21, 2020      Behavioral Health Clinician Progress Note    Patient Name: Jayashree Johnson is a 50 year old female who is being evaluated via a telephone visit.      The patient has been notified of the following:     \"We have found that certain health care needs can be provided without the need for a face to face visit.  This service lets us provide the care you need with a short phone conversation.      I will have full access to your [place] medical record during this entire phone call.   I will be taking notes for your medical record.     Since this is like an office visit, we will bill your insurance company for this service.  Please check with your medical insurance if this type of telephone visit/virtual care is covered.  You may be responsible for the cost of this service if insurance coverage is denied.      There are potential benefits and risks of telephone visits (e.g. limits to patient confidentiality) that differ from in-person visits.?  Confidentiality still applies for telephone services, and nobody will record the visit.  It is important to be in a quiet, private space that is free of distractions (including cell phone or other devices) during the visit.??     If during the course of the call I believe a telephone visit is not appropriate, you will not be charged for this service\"    Consent has been obtained for this service by care team member: yes.    As the provider I attest to compliance with applicable laws and regulations related to telemedicine.         Service Type:  Individual      Service Location:   Phone call (patient / identified key support person reached)     Session Start Time: 10:30am  Session End Time: 10:55am      Session Length: 16 - 37      Attendees: Patient    Visit Activities (Refresh list every visit): [facility] Only    Diagnostic Assessment Date: 09/25/2020  Treatment Plan Review Date: " 01/09/2020  See Flowsheets for today's PHQ-9 and UMANG-7 results  Previous PHQ-9:   PHQ-9 SCORE 5/20/2019 3/4/2020 9/9/2020   PHQ-9 Total Score - - -   PHQ-9 Total Score MyChart - - -   PHQ-9 Total Score 10 0 12     Previous UMANG-7:   UMANG-7 SCORE 4/12/2017 3/2/2018 8/9/2019   Total Score 16 6 7       AYESHA LEVEL:  AYESHA Score (Last Two) 9/18/2012   AYESHA Raw Score 39   Activation Score 56.4   AYESHA Level 3       DATA  Extended Session (60+ minutes): No  Interactive Complexity: No  Crisis: No  [facility] Patient: No    Treatment Objective(s) Addressed in This Session:  Target Behavior(s): disease management/lifestyle changes related to pain    Psychological distress related to Pain     Current Stressors / Issues:  Patient reported that she has found the treatment concepts helpful but has ongoing difficulty with mindfulness. She was educated about the purpose of mindfulness and guided through a different exercise to emphasize the concepts      Progress on Treatment Objective(s) / Homework:  Minimal progress - ACTION (Actively working towards change); Intervened by reinforcing change plan / affirming steps taken    Motivational Interviewing    MI Intervention: Expressed Empathy/Understanding, Supported Autonomy, Collaboration, Evocation, Permission to raise concern or advise, Open-ended questions, Reflections: simple and complex, Change talk (evoked) and Reframe     Change Talk Expressed by the Patient: Desire to change Ability to change Reasons to change Need to change Committment to change Activation Taking steps    Provider Response to Change Talk: E - Evoked more info from patient about behavior change, A - Affirmed patient's thoughts, decisions, or attempts at behavior change, R - Reflected patient's change talk and S - Summarized patient's change talk statements    Care Plan review completed: Yes    Medication Review:  No changes to current psychiatric medication(s)    Medication Compliance:  Yes    Changes in Health  Issues:   None reported    Chemical Use Review:   Substance Use: Chemical use reviewed, no active concerns identified      Tobacco Use: No current tobacco use.      Assessment: Current Emotional / Mental Status (status of significant symptoms):  Risk status (Self / Other harm or suicidal ideation)  Patient denies a history of suicidal ideation, suicide attempts, self-injurious behavior, homicidal ideation, homicidal behavior and and other safety concerns  Patient denies current fears or concerns for personal safety.  Patient denies current or recent suicidal ideation or behaviors.  Patient denies current or recent homicidal ideation or behaviors.  Patient denies current or recent self injurious behavior or ideation.  Patient denies other safety concerns.  A safety and risk management plan has not been developed at this time, however patient was encouraged to call [place] [phone] should there be a change in any of these risk factors.    Appearance:   Unable to assess over the phone   Eye Contact:   Unable to assess over the phone   Psychomotor Behavior: Unable to assess over the phone   Attitude:   Cooperative   Orientation:   All  Speech   Rate / Production: Normal    Volume:  Normal   Mood:    Normal  Affect:    Unable to assess over the phone   Thought Content:  Clear   Thought Form:  Coherent  Logical   Insight:    Good     Diagnoses:  1. UMANG (generalized anxiety disorder)    2. PTSD (post-traumatic stress disorder)    3. Moderate major depression, single episode (H)        Collateral Reports Completed:  Not Applicable    Plan: (Homework, other):  Patient was given information about behavioral services and encouraged to schedule a follow up appointment with the clinic [facility] in 2 weeks.  She was also encouraged to practice skills taught in session. CD Recommendations: No indications of CD issues.      Yoseph Buenrostro PsyD, LP      ______________________________________________________________________    Integrated  Primary Care Behavioral Health Treatment Plan    Patient's Name: Jayashree Johnson  YOB: 1970    Date: 10/09/2020    DSM-V Diagnoses: 296.22 (F32.1)  Major Depressive Disorder, Single Episode, Moderate _ or 309.81 (F43.10) Posttraumatic Stress Disorder (includes Posttraumatic Stress Disorder for Children 6 Years and Younger)  Without dissociative symptoms  Psychosocial / Contextual Factors: chronic pain    No flowsheet data found.    Referral / Collaboration:  Referral to another professional/service is not indicated at this time..    Anticipated number of session or this episode of care: 8-12      MeasurableTreatment Goal(s) related to diagnosis / functional impairment(s)  Goal 1: Patient will report increased value directed behaviors    I will know I've met my goal when I'm feeling better.       Objective #A (Patient Action)                          Patient will identify the cost of control/avoidance behaviors as a coping strategy.  Status: New - Date: 10/09/2020     Intervention(s)  [facility] will utilize exercises/worksheets to teach initial concepts (e.g., Choice Point, Life Turnaround, D.O.T.S.)     Objective #B  Patient will practice mindfulness skills.  Status: New - Date: 10/09/2020    Intervention(s)  [facility] will teach various mindfulness techniques (e.g., Notice 5 Things, 10 Mindful breaths, Leaves on a Stream)    Objective #C  Patient will clarify personal values for her life that positively impact behavior choices.  Status: New - Date: 10/09/2020     Intervention(s)  [facility] will help clarify values via exercises (e.g., values card sort) and worksheets (e.g., Oskar)     Objective #D  Patient will report increased value determined behaviors with decreased negative impact of anxiety.  Status: New - Date: 10/09/2020     Intervention(s)  [facility] will assign goal setting/committed action homework in service of values      Mohit Buenrostro PsyD  October 9, 2020

## 2020-10-28 ENCOUNTER — VIRTUAL VISIT (OUTPATIENT)
Dept: PSYCHOLOGY | Facility: CLINIC | Age: 50
End: 2020-10-28
Payer: COMMERCIAL

## 2020-10-28 DIAGNOSIS — F32.1 MODERATE MAJOR DEPRESSION, SINGLE EPISODE (H): ICD-10-CM

## 2020-10-28 DIAGNOSIS — F43.10 PTSD (POST-TRAUMATIC STRESS DISORDER): Primary | ICD-10-CM

## 2020-10-28 PROCEDURE — 90834 PSYTX W PT 45 MINUTES: CPT | Mod: 95 | Performed by: PSYCHOLOGIST

## 2020-10-28 NOTE — PROGRESS NOTES
"[facility] [place]: Integrated Behavioral Health  October 28, 2020      Behavioral Health Clinician Progress Note    Patient Name: Jayashree Johnson is a 50 year old female who is being evaluated via a telephone visit.      The patient has been notified of the following:     \"We have found that certain health care needs can be provided without the need for a face to face visit.  This service lets us provide the care you need with a short phone conversation.      I will have full access to your [place] medical record during this entire phone call.   I will be taking notes for your medical record.     Since this is like an office visit, we will bill your insurance company for this service.  Please check with your medical insurance if this type of telephone visit/virtual care is covered.  You may be responsible for the cost of this service if insurance coverage is denied.      There are potential benefits and risks of telephone visits (e.g. limits to patient confidentiality) that differ from in-person visits.?  Confidentiality still applies for telephone services, and nobody will record the visit.  It is important to be in a quiet, private space that is free of distractions (including cell phone or other devices) during the visit.??     If during the course of the call I believe a telephone visit is not appropriate, you will not be charged for this service\"    Consent has been obtained for this service by care team member: yes.    As the provider I attest to compliance with applicable laws and regulations related to telemedicine.         Service Type:  Individual      Service Location:   Phone call (patient / identified key support person reached)     Session Start Time: 11:00am  Session End Time: 11:45am      Session Length: 38 - 52      Attendees: Patient    Visit Activities (Refresh list every visit): [facility] Only    Diagnostic Assessment Date: 09/25/2020  Treatment Plan Review Date: " 01/09/2020  See Flowsheets for today's PHQ-9 and UMANG-7 results  Previous PHQ-9:   PHQ-9 SCORE 5/20/2019 3/4/2020 9/9/2020   PHQ-9 Total Score - - -   PHQ-9 Total Score MyChart - - -   PHQ-9 Total Score 10 0 12     Previous UMANG-7:   UMANG-7 SCORE 4/12/2017 3/2/2018 8/9/2019   Total Score 16 6 7       AYESHA LEVEL:  AYESHA Score (Last Two) 9/18/2012   AYESHA Raw Score 39   Activation Score 56.4   AYESHA Level 3       DATA  Extended Session (60+ minutes): No  Interactive Complexity: No  Crisis: No  [facility] Patient: No    Treatment Objective(s) Addressed in This Session:  Target Behavior(s): disease management/lifestyle changes related to pain    Psychological distress related to Pain     Current Stressors / Issues:  Jayashree reported that she has been feeling more overwhelmed by difficult choices that she needs to make. She spoke about needing help but not having many people to turn to for guidance. She spoke about procrastination and the difficulty making decisions to move forward. She identified the thoughts that tend to interfere with her ability to move forward. She was taught a skill to help manage those thoughts that have kept her from making important decisions.      Progress on Treatment Objective(s) / Homework:  Minimal progress - ACTION (Actively working towards change); Intervened by reinforcing change plan / affirming steps taken    Motivational Interviewing    MI Intervention: Expressed Empathy/Understanding, Supported Autonomy, Collaboration, Evocation, Permission to raise concern or advise, Open-ended questions, Reflections: simple and complex, Change talk (evoked) and Reframe     Change Talk Expressed by the Patient: Desire to change Ability to change Reasons to change Need to change Committment to change Activation Taking steps    Provider Response to Change Talk: E - Evoked more info from patient about behavior change, A - Affirmed patient's thoughts, decisions, or attempts at behavior change, R - Reflected patient's  change talk and S - Summarized patient's change talk statements    Care Plan review completed: Yes    Medication Review:  No changes to current psychiatric medication(s)    Medication Compliance:  Yes    Changes in Health Issues:   None reported    Chemical Use Review:   Substance Use: Chemical use reviewed, no active concerns identified      Tobacco Use: No current tobacco use.      Assessment: Current Emotional / Mental Status (status of significant symptoms):  Risk status (Self / Other harm or suicidal ideation)  Patient denies a history of suicidal ideation, suicide attempts, self-injurious behavior, homicidal ideation, homicidal behavior and and other safety concerns  Patient denies current fears or concerns for personal safety.  Patient denies current or recent suicidal ideation or behaviors.  Patient denies current or recent homicidal ideation or behaviors.  Patient denies current or recent self injurious behavior or ideation.  Patient denies other safety concerns.  A safety and risk management plan has not been developed at this time, however patient was encouraged to call [place] [phone] should there be a change in any of these risk factors.    Appearance:   Unable to assess over the phone   Eye Contact:   Unable to assess over the phone   Psychomotor Behavior: Unable to assess over the phone   Attitude:   Cooperative   Orientation:   All  Speech   Rate / Production: Normal    Volume:  Normal   Mood:    Normal  Affect:    Unable to assess over the phone   Thought Content:  Clear   Thought Form:  Coherent  Logical   Insight:    Good     Diagnoses:  1. PTSD (post-traumatic stress disorder)    2. Moderate major depression, single episode (H)        Collateral Reports Completed:  Not Applicable    Plan: (Homework, other):  Patient was given information about behavioral services and encouraged to schedule a follow up appointment with the clinic [facility] in 2 weeks.  She was also encouraged to practice skills  taught in session. CD Recommendations: No indications of CD issues.      Yoseph Buenrostro, PsyD, LP      ______________________________________________________________________    Integrated Primary Care Behavioral Health Treatment Plan    Patient's Name: Jayashree Johnson  YOB: 1970    Date: 10/09/2020    DSM-V Diagnoses: 296.22 (F32.1)  Major Depressive Disorder, Single Episode, Moderate _ or 309.81 (F43.10) Posttraumatic Stress Disorder (includes Posttraumatic Stress Disorder for Children 6 Years and Younger)  Without dissociative symptoms  Psychosocial / Contextual Factors: chronic pain    No flowsheet data found.    Referral / Collaboration:  Referral to another professional/service is not indicated at this time..    Anticipated number of session or this episode of care: 8-12      MeasurableTreatment Goal(s) related to diagnosis / functional impairment(s)  Goal 1: Patient will report increased value directed behaviors    I will know I've met my goal when I'm feeling better.       Objective #A (Patient Action)                          Patient will identify the cost of control/avoidance behaviors as a coping strategy.  Status: New - Date: 10/09/2020     Intervention(s)  [facility] will utilize exercises/worksheets to teach initial concepts (e.g., Choice Point, Life Turnaround, D.O.T.S.)     Objective #B  Patient will practice mindfulness skills.  Status: New - Date: 10/09/2020    Intervention(s)  [facility] will teach various mindfulness techniques (e.g., Notice 5 Things, 10 Mindful breaths, Leaves on a Stream)    Objective #C  Patient will clarify personal values for her life that positively impact behavior choices.  Status: New - Date: 10/09/2020     Intervention(s)  [facility] will help clarify values via exercises (e.g., values card sort) and worksheets (e.g., Oskar)     Objective #D  Patient will report increased value determined behaviors with decreased negative impact of anxiety.  Status: New - Date: 10/09/2020      Intervention(s)  [facility] will assign goal setting/committed action homework in service of values      Mohit Buenrostro PsyD  October 9, 2020

## 2020-10-29 ENCOUNTER — TELEPHONE (OUTPATIENT)
Dept: DERMATOLOGY | Facility: CLINIC | Age: 50
End: 2020-10-29

## 2020-10-29 ENCOUNTER — PATIENT OUTREACH (OUTPATIENT)
Dept: CARE COORDINATION | Facility: CLINIC | Age: 50
End: 2020-10-29

## 2020-10-29 DIAGNOSIS — L90.5 SCAR OF SKIN: ICD-10-CM

## 2020-10-29 DIAGNOSIS — L65.9 HYPOTRICHOSIS: ICD-10-CM

## 2020-10-29 NOTE — PROGRESS NOTES
Social Work Telephone  Note  [facility]     Patient Name:  Jayashree Johnson  /Age:  1970 (50 year old)    Referral Source: psychology - Yoseph Buenrostro  Reason for Referral:  Personal safety     contacted Patient via telephone on 10/29/2020.  Social work received a consult following a  appointment requesting assistance exiting a relationship safely and financially.      Melissa shared that she has been in a relationship for 10 yrs, they aren't  however have acquired several things together.  She stated that most everything is in his name and wanted to understand if she has any legal right to some of these items.        Write encouraged patient to discuss this further with a  to understand if she has any legal right.  Also, discussed options of safe housing and providing her with [facility] contact information.     Provided patient with writer contact information and encouraged her to call with any additional concerns or questions. Sw will continue to assist as needed.               ESTEFANIA Hernandez, [facility]    [facility]  [phone]  [email or web address]

## 2020-10-30 NOTE — TELEPHONE ENCOUNTER
Insurance has her name listed as DAMIEN.    [facility] Prior Authorization Team   Phone: [phone]      PA Initiation    Medication: BIMATOPROST 0.03%-pa INITIATED  Insurance Company: [employer] - Phone [phone] Fax [phone]  Pharmacy Filling the Rx: [facility] - [place], [place] - [address]  Filling Pharmacy Phone:    Filling Pharmacy Fax:    Start Date: 10/30/2020

## 2020-11-04 RX ORDER — BIMATOPROST 3 UG/ML
1 SOLUTION TOPICAL AT BEDTIME
Qty: 3 ML | Refills: 4 | Status: SHIPPED | OUTPATIENT
Start: 2020-11-04 | End: 2021-10-20

## 2020-11-04 NOTE — TELEPHONE ENCOUNTER
PRIOR AUTHORIZATION DENIED    Medication: BIMATOPROST 0.03%-PA Denied    Denial Date: 11/3/2020    Denial Rational:         Appeal Information:

## 2020-11-04 NOTE — TELEPHONE ENCOUNTER
Pt called and notified of PA result. Pt notified of Good Rx coupon that could be applied. Pt requests that the Rx be sent to the [facility] pharmacy. Rx changed to reflect new pharmacy.  Pt advised to go to the Good rx website and print off coupon for [facility] pharmacy and bring with to the pharmacy. Pt verbalized understanding and will call back with any questions.Adelaide Eden RN

## 2020-11-09 ENCOUNTER — MYC MEDICAL ADVICE (OUTPATIENT)
Dept: INTERNAL MEDICINE | Facility: CLINIC | Age: 50
End: 2020-11-09

## 2020-11-11 ENCOUNTER — OFFICE VISIT (OUTPATIENT)
Dept: INTERNAL MEDICINE | Facility: CLINIC | Age: 50
End: 2020-11-11
Payer: COMMERCIAL

## 2020-11-11 VITALS
SYSTOLIC BLOOD PRESSURE: 152 MMHG | OXYGEN SATURATION: 97 % | TEMPERATURE: 97.9 F | HEART RATE: 77 BPM | DIASTOLIC BLOOD PRESSURE: 73 MMHG

## 2020-11-11 DIAGNOSIS — Z20.822 SUSPECTED 2019 NOVEL CORONAVIRUS INFECTION: ICD-10-CM

## 2020-11-11 DIAGNOSIS — R50.9 FEVER AND CHILLS: Primary | ICD-10-CM

## 2020-11-11 DIAGNOSIS — B37.31 YEAST INFECTION OF THE VAGINA: Primary | ICD-10-CM

## 2020-11-11 DIAGNOSIS — J02.0 STREPTOCOCCAL PHARYNGITIS: ICD-10-CM

## 2020-11-11 DIAGNOSIS — J02.9 ACUTE PHARYNGITIS, UNSPECIFIED ETIOLOGY: ICD-10-CM

## 2020-11-11 DIAGNOSIS — R05.9 COUGH: ICD-10-CM

## 2020-11-11 LAB
DEPRECATED S PYO AG THROAT QL EIA: POSITIVE
SPECIMEN SOURCE: ABNORMAL

## 2020-11-11 PROCEDURE — 87880 STREP A ASSAY W/OPTIC: CPT | Performed by: INTERNAL MEDICINE

## 2020-11-11 PROCEDURE — U0003 INFECTIOUS AGENT DETECTION BY NUCLEIC ACID (DNA OR RNA); SEVERE ACUTE RESPIRATORY SYNDROME CORONAVIRUS 2 (SARS-COV-2) (CORONAVIRUS DISEASE [COVID-19]), AMPLIFIED PROBE TECHNIQUE, MAKING USE OF HIGH THROUGHPUT TECHNOLOGIES AS DESCRIBED BY CMS-2020-01-R: HCPCS | Performed by: INTERNAL MEDICINE

## 2020-11-11 PROCEDURE — 99213 OFFICE O/P EST LOW 20 MIN: CPT | Performed by: INTERNAL MEDICINE

## 2020-11-11 RX ORDER — AMOXICILLIN 875 MG
875 TABLET ORAL 2 TIMES DAILY
Qty: 20 TABLET | Refills: 0 | Status: SHIPPED | OUTPATIENT
Start: 2020-11-11 | End: 2020-12-10

## 2020-11-11 RX ORDER — FLUCONAZOLE 150 MG/1
150 TABLET ORAL ONCE
Qty: 1 TABLET | Refills: 0 | Status: SHIPPED | OUTPATIENT
Start: 2020-11-11 | End: 2020-11-11

## 2020-11-11 NOTE — TELEPHONE ENCOUNTER
Reason for Call:  Other prescription    Detailed comments: Patient was seen today and tested positive for strep throat. Was told a script would be sent to a pharmacy for her. She would like it sent to the [facility] pharmacy in [place].     Phone Number Patient can be reached at: Home number on file [phone] (home)    Best Time: any    Can we leave a detailed message on this number? YES    Call taken on 11/11/2020 at 12:12 PM by Emmy Stephens CNA

## 2020-11-11 NOTE — PROGRESS NOTES
Subjective     Jayashree Johnson is a 50 year old female who presents to clinic today for the following health issues:    HPI         Chief Complaint   Patient presents with     Cold Symptoms     sore throat and cough for two weeks, had negative covid test      Sick for two weeks, nose and throat congestion.  Very sore throat, hard to swallow.  Fevers and body aches.  Last weekend 99 range.      SO lives with her and travels for work, has a cough with smoking.     Negative covid test last week through [place].  Test was done on the 5th and found out yesterday.      No known exposures, tries to be careful.  Son is in college and lives with her.      Wonders about strep.      Uses claritin.        Review of Systems   CONSTITUTIONAL:fevers   INTEGUMENTARY/SKIN: NEGATIVE for worrisome rashes, moles or lesions  EYES: NEGATIVE for vision changes or irritation  ENT/MOUTH: sore throat   RESP:cough  CV: NEGATIVE for chest pain, palpitations or peripheral edema  GI: diarrhea   MUSCULOSKELETAL:body aches  NEURO: NEGATIVE for weakness, dizziness or paresthesias  ENDOCRINE: NEGATIVE for temperature intolerance, skin/hair changes  HEME: NEGATIVE for bleeding problems  PSYCHIATRIC: NEGATIVE for changes in mood or affect      Objective    BP (!) 152/73   Pulse 77   Temp 97.9  F (36.6  C)   LMP 03/17/2003   SpO2 97%   There is no height or weight on file to calculate BMI.  Physical Exam   No acute distress  Right tonsil is slightly erythematous  Lungs are clear  Heart is regular            Assessment & Plan     Jayashree was seen today for cold symptoms.    Diagnoses and all orders for this visit:    Fever and chills  -     Symptomatic COVID-19 Virus (Coronavirus) by PCR; Future  -     Symptomatic COVID-19 Virus (Coronavirus) by PCR    Acute pharyngitis, unspecified etiology  -     Symptomatic COVID-19 Virus (Coronavirus) by PCR; Future  -     Streptococcus A Rapid Scr w Reflx to PCR  -     Symptomatic COVID-19 Virus (Coronavirus) by  "PCR    Cough  -     Symptomatic COVID-19 Virus (Coronavirus) by PCR; Future  -     Symptomatic COVID-19 Virus (Coronavirus) by PCR    Streptococcal pharyngitis  -     amoxicillin (AMOXIL) 875 MG tablet; Take 1 tablet (875 mg) by mouth 2 times daily    Suspected 2019 novel coronavirus infection    50-year-old woman who has had 2 weeks of fevers, chills, sore throat cough body aches.  Some GI symptoms.  She had a negative Covid test in [facility] last week.  We will repeat her Covid test today.  We will also get a strep test with her sore throat.  Your strep test is actually positive and we will be treating her with amoxicillin.  She should still quarantine until her second Covid test is negative.     BMI:   Estimated body mass index is 27.66 kg/m  as calculated from the following:    Height as of 3/4/20: 1.689 m (5' 6.5\").    Weight as of 10/9/20: 78.9 kg (174 lb).                No follow-ups on file.    Aneudy Jackson MD  [facility]    "

## 2020-11-11 NOTE — TELEPHONE ENCOUNTER
I called pt and informed her that the rx was sent in today, she asked if she can get diflucan cause she gets an yeast infection after taking antibiotics. I told her we will send it in.   Kelly Cohen MA

## 2020-11-12 ENCOUNTER — MYC MEDICAL ADVICE (OUTPATIENT)
Dept: INTERNAL MEDICINE | Facility: CLINIC | Age: 50
End: 2020-11-12

## 2020-11-12 LAB
SARS-COV-2 RNA SPEC QL NAA+PROBE: NOT DETECTED
SPECIMEN SOURCE: NORMAL

## 2020-11-20 ENCOUNTER — VIRTUAL VISIT (OUTPATIENT)
Dept: PSYCHOLOGY | Facility: CLINIC | Age: 50
End: 2020-11-20
Payer: COMMERCIAL

## 2020-11-20 DIAGNOSIS — F41.1 GAD (GENERALIZED ANXIETY DISORDER): ICD-10-CM

## 2020-11-20 DIAGNOSIS — F32.1 MODERATE MAJOR DEPRESSION, SINGLE EPISODE (H): ICD-10-CM

## 2020-11-20 DIAGNOSIS — F43.10 PTSD (POST-TRAUMATIC STRESS DISORDER): Primary | ICD-10-CM

## 2020-11-20 PROCEDURE — 90832 PSYTX W PT 30 MINUTES: CPT | Mod: 95 | Performed by: PSYCHOLOGIST

## 2020-11-20 NOTE — PROGRESS NOTES
"[facility]: Integrated Behavioral Health  November 20, 2020      Behavioral Health Clinician Progress Note    Patient Name: Jayashree Johnson is a 50 year old female who is being evaluated via a telephone visit.      The patient has been notified of the following:     \"We have found that certain health care needs can be provided without the need for a face to face visit.  This service lets us provide the care you need with a short phone conversation.      I will have full access to your [place] medical record during this entire phone call.   I will be taking notes for your medical record.     Since this is like an office visit, we will bill your insurance company for this service.  Please check with your medical insurance if this type of telephone visit/virtual care is covered.  You may be responsible for the cost of this service if insurance coverage is denied.      There are potential benefits and risks of telephone visits (e.g. limits to patient confidentiality) that differ from in-person visits.?  Confidentiality still applies for telephone services, and nobody will record the visit.  It is important to be in a quiet, private space that is free of distractions (including cell phone or other devices) during the visit.??     If during the course of the call I believe a telephone visit is not appropriate, you will not be charged for this service\"    Consent has been obtained for this service by care team member: yes.    As the provider I attest to compliance with applicable laws and regulations related to telemedicine.         Service Type:  Individual      Service Location:   Phone call (patient / identified key support person reached)     Session Start Time: 09:30am  Session End Time: 10:00am      Session Length: 16 - 37      Attendees: Patient    Visit Activities (Refresh list every visit): [facility] Only    Diagnostic Assessment Date: 09/25/2020  Treatment Plan Review Date: " 01/09/2020  See Flowsheets for today's PHQ-9 and UMANG-7 results  Previous PHQ-9:   PHQ-9 SCORE 5/20/2019 3/4/2020 9/9/2020   PHQ-9 Total Score - - -   PHQ-9 Total Score MyChart - - -   PHQ-9 Total Score 10 0 12     Previous UAMNG-7:   UMANG-7 SCORE 4/12/2017 3/2/2018 8/9/2019   Total Score 16 6 7       AYESHA LEVEL:  AYESHA Score (Last Two) 9/18/2012   AYESHA Raw Score 39   Activation Score 56.4   AYESHA Level 3       DATA  Extended Session (60+ minutes): No  Interactive Complexity: No  Crisis: No  [facility] Patient: No    Treatment Objective(s) Addressed in This Session:  Target Behavior(s): disease management/lifestyle changes related to pain    Psychological distress related to Pain     Current Stressors / Issues:  Jayashree spoke about continuing to feel stuck in bad living situation with very little options. She spoke about the impact on her emotions and physical pain. We spoke about how to utilize some of the skills she has learned and about moving toward clarifying her values as the next step of treatment.       Progress on Treatment Objective(s) / Homework:  Satisfactory progress - ACTION (Actively working towards change); Intervened by reinforcing change plan / affirming steps taken    Motivational Interviewing    MI Intervention: Expressed Empathy/Understanding, Supported Autonomy, Collaboration, Evocation, Permission to raise concern or advise, Open-ended questions, Reflections: simple and complex, Change talk (evoked) and Reframe     Change Talk Expressed by the Patient: Desire to change Ability to change Reasons to change Need to change Committment to change Activation Taking steps    Provider Response to Change Talk: E - Evoked more info from patient about behavior change, A - Affirmed patient's thoughts, decisions, or attempts at behavior change, R - Reflected patient's change talk and S - Summarized patient's change talk statements    Care Plan review completed: Yes    Medication Review:  No changes to current psychiatric  medication(s)    Medication Compliance:  Yes    Changes in Health Issues:   None reported    Chemical Use Review:   Substance Use: Chemical use reviewed, no active concerns identified      Tobacco Use: No current tobacco use.      Assessment: Current Emotional / Mental Status (status of significant symptoms):  Risk status (Self / Other harm or suicidal ideation)  Patient denies a history of suicidal ideation, suicide attempts, self-injurious behavior, homicidal ideation, homicidal behavior and and other safety concerns  Patient denies current fears or concerns for personal safety.  Patient denies current or recent suicidal ideation or behaviors.  Patient denies current or recent homicidal ideation or behaviors.  Patient denies current or recent self injurious behavior or ideation.  Patient denies other safety concerns.  A safety and risk management plan has not been developed at this time, however patient was encouraged to call [place] [phone] should there be a change in any of these risk factors.    Appearance:   Unable to assess over the phone   Eye Contact:   Unable to assess over the phone   Psychomotor Behavior: Unable to assess over the phone   Attitude:   Cooperative   Orientation:   All  Speech   Rate / Production: Normal    Volume:  Normal   Mood:    Normal  Affect:    Unable to assess over the phone   Thought Content:  Clear   Thought Form:  Coherent  Logical   Insight:    Good     Diagnoses:  1. PTSD (post-traumatic stress disorder)    2. Moderate major depression, single episode (H)    3. UMANG (generalized anxiety disorder)        Collateral Reports Completed:  Not Applicable    Plan: (Homework, other):  Patient was given information about behavioral services and encouraged to schedule a follow up appointment with the clinic [facility] in 2 weeks.  She was also encouraged to practice skills taught in session. CD Recommendations: No indications of CD issues.      Yoseph Buenrostro PsyD,  LP      ______________________________________________________________________    Integrated Primary Care Behavioral Health Treatment Plan    Patient's Name: Jayashree Johnson  YOB: 1970    Date: 10/09/2020    DSM-V Diagnoses: 296.22 (F32.1)  Major Depressive Disorder, Single Episode, Moderate _ or 309.81 (F43.10) Posttraumatic Stress Disorder (includes Posttraumatic Stress Disorder for Children 6 Years and Younger)  Without dissociative symptoms  Psychosocial / Contextual Factors: chronic pain    No flowsheet data found.    Referral / Collaboration:  Referral to another professional/service is not indicated at this time..    Anticipated number of session or this episode of care: 8-12      MeasurableTreatment Goal(s) related to diagnosis / functional impairment(s)  Goal 1: Patient will report increased value directed behaviors    I will know I've met my goal when I'm feeling better.       Objective #A (Patient Action)                          Patient will identify the cost of control/avoidance behaviors as a coping strategy.  Status: New - Date: 10/09/2020     Intervention(s)  [facility] will utilize exercises/worksheets to teach initial concepts (e.g., Choice Point, Life Turnaround, D.O.T.S.)     Objective #B  Patient will practice mindfulness skills.  Status: New - Date: 10/09/2020    Intervention(s)  [facility] will teach various mindfulness techniques (e.g., Notice 5 Things, 10 Mindful breaths, Leaves on a Stream)    Objective #C  Patient will clarify personal values for her life that positively impact behavior choices.  Status: New - Date: 10/09/2020     Intervention(s)  [facility] will help clarify values via exercises (e.g., values card sort) and worksheets (e.g., Oskar)     Objective #D  Patient will report increased value determined behaviors with decreased negative impact of anxiety.  Status: New - Date: 10/09/2020     Intervention(s)  [facility] will assign goal setting/committed action homework in service of  values      Mohit Buenrostro PsTra  October 9, 2020

## 2020-11-25 ENCOUNTER — VIRTUAL VISIT (OUTPATIENT)
Dept: PSYCHOLOGY | Facility: CLINIC | Age: 50
End: 2020-11-25
Payer: COMMERCIAL

## 2020-11-25 DIAGNOSIS — F32.1 MODERATE MAJOR DEPRESSION, SINGLE EPISODE (H): ICD-10-CM

## 2020-11-25 DIAGNOSIS — F41.1 GAD (GENERALIZED ANXIETY DISORDER): ICD-10-CM

## 2020-11-25 DIAGNOSIS — F43.10 PTSD (POST-TRAUMATIC STRESS DISORDER): Primary | ICD-10-CM

## 2020-11-25 PROCEDURE — 90832 PSYTX W PT 30 MINUTES: CPT | Mod: 95 | Performed by: PSYCHOLOGIST

## 2020-11-26 NOTE — PROGRESS NOTES
"[facility] [place]: Integrated Behavioral Health  November 25, 2020      Behavioral Health Clinician Progress Note    Patient Name: Jayashree Johnson is a 50 year old female who is being evaluated via a telephone visit.      The patient has been notified of the following:     \"We have found that certain health care needs can be provided without the need for a face to face visit.  This service lets us provide the care you need with a short phone conversation.      I will have full access to your [place] medical record during this entire phone call.   I will be taking notes for your medical record.     Since this is like an office visit, we will bill your insurance company for this service.  Please check with your medical insurance if this type of telephone visit/virtual care is covered.  You may be responsible for the cost of this service if insurance coverage is denied.      There are potential benefits and risks of telephone visits (e.g. limits to patient confidentiality) that differ from in-person visits.?  Confidentiality still applies for telephone services, and nobody will record the visit.  It is important to be in a quiet, private space that is free of distractions (including cell phone or other devices) during the visit.??     If during the course of the call I believe a telephone visit is not appropriate, you will not be charged for this service\"    Consent has been obtained for this service by care team member: yes.    As the provider I attest to compliance with applicable laws and regulations related to telemedicine.         Service Type:  Individual      Service Location:   Phone call (patient / identified key support person reached)     Session Start Time: 02:00pm  Session End Time: 02:30pm      Session Length: 16 - 37      Attendees: Patient    Visit Activities (Refresh list every visit): [facility] Only    Diagnostic Assessment Date: 09/25/2020  Treatment Plan Review Date: " 01/09/2020  See Flowsheets for today's PHQ-9 and UMANG-7 results  Previous PHQ-9:   PHQ-9 SCORE 5/20/2019 3/4/2020 9/9/2020   PHQ-9 Total Score - - -   PHQ-9 Total Score MyChart - - -   PHQ-9 Total Score 10 0 12     Previous UMANG-7:   UMANG-7 SCORE 4/12/2017 3/2/2018 8/9/2019   Total Score 16 6 7       AYESHA LEVEL:  AYESHA Score (Last Two) 9/18/2012   AYESHA Raw Score 39   Activation Score 56.4   AYESHA Level 3       DATA  Extended Session (60+ minutes): No  Interactive Complexity: No  Crisis: No  [facility] Patient: No    Treatment Objective(s) Addressed in This Session:  Target Behavior(s): disease management/lifestyle changes related to pain    Psychological distress related to Pain     Current Stressors / Issues:  Jayashree spoke about the upcoming anniversary of her mother's death and some of the struggles related to that. She spoke about spending time with her children for thanksgiving. Processed her emotions during this time and spoke about grieving process.      Progress on Treatment Objective(s) / Homework:  Satisfactory progress - ACTION (Actively working towards change); Intervened by reinforcing change plan / affirming steps taken    Motivational Interviewing    MI Intervention: Expressed Empathy/Understanding, Supported Autonomy, Collaboration, Evocation, Permission to raise concern or advise, Open-ended questions, Reflections: simple and complex, Change talk (evoked) and Reframe     Change Talk Expressed by the Patient: Desire to change Ability to change Reasons to change Need to change Committment to change Activation Taking steps    Provider Response to Change Talk: E - Evoked more info from patient about behavior change, A - Affirmed patient's thoughts, decisions, or attempts at behavior change, R - Reflected patient's change talk and S - Summarized patient's change talk statements    Care Plan review completed: Yes    Medication Review:  No changes to current psychiatric medication(s)    Medication  Compliance:  Yes    Changes in Health Issues:   None reported    Chemical Use Review:   Substance Use: Chemical use reviewed, no active concerns identified      Tobacco Use: No current tobacco use.      Assessment: Current Emotional / Mental Status (status of significant symptoms):  Risk status (Self / Other harm or suicidal ideation)  Patient denies a history of suicidal ideation, suicide attempts, self-injurious behavior, homicidal ideation, homicidal behavior and and other safety concerns  Patient denies current fears or concerns for personal safety.  Patient denies current or recent suicidal ideation or behaviors.  Patient denies current or recent homicidal ideation or behaviors.  Patient denies current or recent self injurious behavior or ideation.  Patient denies other safety concerns.  A safety and risk management plan has not been developed at this time, however patient was encouraged to call [place] [phone] should there be a change in any of these risk factors.    Appearance:   Unable to assess over the phone   Eye Contact:   Unable to assess over the phone   Psychomotor Behavior: Unable to assess over the phone   Attitude:   Cooperative   Orientation:   All  Speech   Rate / Production: Normal    Volume:  Normal   Mood:    Normal  Affect:    Unable to assess over the phone   Thought Content:  Clear   Thought Form:  Coherent  Logical   Insight:    Good     Diagnoses:  1. PTSD (post-traumatic stress disorder)    2. UMANG (generalized anxiety disorder)    3. Moderate major depression, single episode (H)        Collateral Reports Completed:  Not Applicable    Plan: (Homework, other):  Patient was given information about behavioral services and encouraged to schedule a follow up appointment with the clinic [facility] in 2 weeks.  She was also encouraged to practice skills taught in session. CD Recommendations: No indications of CD issues.      Yoseph Buenrostro PsyD,  LP      ______________________________________________________________________    Integrated Primary Care Behavioral Health Treatment Plan    Patient's Name: Jayashree Johnson  YOB: 1970    Date: 10/09/2020    DSM-V Diagnoses: 296.22 (F32.1)  Major Depressive Disorder, Single Episode, Moderate _ or 309.81 (F43.10) Posttraumatic Stress Disorder (includes Posttraumatic Stress Disorder for Children 6 Years and Younger)  Without dissociative symptoms  Psychosocial / Contextual Factors: chronic pain    No flowsheet data found.    Referral / Collaboration:  Referral to another professional/service is not indicated at this time..    Anticipated number of session or this episode of care: 8-12      MeasurableTreatment Goal(s) related to diagnosis / functional impairment(s)  Goal 1: Patient will report increased value directed behaviors    I will know I've met my goal when I'm feeling better.       Objective #A (Patient Action)                          Patient will identify the cost of control/avoidance behaviors as a coping strategy.  Status: New - Date: 10/09/2020     Intervention(s)  [facility] will utilize exercises/worksheets to teach initial concepts (e.g., Choice Point, Life Turnaround, D.O.T.S.)     Objective #B  Patient will practice mindfulness skills.  Status: New - Date: 10/09/2020    Intervention(s)  [facility] will teach various mindfulness techniques (e.g., Notice 5 Things, 10 Mindful breaths, Leaves on a Stream)    Objective #C  Patient will clarify personal values for her life that positively impact behavior choices.  Status: New - Date: 10/09/2020     Intervention(s)  [facility] will help clarify values via exercises (e.g., values card sort) and worksheets (e.g., Oskar)     Objective #D  Patient will report increased value determined behaviors with decreased negative impact of anxiety.  Status: New - Date: 10/09/2020     Intervention(s)  [facility] will assign goal setting/committed action homework in service of  values      Mohit Buenrostro PsTra  October 9, 2020

## 2020-12-02 ENCOUNTER — MYC MEDICAL ADVICE (OUTPATIENT)
Dept: INTERNAL MEDICINE | Facility: CLINIC | Age: 50
End: 2020-12-02

## 2020-12-06 DIAGNOSIS — I10 ESSENTIAL HYPERTENSION: ICD-10-CM

## 2020-12-07 RX ORDER — METOPROLOL SUCCINATE 25 MG/1
TABLET, EXTENDED RELEASE ORAL
Qty: 30 TABLET | Refills: 10 | OUTPATIENT
Start: 2020-12-07

## 2020-12-07 NOTE — TELEPHONE ENCOUNTER
"Denied  Refills current  Sent 4/29/2020 for 1 month and 10 refills to this pharmacy    Requested Prescriptions   Pending Prescriptions Disp Refills     metoprolol succinate ER (TOPROL-XL) 25 MG 24 hr tablet [Pharmacy Med Name: METOPROLOL SUCCINATE ER 25MG TB24] 30 tablet 10     Sig: TAKE ONE TABLET BY MOUTH ONCE DAILY       Beta-Blockers Protocol Failed - 12/6/2020 10:52 AM        Failed - Blood pressure under 140/90 in past 12 months     BP Readings from Last 3 Encounters:   11/11/20 (!) 152/73   10/09/20 131/58   09/09/20 130/84           Passed - Patient is age 6 or older        Passed - Recent (12 mo) or future (30 days) visit within the authorizing provider's specialty     Patient has had an office visit with the authorizing provider or a provider within the authorizing providers department within the previous 12 mos or has a future within next 30 days. See \"Patient Info\" tab in inbasket, or \"Choose Columns\" in Meds & Orders section of the refill encounter.              Passed - Medication is active on med list         Saida Cortez RN    "

## 2020-12-10 ENCOUNTER — OFFICE VISIT (OUTPATIENT)
Dept: FAMILY MEDICINE | Facility: CLINIC | Age: 50
End: 2020-12-10
Payer: COMMERCIAL

## 2020-12-10 VITALS
OXYGEN SATURATION: 98 % | RESPIRATION RATE: 16 BRPM | DIASTOLIC BLOOD PRESSURE: 90 MMHG | BODY MASS INDEX: 27.73 KG/M2 | SYSTOLIC BLOOD PRESSURE: 120 MMHG | TEMPERATURE: 98.6 F | WEIGHT: 174.44 LBS | HEART RATE: 64 BPM

## 2020-12-10 DIAGNOSIS — M75.102 ROTATOR CUFF SYNDROME, LEFT: Primary | ICD-10-CM

## 2020-12-10 DIAGNOSIS — J02.9 SORE THROAT: ICD-10-CM

## 2020-12-10 DIAGNOSIS — I10 ESSENTIAL HYPERTENSION: ICD-10-CM

## 2020-12-10 LAB
DEPRECATED S PYO AG THROAT QL EIA: NEGATIVE
SPECIMEN SOURCE: NORMAL
SPECIMEN SOURCE: NORMAL
STREP GROUP A PCR: NOT DETECTED

## 2020-12-10 PROCEDURE — 99N1174 PR STATISTIC STREP A RAPID: Performed by: FAMILY MEDICINE

## 2020-12-10 PROCEDURE — 87651 STREP A DNA AMP PROBE: CPT | Performed by: FAMILY MEDICINE

## 2020-12-10 PROCEDURE — 99214 OFFICE O/P EST MOD 30 MIN: CPT | Performed by: FAMILY MEDICINE

## 2020-12-10 RX ORDER — METOPROLOL SUCCINATE 25 MG/1
50 TABLET, EXTENDED RELEASE ORAL DAILY
Qty: 60 TABLET | Refills: 10 | Status: SHIPPED | OUTPATIENT
Start: 2020-12-10 | End: 2022-05-17

## 2020-12-10 ASSESSMENT — PAIN SCALES - GENERAL: PAINLEVEL: SEVERE PAIN (7)

## 2020-12-10 NOTE — PROGRESS NOTES
Subjective     Jayashree Johnson is a 50 year old female who presents to clinic today for the following health issues:    HPI         Musculoskeletal problem/pain  Onset/Duration: few months, worse last two weeks   Description  Location: shoulder - left  Joint Swelling: no  Redness: no  Pain: YES  Warmth: YES  Intensity:  severe  Progression of Symptoms:  worsening  Accompanying signs and symptoms:   Fevers: YES, has strep 3 weeks ago   Numbness/tingling/weakness: YES- weakness, numbness and tingling down to fingers   History  Trauma to the area: YES- fell two years ago   Recent illness:  YES  Previous similar problem: no  Previous evaluation:  YES  Precipitating or alleviating factors:  Aggravating factors include: lifting and pulling arm up   Therapies tried and outcome: heat, exercises and deep heating rub    Concern: With blood pressure. Been taking on machine at home.     Hypertension Follow-up      Do you check your blood pressure regularly outside of the clinic? Yes     Are you following a low salt diet? No    Are your blood pressures ever more than 140 on the top number (systolic) OR more   than 90 on the bottom number (diastolic), for example 140/90? Yes        Patient Active Problem List   Diagnosis     CARDIOVASCULAR SCREENING; LDL GOAL LESS THAN 100     Gestational diabetes mellitus, antepartum / HX     Hypoglycemia     Family history of breast cancer in sister     Moderate major depression, single episode (H)     Sleep disturbance -- chronic.     Actinic keratosis     SK (seborrheic keratosis)     Pruritus     Ovarian cyst     Microscopic colitis     Myalgia and myositis     Cellulitis of nose, external     Vitamin D deficiency     Polyarthralgia     Nasal obstruction     S/P cervical spinal fusion     PTSD (post-traumatic stress disorder)       Current Outpatient Medications   Medication Sig Dispense Refill     ALPRAZolam (XANAX PO) Take 0.125-0.25 mg by mouth nightly as needed for sleep        azelastine  (ASTELIN) 0.1 % nasal spray Spray 1 spray into both nostrils 2 times daily 1 Bottle 1     Cholecalciferol (VITAMIN D-3 PO) Take 1 capsule by mouth daily       Cyanocobalamin (VITAMIN B-12 PO) Take 1 tablet by mouth daily       Digestive Enzymes (DIGESTIVE ENZYME PO) Take 2 capsules by mouth 2 times daily       DULoxetine (CYMBALTA) 30 MG capsule        fluticasone (FLONASE) 50 MCG/ACT nasal spray USE ONE SPRAY IN EACH NOSTRIL EVERY DAY AS NEEDED FOR RHINITIS OR ALLERGIES 16 g 11     loratadine (CLARITIN) 10 MG tablet TAKE ONE TABLET BY MOUTH ONCE DAILY 90 tablet 1     metoprolol succinate ER (TOPROL-XL) 25 MG 24 hr tablet TAKE ONE TABLET BY MOUTH ONCE DAILY 30 tablet 10     Multiple Vitamin (MULTI-VITAMIN DAILY PO) Take 1 tablet by mouth daily       Polyethyl Glycol-Propyl Glycol (SYSTANE OP) Place 1-2 drops into both eyes daily as needed       zolpidem (AMBIEN) 10 MG tablet Take 10 mg by mouth nightly as needed for sleep       bimatoprost (LATISSE) 0.03 % external opthalmic solution Apply 1 drop topically At Bedtime (Patient not taking: Reported on 11/11/2020) 3 mL 4     SUMAtriptan (IMITREX) 50 MG tablet Take 1 tablet (50 mg) by mouth at onset of headache for migraine (Patient not taking: Reported on 12/10/2020) 8 tablet 6     valACYclovir (VALTREX) 500 MG tablet Take 2 tablets (1,000 mg) by mouth daily (Patient not taking: Reported on 12/10/2020) 90 tablet 3         Review of Systems   CONSTITUTIONAL: NEGATIVE for fever, chills, change in weight  ENT/MOUTH: POSITIVE for sore throat  RESP: NEGATIVE for significant cough or SOB  CV: NEGATIVE for chest pain, palpitations or peripheral edema  ROS otherwise negative      Objective    BP (!) 120/90 (BP Location: Left arm, Patient Position: Chair, Cuff Size: Adult Regular)   Pulse 64   Temp 98.6  F (37  C) (Temporal)   Resp 16   Wt 79.1 kg (174 lb 7 oz)   LMP 03/17/2003   SpO2 98%   BMI 27.73 kg/m    Body mass index is 27.73 kg/m .  Physical Exam   GENERAL:  healthy, alert and no distress  HENT: normal cephalic/atraumatic, ear canals and TM's normal, nose and mouth without ulcers or lesions, oropharynx clear, oral mucous membranes moist, tonsillar erythema and sinuses: not tender  NECK: no adenopathy, no asymmetry, masses, or scars and thyroid normal to palpation  RESP: lungs clear to auscultation - no rales, rhonchi or wheezes  CV: regular rate and rhythm, normal S1 S2, no S3 or S4, no murmur, click or rub, no peripheral edema and peripheral pulses strong  ABDOMEN: soft, nontender, no hepatosplenomegaly, no masses and bowel sounds normal  MS: neck exam shows normal strength, no torticollis and ROM is normal and Exam of the left  shoulder revealed tenderness to palpation over the anterior glenohumoral space.  Impingement sign was positive.  Pain on resisted external rotation and abduction,  not on resisted internal rotation.  Pain on raising the arm over the head. No definite weakness noted.     LYMPH: no cervical, supraclavicular, axillary, or inguinal adenopathy    Results for orders placed or performed in visit on 12/10/20 (from the past 24 hour(s))   Streptococcus A Rapid Scr w Reflx to PCR    Specimen: Throat   Result Value Ref Range    Strep Specimen Description Throat     Streptococcus Group A Rapid Screen Negative NEG^Negative           Assessment & Plan     Essential hypertension  Chronic, poorly controlled recently due to pain and stress. Increase Toprol XL to 50 mg daily and continue to monitor BP.  - metoprolol succinate ER (TOPROL-XL) 25 MG 24 hr tablet; Take 2 tablets (50 mg) by mouth daily    Rotator cuff syndrome, left  Subacute left Rotator cuff strain and subacromial bursitis. Prior history of left shoulder injury 18 months ago may be contributing to frozen shoulder. Rest the affected painful area as much as possible.  Apply ice for 15-20 minutes intermittently as needed and especially after any offending activity. Daily stretching.  As pain recedes,  begin normal activities slowly as tolerated.  Referral to [facility] for shoulder injection.Consider Physical Therapy if symptoms not better with symptomatic care.   - Orthopedic & Spine  Referral; Future    Sore throat  Viral pharyngitis.  - Streptococcus A Rapid Scr w Reflx to PCR        Patient Instructions     Patient Education     Understanding a Rotator Cuff Tendon Tear    The rotator cuff is a group of 4 muscles and their tendons in the shoulder. The muscles are located in the front, back, and top of the shoulder joint. They each have a strong band of tissue (tendon) that attaches to the top of the upper arm bone. This helps keep the arm bone firmly in place in the socket of the shoulder joint. The muscles and tendons of the rotator cuff also help the shoulder joint with certain movements. These include reaching the arm over the head and rotating the arm.   Any one of the rotator cuff tendons can fray or tear from causes such as injury and overuse. A tear may be partial or complete. With a partial tear, some of the tendon is still intact. With a complete tear, the tendon is fully torn. Both types can cause pain and weakness. Arm and should movement also may be limited. A rotator cuff tear often needs treatment to heal properly.   Causes of a rotator cuff tendon tear  Causes can include:    Wear and tear of the tendons from normal use over time or from aging    Overuse of the tendons from sports or work activities, especially those that involve repeated overhead movements    Injury to the tendons from a fall or other accident  Symptoms of a rotator cuff tendon tear  Some people with a rotator cuff tendon tear have few or no symptoms. Others may have symptoms that range from mild to severe. Possible symptoms include:     Pain in your shoulder, which may be worse with overhead movements or at night from lying on the affected side    Weakness in your arm and shoulder    Trouble lifting up your arm or rotating  it    Clicking or crackling sounds when moving or using your arm and shoulder  Treating a rotator cuff tendon tear  Treatment for a rotator cuff tendon tear depends on several factors. These include how severe the tear is and your symptoms. Treatment may include:     Resting your arm and shoulder. This involves limiting certain movements, such as reaching above your head or lifting up your arm. These can slow healing and worsen symptoms. You may also need to avoid certain sports and types of work for a time.    Cold packs or heat packs. These help reduce pain and swelling.    Prescription or over-the-counter medicines  These help reduce pain and swelling. NSAIDs (nonsteroidal anti-inflammatory drugs) are the most common medicines used. They may be taken as pills. Or they may be put on the skin as a gel, cream, or patch.    Injections of medicine into your shoulder. These help relieve pain and swelling for a time. The medicine is usually a corticosteroid. This is a strong medicine that helps ease inflammation .    Physical therapy and exercises.  These help improve strength, flexibility, and range of motion in your arm and shoulder.     Surgery. Surgery may be needed if your tendon is completely torn or if other treatments don t relieve your symptoms. Different options are available. In many cases, the damaged tendon is repairedand is reattached to your arm bone.  Possible complications    If a partial tear isn t given time to heal, it may get larger or tear completely. You may then need more treatment.    Even with treatment, a partial or complete tear may sometimes have trouble healing. The problem may become long-term (chronic). This can cause ongoing pain, weakness, and limited movement of your arm and shoulder.    When to call your healthcare provider  Call your healthcare provider right away if you have any of these:    Fever of 100.4 F (38 C) or higher, or as advised by your healthcare  provider    Chills    Symptoms that don t get better with treatment, or get worse    Redness, warmth, swelling, bleeding or drainage at the incision site after surgery    New symptoms  [employer] last reviewed this educational content on 6/1/2019 2000-2020 [employer]. [address] [place] [id number]. All rights reserved. This information is not intended as a substitute for professional medical care. Always follow your healthcare professional's instructions.           Patient Education     Shoulder Pain with Uncertain Cause   Shoulder pain can have many causes. Pain often comes from the structures that surround the shoulder joint. These are the joint capsule, ligaments, tendons, muscles, and bursa. Pain can also come from cartilage in the joint. Cartilage can become worn out or injured. It s important to know what s causing your pain so the healthcare provider can use the correct treatment. But sometimes it s difficult to find the exact cause of shoulder pain. You may need to see a specialist (orthopedist). You may also need special tests such as a CT scan or MRI. The provider may need to use special tools to look inside the joint (arthroscopy).  Shoulder pain can be treated with a sling or a device that keeps your shoulder from moving. You can take an anti-inflammatory medicine such as ibuprofen to ease pain. You may need to do special shoulder exercises. Follow up with a specialist if the pain is severe or doesn t go away after a few weeks.  Home care  Follow these tips when caring for yourself at home:    If a sling was given to you, leave it in place for the time advised by your healthcare provider. If you aren t sure how long to wear it, ask for advice. If the sling becomes loose, adjust it so that your forearm is level with the ground. Your shoulder should feel well supported.    Put an ice pack on the injured area for 20 minutes every 1 to 2 hours the first day. You can make your own  ice pack by putting ice cubes in a plastic bag. Wrap the bag in a thin towel. Continue with ice packs 3 to 4 times a day for the next 2 days. Then use the pack as needed to ease pain and swelling.    You may use acetaminophen or ibuprofen to control pain, unless another pain medicine was prescribed. If you have chronic liver or kidney disease, talk with your healthcare provider before using these medicines. Also talk with your provider if you ve ever had a stomach ulcer or digestive bleeding.    Shoulder pain may seem worse at night, when there is less to distract you from the pain. If you sleep on your side, try to keep weight off your painful shoulder. Propping pillows behind you may stop you from rolling over onto that shoulder during sleep.     Shoulder and elbow joints can become stiff if left in a sling for too long. You should start range of motion exercises about 7 to 10 days after the injury. Talk with your provider to find out what type of exercises to do and how soon to start.    You can take the sling off to shower or bathe.  Follow-up care  Follow up with your healthcare provider if you don t start to get better in the next 5 days.  When to seek medical advice  Call your healthcare provider right away if any of these occur:    Pain or swelling gets worse or continues for more than a few days    Your hand or fingers become cold, blue, numb, or tingly    Large amount of bruising on your shoulder or upper arm    Trouble moving your hand or fingers    Weakness in your hand or fingers    Your shoulder becomes stiff    It feels like your shoulder is popping out    You are less able to do your daily activities  [employer] last reviewed this educational content on 1/1/2020 2000-2020 [employer]. [address] [place] [address], [place] [address]. All rights reserved. This information is not intended as a substitute for professional medical care. Always follow your healthcare professional's  instructions.               Return in about 2 weeks (around 12/24/2020) for Follow up, with any available provider, using a video visit recheck BP.    Lonny Stephens MD  [facility]

## 2020-12-10 NOTE — PATIENT INSTRUCTIONS
Patient Education     Understanding a Rotator Cuff Tendon Tear    The rotator cuff is a group of 4 muscles and their tendons in the shoulder. The muscles are located in the front, back, and top of the shoulder joint. They each have a strong band of tissue (tendon) that attaches to the top of the upper arm bone. This helps keep the arm bone firmly in place in the socket of the shoulder joint. The muscles and tendons of the rotator cuff also help the shoulder joint with certain movements. These include reaching the arm over the head and rotating the arm.   Any one of the rotator cuff tendons can fray or tear from causes such as injury and overuse. A tear may be partial or complete. With a partial tear, some of the tendon is still intact. With a complete tear, the tendon is fully torn. Both types can cause pain and weakness. Arm and should movement also may be limited. A rotator cuff tear often needs treatment to heal properly.   Causes of a rotator cuff tendon tear  Causes can include:    Wear and tear of the tendons from normal use over time or from aging    Overuse of the tendons from sports or work activities, especially those that involve repeated overhead movements    Injury to the tendons from a fall or other accident  Symptoms of a rotator cuff tendon tear  Some people with a rotator cuff tendon tear have few or no symptoms. Others may have symptoms that range from mild to severe. Possible symptoms include:     Pain in your shoulder, which may be worse with overhead movements or at night from lying on the affected side    Weakness in your arm and shoulder    Trouble lifting up your arm or rotating it    Clicking or crackling sounds when moving or using your arm and shoulder  Treating a rotator cuff tendon tear  Treatment for a rotator cuff tendon tear depends on several factors. These include how severe the tear is and your symptoms. Treatment may include:     Resting your arm and shoulder. This involves  limiting certain movements, such as reaching above your head or lifting up your arm. These can slow healing and worsen symptoms. You may also need to avoid certain sports and types of work for a time.    Cold packs or heat packs. These help reduce pain and swelling.    Prescription or over-the-counter medicines  These help reduce pain and swelling. NSAIDs (nonsteroidal anti-inflammatory drugs) are the most common medicines used. They may be taken as pills. Or they may be put on the skin as a gel, cream, or patch.    Injections of medicine into your shoulder. These help relieve pain and swelling for a time. The medicine is usually a corticosteroid. This is a strong medicine that helps ease inflammation .    Physical therapy and exercises.  These help improve strength, flexibility, and range of motion in your arm and shoulder.     Surgery. Surgery may be needed if your tendon is completely torn or if other treatments don t relieve your symptoms. Different options are available. In many cases, the damaged tendon is repairedand is reattached to your arm bone.  Possible complications    If a partial tear isn t given time to heal, it may get larger or tear completely. You may then need more treatment.    Even with treatment, a partial or complete tear may sometimes have trouble healing. The problem may become long-term (chronic). This can cause ongoing pain, weakness, and limited movement of your arm and shoulder.    When to call your healthcare provider  Call your healthcare provider right away if you have any of these:    Fever of 100.4 F (38 C) or higher, or as advised by your healthcare provider    Chills    Symptoms that don t get better with treatment, or get worse    Redness, warmth, swelling, bleeding or drainage at the incision site after surgery    New symptoms  [employer] last reviewed this educational content on 6/1/2019 2000-2020 [employer]. [address] [place] [address], [place] [address]. All  rights reserved. This information is not intended as a substitute for professional medical care. Always follow your healthcare professional's instructions.           Patient Education     Shoulder Pain with Uncertain Cause   Shoulder pain can have many causes. Pain often comes from the structures that surround the shoulder joint. These are the joint capsule, ligaments, tendons, muscles, and bursa. Pain can also come from cartilage in the joint. Cartilage can become worn out or injured. It s important to know what s causing your pain so the healthcare provider can use the correct treatment. But sometimes it s difficult to find the exact cause of shoulder pain. You may need to see a specialist (orthopedist). You may also need special tests such as a CT scan or MRI. The provider may need to use special tools to look inside the joint (arthroscopy).  Shoulder pain can be treated with a sling or a device that keeps your shoulder from moving. You can take an anti-inflammatory medicine such as ibuprofen to ease pain. You may need to do special shoulder exercises. Follow up with a specialist if the pain is severe or doesn t go away after a few weeks.  Home care  Follow these tips when caring for yourself at home:    If a sling was given to you, leave it in place for the time advised by your healthcare provider. If you aren t sure how long to wear it, ask for advice. If the sling becomes loose, adjust it so that your forearm is level with the ground. Your shoulder should feel well supported.    Put an ice pack on the injured area for 20 minutes every 1 to 2 hours the first day. You can make your own ice pack by putting ice cubes in a plastic bag. Wrap the bag in a thin towel. Continue with ice packs 3 to 4 times a day for the next 2 days. Then use the pack as needed to ease pain and swelling.    You may use acetaminophen or ibuprofen to control pain, unless another pain medicine was prescribed. If you have chronic liver or  kidney disease, talk with your healthcare provider before using these medicines. Also talk with your provider if you ve ever had a stomach ulcer or digestive bleeding.    Shoulder pain may seem worse at night, when there is less to distract you from the pain. If you sleep on your side, try to keep weight off your painful shoulder. Propping pillows behind you may stop you from rolling over onto that shoulder during sleep.     Shoulder and elbow joints can become stiff if left in a sling for too long. You should start range of motion exercises about 7 to 10 days after the injury. Talk with your provider to find out what type of exercises to do and how soon to start.    You can take the sling off to shower or bathe.  Follow-up care  Follow up with your healthcare provider if you don t start to get better in the next 5 days.  When to seek medical advice  Call your healthcare provider right away if any of these occur:    Pain or swelling gets worse or continues for more than a few days    Your hand or fingers become cold, blue, numb, or tingly    Large amount of bruising on your shoulder or upper arm    Trouble moving your hand or fingers    Weakness in your hand or fingers    Your shoulder becomes stiff    It feels like your shoulder is popping out    You are less able to do your daily activities  [employer] last reviewed this educational content on 1/1/2020 2000-2020 [employer]. [address] [place] [address], [place] [address]. All rights reserved. This information is not intended as a substitute for professional medical care. Always follow your healthcare professional's instructions.

## 2020-12-11 ENCOUNTER — OFFICE VISIT (OUTPATIENT)
Dept: ORTHOPEDICS | Facility: CLINIC | Age: 50
End: 2020-12-11
Attending: FAMILY MEDICINE
Payer: COMMERCIAL

## 2020-12-11 ENCOUNTER — ANCILLARY PROCEDURE (OUTPATIENT)
Dept: GENERAL RADIOLOGY | Facility: CLINIC | Age: 50
End: 2020-12-11
Attending: PHYSICAL MEDICINE & REHABILITATION
Payer: COMMERCIAL

## 2020-12-11 VITALS — DIASTOLIC BLOOD PRESSURE: 80 MMHG | SYSTOLIC BLOOD PRESSURE: 137 MMHG

## 2020-12-11 DIAGNOSIS — M67.912 TENDINOPATHY OF LEFT ROTATOR CUFF: ICD-10-CM

## 2020-12-11 DIAGNOSIS — M75.102 ROTATOR CUFF SYNDROME, LEFT: ICD-10-CM

## 2020-12-11 DIAGNOSIS — M25.512 ACUTE PAIN OF LEFT SHOULDER: Primary | ICD-10-CM

## 2020-12-11 PROBLEM — M25.511 ACUTE PAIN OF RIGHT SHOULDER: Status: ACTIVE | Noted: 2020-12-11

## 2020-12-11 PROCEDURE — 99244 OFF/OP CNSLTJ NEW/EST MOD 40: CPT | Mod: 25 | Performed by: PHYSICAL MEDICINE & REHABILITATION

## 2020-12-11 PROCEDURE — 73030 X-RAY EXAM OF SHOULDER: CPT | Mod: LT | Performed by: RADIOLOGY

## 2020-12-11 PROCEDURE — 20610 DRAIN/INJ JOINT/BURSA W/O US: CPT | Mod: LT | Performed by: PHYSICAL MEDICINE & REHABILITATION

## 2020-12-11 RX ORDER — TRIAMCINOLONE ACETONIDE 40 MG/ML
40 INJECTION, SUSPENSION INTRA-ARTICULAR; INTRAMUSCULAR
Status: SHIPPED | OUTPATIENT
Start: 2020-12-11

## 2020-12-11 RX ADMIN — TRIAMCINOLONE ACETONIDE 40 MG: 40 INJECTION, SUSPENSION INTRA-ARTICULAR; INTRAMUSCULAR at 11:50
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
## 2020-12-11 NOTE — PATIENT INSTRUCTIONS
-Steroid injection performed today.  Take it easy over the next few days. Keep in mind that the steroid may take up to 3 days to start working and up to 2 weeks to reach maximal effect.    -Ice or heat for 15-20 minutes as needed for soreness.  -Patient's preferred over the counter medications as needed for soreness.   -Provided home exercises. Please do multiple times per day.  -Avoid aggravating activities.    -Follow up as needed if symptoms fail to improve or worsen.  Please call with questions or concerns.

## 2020-12-16 ENCOUNTER — MYC MEDICAL ADVICE (OUTPATIENT)
Dept: INTERNAL MEDICINE | Facility: CLINIC | Age: 50
End: 2020-12-16

## 2020-12-16 DIAGNOSIS — B37.31 YEAST INFECTION OF THE VAGINA: Primary | ICD-10-CM

## 2020-12-16 RX ORDER — FLUCONAZOLE 150 MG/1
150 TABLET ORAL DAILY
Qty: 3 TABLET | Refills: 0 | Status: SHIPPED | OUTPATIENT
Start: 2020-12-16 | End: 2020-12-19

## 2020-12-23 ENCOUNTER — VIRTUAL VISIT (OUTPATIENT)
Dept: PSYCHOLOGY | Facility: CLINIC | Age: 50
End: 2020-12-23
Payer: COMMERCIAL

## 2020-12-23 DIAGNOSIS — F41.1 GAD (GENERALIZED ANXIETY DISORDER): ICD-10-CM

## 2020-12-23 DIAGNOSIS — F43.10 PTSD (POST-TRAUMATIC STRESS DISORDER): Primary | ICD-10-CM

## 2020-12-23 DIAGNOSIS — F32.1 MODERATE MAJOR DEPRESSION, SINGLE EPISODE (H): ICD-10-CM

## 2020-12-23 PROCEDURE — 90834 PSYTX W PT 45 MINUTES: CPT | Mod: 95 | Performed by: PSYCHOLOGIST

## 2020-12-23 NOTE — PROGRESS NOTES
"[facility]: Integrated Behavioral Health  December 23, 2020      Behavioral Health Clinician Progress Note    Patient Name: Jayashree Johnson is a 50 year old female who is being evaluated via a telephone visit.      The patient has been notified of the following:     \"We have found that certain health care needs can be provided without the need for a face to face visit.  This service lets us provide the care you need with a short phone conversation.      I will have full access to your [place] medical record during this entire phone call.   I will be taking notes for your medical record.     Since this is like an office visit, we will bill your insurance company for this service.  Please check with your medical insurance if this type of telephone visit/virtual care is covered.  You may be responsible for the cost of this service if insurance coverage is denied.      There are potential benefits and risks of telephone visits (e.g. limits to patient confidentiality) that differ from in-person visits.?  Confidentiality still applies for telephone services, and nobody will record the visit.  It is important to be in a quiet, private space that is free of distractions (including cell phone or other devices) during the visit.??     If during the course of the call I believe a telephone visit is not appropriate, you will not be charged for this service\"    Consent has been obtained for this service by care team member: yes.    As the provider I attest to compliance with applicable laws and regulations related to telemedicine.         Service Type:  Individual      Service Location:   Phone call (patient / identified key support person reached)     Session Start Time: 01:00pm  Session End Time: 01:50pm      Session Length: 16 - 37      Attendees: Patient    Visit Activities (Refresh list every visit): [facility] Only    Diagnostic Assessment Date: 09/25/2020  Treatment Plan Review Date: " 01/09/2020  See Flowsheets for today's PHQ-9 and UMANG-7 results  Previous PHQ-9:   PHQ-9 SCORE 5/20/2019 3/4/2020 9/9/2020   PHQ-9 Total Score - - -   PHQ-9 Total Score MyChart - - -   PHQ-9 Total Score 10 0 12     Previous UMANG-7:   UMANG-7 SCORE 4/12/2017 3/2/2018 8/9/2019   Total Score 16 6 7       AYESHA LEVEL:  AYESHA Score (Last Two) 9/18/2012   AYESHA Raw Score 39   Activation Score 56.4   AYESHA Level 3       DATA  Extended Session (60+ minutes): No  Interactive Complexity: No  Crisis: No  [facility] Patient: No    Treatment Objective(s) Addressed in This Session:  Target Behavior(s): disease management/lifestyle changes related to pain    Psychological distress related to Pain Her emotions were processed and her     Current Stressors / Issues:  Melissa spoke about her plans for [place], the challenges she's facing regarding her pain, and needs she has for assistance. Her emotions were processed and assisted with identifying ways to get her needs met. She was taught a skill to manage the thoughts different.       Progress on Treatment Objective(s) / Homework:  Satisfactory progress - ACTION (Actively working towards change); Intervened by reinforcing change plan / affirming steps taken    Motivational Interviewing    MI Intervention: Expressed Empathy/Understanding, Supported Autonomy, Collaboration, Evocation, Permission to raise concern or advise, Open-ended questions, Reflections: simple and complex, Change talk (evoked) and Reframe     Change Talk Expressed by the Patient: Desire to change Ability to change Reasons to change Need to change Committment to change Activation Taking steps    Provider Response to Change Talk: E - Evoked more info from patient about behavior change, A - Affirmed patient's thoughts, decisions, or attempts at behavior change, R - Reflected patient's change talk and S - Summarized patient's change talk statements    Care Plan review completed: Yes    Medication Review:  No changes to current psychiatric  medication(s)    Medication Compliance:  Yes    Changes in Health Issues:   None reported    Chemical Use Review:   Substance Use: Chemical use reviewed, no active concerns identified      Tobacco Use: No current tobacco use.      Assessment: Current Emotional / Mental Status (status of significant symptoms):  Risk status (Self / Other harm or suicidal ideation)  Patient denies a history of suicidal ideation, suicide attempts, self-injurious behavior, homicidal ideation, homicidal behavior and and other safety concerns  Patient denies current fears or concerns for personal safety.  Patient denies current or recent suicidal ideation or behaviors.  Patient denies current or recent homicidal ideation or behaviors.  Patient denies current or recent self injurious behavior or ideation.  Patient denies other safety concerns.  A safety and risk management plan has not been developed at this time, however patient was encouraged to call [place] [phone] should there be a change in any of these risk factors.    Appearance:   Unable to assess over the phone   Eye Contact:   Unable to assess over the phone   Psychomotor Behavior: Unable to assess over the phone   Attitude:   Cooperative   Orientation:   All  Speech   Rate / Production: Normal    Volume:  Normal   Mood:    Normal  Affect:    Unable to assess over the phone   Thought Content:  Clear   Thought Form:  Coherent  Logical   Insight:    Good     Diagnoses:  1. PTSD (post-traumatic stress disorder)    2. UMANG (generalized anxiety disorder)    3. Moderate major depression, single episode (H)        Collateral Reports Completed:  Not Applicable    Plan: (Homework, other):  Patient was given information about behavioral services and encouraged to schedule a follow up appointment with the clinic [facility] in 2 weeks.  She was also encouraged to practice skills taught in session. CD Recommendations: No indications of CD issues.      Yoseph Buenrostro PsyD,  LP      ______________________________________________________________________    Integrated Primary Care Behavioral Health Treatment Plan    Patient's Name: Jayashree Johnson  YOB: 1970    Date: 10/09/2020    DSM-V Diagnoses: 296.22 (F32.1)  Major Depressive Disorder, Single Episode, Moderate _ or 309.81 (F43.10) Posttraumatic Stress Disorder (includes Posttraumatic Stress Disorder for Children 6 Years and Younger)  Without dissociative symptoms  Psychosocial / Contextual Factors: chronic pain    No flowsheet data found.    Referral / Collaboration:  Referral to another professional/service is not indicated at this time..    Anticipated number of session or this episode of care: 8-12      MeasurableTreatment Goal(s) related to diagnosis / functional impairment(s)  Goal 1: Patient will report increased value directed behaviors    I will know I've met my goal when I'm feeling better.       Objective #A (Patient Action)                          Patient will identify the cost of control/avoidance behaviors as a coping strategy.  Status: New - Date: 10/09/2020     Intervention(s)  [facility] will utilize exercises/worksheets to teach initial concepts (e.g., Choice Point, Life Turnaround, D.O.T.S.)     Objective #B  Patient will practice mindfulness skills.  Status: New - Date: 10/09/2020    Intervention(s)  [facility] will teach various mindfulness techniques (e.g., Notice 5 Things, 10 Mindful breaths, Leaves on a Stream)    Objective #C  Patient will clarify personal values for her life that positively impact behavior choices.  Status: New - Date: 10/09/2020     Intervention(s)  [facility] will help clarify values via exercises (e.g., values card sort) and worksheets (e.g., Oskar)     Objective #D  Patient will report increased value determined behaviors with decreased negative impact of anxiety.  Status: New - Date: 10/09/2020     Intervention(s)  [facility] will assign goal setting/committed action homework in service of  values      Mohit Buenrostro PsTra  October 9, 2020

## 2021-01-10 ENCOUNTER — HEALTH MAINTENANCE LETTER (OUTPATIENT)
Age: 51
End: 2021-01-10

## 2021-01-11 ENCOUNTER — VIRTUAL VISIT (OUTPATIENT)
Dept: PSYCHOLOGY | Facility: CLINIC | Age: 51
End: 2021-01-11
Payer: COMMERCIAL

## 2021-01-11 DIAGNOSIS — F32.1 MODERATE MAJOR DEPRESSION, SINGLE EPISODE (H): ICD-10-CM

## 2021-01-11 DIAGNOSIS — F41.1 GAD (GENERALIZED ANXIETY DISORDER): Primary | ICD-10-CM

## 2021-01-11 PROCEDURE — 90834 PSYTX W PT 45 MINUTES: CPT | Mod: 95 | Performed by: PSYCHOLOGIST

## 2021-01-11 NOTE — PROGRESS NOTES
"[facility] [place]: Integrated Behavioral Health  January 11, 2021      Behavioral Health Clinician Progress Note    Patient Name: Jayashree Johnson is a 50 year old female who is being evaluated via a telephone visit.      The patient has been notified of the following:     \"We have found that certain health care needs can be provided without the need for a face to face visit.  This service lets us provide the care you need with a short phone conversation.      I will have full access to your [facility] medical record during this entire phone call.   I will be taking notes for your medical record.     Since this is like an office visit, we will bill your insurance company for this service.  Please check with your medical insurance if this type of telephone visit/virtual care is covered.  You may be responsible for the cost of this service if insurance coverage is denied.      There are potential benefits and risks of telephone visits (e.g. limits to patient confidentiality) that differ from in-person visits.?  Confidentiality still applies for telephone services, and nobody will record the visit.  It is important to be in a quiet, private space that is free of distractions (including cell phone or other devices) during the visit.??     If during the course of the call I believe a telephone visit is not appropriate, you will not be charged for this service\"    Consent has been obtained for this service by care team member: yes.    As the provider I attest to compliance with applicable laws and regulations related to telemedicine.         Service Type:  Individual      Service Location:   Phone call (patient / identified key support person reached)     Session Start Time: 11:02am  Session End Time: 11:52am      Session Length: 38 - 52      Attendees: Patient    Visit Activities (Refresh list every visit): [facility] [place] Only    Diagnostic Assessment Date: 09/25/2020  Treatment Plan Review Date: " 01/09/2020  See Flowsheets for today's PHQ-9 and UMANG-7 results  Previous PHQ-9:   PHQ-9 SCORE 5/20/2019 3/4/2020 9/9/2020   PHQ-9 Total Score - - -   PHQ-9 Total Score MyChart - - -   PHQ-9 Total Score 10 0 12     Previous UMANG-7:   UMANG-7 SCORE 4/12/2017 3/2/2018 8/9/2019   Total Score 16 6 7       AYESHA LEVEL:  AYESHA Score (Last Two) 9/18/2012   AYESHA Raw Score 39   Activation Score 56.4   AYESHA Level 3       DATA  Extended Session (60+ minutes): No  Interactive Complexity: No  Crisis: No  [facility] Patient: No    Treatment Objective(s) Addressed in This Session:  Target Behavior(s): disease management/lifestyle changes related to pain    Psychological distress related to Pain Her emotions were processed and her     Current Stressors / Issues:  Melissa spoke about some of her upcoming difficult decisions to make. She noted negative/critical thoughts that seem to get in the way. She was taught some skills to obtain distance from them and to be more open/accepting of painful emotions.       Progress on Treatment Objective(s) / Homework:  Satisfactory progress - ACTION (Actively working towards change); Intervened by reinforcing change plan / affirming steps taken    Motivational Interviewing    MI Intervention: Expressed Empathy/Understanding, Supported Autonomy, Collaboration, Evocation, Permission to raise concern or advise, Open-ended questions, Reflections: simple and complex, Change talk (evoked) and Reframe     Change Talk Expressed by the Patient: Desire to change Ability to change Reasons to change Need to change Committment to change Activation Taking steps    Provider Response to Change Talk: E - Evoked more info from patient about behavior change, A - Affirmed patient's thoughts, decisions, or attempts at behavior change, R - Reflected patient's change talk and S - Summarized patient's change talk statements    Care Plan review completed: Yes    Medication Review:  No changes to current psychiatric  medication(s)    Medication Compliance:  Yes    Changes in Health Issues:   None reported    Chemical Use Review:   Substance Use: Chemical use reviewed, no active concerns identified      Tobacco Use: No current tobacco use.      Assessment: Current Emotional / Mental Status (status of significant symptoms):  Risk status (Self / Other harm or suicidal ideation)  Patient denies a history of suicidal ideation, suicide attempts, self-injurious behavior, homicidal ideation, homicidal behavior and and other safety concerns  Patient denies current fears or concerns for personal safety.  Patient denies current or recent suicidal ideation or behaviors.  Patient denies current or recent homicidal ideation or behaviors.  Patient denies current or recent self injurious behavior or ideation.  Patient denies other safety concerns.  A safety and risk management plan has not been developed at this time, however patient was encouraged to call [place] [phone] should there be a change in any of these risk factors.    Appearance:   Unable to assess over the phone   Eye Contact:   Unable to assess over the phone   Psychomotor Behavior: Unable to assess over the phone   Attitude:   Cooperative   Orientation:   All  Speech   Rate / Production: Normal    Volume:  Normal   Mood:    Normal  Affect:    Unable to assess over the phone   Thought Content:  Clear   Thought Form:  Coherent  Logical   Insight:    Good     Diagnoses:  1. UMANG (generalized anxiety disorder)    2. Moderate major depression, single episode (H)        Collateral Reports Completed:  Not Applicable    Plan: (Homework, other):  Patient was given information about behavioral services and encouraged to schedule a follow up appointment with the clinic [facility] in 2 weeks.  She was also encouraged to practice skills taught in session. CD Recommendations: No indications of CD issues.      Yoseph Buenrostro PsyD,  LP      ______________________________________________________________________    Integrated Primary Care Behavioral Health Treatment Plan    Patient's Name: Jayashree Johnson  YOB: 1970    Date: 10/09/2020    DSM-V Diagnoses: 296.22 (F32.1)  Major Depressive Disorder, Single Episode, Moderate _ or 309.81 (F43.10) Posttraumatic Stress Disorder (includes Posttraumatic Stress Disorder for Children 6 Years and Younger)  Without dissociative symptoms  Psychosocial / Contextual Factors: chronic pain    No flowsheet data found.    Referral / Collaboration:  Referral to another professional/service is not indicated at this time..    Anticipated number of session or this episode of care: 8-12      MeasurableTreatment Goal(s) related to diagnosis / functional impairment(s)  Goal 1: Patient will report increased value directed behaviors    I will know I've met my goal when I'm feeling better.       Objective #A (Patient Action)                          Patient will identify the cost of control/avoidance behaviors as a coping strategy.  Status: New - Date: 10/09/2020     Intervention(s)  [facility] will utilize exercises/worksheets to teach initial concepts (e.g., Choice Point, Life Turnaround, D.O.T.S.)     Objective #B  Patient will practice mindfulness skills.  Status: New - Date: 10/09/2020    Intervention(s)  [facility] will teach various mindfulness techniques (e.g., Notice 5 Things, 10 Mindful breaths, Leaves on a Stream)    Objective #C  Patient will clarify personal values for her life that positively impact behavior choices.  Status: New - Date: 10/09/2020     Intervention(s)  [facility] will help clarify values via exercises (e.g., values card sort) and worksheets (e.g., Oskar)     Objective #D  Patient will report increased value determined behaviors with decreased negative impact of anxiety.  Status: New - Date: 10/09/2020     Intervention(s)  [facility] will assign goal setting/committed action homework in service of  values      Mohit Buenrostro PsTra  October 9, 2020

## 2021-01-12 ENCOUNTER — OFFICE VISIT (OUTPATIENT)
Dept: ORTHOPEDICS | Facility: CLINIC | Age: 51
End: 2021-01-12
Payer: COMMERCIAL

## 2021-01-12 ENCOUNTER — ANCILLARY PROCEDURE (OUTPATIENT)
Dept: GENERAL RADIOLOGY | Facility: CLINIC | Age: 51
End: 2021-01-12
Attending: PHYSICAL MEDICINE & REHABILITATION
Payer: COMMERCIAL

## 2021-01-12 VITALS — BODY MASS INDEX: 27.66 KG/M2 | SYSTOLIC BLOOD PRESSURE: 138 MMHG | DIASTOLIC BLOOD PRESSURE: 84 MMHG | WEIGHT: 174 LBS

## 2021-01-12 DIAGNOSIS — M25.512 ACUTE PAIN OF LEFT SHOULDER: ICD-10-CM

## 2021-01-12 DIAGNOSIS — M54.12 CERVICAL RADICULOPATHY: ICD-10-CM

## 2021-01-12 DIAGNOSIS — M54.2 CERVICALGIA: ICD-10-CM

## 2021-01-12 DIAGNOSIS — M54.2 CERVICALGIA: Primary | ICD-10-CM

## 2021-01-12 DIAGNOSIS — Z98.1 HISTORY OF FUSION OF CERVICAL SPINE: ICD-10-CM

## 2021-01-12 PROCEDURE — 99214 OFFICE O/P EST MOD 30 MIN: CPT | Performed by: PHYSICAL MEDICINE & REHABILITATION

## 2021-01-12 PROCEDURE — 72040 X-RAY EXAM NECK SPINE 2-3 VW: CPT | Mod: TC | Performed by: RADIOLOGY

## 2021-01-12 RX ORDER — CLINDAMYCIN PALMITATE HYDROCHLORIDE 75 MG/5ML
SOLUTION ORAL 3 TIMES DAILY
Status: CANCELLED | OUTPATIENT
Start: 2021-01-12

## 2021-01-12 ASSESSMENT — PAIN SCALES - GENERAL: PAINLEVEL: SEVERE PAIN (7)
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
## 2021-01-12 NOTE — PATIENT INSTRUCTIONS
-MRI cervical spine and left shoulder ordered.  [facility] [phone]. Cost estimates can be provided by [employer] at the same number.    -Follow up after completion of MRI.  Can be in person or virtual appointment.  Please schedule at least 1-2 business days after MRI is completed to ensure we have the results of the MRI.
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
## 2021-01-13 ENCOUNTER — HOSPITAL ENCOUNTER (OUTPATIENT)
Dept: MRI IMAGING | Facility: CLINIC | Age: 51
End: 2021-01-13
Attending: PHYSICAL MEDICINE & REHABILITATION
Payer: COMMERCIAL

## 2021-01-13 ENCOUNTER — MYC MEDICAL ADVICE (OUTPATIENT)
Dept: ORTHOPEDICS | Facility: CLINIC | Age: 51
End: 2021-01-13

## 2021-01-13 DIAGNOSIS — M25.512 ACUTE PAIN OF LEFT SHOULDER: Primary | ICD-10-CM

## 2021-01-13 DIAGNOSIS — M25.512 ACUTE PAIN OF LEFT SHOULDER: ICD-10-CM

## 2021-01-13 DIAGNOSIS — Z98.1 HISTORY OF FUSION OF CERVICAL SPINE: ICD-10-CM

## 2021-01-13 DIAGNOSIS — M54.2 CERVICALGIA: ICD-10-CM

## 2021-01-13 DIAGNOSIS — M54.12 CERVICAL RADICULOPATHY: ICD-10-CM

## 2021-01-13 PROCEDURE — 73221 MRI JOINT UPR EXTREM W/O DYE: CPT | Mod: LT

## 2021-01-13 PROCEDURE — 72141 MRI NECK SPINE W/O DYE: CPT

## 2021-01-13 PROCEDURE — 73221 MRI JOINT UPR EXTREM W/O DYE: CPT | Mod: 26 | Performed by: RADIOLOGY

## 2021-01-15 ENCOUNTER — VIRTUAL VISIT (OUTPATIENT)
Dept: ORTHOPEDICS | Facility: CLINIC | Age: 51
End: 2021-01-15
Payer: COMMERCIAL

## 2021-01-15 DIAGNOSIS — M67.912 TENDINOPATHY OF LEFT ROTATOR CUFF: ICD-10-CM

## 2021-01-15 DIAGNOSIS — M75.02 ADHESIVE CAPSULITIS OF LEFT SHOULDER: ICD-10-CM

## 2021-01-15 DIAGNOSIS — M25.512 ACUTE PAIN OF LEFT SHOULDER: Primary | ICD-10-CM

## 2021-01-15 DIAGNOSIS — M75.102 TEAR OF LEFT SUPRASPINATUS TENDON: ICD-10-CM

## 2021-01-15 PROCEDURE — 99214 OFFICE O/P EST MOD 30 MIN: CPT | Mod: 95 | Performed by: PHYSICAL MEDICINE & REHABILITATION

## 2021-01-15 RX ORDER — CYCLOBENZAPRINE HCL 5 MG
5 TABLET ORAL 3 TIMES DAILY PRN
Qty: 30 TABLET | Refills: 1 | Status: SHIPPED | OUTPATIENT
Start: 2021-01-15 | End: 2021-10-20

## 2021-01-15 NOTE — PATIENT INSTRUCTIONS
-Physical therapy ordered.  Please do 5-6 days of exercises per week between formal sessions and the home exercises they provide.  -Glenohumeral joint steroid injection ordered today.  [facility] [phone]. Cost estimates can be provided by [facility] at the same number.  -Ice or heat 15-20 minutes as needed (Avoid sleeping on a heating pad or ice)  -Patient's preferred over the counter medications as directed on packaging as needed for pain or soreness.      -Also discussed orthopedic surgery referral    -Follow up as needed if symptoms fail to improve or worsen.  Please call with questions or concerns.

## 2021-01-15 NOTE — PROGRESS NOTES
Melissa is a 50 year old who is being evaluated via a billable telephone visit.      What phone number would you like to be contacted at? [phone]  How would you like to obtain your AVS? Annetta Torres is a 50 year old who presents for a telephone visit today.  Symptoms are similar to when we saw her on 1/12/2021.        Imaging:  Independent visualization of images performed.      Radiology:  MR CERVICAL SPINE WITHOUT CONTRAST 1/13/2021 6:01 PM      HISTORY: Neck pain, left arm pain and weakness, numbness and tingling  left arm.  History of fusion. Cervicalgia. Cervical radiculopathy.  History of fusion of cervical spine. Acute pain of left shoulder     TECHNIQUE: Multiplanar multisequence images were obtained through the  cervical spine without contrast.     COMPARISON: 1/23/2019.     FINDINGS: There has been prior anterior fusion procedure at C5-C6.  Posterior alignment is normal. Vertebral body heights are maintained.  Bone marrow signal intensity is normal as visualized. There is  metallic artifact at the C5-6 level partially obscuring these bodies.  Disc space narrowing is present at C4-C5. The cervical cord is normal  in morphology and signal characteristics. Paraspinal soft tissues are  unremarkable as visualized.     C1-C2: Normal.     C2-C3: Normal.     C3-C4: There is a small central disc protrusion which is causing mild  central canal stenosis but no neural foraminal stenosis.     C4-C5: There is a broad-based disc bulge and some disc space narrowing  causing mild central canal stenosis but no neural foraminal stenosis.     C5-C6: This level has been fused. There is no significant stenosis.     C6-C7: Minimal disc bulge. No stenosis.     C7-T1: Normal.                                                                      IMPRESSION:  1. Previous C5-C6 anterior fusion. There is no stenosis at this level.  2. Small central disc protrusion at C3-C4 with secondary mild central  canal stenosis.  3.  Broad-based disc bulge at C4-C5 with mild central canal stenosis.     DIMITRI VERMA MD    EXAM: MR left shoulder without  contrast 1/14/2021 7:10 AM     TECHNIQUE: Multiplanar, multisequence imaging of the left shoulder  were obtained without administration of intravenous or intra-articular  gadolinium contrast using routine protocol.     History: Decreased left shoulder ROM and weakness, shoulder pain  despite conservative care.; Cervicalgia; Cervical radiculopathy;  History of fusion of cervical spine; Acute pain of left shoulder      Comparison: Radiographs 12/11/2020     Findings:     ROTATOR CUFF and ASSOCIATED STRUCTURES  Rotator cuff: High-grade bursal sided tearing of the anterior  supraspinatus at the footprint with minimal retraction of the bursal  sided fibers. The infraspinatus and teres minor are intact. Tendinosis  of the subscapularis with foci of low-grade articular sided tearing.     Bursa: Trace fluid in subacromial-subdeltoid bursa.     Musculature: Muscle bulk of rotator cuff is preserved.  Deltoid muscle  bulk is also preserved.  No muscle edema.     Acromioclavicular joint  There are mild degenerative changes of the acromioclavicular joint.  Acromion is type 2 in sagittal morphology.  Coracoacromial ligament is  not thickened.     OSSEOUS STRUCTURES  No fracture, marrow contusion or marrow infiltration.     LONG BICIPITAL TENDON  The long head of the biceps tendon is normally situated within the  bicipital groove. No complete or partial biceps tendon tear is  present.     GLENOHUMERAL JOINT  Joint fluid: Physiologic amount of joint fluid is  present.     Cartilage and subarticular bone:  Thinning of cartilage of the  superior humeral head.     Labrum: Limited assessment on this study with relative lack of joint  distention shows no labral tear.     ANCILLARY FINDINGS:  Edematous and thickened coracohumeral ligament. Thickening and  edematous inferior glenohumeral ligament. Mild soft tissue  edema  distal to the axillary pouch. Mild edema within visualized intercostal  muscles, likely muscle strain.                                                                      Impression:     1. High-grade bursal sided tearing of the anterior supraspinatus at  the footprint with minimal retraction of the bursal sided fibers.     2. Tendinosis of the subscapularis with foci of low-grade articular  sided tearing.     3. Findings of adhesive capsulitis.     Assessment:  Acute pain of left shoulder  Tear of left supraspinatus tendon  Tendinopathy of left rotator cuff  Adhesive capsulitis of left shoulder    Plan:  -Physical therapy ordered.  Please do 5-6 days of exercises per week between formal sessions and the home exercises they provide.  -Glenohumeral joint steroid injection ordered today.  [facility] [phone]. Cost estimates can be provided by [facility] at the same number.  -Flexeril prescribed.  Can take up to 3 times a day for muscle spasms/pain.  Can cause sedation.  Do not take prior to driving.    -Ice or heat 15-20 minutes as needed (Avoid sleeping on a heating pad or ice)  -Patient's preferred over the counter medications as directed on packaging as needed for pain or soreness.      -Also discussed orthopedic surgery referral    -Follow up as needed if symptoms fail to improve or worsen.  Please call with questions or concerns.        Aliza Eason MD, CAQ Sports Medicine  [facility]      Phone call duration: 12 minutes

## 2021-01-15 NOTE — LETTER
1/15/2021         RE: Jayashree Johnson  [address]  [facility] [address]        Dear Colleague,    Thank you for referring your patient, Jayashree Johnson, to the [facility] [place]. Please see a copy of my visit note below.    Melissa is a 50 year old who is being evaluated via a billable telephone visit.      What phone number would you like to be contacted at? [phone]  How would you like to obtain your AVS? Annetta Torres is a 50 year old who presents for a telephone visit today.  Symptoms are similar to when we saw her on 1/12/2021.        Imaging:  Independent visualization of images performed.      Radiology:  MR CERVICAL SPINE WITHOUT CONTRAST 1/13/2021 6:01 PM      HISTORY: Neck pain, left arm pain and weakness, numbness and tingling  left arm.  History of fusion. Cervicalgia. Cervical radiculopathy.  History of fusion of cervical spine. Acute pain of left shoulder     TECHNIQUE: Multiplanar multisequence images were obtained through the  cervical spine without contrast.     COMPARISON: 1/23/2019.     FINDINGS: There has been prior anterior fusion procedure at C5-C6.  Posterior alignment is normal. Vertebral body heights are maintained.  Bone marrow signal intensity is normal as visualized. There is  metallic artifact at the C5-6 level partially obscuring these bodies.  Disc space narrowing is present at C4-C5. The cervical cord is normal  in morphology and signal characteristics. Paraspinal soft tissues are  unremarkable as visualized.     C1-C2: Normal.     C2-C3: Normal.     C3-C4: There is a small central disc protrusion which is causing mild  central canal stenosis but no neural foraminal stenosis.     C4-C5: There is a broad-based disc bulge and some disc space narrowing  causing mild central canal stenosis but no neural foraminal stenosis.     C5-C6: This level has been fused. There is no significant stenosis.     C6-C7: Minimal disc bulge. No  stenosis.     C7-T1: Normal.                                                                      IMPRESSION:  1. Previous C5-C6 anterior fusion. There is no stenosis at this level.  2. Small central disc protrusion at C3-C4 with secondary mild central  canal stenosis.  3. Broad-based disc bulge at C4-C5 with mild central canal stenosis.     DIMITRI VERMA MD    EXAM: MR left shoulder without  contrast 1/14/2021 7:10 AM     TECHNIQUE: Multiplanar, multisequence imaging of the left shoulder  were obtained without administration of intravenous or intra-articular  gadolinium contrast using routine protocol.     History: Decreased left shoulder ROM and weakness, shoulder pain  despite conservative care.; Cervicalgia; Cervical radiculopathy;  History of fusion of cervical spine; Acute pain of left shoulder      Comparison: Radiographs 12/11/2020     Findings:     ROTATOR CUFF and ASSOCIATED STRUCTURES  Rotator cuff: High-grade bursal sided tearing of the anterior  supraspinatus at the footprint with minimal retraction of the bursal  sided fibers. The infraspinatus and teres minor are intact. Tendinosis  of the subscapularis with foci of low-grade articular sided tearing.     Bursa: Trace fluid in subacromial-subdeltoid bursa.     Musculature: Muscle bulk of rotator cuff is preserved.  Deltoid muscle  bulk is also preserved.  No muscle edema.     Acromioclavicular joint  There are mild degenerative changes of the acromioclavicular joint.  Acromion is type 2 in sagittal morphology.  Coracoacromial ligament is  not thickened.     OSSEOUS STRUCTURES  No fracture, marrow contusion or marrow infiltration.     LONG BICIPITAL TENDON  The long head of the biceps tendon is normally situated within the  bicipital groove. No complete or partial biceps tendon tear is  present.     GLENOHUMERAL JOINT  Joint fluid: Physiologic amount of joint fluid is  present.     Cartilage and subarticular bone:  Thinning of cartilage of the  superior  humeral head.     Labrum: Limited assessment on this study with relative lack of joint  distention shows no labral tear.     ANCILLARY FINDINGS:  Edematous and thickened coracohumeral ligament. Thickening and  edematous inferior glenohumeral ligament. Mild soft tissue edema  distal to the axillary pouch. Mild edema within visualized intercostal  muscles, likely muscle strain.                                                                      Impression:     1. High-grade bursal sided tearing of the anterior supraspinatus at  the footprint with minimal retraction of the bursal sided fibers.     2. Tendinosis of the subscapularis with foci of low-grade articular  sided tearing.     3. Findings of adhesive capsulitis.     Assessment:  Acute pain of left shoulder  Tear of left supraspinatus tendon  Tendinopathy of left rotator cuff  Adhesive capsulitis of left shoulder    Plan:  -Physical therapy ordered.  Please do 5-6 days of exercises per week between formal sessions and the home exercises they provide.  -Glenohumeral joint steroid injection ordered today.  [facility] [phone]. Cost estimates can be provided by [facility] at the same number.  -Ice or heat 15-20 minutes as needed (Avoid sleeping on a heating pad or ice)  -Patient's preferred over the counter medications as directed on packaging as needed for pain or soreness.      -Also discussed orthopedic surgery referral    -Follow up as needed if symptoms fail to improve or worsen.  Please call with questions or concerns.        Aliza Eason MD, [facility]  [facility]      Phone call duration: 12 minutes        Again, thank you for allowing me to participate in the care of your patient.        Sincerely,        Nenita Eason MD

## 2021-01-18 ENCOUNTER — MYC MEDICAL ADVICE (OUTPATIENT)
Dept: ORTHOPEDICS | Facility: CLINIC | Age: 51
End: 2021-01-18

## 2021-01-18 DIAGNOSIS — Z11.59 ENCOUNTER FOR SCREENING FOR OTHER VIRAL DISEASES: Primary | ICD-10-CM

## 2021-01-18 DIAGNOSIS — M25.512 ACUTE PAIN OF LEFT SHOULDER: Primary | ICD-10-CM

## 2021-01-20 ENCOUNTER — OFFICE VISIT (OUTPATIENT)
Dept: ORTHOPEDICS | Facility: CLINIC | Age: 51
End: 2021-01-20
Attending: PHYSICAL MEDICINE & REHABILITATION
Payer: COMMERCIAL

## 2021-01-20 VITALS
HEIGHT: 68 IN | DIASTOLIC BLOOD PRESSURE: 70 MMHG | SYSTOLIC BLOOD PRESSURE: 124 MMHG | WEIGHT: 173.5 LBS | BODY MASS INDEX: 26.3 KG/M2

## 2021-01-20 DIAGNOSIS — M75.02 ADHESIVE CAPSULITIS OF LEFT SHOULDER: ICD-10-CM

## 2021-01-20 DIAGNOSIS — M75.112 NONTRAUMATIC INCOMPLETE TEAR OF LEFT ROTATOR CUFF: Primary | ICD-10-CM

## 2021-01-20 PROCEDURE — 99243 OFF/OP CNSLTJ NEW/EST LOW 30: CPT | Performed by: ORTHOPAEDIC SURGERY

## 2021-01-20 ASSESSMENT — PAIN SCALES - GENERAL: PAINLEVEL: MODERATE PAIN (5)

## 2021-01-20 ASSESSMENT — MIFFLIN-ST. JEOR: SCORE: 1445.49
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
## 2021-01-21 ENCOUNTER — MYC MEDICAL ADVICE (OUTPATIENT)
Dept: ORTHOPEDICS | Facility: CLINIC | Age: 51
End: 2021-01-21

## 2021-01-21 ENCOUNTER — OFFICE VISIT (OUTPATIENT)
Dept: LAB | Facility: CLINIC | Age: 51
End: 2021-01-21
Payer: COMMERCIAL

## 2021-01-21 DIAGNOSIS — Z11.59 ENCOUNTER FOR SCREENING FOR OTHER VIRAL DISEASES: ICD-10-CM

## 2021-01-21 LAB
LABORATORY COMMENT REPORT: NORMAL
SARS-COV-2 RNA RESP QL NAA+PROBE: NEGATIVE
SARS-COV-2 RNA RESP QL NAA+PROBE: NORMAL
SPECIMEN SOURCE: NORMAL
SPECIMEN SOURCE: NORMAL

## 2021-01-21 PROCEDURE — U0005 INFEC AGEN DETEC AMPLI PROBE: HCPCS | Performed by: PHYSICAL MEDICINE & REHABILITATION

## 2021-01-21 PROCEDURE — U0003 INFECTIOUS AGENT DETECTION BY NUCLEIC ACID (DNA OR RNA); SEVERE ACUTE RESPIRATORY SYNDROME CORONAVIRUS 2 (SARS-COV-2) (CORONAVIRUS DISEASE [COVID-19]), AMPLIFIED PROBE TECHNIQUE, MAKING USE OF HIGH THROUGHPUT TECHNOLOGIES AS DESCRIBED BY CMS-2020-01-R: HCPCS | Performed by: PHYSICAL MEDICINE & REHABILITATION

## 2021-01-21 NOTE — TELEPHONE ENCOUNTER
Often this will resolve in time (on its own).  Can take up to 6 months or more to fully get better, but impossible to predict exactly how long it will take. There is no exact average.      Recommend gentle motion and stretching exercises as guided by pain or tolerance.       Also  consider the steroid injection done by radiology under xray as this can improve the pain.    Ultimately cannot predict when exactly or if the shoulder will start feeling better, as it is different for every patient and is affected by multiple different factors.      MIMI Zarate, CNP  Orthopedic Surgery

## 2021-01-21 NOTE — TELEPHONE ENCOUNTER
Like we discussed in the office, I recommend proceeding with the steroid injection that Dr. Eason had recommended and ordered.  Limit your use of the shoulder.  Let pain be the guide for any activity.

## 2021-01-29 ENCOUNTER — VIRTUAL VISIT (OUTPATIENT)
Dept: PSYCHOLOGY | Facility: CLINIC | Age: 51
End: 2021-01-29
Payer: COMMERCIAL

## 2021-01-29 DIAGNOSIS — F32.1 MODERATE MAJOR DEPRESSION, SINGLE EPISODE (H): Primary | ICD-10-CM

## 2021-01-29 DIAGNOSIS — F41.1 GAD (GENERALIZED ANXIETY DISORDER): ICD-10-CM

## 2021-01-29 PROCEDURE — 90834 PSYTX W PT 45 MINUTES: CPT | Mod: 95 | Performed by: PSYCHOLOGIST

## 2021-01-29 NOTE — PROGRESS NOTES
"[facility]: Integrated Behavioral Health  January 22, 2021      Behavioral Health Clinician Progress Note    Patient Name: Jayashree Johnson is a 50 year old female who is being evaluated via a telephone visit.      The patient has been notified of the following:     \"We have found that certain health care needs can be provided without the need for a face to face visit.  This service lets us provide the care you need with a short phone conversation.      I will have full access to your [facility] medical record during this entire phone call.   I will be taking notes for your medical record.     Since this is like an office visit, we will bill your insurance company for this service.  Please check with your medical insurance if this type of telephone visit/virtual care is covered.  You may be responsible for the cost of this service if insurance coverage is denied.      There are potential benefits and risks of telephone visits (e.g. limits to patient confidentiality) that differ from in-person visits.?  Confidentiality still applies for telephone services, and nobody will record the visit.  It is important to be in a quiet, private space that is free of distractions (including cell phone or other devices) during the visit.??     If during the course of the call I believe a telephone visit is not appropriate, you will not be charged for this service\"    Consent has been obtained for this service by care team member: yes.    As the provider I attest to compliance with applicable laws and regulations related to telemedicine.         Service Type:  Individual      Service Location:   Phone call (patient / identified key support person reached)     Session Start Time: 10:00am  Session End Time: 10:45am      Session Length: 16 - 37      Attendees: Patient    Visit Activities (Refresh list every visit): [facility] Only    Diagnostic Assessment Date: 09/25/2020  Treatment Plan Review Date: " 04/29/2021  See Flowsheets for today's PHQ-9 and UMANG-7 results  Previous PHQ-9:   PHQ-9 SCORE 5/20/2019 3/4/2020 9/9/2020   PHQ-9 Total Score - - -   PHQ-9 Total Score MyChart - - -   PHQ-9 Total Score 10 0 12     Previous UMANG-7:   UMANG-7 SCORE 4/12/2017 3/2/2018 8/9/2019   Total Score 16 6 7       AYESHA LEVEL:  AYESHA Score (Last Two) 9/18/2012   AYESAH Raw Score 39   Activation Score 56.4   AYESHA Level 3       DATA  Extended Session (60+ minutes): No  Interactive Complexity: No  Crisis: No  [facility] Patient: No    Treatment Objective(s) Addressed in This Session:  Target Behavior(s): disease management/lifestyle changes related to pain    Psychological distress related to Pain Her emotions were processed and her     Current Stressors / Issues:  Melissa reported that her step-father passed away last week, and she reported having some shoulder problems with minimal treatment options at this time. She expressed her attempts at having more positive thinking about her life with limited success. She was guided through an exercise to demonstrate the difference between herself and her thoughts. She was taught an ACT defusion skill as well. Treatment plan was updated.      Progress on Treatment Objective(s) / Homework:  Minimal progress - ACTION (Actively working towards change); Intervened by reinforcing change plan / affirming steps taken    Motivational Interviewing    MI Intervention: Expressed Empathy/Understanding, Supported Autonomy, Collaboration, Evocation, Permission to raise concern or advise, Open-ended questions, Reflections: simple and complex, Change talk (evoked) and Reframe     Change Talk Expressed by the Patient: Desire to change Ability to change Reasons to change Need to change Committment to change Activation Taking steps    Provider Response to Change Talk: E - Evoked more info from patient about behavior change, A - Affirmed patient's thoughts, decisions, or attempts at behavior change, R - Reflected patient's change  talk and S - Summarized patient's change talk statements    Care Plan review completed: Yes    Medication Review:  No changes to current psychiatric medication(s)    Medication Compliance:  Yes    Changes in Health Issues:   None reported    Chemical Use Review:   Substance Use: Chemical use reviewed, no active concerns identified      Tobacco Use: No current tobacco use.      Assessment: Current Emotional / Mental Status (status of significant symptoms):  Risk status (Self / Other harm or suicidal ideation)  Patient denies a history of suicidal ideation, suicide attempts, self-injurious behavior, homicidal ideation, homicidal behavior and and other safety concerns  Patient denies current fears or concerns for personal safety.  Patient denies current or recent suicidal ideation or behaviors.  Patient denies current or recent homicidal ideation or behaviors.  Patient denies current or recent self injurious behavior or ideation.  Patient denies other safety concerns.  A safety and risk management plan has not been developed at this time, however patient was encouraged to call [place] [phone] should there be a change in any of these risk factors.    Appearance:   Unable to assess over the phone   Eye Contact:   Unable to assess over the phone   Psychomotor Behavior: Unable to assess over the phone   Attitude:   Cooperative   Orientation:   All  Speech   Rate / Production: Normal    Volume:  Normal   Mood:    Depressed  Sad   Affect:    Unable to assess over the phone   Thought Content:  Clear   Thought Form:  Coherent  Logical   Insight:    Good     Diagnoses:  1. Moderate major depression, single episode (H)    2. UMANG (generalized anxiety disorder)        Collateral Reports Completed:  Not Applicable    Plan: (Homework, other):  Patient was given information about behavioral services and encouraged to schedule a follow up appointment with the clinic [facility] in 2 weeks.  She was also encouraged to practice skills  taught in session. CD Recommendations: No indications of CD issues.      Yoseph Buenrostro, PsyD, LP      ______________________________________________________________________    Integrated Primary Care Behavioral Health Treatment Plan    Patient's Name: Jayashree Johnson  YOB: 1970    Date: January 29, 2021    DSM-V Diagnoses: 296.22 (F32.1)  Major Depressive Disorder, Single Episode, Moderate _ or 309.81 (F43.10) Posttraumatic Stress Disorder (includes Posttraumatic Stress Disorder for Children 6 Years and Younger)  Without dissociative symptoms  Psychosocial / Contextual Factors: chronic pain    No flowsheet data found.    Referral / Collaboration:  Referral to another professional/service is not indicated at this time..    Anticipated number of session or this episode of care: 8-12      MeasurableTreatment Goal(s) related to diagnosis / functional impairment(s)  Goal 1: Patient will report increased value directed behaviors    I will know I've met my goal when I'm feeling better.       Objective #A (Patient Action)                          Patient will identify the cost of control/avoidance behaviors as a coping strategy.  Status: Continued - Date(s): 01/29/2021     Intervention(s)  [facility] will utilize exercises/worksheets to teach initial concepts (e.g., Choice Point, Life Turnaround, D.O.T.S.)     Objective #B  Patient will practice mindfulness skills.  Status: Continued - Date(s):  01/29/2021    Intervention(s)  [facility] will teach various mindfulness techniques (e.g., Notice 5 Things, 10 Mindful breaths, Leaves on a Stream)    Objective #C  Patient will clarify personal values for her life that positively impact behavior choices.  Status: Continued - Date(s): 01/29/2021     Intervention(s)  [facility] will help clarify values via exercises (e.g., values card sort) and worksheets (e.g., Oskar)     Objective #D  Patient will report increased value determined behaviors with decreased negative impact of  anxiety.  Status: Continued - Date(s):  01/29/2021     Intervention(s)  [facility] will assign goal setting/committed action homework in service of values      Mohit Buenrostro PsyD  January 29, 2021

## 2021-02-17 ENCOUNTER — HOSPITAL ENCOUNTER (OUTPATIENT)
Dept: PHYSICAL THERAPY | Facility: CLINIC | Age: 51
Setting detail: THERAPIES SERIES
End: 2021-02-17
Attending: PHYSICAL MEDICINE & REHABILITATION
Payer: COMMERCIAL

## 2021-02-17 PROCEDURE — 97110 THERAPEUTIC EXERCISES: CPT | Mod: GP | Performed by: PHYSICAL THERAPIST

## 2021-02-17 PROCEDURE — 97530 THERAPEUTIC ACTIVITIES: CPT | Mod: GP | Performed by: PHYSICAL THERAPIST

## 2021-02-17 PROCEDURE — 97162 PT EVAL MOD COMPLEX 30 MIN: CPT | Mod: GP | Performed by: PHYSICAL THERAPIST

## 2021-02-17 NOTE — PROGRESS NOTES
[facility]          OUTPATIENT PHYSICAL THERAPY ORTHOPEDIC EVALUATION  PLAN OF TREATMENT FOR OUTPATIENT REHABILITATION  (COMPLETE FOR INITIAL CLAIMS ONLY)  Patient's Last Name, First Name, M.I.  YOB: 1970  Jayashree Johnson    Provider s Name:  [facility]   Medical Record No.  [id number]   Start of Care Date:  02/17/21   Onset Date:  11/17/20   Type:     _X__PT   ___OT   ___SLP Medical Diagnosis:  Acute pain of the left shoulder, tear of left supraspinatus tendon, tendinopathy of the left rotator cuff, adhesive capsulitis of the left shoulder     PT Diagnosis:  neck pain, LUE pain to the fingers, thumb is stiff, loss of ROM in neck and the left shoulder, referral pain from the cervical   Visits from SOC:  1      _________________________________________________________________________________  Plan of Treatment/Functional Goals:  joint mobilization, manual therapy, neuromuscular re-education, ROM, strengthening, stretching     Cryotherapy, Electrical stimulation, Traction, Ultrasound     Goals  Goal Identifier: 1  Goal Description: Patient has 50% increase function in the left shoulder as seen by a SPADI score of 47.   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                        Certification Date From:  02/17/21   Certification Date To:  05/17/21    Referring Provider:      Initial Assessment        See Epic Evaluation Start of Care Date: 02/17/21
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
## 2021-02-23 ENCOUNTER — TELEPHONE (OUTPATIENT)
Dept: DERMATOLOGY | Facility: CLINIC | Age: 51
End: 2021-02-23

## 2021-02-23 DIAGNOSIS — J30.89 CHRONIC NONSEASONAL ALLERGIC RHINITIS DUE TO POLLEN: ICD-10-CM

## 2021-02-23 RX ORDER — LORATADINE 10 MG/1
TABLET ORAL
Qty: 90 TABLET | Refills: 1 | Status: SHIPPED | OUTPATIENT
Start: 2021-02-23 | End: 2021-07-28

## 2021-02-23 NOTE — TELEPHONE ENCOUNTER
I spoke with Melissa and scheduled her for her 6 month skin exam and scar evaluation.  Zaida Veliz RN

## 2021-02-23 NOTE — TELEPHONE ENCOUNTER
[facility] Call Center    Phone Message    May a detailed message be left on voicemail: yes     Reason for Call: Other: Patient called to schedule with Dr. Meyers. please call back to discuss.      Action Taken: Message routed to:  Adult Clinics: Dermatology p [phone]    Travel Screening: Not Applicable

## 2021-02-24 ENCOUNTER — HOSPITAL ENCOUNTER (OUTPATIENT)
Dept: PHYSICAL THERAPY | Facility: CLINIC | Age: 51
Setting detail: THERAPIES SERIES
End: 2021-02-24
Attending: PHYSICAL MEDICINE & REHABILITATION
Payer: COMMERCIAL

## 2021-02-24 DIAGNOSIS — J30.89 CHRONIC NONSEASONAL ALLERGIC RHINITIS DUE TO POLLEN: ICD-10-CM

## 2021-02-24 PROCEDURE — 97110 THERAPEUTIC EXERCISES: CPT | Mod: GP | Performed by: PHYSICAL THERAPIST

## 2021-02-24 PROCEDURE — 97140 MANUAL THERAPY 1/> REGIONS: CPT | Mod: GP | Performed by: PHYSICAL THERAPIST

## 2021-02-25 ENCOUNTER — MYC MEDICAL ADVICE (OUTPATIENT)
Dept: INTERNAL MEDICINE | Facility: CLINIC | Age: 51
End: 2021-02-25

## 2021-02-25 DIAGNOSIS — J30.89 CHRONIC NONSEASONAL ALLERGIC RHINITIS DUE TO POLLEN: Primary | ICD-10-CM

## 2021-02-25 RX ORDER — CETIRIZINE HYDROCHLORIDE 10 MG/1
10 TABLET ORAL DAILY
Qty: 90 TABLET | Refills: 3 | Status: SHIPPED | OUTPATIENT
Start: 2021-02-25 | End: 2022-08-10

## 2021-02-25 RX ORDER — LORATADINE 10 MG/1
TABLET ORAL
Qty: 90 TABLET | Refills: 1 | OUTPATIENT
Start: 2021-02-25

## 2021-03-03 ENCOUNTER — HOSPITAL ENCOUNTER (OUTPATIENT)
Dept: PHYSICAL THERAPY | Facility: CLINIC | Age: 51
Setting detail: THERAPIES SERIES
End: 2021-03-03
Attending: PHYSICAL MEDICINE & REHABILITATION
Payer: COMMERCIAL

## 2021-03-03 PROCEDURE — 97140 MANUAL THERAPY 1/> REGIONS: CPT | Mod: GP | Performed by: PHYSICAL THERAPIST

## 2021-03-03 PROCEDURE — 97110 THERAPEUTIC EXERCISES: CPT | Mod: GP | Performed by: PHYSICAL THERAPIST

## 2021-03-09 ENCOUNTER — HOSPITAL ENCOUNTER (OUTPATIENT)
Dept: PHYSICAL THERAPY | Facility: CLINIC | Age: 51
Setting detail: THERAPIES SERIES
End: 2021-03-09
Attending: PHYSICAL MEDICINE & REHABILITATION
Payer: COMMERCIAL

## 2021-03-09 PROCEDURE — 97110 THERAPEUTIC EXERCISES: CPT | Mod: GP | Performed by: PHYSICAL THERAPIST

## 2021-03-10 ENCOUNTER — OFFICE VISIT (OUTPATIENT)
Dept: DERMATOLOGY | Facility: CLINIC | Age: 51
End: 2021-03-10
Payer: COMMERCIAL

## 2021-03-10 DIAGNOSIS — L82.0 SEBORRHEIC KERATOSES, INFLAMED: ICD-10-CM

## 2021-03-10 DIAGNOSIS — Z85.828 HISTORY OF NONMELANOMA SKIN CANCER: Primary | ICD-10-CM

## 2021-03-10 DIAGNOSIS — D04.39 SQUAMOUS CELL CARCINOMA IN SITU (SCCIS) OF SKIN OF EYEBROW: ICD-10-CM

## 2021-03-10 DIAGNOSIS — D22.9 MULTIPLE BENIGN NEVI: ICD-10-CM

## 2021-03-10 DIAGNOSIS — L82.1 SEBORRHEIC KERATOSES: ICD-10-CM

## 2021-03-10 PROCEDURE — 17110 DESTRUCTION B9 LES UP TO 14: CPT | Performed by: DERMATOLOGY

## 2021-03-10 PROCEDURE — 99213 OFFICE O/P EST LOW 20 MIN: CPT | Mod: 25 | Performed by: DERMATOLOGY

## 2021-03-10 ASSESSMENT — PAIN SCALES - GENERAL: PAINLEVEL: NO PAIN (0)

## 2021-03-10 NOTE — PATIENT INSTRUCTIONS
Cryotherapy    What is it?    Use of a very cold liquid, such as liquid nitrogen, to freeze and destroy abnormal skin cells that need to be removed    What should I expect?    Tenderness and redness    A small blister that might grow and fill with dark purple blood. There may be crusting.    More than one treatment may be needed if the lesions do not go away.    How do I care for the treated area?    Gently wash the area with your hands when bathing.    Use a thin layer of Vaseline to help with healing. You may use a Band-Aid.     The area should heal within 7-10 days and may leave behind a pink or lighter color.     Do not use an antibiotic or Neosporin ointment.     You may take acetaminophen (Tylenol) for pain.     Call your Doctor if you have:    Severe pain    Signs of infection (warmth, redness, cloudy yellow drainage, and or a bad smell)    Questions or concerns    Who should I call with questions?       [facility]: [phone]       [facility]: [phone]       For urgent needs outside of business hours call the [facility] at [phone]        and ask for the dermatology resident on call

## 2021-03-10 NOTE — NURSING NOTE
"Jayashree Johnson's goals for this visit include:   Chief Complaint   Patient presents with     Derm Problem     Melissa is here today for a skin exam and scar eval, pt states \"bothered by trackmarks made from needle, has concerns on her upper chest/ neck and right medial thigh\"       She requests these members of her care team be copied on today's visit information:     PCP: Aneudy Jackson    Referring Provider:  No referring provider defined for this encounter.    LMP 03/17/2003     Do you need any medication refills at today's visit? No    Abigail Casillas LPN      "
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
## 2021-03-15 ENCOUNTER — TELEPHONE (OUTPATIENT)
Dept: NEUROSURGERY | Facility: CLINIC | Age: 51
End: 2021-03-15

## 2021-03-15 ENCOUNTER — VIRTUAL VISIT (OUTPATIENT)
Dept: NEUROSURGERY | Facility: CLINIC | Age: 51
End: 2021-03-15
Payer: COMMERCIAL

## 2021-03-15 DIAGNOSIS — M25.512 ACUTE PAIN OF LEFT SHOULDER: Primary | ICD-10-CM

## 2021-03-15 PROCEDURE — 99213 OFFICE O/P EST LOW 20 MIN: CPT | Mod: 95 | Performed by: PHYSICIAN ASSISTANT

## 2021-03-15 RX ORDER — HYDROCODONE BITARTRATE AND ACETAMINOPHEN 5; 325 MG/1; MG/1
1-2 TABLET ORAL EVERY 6 HOURS PRN
Qty: 25 TABLET | Refills: 0 | Status: SHIPPED | OUTPATIENT
Start: 2021-03-15 | End: 2021-03-30

## 2021-03-15 NOTE — PROGRESS NOTES
.Melissa is a 50 year old who is being evaluated via a billable telephone visit.      What phone number would you like to be contacted at? [phone]  How would you like to obtain your AVS? Annetta    Left shoulder pain    Subjective   Melissa is a 50 year old who presents for the following health issues left shoulder pain    HPI     50-year-old female with a history of C5-6 ACDF done in 2018.  She now presents with worsening pain in her left shoulder as well as some numbness and tingling down her left arm into the fingertips.  She has been working with Dr. Eason, as well as with physical therapy.  She had also seen orthopedics, and was told that she has a small rotator cuff tear, as well as adhesive capsulitis in her left shoulder.  She received an injection into her left shoulder, but did not really notice any symptomatic improvement.  She does have a new cervical spine MRI.  There are a couple areas of some disc bulging, but no overt nerve impingement.    Review of Systems   CONSTITUTIONAL: NEGATIVE for fever, chills, change in weight  ENT/MOUTH: NEGATIVE for ear, mouth and throat problems  RESP: NEGATIVE for significant cough or SOB  CV: NEGATIVE for chest pain, palpitations or peripheral edema      Objective           Vitals:  No vitals were obtained today due to virtual visit.    Physical Exam   healthy, alert and no distress  PSYCH: Alert and oriented times 3; coherent speech, normal   rate and volume, able to articulate logical thoughts, able   to abstract reason, no tangential thoughts, no hallucinations   or delusions  Her affect is normal and pleasant  RESP: No cough, no audible wheezing, able to talk in full sentences  Remainder of exam unable to be completed due to telephone visits      Assessment/Plan:  I did have a discussion with her regarding her physical symptoms and MRI findings.  She understand that there are no obvious areas of nerve impingement in her cervical spine.  However, as she has had an  injection into her left shoulder with no symptomatic improvement, it does make sense to try an injection into her cervical epidural space.  She did wish to proceed with that option.  We will also send over prescription for some hydrocodone to her pharmacy.  She will follow-up with us after the injection to let us know how she is doing.        Phone call duration: 10 minutes

## 2021-03-15 NOTE — TELEPHONE ENCOUNTER
Reason for Call:  Other appointment and call back    Detailed comments: Patient asking if today's appointment could be a phone visit?  Please call    Phone Number Patient can be reached at: Home number on file [phone] (home) or Cell number on file:    Telephone Information:   Mobile [phone]       Best Time: any    Can we leave a detailed message on this number? YES    Call taken on 3/15/2021 at 12:27 PM by Maddie Fleming

## 2021-03-16 ENCOUNTER — TELEPHONE (OUTPATIENT)
Dept: SURGERY | Facility: CLINIC | Age: 51
End: 2021-03-16

## 2021-03-16 NOTE — TELEPHONE ENCOUNTER
Pt scheduled for MATT  Date: 4/8/21  Time: 0800   Dr. Lomas    Instructed pt to have H&P and  for procedure.  Patient informed of COVID testing process.

## 2021-03-17 DIAGNOSIS — Z11.59 ENCOUNTER FOR SCREENING FOR OTHER VIRAL DISEASES: ICD-10-CM

## 2021-03-18 ENCOUNTER — TELEPHONE (OUTPATIENT)
Dept: INTERNAL MEDICINE | Facility: CLINIC | Age: 51
End: 2021-03-18

## 2021-03-18 ENCOUNTER — MYC MEDICAL ADVICE (OUTPATIENT)
Dept: INTERNAL MEDICINE | Facility: CLINIC | Age: 51
End: 2021-03-18

## 2021-03-18 NOTE — TELEPHONE ENCOUNTER
Patient calling to question if she could be worked in for a pre op physical on Tuesday, 3/23 either before 10:30 am or after 11:30 am. Patient is having an injection to Spine. Writer did schedule patient on Wednesday, 3/24 at 2:00 pm if patient can not be worked in on 3/23. Patient is scheduled for her Covid test on Aidtya 3/21. Please advise patient if she can be worked in on Tuesday or not, either call or send message via AG&P if able to work in, then cancel appointment on 3/24. Rosetta George LPN

## 2021-03-19 ENCOUNTER — MYC MEDICAL ADVICE (OUTPATIENT)
Dept: GENERAL RADIOLOGY | Facility: OTHER | Age: 51
End: 2021-03-19

## 2021-03-19 ENCOUNTER — MYC MEDICAL ADVICE (OUTPATIENT)
Dept: ORTHOPEDICS | Facility: CLINIC | Age: 51
End: 2021-03-19

## 2021-03-19 DIAGNOSIS — M75.112 NONTRAUMATIC INCOMPLETE TEAR OF LEFT ROTATOR CUFF: Primary | ICD-10-CM

## 2021-03-19 DIAGNOSIS — M75.02 ADHESIVE CAPSULITIS OF LEFT SHOULDER: ICD-10-CM

## 2021-03-21 DIAGNOSIS — Z11.59 ENCOUNTER FOR SCREENING FOR OTHER VIRAL DISEASES: ICD-10-CM

## 2021-03-21 LAB
SARS-COV-2 RNA RESP QL NAA+PROBE: NORMAL
SPECIMEN SOURCE: NORMAL

## 2021-03-21 PROCEDURE — U0005 INFEC AGEN DETEC AMPLI PROBE: HCPCS | Performed by: ANESTHESIOLOGY

## 2021-03-21 PROCEDURE — U0003 INFECTIOUS AGENT DETECTION BY NUCLEIC ACID (DNA OR RNA); SEVERE ACUTE RESPIRATORY SYNDROME CORONAVIRUS 2 (SARS-COV-2) (CORONAVIRUS DISEASE [COVID-19]), AMPLIFIED PROBE TECHNIQUE, MAKING USE OF HIGH THROUGHPUT TECHNOLOGIES AS DESCRIBED BY CMS-2020-01-R: HCPCS | Performed by: ANESTHESIOLOGY

## 2021-03-22 LAB
LABORATORY COMMENT REPORT: NORMAL
SARS-COV-2 RNA RESP QL NAA+PROBE: NEGATIVE
SPECIMEN SOURCE: NORMAL

## 2021-03-22 NOTE — TELEPHONE ENCOUNTER
Patient MyCharted. She is verifying her pre op appointment for Wednesday. It got switched to Tuesday.     Patient called and had a telephone message sent to PCP asking if her Wednesday appointment could be switched to Tuesday. PCP OK'd this. Meanwhile, patient sent a MyChart stating the Wednesday would work. The original message was already taken care of at that point and patient was already switched to Tuesday per her request.     Patient is upset. She set up transportation for this visit for Wednesday and now her appointment is Tuesday. She is wondering if she can get worked back into Dr. Jackson's schedule Wednesday for her pre op? Surgery is Thursday. If she can't she will see what she can do for transportation on Tuesday.     Will route to PCP.     Amberly Abarca, MYRNAN, RN  [facility]

## 2021-03-23 NOTE — TELEPHONE ENCOUNTER
New order placed for injection to shoulder.    Recommend return to clinic 2 weeks after injection.     Recommend patient speak to neurosurgery regarding other options since insurance wont approve NATASHA.    MIMI Zarate, CNP  Orthopedic Surgery

## 2021-03-23 NOTE — TELEPHONE ENCOUNTER
Melissa,  I apologize for the confusion.  What I was trying to explain was to space the injections out over 2 weeks. Another words, proceed with the neck injection on the 25th of March, then wait 2 weeks then proceed with the shoulder injection if it is still needed.  By spacing out the injections over 2 weeks, you will know what injection helped with what symptoms helping to narrow down which issue is causing what symptoms.  Lets plan on seeing you back in clinic following the shoulder injection for re-evaluation.     In terms of the letter, what it sounds like is that work right now for you would be impossible and would like a letter stating that you are unable to work due to pain, difficulty with the left shoulder.      Other option would be a letter stating restrictions of no use of the left arm.      MIMI Zarate, CNP  Orthopedic Surgery

## 2021-03-24 ENCOUNTER — MYC MEDICAL ADVICE (OUTPATIENT)
Dept: INTERNAL MEDICINE | Facility: CLINIC | Age: 51
End: 2021-03-24

## 2021-03-24 ENCOUNTER — OFFICE VISIT (OUTPATIENT)
Dept: INTERNAL MEDICINE | Facility: CLINIC | Age: 51
End: 2021-03-24
Payer: COMMERCIAL

## 2021-03-24 ENCOUNTER — HOSPITAL ENCOUNTER (OUTPATIENT)
Dept: PHYSICAL THERAPY | Facility: CLINIC | Age: 51
Setting detail: THERAPIES SERIES
End: 2021-03-24
Attending: PHYSICAL MEDICINE & REHABILITATION
Payer: COMMERCIAL

## 2021-03-24 ENCOUNTER — ANESTHESIA EVENT (OUTPATIENT)
Dept: SURGERY | Facility: CLINIC | Age: 51
End: 2021-03-24
Payer: COMMERCIAL

## 2021-03-24 VITALS
OXYGEN SATURATION: 98 % | WEIGHT: 179 LBS | DIASTOLIC BLOOD PRESSURE: 78 MMHG | SYSTOLIC BLOOD PRESSURE: 128 MMHG | RESPIRATION RATE: 10 BRPM | HEIGHT: 68 IN | HEART RATE: 90 BPM | TEMPERATURE: 97.9 F | BODY MASS INDEX: 27.13 KG/M2

## 2021-03-24 DIAGNOSIS — R73.09 ELEVATED GLUCOSE: ICD-10-CM

## 2021-03-24 DIAGNOSIS — Z01.818 PREOP GENERAL PHYSICAL EXAM: Primary | ICD-10-CM

## 2021-03-24 DIAGNOSIS — M54.2 NECK PAIN: ICD-10-CM

## 2021-03-24 LAB
ALBUMIN SERPL-MCNC: 3.7 G/DL (ref 3.4–5)
ALP SERPL-CCNC: 105 U/L (ref 40–150)
ALT SERPL W P-5'-P-CCNC: 32 U/L (ref 0–50)
ANION GAP SERPL CALCULATED.3IONS-SCNC: 4 MMOL/L (ref 3–14)
AST SERPL W P-5'-P-CCNC: 32 U/L (ref 0–45)
BILIRUB SERPL-MCNC: 0.2 MG/DL (ref 0.2–1.3)
BUN SERPL-MCNC: 7 MG/DL (ref 7–30)
CALCIUM SERPL-MCNC: 8.9 MG/DL (ref 8.5–10.1)
CHLORIDE SERPL-SCNC: 108 MMOL/L (ref 94–109)
CO2 SERPL-SCNC: 26 MMOL/L (ref 20–32)
CREAT SERPL-MCNC: 0.82 MG/DL (ref 0.52–1.04)
ERYTHROCYTE [DISTWIDTH] IN BLOOD BY AUTOMATED COUNT: 13.4 % (ref 10–15)
GFR SERPL CREATININE-BSD FRML MDRD: 82 ML/MIN/{1.73_M2}
GLUCOSE SERPL-MCNC: 92 MG/DL (ref 70–99)
HBA1C MFR BLD: 5.7 % (ref 0–5.6)
HCT VFR BLD AUTO: 39 % (ref 35–47)
HGB BLD-MCNC: 13.1 G/DL (ref 11.7–15.7)
MCH RBC QN AUTO: 30.1 PG (ref 26.5–33)
MCHC RBC AUTO-ENTMCNC: 33.6 G/DL (ref 31.5–36.5)
MCV RBC AUTO: 90 FL (ref 78–100)
PLATELET # BLD AUTO: 301 10E9/L (ref 150–450)
POTASSIUM SERPL-SCNC: 4.1 MMOL/L (ref 3.4–5.3)
PROT SERPL-MCNC: 7.7 G/DL (ref 6.8–8.8)
RBC # BLD AUTO: 4.35 10E12/L (ref 3.8–5.2)
SODIUM SERPL-SCNC: 138 MMOL/L (ref 133–144)
WBC # BLD AUTO: 11 10E9/L (ref 4–11)

## 2021-03-24 PROCEDURE — 97014 ELECTRIC STIMULATION THERAPY: CPT | Mod: GP | Performed by: PHYSICAL THERAPIST

## 2021-03-24 PROCEDURE — 99214 OFFICE O/P EST MOD 30 MIN: CPT | Performed by: INTERNAL MEDICINE

## 2021-03-24 PROCEDURE — 85027 COMPLETE CBC AUTOMATED: CPT | Performed by: INTERNAL MEDICINE

## 2021-03-24 PROCEDURE — 80053 COMPREHEN METABOLIC PANEL: CPT | Performed by: INTERNAL MEDICINE

## 2021-03-24 PROCEDURE — 83036 HEMOGLOBIN GLYCOSYLATED A1C: CPT | Performed by: INTERNAL MEDICINE

## 2021-03-24 PROCEDURE — 36415 COLL VENOUS BLD VENIPUNCTURE: CPT | Performed by: INTERNAL MEDICINE

## 2021-03-24 ASSESSMENT — MIFFLIN-ST. JEOR: SCORE: 1472.19

## 2021-03-24 ASSESSMENT — PAIN SCALES - GENERAL: PAINLEVEL: SEVERE PAIN (6)

## 2021-03-24 NOTE — TELEPHONE ENCOUNTER
Call back from patient-    Patient states she is going forward with neck injection that is scheduled for tomorrow, she wants to  work note ,  She has Mychart but is unable to print off letter.  And she will schedule follow-up appointment with  for 2 weeks following neck injection.      Patient was verbally given both call back numbers [phone] and [phone] as she states , the numbers were cut off on her phone. And she wants call back numbers.      Patient rude and upset on phone.     .............Karishma DE LEON CMA  ([facility])

## 2021-03-24 NOTE — PATIENT INSTRUCTIONS
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
## 2021-03-25 ENCOUNTER — ANESTHESIA (OUTPATIENT)
Dept: SURGERY | Facility: CLINIC | Age: 51
End: 2021-03-25
Payer: COMMERCIAL

## 2021-03-25 ENCOUNTER — HOSPITAL ENCOUNTER (OUTPATIENT)
Facility: CLINIC | Age: 51
Discharge: HOME | End: 2021-03-25
Attending: ANESTHESIOLOGY | Admitting: ANESTHESIOLOGY
Payer: COMMERCIAL

## 2021-03-25 ENCOUNTER — HOSPITAL ENCOUNTER (OUTPATIENT)
Dept: GENERAL RADIOLOGY | Facility: CLINIC | Age: 51
End: 2021-03-25
Attending: ANESTHESIOLOGY | Admitting: ANESTHESIOLOGY
Payer: COMMERCIAL

## 2021-03-25 VITALS
BODY MASS INDEX: 27.13 KG/M2 | SYSTOLIC BLOOD PRESSURE: 135 MMHG | RESPIRATION RATE: 12 BRPM | HEART RATE: 77 BPM | DIASTOLIC BLOOD PRESSURE: 83 MMHG | TEMPERATURE: 97.9 F | OXYGEN SATURATION: 99 % | WEIGHT: 179 LBS

## 2021-03-25 DIAGNOSIS — M25.512 ACUTE PAIN OF LEFT SHOULDER: ICD-10-CM

## 2021-03-25 PROCEDURE — 250N000011 HC RX IP 250 OP 636: Performed by: ANESTHESIOLOGY

## 2021-03-25 PROCEDURE — 370N000017 HC ANESTHESIA TECHNICAL FEE, PER MIN: Performed by: ANESTHESIOLOGY

## 2021-03-25 PROCEDURE — 250N000011 HC RX IP 250 OP 636: Performed by: NURSE ANESTHETIST, CERTIFIED REGISTERED

## 2021-03-25 PROCEDURE — 62321 NJX INTERLAMINAR CRV/THRC: CPT | Performed by: ANESTHESIOLOGY

## 2021-03-25 PROCEDURE — 999N000179 XR SURGERY CARM FLUORO LESS THAN 5 MIN W STILLS: Mod: TC

## 2021-03-25 PROCEDURE — 258N000003 HC RX IP 258 OP 636: Performed by: NURSE ANESTHETIST, CERTIFIED REGISTERED

## 2021-03-25 PROCEDURE — 250N000009 HC RX 250: Performed by: NURSE ANESTHETIST, CERTIFIED REGISTERED

## 2021-03-25 RX ORDER — LIDOCAINE 40 MG/G
CREAM TOPICAL
Status: DISCONTINUED | OUTPATIENT
Start: 2021-03-25 | End: 2021-03-25 | Stop reason: HOSPADM

## 2021-03-25 RX ORDER — SODIUM CHLORIDE, SODIUM LACTATE, POTASSIUM CHLORIDE, CALCIUM CHLORIDE 600; 310; 30; 20 MG/100ML; MG/100ML; MG/100ML; MG/100ML
INJECTION, SOLUTION INTRAVENOUS CONTINUOUS
Status: DISCONTINUED | OUTPATIENT
Start: 2021-03-25 | End: 2021-03-25 | Stop reason: HOSPADM

## 2021-03-25 RX ORDER — PROPOFOL 10 MG/ML
INJECTION, EMULSION INTRAVENOUS PRN
Status: DISCONTINUED | OUTPATIENT
Start: 2021-03-25 | End: 2021-03-25

## 2021-03-25 RX ORDER — TRIAMCINOLONE ACETONIDE 40 MG/ML
INJECTION, SUSPENSION INTRA-ARTICULAR; INTRAMUSCULAR PRN
Status: DISCONTINUED | OUTPATIENT
Start: 2021-03-25 | End: 2021-03-25 | Stop reason: HOSPADM

## 2021-03-25 RX ORDER — IOPAMIDOL 612 MG/ML
INJECTION, SOLUTION INTRATHECAL PRN
Status: DISCONTINUED | OUTPATIENT
Start: 2021-03-25 | End: 2021-03-25 | Stop reason: HOSPADM

## 2021-03-25 RX ORDER — ONDANSETRON 2 MG/ML
4 INJECTION INTRAMUSCULAR; INTRAVENOUS ONCE
Status: COMPLETED | OUTPATIENT
Start: 2021-03-25 | End: 2021-03-25

## 2021-03-25 RX ORDER — LIDOCAINE HYDROCHLORIDE 20 MG/ML
INJECTION, SOLUTION INFILTRATION; PERINEURAL PRN
Status: DISCONTINUED | OUTPATIENT
Start: 2021-03-25 | End: 2021-03-25

## 2021-03-25 RX ORDER — DIMENHYDRINATE 50 MG/ML
12.5 INJECTION, SOLUTION INTRAMUSCULAR; INTRAVENOUS
Status: COMPLETED | OUTPATIENT
Start: 2021-03-25 | End: 2021-03-25

## 2021-03-25 RX ADMIN — DIMENHYDRINATE 12.5 MG: 50 INJECTION, SOLUTION INTRAMUSCULAR; INTRAVENOUS at 13:55

## 2021-03-25 RX ADMIN — ONDANSETRON 4 MG: 2 INJECTION INTRAMUSCULAR; INTRAVENOUS at 12:34

## 2021-03-25 RX ADMIN — LIDOCAINE HYDROCHLORIDE 40 MG: 20 INJECTION, SOLUTION INFILTRATION; PERINEURAL at 13:09

## 2021-03-25 RX ADMIN — PROPOFOL 20 MG: 10 INJECTION, EMULSION INTRAVENOUS at 13:17

## 2021-03-25 RX ADMIN — PROPOFOL 20 MG: 10 INJECTION, EMULSION INTRAVENOUS at 13:13

## 2021-03-25 RX ADMIN — PROPOFOL 50 MG: 10 INJECTION, EMULSION INTRAVENOUS at 13:09

## 2021-03-25 RX ADMIN — SODIUM CHLORIDE, POTASSIUM CHLORIDE, SODIUM LACTATE AND CALCIUM CHLORIDE: 600; 310; 30; 20 INJECTION, SOLUTION INTRAVENOUS at 12:42

## 2021-03-25 NOTE — DISCHARGE INSTRUCTIONS
Home Care Instructions                Procedure: Epidural injection or joint injection    Activity:    Rest today    Do not work today    Resume normal activity tomorrow    Pain:    You may experience soreness at the injection site for 1 to 3 days.    You may use an ice pack for 20 minutes every 2 hours for the first 24 hours    You may use a heating pad after the first 24 hours    You may use Tylenol  (acetaminophen) every 4 hours or other pain medicines as directed by your physician    Safety  Sedation medicine, if given may remain active for many hours.    It is important for the next 24 hours that you do not:    Drive a car    Operate machines or power tools    Consume alcohol, including beer    Sign any important papers or legal documents    You may experience numbness radiating into your legs or arms, (depending on the procedure location)  This numbness may last several hours.  Until the numb sensation returns to normal please use caution in walking, climbing stairs, stepping out of your vehicle, etc.    Common side effects of steroids:  Not everyone will experience corticosteroid side effects. If side effects are experienced they will gradually subside in the 7-10 day period following an injection.    Most common side effects include:    Flushed face and/or chest    Feeling of warmth, particularly in face but could be overall feeling of warmth    Increased blood sugar in diabetic patients    Menstrual irregularities may occur.  If taking hormone based birth control an alternate method of birth control is recommended    Sleep disturbances and/or mood swings are possible    Leg cramps    Please contact us if you have:  Severe pain   Fever more than 101.5 degrees Fahrenheit  Signs of infection (redness, swelling or drainage)      If you have questions during normal business hours (8am-5pm Monday-Friday) contact the [facility] at [phone]. If you need help after hours, we recommend that you go to a  hospital emergency room or dial 911.

## 2021-03-25 NOTE — ANESTHESIA CARE TRANSFER NOTE
Patient: Jayashree Johnson    Procedure(s):  CERVICAL 6-7 EPIDURAL INJECTION    Diagnosis: Acute pain of left shoulder [M25.512]  Diagnosis Additional Information: No value filed.    Anesthesia Type:   MAC     Note:    Oropharynx: oropharynx clear of all foreign objects and spontaneously breathing  Level of Consciousness: drowsy      Independent Airway: airway patency satisfactory and stable  Dentition: dentition unchanged  Vital Signs Stable: post-procedure vital signs reviewed and stable  Report to RN Given: handoff report given  Patient transferred to: Phase II    Handoff Report: Identifed the Patient, Identified the Reponsible Provider, Reviewed the pertinent medical history, Discussed the surgical course, Reviewed Intra-OP anesthesia mangement and issues during anesthesia, Set expectations for post-procedure period and Allowed opportunity for questions and acknowledgement of understanding      Vitals: (Last set prior to Anesthesia Care Transfer)  CRNA VITALS  3/25/2021 1252 - 3/25/2021 1328      3/25/2021             SpO2:  (!) 81 %        Electronically Signed By: MIMI Reynolds CRNA  March 25, 2021  1:28 PM

## 2021-03-25 NOTE — ANESTHESIA POSTPROCEDURE EVALUATION
Patient: Jayashree Johnson    Procedure(s):  CERVICAL 6-7 EPIDURAL INJECTION    Diagnosis:Acute pain of left shoulder [M25.512]  Diagnosis Additional Information: No value filed.    Anesthesia Type:  MAC    Note:  Disposition: Outpatient   Postop Pain Control: Uneventful            Sign Out: Well controlled pain   PONV: No   Neuro/Psych: Uneventful            Sign Out: Acceptable/Baseline neuro status   Airway/Respiratory: Uneventful            Sign Out: Acceptable/Baseline resp. status   CV/Hemodynamics: Uneventful            Sign Out: Acceptable CV status   Other NRE: NONE   DID A NON-ROUTINE EVENT OCCUR? No         Last vitals:  Vitals:    03/25/21 1340 03/25/21 1350 03/25/21 1400   BP: 136/73 136/78 139/83   Pulse: 75 77 75   Resp:      Temp:      SpO2: 99% 98% 100%       Last vitals prior to Anesthesia Care Transfer:  CRNA VITALS  3/25/2021 1252 - 3/25/2021 1352      3/25/2021             SpO2:  (!) 81 %          Electronically Signed By: MIMI Reynolds CRNA  March 25, 2021  2:10 PM

## 2021-03-25 NOTE — OP NOTE
CHIEF COMPLAINT: Neck pain and left arm pain secondary to cervical spondylosis and cervical disc degeneration  PROCEDURE: C6-7 Interlaminar epidural steroid injection using fluoroscopic guidance with contrast dye.   PROCEDURE DETAILS: After written informed consent was obtained from the patient, the patient was escorted to the procedure room.  The patient was placed in the prone position.  A  time out  was conducted to verify patient identity, procedure to be performed, side, site, allergies and any special requirements.  The skin over the neck and upper back region were prepped and draped in normal sterile fashion. Fluoroscopy was used to identify the C6-7 interspace in an AP view and the skin was anesthetized with 2 mL of 1% lidocaine with bicarbonate buffer.  A 20-gauge 3-1/2 inch Tuohy needle was advanced using the loss of resistance technique with preservative free normal saline with fluoroscopic guidance. After negative aspiration for CSF and blood, 1.5 cc of Isovue contrast dye was injected revealing the appropriate cervical epidurogram without evidence of intrathecal or intravascular spread. Following this, a 3-mL solution of 40 mg of triamcinolone with 2 cc preservative-free normal saline was slowly injected.  I had very good flow of medication along the left epidural gutter covering her left C5, C6, C7, and C8 nerve roots.  After injection of the medication, as the needle tip was withdrawn, it was flushed with local anesthetic.  The patient was monitored with blood pressure and pulse oximetry machines with the assistance of an RN throughout the procedure.  The patient was alert and responsive to questions throughout the procedure.   The patient tolerated the procedure well and was observed in the post-procedural area.  The patient was dismissed without apparent complications.     BLOOD LOSS: less than 5 cc    DIAGNOSIS:  1. Neck pain secondary to cervical spondylosis and cervical disc degeneration    PLAN:  1. Performed a C6-7 interlaminar epidural steroid injection.   2.  I told Melissa that she should be reevaluated after a week after the injection today.  If she has substantial relief then we will just have to see how well she does over the next few months.  If she does not have substantial relief, I would go ahead with the rotator cuff injection under ultrasound guidance.  See if we can take her shoulder out of play.  I think the.  Where she has local anesthetic in her rotator cuff muscles he has a key period of time to see what kind of pain relief she is getting from her shoulder and how much her shoulder is contributing to this pain problem.  If it takes the shoulder out of play but she still having the upper neck and arm numbness then I think the next step would be an EMG to look for thoracic outlet syndrome versus radiculopathy versus peripheral entrapment neuropathies.  If the EMG is not enlightening and the rotator cuff injection was not diagnostically beneficial then evaluation by thoracic surgery for possible thoracic outlet syndrome would be the last recommendation.  Difficult situation.  I think she does have 2 things going on.  I do not think a rotator cuff would cause numbness in the C7 and C8 dermatomes.    Chava Lomas MD  Diplomate of the American Board of Anesthesiology, Pain Medicine

## 2021-03-29 ENCOUNTER — MYC MEDICAL ADVICE (OUTPATIENT)
Dept: INTERNAL MEDICINE | Facility: CLINIC | Age: 51
End: 2021-03-29

## 2021-03-30 DIAGNOSIS — M25.512 ACUTE PAIN OF LEFT SHOULDER: ICD-10-CM

## 2021-03-30 RX ORDER — HYDROCODONE BITARTRATE AND ACETAMINOPHEN 5; 325 MG/1; MG/1
1-2 TABLET ORAL EVERY 6 HOURS PRN
Qty: 25 TABLET | Refills: 0 | Status: SHIPPED | OUTPATIENT
Start: 2021-03-30 | End: 2021-04-23

## 2021-04-08 ENCOUNTER — OFFICE VISIT (OUTPATIENT)
Dept: PSYCHOLOGY | Facility: CLINIC | Age: 51
End: 2021-04-08
Payer: COMMERCIAL

## 2021-04-08 DIAGNOSIS — F32.1 MODERATE MAJOR DEPRESSION, SINGLE EPISODE (H): Primary | ICD-10-CM

## 2021-04-08 PROCEDURE — 90832 PSYTX W PT 30 MINUTES: CPT | Performed by: PSYCHOLOGIST

## 2021-04-08 NOTE — Clinical Note
Hello,    I'm letting you know I spoke with Melissa this evening. She messaged me that she was in a very dark place but not suicidal. She is having difficulty obtaining a new antidepressant since the one prescribed by [facility] was denied. She did not want to go to the ER. I gave her [facility] crisis numbers as an alternative. My question is if Dr. Jackson is able to prescribe an antidepressant for her while she waits to hear back from [facility]? Also, would she be a candidate for a pain management program?    Thank you for your assistance,  Yoseph Buenrostro PsyD, LP  Behavioral Health Clinician

## 2021-04-08 NOTE — PROGRESS NOTES
[facility]: [facility]al [facility]  April 8, 2021      Behavioral Health Clinician Progress Note    Patient Name: Jayashree Johnson           Service Type:  Phone Visit      Service Location:   Phone call (patient / identified key support person reached)     Session Start Time: 05:15pm  Session End Time: 05:25pm      Session Length: 16 - 37      Attendees: Patient    Visit Activities (Refresh list every visit): [facility] Only    See Flowsheets for today's PHQ-9 and UMANG-7 results  Previous PHQ-9:   PHQ-9 SCORE 5/20/2019 3/4/2020 9/9/2020   PHQ-9 Total Score - - -   PHQ-9 Total Score MyChart - - -   PHQ-9 Total Score 10 0 12     Previous UMANG-7:   UMANG-7 SCORE 4/12/2017 3/2/2018 8/9/2019   Total Score 16 6 7       AYESHA LEVEL:  AYESHA Score (Last Two) 9/18/2012   AYESHA Raw Score 39   Activation Score 56.4   AYESHA Level 3       DATA  Extended Session (60+ minutes): No  Interactive Complexity: No  Crisis: Yes, visit entailed Crisis Management / Stabilization requiring urgent assessment and history of the crisis state, mental status exam and disposition  [facility] Patient: No    Treatment Objective(s) Addressed in This Session:  Target Behavior(s): disease management/lifestyle changes      Depressed Mood:      Current Stressors / Issues:  Received [employer] message from patient indicating she was having difficulty with her mood, obtaining medication, and obtaining pain relief in her shoulder. She reported that she attempted to get new medication from [facility] but insurance denied it and she has not been offered an alternative. She spoke about feeling very depressed but denied having thoughts of self-harm. She was informed that she could go to the ER if needed but she expressed some frustration with the [employer] system not taking her concerns seriously. She also has transportation issues. Validation and encouragement were offered. She was given the [facility] hotline and text service number ([phone]). She indicated  she would call a friend as a distraction as well. I will call her tomorrow morning to check on her and determine next steps.      Chemical Use Review:   Substance Use: Chemical use reviewed, no active concerns identified      Tobacco Use: No current tobacco use.      Assessment: Current Emotional / Mental Status (status of significant symptoms):  Risk status (Self / Other harm or suicidal ideation)  Patient denies a history of suicidal ideation, suicide attempts, self-injurious behavior, homicidal ideation, homicidal behavior and and other safety concerns  Patient denies current fears or concerns for personal safety.  Patient denies current or recent suicidal ideation or behaviors.  Patient denies current or recent homicidal ideation or behaviors.  Patient denies current or recent self injurious behavior or ideation.  Patient denies other safety concerns.  A safety and risk management plan has not been developed at this time, however patient was encouraged to call [place] [phone] should there be a change in any of these risk factors.    Appearance:   Unable to asseess over the phone   Eye Contact:   Unable to asseess over the phone   Psychomotor Behavior: Unable to asseess over the phone   Attitude:   Cooperative   Orientation:   All  Speech   Rate / Production: Normal    Volume:  Normal   Mood:    Depressed   Affect:    Unable to asseess over the phone but sounded tearful   Thought Content:  Clear   Thought Form:  Coherent  Logical   Insight:    Good     Diagnoses:  1. Moderate major depression, single episode (H)        Collateral Reports Completed:  Routed note to Care Team Member(s)    Plan: (Homework, other):  Patient was given information about behavioral services and encouraged to schedule a follow up appointment with the clinic [facility] on 04/09.  She was also given community resources to refer to.  CD Recommendations: No indications of CD issues.      Yoseph Buenrostro PsyD, LP      Mohit Buenrostro PsyD  April 8,  2021

## 2021-04-09 ENCOUNTER — VIRTUAL VISIT (OUTPATIENT)
Dept: PSYCHOLOGY | Facility: CLINIC | Age: 51
End: 2021-04-09
Payer: COMMERCIAL

## 2021-04-09 DIAGNOSIS — Z11.59 ENCOUNTER FOR SCREENING FOR OTHER VIRAL DISEASES: ICD-10-CM

## 2021-04-09 DIAGNOSIS — F32.1 MODERATE MAJOR DEPRESSION, SINGLE EPISODE (H): Primary | ICD-10-CM

## 2021-04-09 PROCEDURE — 90832 PSYTX W PT 30 MINUTES: CPT | Mod: 95 | Performed by: PSYCHOLOGIST

## 2021-04-09 NOTE — PROGRESS NOTES
[facility]  April 9, 2021      Behavioral Health Clinician Progress Note    Patient Name: Jayashree Jonhson           Service Type:  Phone Visit      Service Location:   Phone call (patient / identified key support person reached)     Session Start Time: 10:40am  Session End Time: 10:10am      Session Length: 16 - 37      Attendees: Patient    Visit Activities (Refresh list every visit): [facility] Only    See Flowsheets for today's PHQ-9 and UMANG-7 results  Previous PHQ-9:   PHQ-9 SCORE 5/20/2019 3/4/2020 9/9/2020   PHQ-9 Total Score - - -   PHQ-9 Total Score MyChart - - -   PHQ-9 Total Score 10 0 12     Previous UMANG-7:   UMANG-7 SCORE 4/12/2017 3/2/2018 8/9/2019   Total Score 16 6 7       AYESHA LEVEL:  AYESHA Score (Last Two) 9/18/2012   AYESHA Raw Score 39   Activation Score 56.4   AYESHA Level 3       DATA  Extended Session (60+ minutes): No  Interactive Complexity: No  Crisis: Yes, visit entailed Crisis Management / Stabilization requiring urgent assessment and history of the crisis state, mental status exam and disposition  [facility] Patient: No    Treatment Objective(s) Addressed in This Session:  Target Behavior(s): disease management/lifestyle changes      Depressed Mood:      Current Stressors / Issues:  Melissa reported she spoke with a friend last night after our phone call and found that it helped. She continued to feel increasingly depressed this morning. She expressed her frustration about her treatment and difficulty understanding the procedures/tests she is receiving regarding her neck/shoulder pain. She also is experiencing financial difficulty because her insurance has declined her recent procedures. She was given the number for patient relations and billing services. She will utilize the emergency numbers provided last night. Will continue to work with her other providers to coordinate services. She requested a community referral for treatment as she would like some privacy from  [facility] records regarding mental health treatment. Will send her some options via [employer].      Chemical Use Review:   Substance Use: Chemical use reviewed, no active concerns identified      Tobacco Use: No current tobacco use.      Assessment: Current Emotional / Mental Status (status of significant symptoms):  Risk status (Self / Other harm or suicidal ideation)  Patient denies a history of suicidal ideation, suicide attempts, self-injurious behavior, homicidal ideation, homicidal behavior and and other safety concerns  Patient denies current fears or concerns for personal safety.  Patient denies current or recent suicidal ideation or behaviors.  Patient denies current or recent homicidal ideation or behaviors.  Patient denies current or recent self injurious behavior or ideation.  Patient denies other safety concerns.  A safety and risk management plan has not been developed at this time, however patient was encouraged to call [place] [phone] should there be a change in any of these risk factors.    Appearance:   Unable to asseess over the phone   Eye Contact:   Unable to asseess over the phone   Psychomotor Behavior: Unable to asseess over the phone   Attitude:   Cooperative   Orientation:   All  Speech   Rate / Production: Normal    Volume:  Normal   Mood:    Depressed   Affect:    Unable to asseess over the phone but sounded tearful   Thought Content:  Clear   Thought Form:  Coherent  Logical   Insight:    Good     Diagnoses:  1. Moderate major depression, single episode (H)        Collateral Reports Completed:  Routed note to Care Team Member(s)    Plan: (Homework, other):  Patient was given information about behavioral services and encouraged to schedule a follow up appointment with the clinic [facility] on 04/09.  She was also given community resources to refer to.  CD Recommendations: No indications of CD issues.      Yoseph Buenrostro PsyD, LP      Mohit Buenrostro PsyD  April 9, 2021

## 2021-04-12 ENCOUNTER — MYC MEDICAL ADVICE (OUTPATIENT)
Dept: FAMILY MEDICINE | Facility: CLINIC | Age: 51
End: 2021-04-12

## 2021-04-12 ENCOUNTER — TELEPHONE (OUTPATIENT)
Dept: INTERNAL MEDICINE | Facility: CLINIC | Age: 51
End: 2021-04-12

## 2021-04-12 NOTE — TELEPHONE ENCOUNTER
Spoke to Dr. Jackson about message below. He said he can add patient on tomorrow at 11 AM. He would prefer face to face but if she doesn't want to come into clinic, he can do a virtual visit with her.     Annetta sent to patient. Will see what patient response with and then schedule her appointment.     JILLIAN Levy, RN  [facility]

## 2021-04-12 NOTE — TELEPHONE ENCOUNTER
Patient is on Dr. Schoen's schedule for this afternoon. He has a closed practice and cannot see patient. Also, for the symptoms patient is having, she needs a face to face NOT a virtual visit. Please help patient get rescheduled.     Attempted to call patient, no answer. Left message for a return call.     MYRNA LevyN, RN  [facility]

## 2021-04-12 NOTE — TELEPHONE ENCOUNTER
"Please triage. Patient made a virtual visit through [employer] for the following:  \"Continued stomach pain above and on right hand side of belly button.  Extreme lower back pain\"  "

## 2021-04-12 NOTE — TELEPHONE ENCOUNTER
See other encounter. Patient is scheduled to see Dr. Jackson at 11 AM tomorrow.     JILLIAN Levy, RN  [facility]

## 2021-04-12 NOTE — TELEPHONE ENCOUNTER
Patient is scheduled tomorrow at 11 AM with PCP.     Amberly Abarca, MYRNAN, RN  [facility]

## 2021-04-13 ENCOUNTER — VIRTUAL VISIT (OUTPATIENT)
Dept: INTERNAL MEDICINE | Facility: CLINIC | Age: 51
End: 2021-04-13
Payer: COMMERCIAL

## 2021-04-13 DIAGNOSIS — R14.0 BLOATING: ICD-10-CM

## 2021-04-13 DIAGNOSIS — R10.9 STOMACH PAIN: Primary | ICD-10-CM

## 2021-04-13 DIAGNOSIS — F33.1 MODERATE EPISODE OF RECURRENT MAJOR DEPRESSIVE DISORDER (H): ICD-10-CM

## 2021-04-13 DIAGNOSIS — M25.512 LEFT SHOULDER PAIN, UNSPECIFIED CHRONICITY: ICD-10-CM

## 2021-04-13 PROCEDURE — 99214 OFFICE O/P EST MOD 30 MIN: CPT | Mod: 95 | Performed by: INTERNAL MEDICINE

## 2021-04-13 ASSESSMENT — PATIENT HEALTH QUESTIONNAIRE - PHQ9: SUM OF ALL RESPONSES TO PHQ QUESTIONS 1-9: 18
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
## 2021-04-15 ENCOUNTER — HOSPITAL ENCOUNTER (OUTPATIENT)
Dept: CT IMAGING | Facility: CLINIC | Age: 51
Discharge: HOME | End: 2021-04-15
Attending: INTERNAL MEDICINE | Admitting: INTERNAL MEDICINE
Payer: COMMERCIAL

## 2021-04-15 DIAGNOSIS — R14.0 BLOATING: ICD-10-CM

## 2021-04-15 DIAGNOSIS — Z11.59 ENCOUNTER FOR SCREENING FOR OTHER VIRAL DISEASES: ICD-10-CM

## 2021-04-15 DIAGNOSIS — R10.9 STOMACH PAIN: ICD-10-CM

## 2021-04-15 LAB
SARS-COV-2 RNA RESP QL NAA+PROBE: NORMAL
SPECIMEN SOURCE: NORMAL

## 2021-04-15 PROCEDURE — 250N000011 HC RX IP 250 OP 636: Performed by: INTERNAL MEDICINE

## 2021-04-15 PROCEDURE — 250N000009 HC RX 250: Performed by: INTERNAL MEDICINE

## 2021-04-15 PROCEDURE — U0005 INFEC AGEN DETEC AMPLI PROBE: HCPCS | Performed by: PHYSICAL MEDICINE & REHABILITATION

## 2021-04-15 PROCEDURE — U0003 INFECTIOUS AGENT DETECTION BY NUCLEIC ACID (DNA OR RNA); SEVERE ACUTE RESPIRATORY SYNDROME CORONAVIRUS 2 (SARS-COV-2) (CORONAVIRUS DISEASE [COVID-19]), AMPLIFIED PROBE TECHNIQUE, MAKING USE OF HIGH THROUGHPUT TECHNOLOGIES AS DESCRIBED BY CMS-2020-01-R: HCPCS | Performed by: PHYSICAL MEDICINE & REHABILITATION

## 2021-04-15 PROCEDURE — 74177 CT ABD & PELVIS W/CONTRAST: CPT

## 2021-04-15 RX ORDER — IOPAMIDOL 755 MG/ML
500 INJECTION, SOLUTION INTRAVASCULAR ONCE
Status: COMPLETED | OUTPATIENT
Start: 2021-04-15 | End: 2021-04-15

## 2021-04-15 RX ADMIN — SODIUM CHLORIDE 60 ML: 9 INJECTION, SOLUTION INTRAVENOUS at 11:23

## 2021-04-15 RX ADMIN — IOPAMIDOL 88 ML: 755 INJECTION, SOLUTION INTRAVENOUS at 11:22

## 2021-04-16 ENCOUNTER — MYC MEDICAL ADVICE (OUTPATIENT)
Dept: INTERNAL MEDICINE | Facility: CLINIC | Age: 51
End: 2021-04-16

## 2021-04-17 ENCOUNTER — MYC MEDICAL ADVICE (OUTPATIENT)
Dept: INTERNAL MEDICINE | Facility: CLINIC | Age: 51
End: 2021-04-17

## 2021-04-21 ENCOUNTER — MYC MEDICAL ADVICE (OUTPATIENT)
Dept: INTERNAL MEDICINE | Facility: CLINIC | Age: 51
End: 2021-04-21

## 2021-04-23 DIAGNOSIS — M25.512 ACUTE PAIN OF LEFT SHOULDER: ICD-10-CM

## 2021-04-23 RX ORDER — HYDROCODONE BITARTRATE AND ACETAMINOPHEN 5; 325 MG/1; MG/1
1-2 TABLET ORAL EVERY 6 HOURS PRN
Qty: 25 TABLET | Refills: 0 | Status: SHIPPED | OUTPATIENT
Start: 2021-04-23 | End: 2021-05-17

## 2021-04-28 ENCOUNTER — VIRTUAL VISIT (OUTPATIENT)
Dept: ORTHOPEDICS | Facility: CLINIC | Age: 51
End: 2021-04-28
Payer: COMMERCIAL

## 2021-04-28 DIAGNOSIS — R20.2 NUMBNESS AND TINGLING IN LEFT ARM: ICD-10-CM

## 2021-04-28 DIAGNOSIS — R20.0 NUMBNESS AND TINGLING IN LEFT ARM: ICD-10-CM

## 2021-04-28 DIAGNOSIS — M75.02 ADHESIVE CAPSULITIS OF LEFT SHOULDER: Primary | ICD-10-CM

## 2021-04-28 PROCEDURE — 99213 OFFICE O/P EST LOW 20 MIN: CPT | Mod: 95 | Performed by: ORTHOPAEDIC SURGERY

## 2021-04-28 RX ORDER — MELOXICAM 15 MG/1
15 TABLET ORAL DAILY
Qty: 30 TABLET | Refills: 1 | Status: SHIPPED | OUTPATIENT
Start: 2021-04-28 | End: 2021-09-10

## 2021-04-28 RX ORDER — METHYLPREDNISOLONE 4 MG
TABLET, DOSE PACK ORAL
Qty: 21 TABLET | Refills: 0 | Status: SHIPPED | OUTPATIENT
Start: 2021-04-28 | End: 2021-07-28

## 2021-04-28 NOTE — LETTER
4/28/2021         RE: Jayashree Johnson  [address]  [place] [id number]        Dear Colleague,    Thank you for referring your patient, Jayashree Johnson, to the [facility]. Please see a copy of my visit note below.    Melissa is a 50 year old who is being evaluated via a billable telephone visit.      What phone number would you like to be contacted at? [phone]  How would you like to obtain your AVS? Shanitahargunnar   Phone call duration:  minutes      ORTHOPEDIC CONSULT        Chief Complaint: Jayashree Johnson is a 50 year old right hand dominant female who works as a .        Chief Complaints and History of Present Illnesses   Patient presents with     Pain     left shoulder             History of Present Illness: I had the pleasure of visiting with the patient today on the phone.  We had a lengthy discussion regarding her medical history concerning her adhesive capsulitis.  She states that she has had numbness and tingling in all four digits but not the thumb of the left hand for about 2 months now.  She feels that when she lays on her back with her arm stretched out that it feels better.  Most of the pain in the shoulder as long the top of the shoulder as well as the posterior shoulder.  She tried doing physical therapy after an injection in December but had increasing problems with that.  When she wakes up in the morning she does fairly well but as the day goes on she gets stiffer and stiffer with more pain.  She recently had an injection in her neck but states that that did not help very much.  An EMG was recommended in the past but she deferred this at the time.  She has been taking ibuprofen and Tylenol but these are not seeming to help.  She has not had a Medrol Dosepak in the past.    Onset: 5 month(s) ago. Reports insidious onset without acute precipitating event. She reports pain in the left shoulder and upper arm. She reports numbness in the hand and last 3 digits  of her fingers. She feels tightness and stiffness in the shoulder. She has a history of cervical fusion in May 2013 at C5/C6  Location of Pain: left posterior and inferior shoulder.   Worsened by: overhead motions and lifting, pushing and pulling  Better with: rest in a position of comfort, Norco, Biofreeze  Treatments tried: rest/activity avoidance, heat, other medications: Hydrocodone/Acetaminophen (Vicodin/Norco) and Cyclobenzaprine (Flexeril) , physical therapy and corticosteroid injection (most recent date: December 2020) that provided minimal pain relief  Associated symptoms: numbness, tingling, warmth, weakness of shoulder and hand and stiffness    Physical Exam: Not performed as this was a phone call.        Diagnostic Modalities:    Independent visualization of the images was performed.  I personally interpreted the imaging studies.  MRI was reviewed which does show a bursal sided nearly full-thickness supraspinatus tear.  There is also evidence of adhesive capsulitis.  There is an intrasubstance subscapularis tear which is small.    Impression: Adhesive capsulitis left shoulder.  Numbness and tingling of the left arm going into the four digits of the left hand excluding the finger.    Plan:  All of the above pertinent physical exam and imaging modalities findings was reviewed.  I had a lengthy discussion with her options.  I did recommend that she get an EMG so that we are able to determine how to proceed concerning the numbness and tingling of the left arm.  For the time being we will start with Mobic and a Medrol Dosepak.  I did advise her to take the Medrol Dosepak in the morning to prevent insomnia.  I did explain to her that some female patients in particular feel that there is a more swelling feeling throughout her body but this usually passes.    I would like her to check back in with me after the EMG and after 2 weeks of the methylprednisolone and Mobic therapy.  I did advise her that we could try  a steroid injection with another round of PT at a later date.  In the meantime I did express to her that it is normal to be frustrated as this is usually a 6-month process and that she has a reason to hurt.            BP Readings from Last 1 Encounters:   03/25/21 135/83                 Again, thank you for allowing me to participate in the care of your patient.        Sincerely,        Tr Palumbo, DO

## 2021-04-28 NOTE — PROGRESS NOTES
Melissa is a 50 year old who is being evaluated via a billable telephone visit.      What phone number would you like to be contacted at? [phone]  How would you like to obtain your AVS? Annetta   Phone call duration:  minutes      ORTHOPEDIC CONSULT        Chief Complaint: Jayashree Johnson is a 50 year old right hand dominant female who works as a .        Chief Complaints and History of Present Illnesses   Patient presents with     Pain     left shoulder             History of Present Illness: I had the pleasure of visiting with the patient today on the phone.  We had a lengthy discussion regarding her medical history concerning her adhesive capsulitis.  She states that she has had numbness and tingling in all four digits but not the thumb of the left hand for about 2 months now.  She feels that when she lays on her back with her arm stretched out that it feels better.  Most of the pain in the shoulder as long the top of the shoulder as well as the posterior shoulder.  She tried doing physical therapy after an injection in December but had increasing problems with that.  When she wakes up in the morning she does fairly well but as the day goes on she gets stiffer and stiffer with more pain.  She recently had an injection in her neck but states that that did not help very much.  An EMG was recommended in the past but she deferred this at the time.  She has been taking ibuprofen and Tylenol but these are not seeming to help.  She has not had a Medrol Dosepak in the past.    Onset: 5 month(s) ago. Reports insidious onset without acute precipitating event. She reports pain in the left shoulder and upper arm. She reports numbness in the hand and last 3 digits of her fingers. She feels tightness and stiffness in the shoulder. She has a history of cervical fusion in May 2013 at C5/C6  Location of Pain: left posterior and inferior shoulder.   Worsened by: overhead motions and lifting, pushing and  pulling  Better with: rest in a position of comfort, Norco, Biofreeze  Treatments tried: rest/activity avoidance, heat, other medications: Hydrocodone/Acetaminophen (Vicodin/Norco) and Cyclobenzaprine (Flexeril) , physical therapy and corticosteroid injection (most recent date: December 2020) that provided minimal pain relief  Associated symptoms: numbness, tingling, warmth, weakness of shoulder and hand and stiffness    Physical Exam: Not performed as this was a phone call.        Diagnostic Modalities:    Independent visualization of the images was performed.  I personally interpreted the imaging studies.  MRI was reviewed which does show a bursal sided nearly full-thickness supraspinatus tear.  There is also evidence of adhesive capsulitis.  There is an intrasubstance subscapularis tear which is small.    Impression: Adhesive capsulitis left shoulder.  Numbness and tingling of the left arm going into the four digits of the left hand excluding the finger.    Plan:  All of the above pertinent physical exam and imaging modalities findings was reviewed.  I had a lengthy discussion with her options.  I did recommend that she get an EMG so that we are able to determine how to proceed concerning the numbness and tingling of the left arm.  For the time being we will start with Mobic and a Medrol Dosepak.  I did advise her to take the Medrol Dosepak in the morning to prevent insomnia.  I did explain to her that some female patients in particular feel that there is a more swelling feeling throughout her body but this usually passes.    I would like her to check back in with me after the EMG and after 2 weeks of the methylprednisolone and Mobic therapy.  I did advise her that we could try a steroid injection with another round of PT at a later date.  In the meantime I did express to her that it is normal to be frustrated as this is usually a 6-month process and that she has a reason to hurt.            BP Readings from Last  1 Encounters:   03/25/21 135/83

## 2021-04-29 ENCOUNTER — TELEPHONE (OUTPATIENT)
Dept: ORTHOPEDICS | Facility: CLINIC | Age: 51
End: 2021-04-29

## 2021-05-13 ENCOUNTER — TRANSFERRED RECORDS (OUTPATIENT)
Dept: HEALTH INFORMATION MANAGEMENT | Facility: CLINIC | Age: 51
End: 2021-05-13

## 2021-05-14 ENCOUNTER — MYC MEDICAL ADVICE (OUTPATIENT)
Dept: ORTHOPEDICS | Facility: CLINIC | Age: 51
End: 2021-05-14

## 2021-05-14 ENCOUNTER — MYC MEDICAL ADVICE (OUTPATIENT)
Dept: ORTHOPEDICS | Facility: CLINIC | Age: 51
End: 2021-05-14
Payer: COMMERCIAL

## 2021-05-14 NOTE — TELEPHONE ENCOUNTER
I discussed the EMG findings with the patient.  I advised her that they were normal.  I reassured her that this meant we did not need to perform a cubital tunnel release.  I did advise her to try and keep pumping her fingers to get the fluid out and continue to keep it in a nondependent position.  I also advised her that she can place Voltaren gel on her fingers and hand if it would make her feel better as well.  I advised her that I continue to support nonoperative treatment and would like her to continue doing as much as she can as far as her exercises in the morning when she feels better.

## 2021-05-17 DIAGNOSIS — M25.512 ACUTE PAIN OF LEFT SHOULDER: ICD-10-CM

## 2021-05-17 RX ORDER — HYDROCODONE BITARTRATE AND ACETAMINOPHEN 5; 325 MG/1; MG/1
1-2 TABLET ORAL EVERY 6 HOURS PRN
Qty: 25 TABLET | Refills: 0 | Status: SHIPPED | OUTPATIENT
Start: 2021-05-17 | End: 2021-06-04

## 2021-06-03 ENCOUNTER — MYC MEDICAL ADVICE (OUTPATIENT)
Dept: NEUROSURGERY | Facility: CLINIC | Age: 51
End: 2021-06-03

## 2021-06-03 DIAGNOSIS — M25.512 ACUTE PAIN OF LEFT SHOULDER: ICD-10-CM

## 2021-06-03 NOTE — TELEPHONE ENCOUNTER
Patient calling for a refill of Norco.     DOS: 3/15/21 virtual visit c William Albert PA-C  S/P:  -C5-6 ACDF done in 2018  -CERVICAL 6-7 EPIDURAL INJECTION 3/25/21      Current symptom(s): Pain is 7-8/10 to left shoulder into her elbow down to her hand. She states that she experiences numbness and tingling down the left arm to her fingertips. These symptoms were present prior however they are getting worse.    She did not experience any relief from the MATT.    Current pain management: Norco 1-2 times a day, however she states that she has built up a tolerance and the pain is getting worse so she needs to take more.     Last fill: 5/17/21  Next visit: None    Medication pended for your approval, if appropriate. Pharmacy verified. [facility]     Any patient questions or concerns: Pain    Informed patient request will be forwarded to care team.

## 2021-06-04 ENCOUNTER — MYC MEDICAL ADVICE (OUTPATIENT)
Dept: NEUROSURGERY | Facility: CLINIC | Age: 51
End: 2021-06-04

## 2021-06-04 RX ORDER — HYDROCODONE BITARTRATE AND ACETAMINOPHEN 5; 325 MG/1; MG/1
1-2 TABLET ORAL EVERY 6 HOURS PRN
Qty: 25 TABLET | Refills: 0 | Status: SHIPPED | OUTPATIENT
Start: 2021-06-04 | End: 2021-07-28

## 2021-06-17 ENCOUNTER — MYC MEDICAL ADVICE (OUTPATIENT)
Dept: DERMATOLOGY | Facility: CLINIC | Age: 51
End: 2021-06-17

## 2021-06-21 NOTE — TELEPHONE ENCOUNTER
1st attempt, left message requesting a call back to assist patient with rescheduling.    Adelaide Tello  Surgical Specialties Procedure   [facility] [place]  6/21/2021 9:16 AM

## 2021-06-22 NOTE — TELEPHONE ENCOUNTER
Patient has been rescheduled.    Adelaide Frye  Surgical Specialties Procedure   [facility] [place]  6/22/2021 9:59 AM

## 2021-07-09 ENCOUNTER — MYC MEDICAL ADVICE (OUTPATIENT)
Dept: INTERNAL MEDICINE | Facility: CLINIC | Age: 51
End: 2021-07-09

## 2021-07-09 NOTE — TELEPHONE ENCOUNTER
Routing to RN triage to determine if this is something you are able to do. I am not seeing a recent ED visit or note?

## 2021-07-09 NOTE — TELEPHONE ENCOUNTER
Phoned patient.  Patient states she had shoulder surgery at the [facility] and they informed her that she may get her stiches removed by her primary care provider office instead of driving all the way to the [facility] next week.  Stitches not placed at [facility], will sent to PCP for approval.    Scheduled patient on RN schedule.    Will forward to PCP for approval/ orders RN to remove stitches.    Saida Cortez RN

## 2021-07-13 ENCOUNTER — ALLIED HEALTH/NURSE VISIT (OUTPATIENT)
Dept: FAMILY MEDICINE | Facility: CLINIC | Age: 51
End: 2021-07-13
Payer: COMMERCIAL

## 2021-07-13 DIAGNOSIS — Z48.02 ENCOUNTER FOR REMOVAL OF SUTURES: Primary | ICD-10-CM

## 2021-07-13 PROCEDURE — 99207 PR NO CHARGE NURSE ONLY: CPT

## 2021-07-13 NOTE — NURSING NOTE
Jayashree Johnson presents to the clinic today for removal of sutures.  The patient has had the sutures in place for Unknown, this was done at [facility] and given Ok per Dr. Jackson for removal. .  There has been no history of infection or drainage.  2 sutures are seen located on the left shoulder.  The wound is healing well with no signs of infection.  Tetanus status is up to date.   All sutures were easily removed today.  Routine wound care discussed.  The patient will follow up as needed.    Closing this encounter.  Kelly Burgess, MYRNAN, RN

## 2021-07-16 ENCOUNTER — MYC MEDICAL ADVICE (OUTPATIENT)
Dept: INTERNAL MEDICINE | Facility: CLINIC | Age: 51
End: 2021-07-16

## 2021-07-16 DIAGNOSIS — M25.512 LEFT SHOULDER PAIN, UNSPECIFIED CHRONICITY: Primary | ICD-10-CM

## 2021-07-22 ENCOUNTER — HOSPITAL ENCOUNTER (OUTPATIENT)
Dept: PHYSICAL THERAPY | Facility: CLINIC | Age: 51
Setting detail: THERAPIES SERIES
End: 2021-07-22
Attending: INTERNAL MEDICINE
Payer: COMMERCIAL

## 2021-07-22 PROCEDURE — 97162 PT EVAL MOD COMPLEX 30 MIN: CPT | Mod: GP

## 2021-07-22 PROCEDURE — 97110 THERAPEUTIC EXERCISES: CPT | Mod: GP

## 2021-07-22 NOTE — PROGRESS NOTES
[facility]          OUTPATIENT PHYSICAL THERAPY ORTHOPEDIC EVALUATION  PLAN OF TREATMENT FOR OUTPATIENT REHABILITATION  (COMPLETE FOR INITIAL CLAIMS ONLY)  Patient's Last Name, First Name, M.I.  YOB: 1970  Jayashree Johnson    Provider s Name:  [facility]   Medical Record No.  [id number]   Start of Care Date:  07/22/21   Onset Date:  07/01/21   Type:     _X__PT   ___OT   ___SLP Medical Diagnosis:  Left shoulder pain, unspecified chronicity M25.512  - Primary     PT Diagnosis:  decreased shoulder ROM, decreased shoulder strength, and decreased activity tolerance.    Visits from SOC:  1      _________________________________________________________________________________  Plan of Treatment/Functional Goals:  joint mobilization, manual therapy, neuromuscular re-education, ROM, strengthening, stretching  as needed  Cryotherapy, Hot packs, Electrical stimulation, TENS, Ultrasound  as needed  Goals  Goal Identifier: HEP  Goal Description: Pt will demonstrate accurate compliance to HEP >4 days per wk to independently manage symptoms and progress function  Target Date: 01/17/22    Goal Identifier: Shoulder ROM  Goal Description: Pt will demonstrate shoulder flexion to >120 deg to be able to reach onto overhead shelves in kitchen   Target Date: 10/19/21    Goal Identifier: Shoulder ROM  Goal Description: Pt will demonstrate shoulder ER to at least 50 deg to be able to reach back of head to style hair  Target Date: 10/30/21    Goal Identifier: Shoulder strength  Goal Description: Pt will demonstrate shoulder flex, abd, IR, and ER strength of at least 4-/5 to be able to tolerate daily tasks  Target Date: 01/17/22    Goal Identifier: Shoulder function  Goal Description: Pt will demonstrate the ability to complete >3 hours of computer work w/shoulder pain less than 4/10 to be able to tolerate computer work for her job  Target Date: 12/19/21    Therapy  Frequency:  2 times/Week  Predicted Duration of Therapy Intervention:  6 months    Keren Barriga, PT                 I CERTIFY THE NEED FOR THESE SERVICES FURNISHED UNDER        THIS PLAN OF TREATMENT AND WHILE UNDER MY CARE     (Physician co-signature of this document indicates review and certification of the therapy plan).                       Certification Date From:  07/22/21   Certification Date To:  10/19/21    Referring Provider:  Aneudy Jackson MD    Initial Assessment        See Epic Evaluation Start of Care Date: 07/22/21
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
## 2021-07-27 ENCOUNTER — MYC MEDICAL ADVICE (OUTPATIENT)
Dept: INTERNAL MEDICINE | Facility: CLINIC | Age: 51
End: 2021-07-27

## 2021-07-27 DIAGNOSIS — J30.89 CHRONIC NONSEASONAL ALLERGIC RHINITIS DUE TO POLLEN: Primary | ICD-10-CM

## 2021-07-27 RX ORDER — LORATADINE 10 MG/1
10 TABLET ORAL DAILY
Qty: 90 TABLET | Refills: 1 | Status: SHIPPED | OUTPATIENT
Start: 2021-07-27 | End: 2022-02-01

## 2021-07-27 NOTE — TELEPHONE ENCOUNTER
Pharmacy cued    Patient requesting changing antihistamine form Zyrtec to Claritin?      Routing refill request to provider for review/approval because:  Drug not active on patient's medication list - nurse entered script for Claritin/Loratadine - provider to review and correct as needed.         Heena Davis RN  [facility]

## 2021-07-27 NOTE — TELEPHONE ENCOUNTER
Please contact her and see if we can get authorization to get [place] record, not allowed in Care Everywhere so I don't know what test she had done.

## 2021-07-28 ENCOUNTER — OFFICE VISIT (OUTPATIENT)
Dept: INTERNAL MEDICINE | Facility: CLINIC | Age: 51
End: 2021-07-28
Payer: COMMERCIAL

## 2021-07-28 ENCOUNTER — MYC MEDICAL ADVICE (OUTPATIENT)
Dept: INTERNAL MEDICINE | Facility: CLINIC | Age: 51
End: 2021-07-28

## 2021-07-28 VITALS
TEMPERATURE: 98.6 F | OXYGEN SATURATION: 98 % | RESPIRATION RATE: 16 BRPM | SYSTOLIC BLOOD PRESSURE: 130 MMHG | WEIGHT: 164 LBS | BODY MASS INDEX: 24.86 KG/M2 | HEART RATE: 84 BPM | DIASTOLIC BLOOD PRESSURE: 84 MMHG

## 2021-07-28 DIAGNOSIS — R25.2 LEG CRAMPING: ICD-10-CM

## 2021-07-28 DIAGNOSIS — G44.209 TENSION HEADACHE: ICD-10-CM

## 2021-07-28 DIAGNOSIS — M25.512 LEFT SHOULDER PAIN, UNSPECIFIED CHRONICITY: Primary | ICD-10-CM

## 2021-07-28 PROCEDURE — 93000 ELECTROCARDIOGRAM COMPLETE: CPT | Performed by: INTERNAL MEDICINE

## 2021-07-28 PROCEDURE — 99214 OFFICE O/P EST MOD 30 MIN: CPT | Performed by: INTERNAL MEDICINE

## 2021-07-28 RX ORDER — PROCHLORPERAZINE MALEATE 10 MG
10 TABLET ORAL EVERY 6 HOURS PRN
Qty: 10 TABLET | Refills: 0 | Status: SHIPPED | OUTPATIENT
Start: 2021-07-28 | End: 2021-10-20

## 2021-07-28 ASSESSMENT — PAIN SCALES - GENERAL: PAINLEVEL: EXTREME PAIN (9)

## 2021-07-28 NOTE — PROGRESS NOTES
Assessment & Plan   Problem List Items Addressed This Visit     None      Visit Diagnoses     Left shoulder pain, unspecified chronicity    -  Primary    Relevant Orders    CBC with platelets and differential    Comprehensive metabolic panel (BMP + Alb, Alk Phos, ALT, AST, Total. Bili, TP)    Troponin I    EKG 12-lead complete w/read - Clinics (Completed)    ESR: Erythrocyte sedimentation rate    CRP, inflammation    Tension headache        Relevant Medications    prochlorperazine (COMPAZINE) 10 MG tablet    Other Relevant Orders    CBC with platelets and differential    Comprehensive metabolic panel (BMP + Alb, Alk Phos, ALT, AST, Total. Bili, TP)    Troponin I    EKG 12-lead complete w/read - Clinics (Completed)    ESR: Erythrocyte sedimentation rate    CRP, inflammation    Leg cramping        Relevant Orders    CBC with platelets and differential    Comprehensive metabolic panel (BMP + Alb, Alk Phos, ALT, AST, Total. Bili, TP)    Troponin I    EKG 12-lead complete w/read - Clinics (Completed)    ESR: Erythrocyte sedimentation rate    CRP, inflammation         Patient who has a history of some polyarthralgias, recent cervical fusion and a recent left shoulder surgery.  She has chronic left arm pain.  These have all been stressful for her and in some ways debilitating.  Today she had been sending my chart messages about going to the emergency room in [place] after having a high blood pressure reading of 180.  She left the ER due to the overcrowding and weight.  Her EKG there said there is possibly some anterior changes but the EKG today is normal.  Her blood pressure is normal at 130.  She does have a headache which is probably from the stress of this and the pain in her left shoulder.  We will give her some Compazine to try to break her headache and help her nausea.  We did check labs for troponin and other electrolytes with her leg cramping.  She can follow-up on chronic issues in the future she will talk  "to her surgeon as well.           BMI:   Estimated body mass index is 24.86 kg/m  as calculated from the following:    Height as of 3/24/21: 1.73 m (5' 8.11\").    Weight as of this encounter: 74.4 kg (164 lb).           No follow-ups on file.    Aneudy Jackson MD  [facility] DAVID Torres is a 51 year old who presents for the following health issues     HPI     Chief Complaint   Patient presents with     Headache     bad headache especially over the past two days     Diarrhea     going on for weeks     Generalized Body Aches     Hypertension     elevated bp       BP was up last week, sent to ER BP was 80. Had an EKG and then she left the ER without being seen.      Gets cramping in her leg sometimes both, left is more.      Headaches started Saturday, hasn't used imitrex for months.  Comes and goes, ringing in her ears. Takes tylenol which helps.  Squeezing on her head, front and back, both sides.      Shoulder surgery on July 1st at [place].       Review of Systems         Objective    /84   Pulse 84   Temp 98.6  F (37  C) (Temporal)   Resp 16   Wt 74.4 kg (164 lb)   LMP 03/17/2003 (Exact Date)   SpO2 98%   BMI 24.86 kg/m    Body mass index is 24.86 kg/m .  Physical Exam   Moderate distress, laying down on the exam table  Her eyes are normal  Ears are clear  Heart is regular lungs are clear  Left arm is in a sling.    EKG shows a normal sinus rhythm without any ST elevation or changes.            "

## 2021-07-28 NOTE — TELEPHONE ENCOUNTER
Please see if she can do 3:45 in clinic today, I can't just order tests without knowing what is going on.

## 2021-07-28 NOTE — PROGRESS NOTES
Outpatient Physical Therapy Discharge Note     Patient: Jayashree Johnson  : 1970    Beginning/End Dates of Reporting Period:  21 to 3/24/21    Referring Provider:     Therapy Diagnosis: shoulder pain     Client Self Report: Patient has been very painful to the hand on the left, has steriod injection scheduled for tomorrow at [facility]. Pain in hand is #4, elbow #6, neck #5.     Objective Measurements:  Objective Measure: Pain in the left shoulder /arm  Details: #4-5  Objective Measure: AROM left shoulder     Objective Measure: PREP  Details: supine with laying on a roll under neck  Objective Measure: swallowing   Details: tight  Objective Measure: swelling in the face and neck with sleeping  Details: worse since sleeping flat       Goals:  Goals not met when last seen, pt did not return after 21-3/24/21    Plan:  Discharge from therapy.    Discharge:    Reason for Discharge: No further expectation of progress. Patient has not returned      Discharge Plan: Patient to continue home program.    Thank you for the referral,            Rosetta Wheat PT

## 2021-07-29 ENCOUNTER — MYC MEDICAL ADVICE (OUTPATIENT)
Dept: INTERNAL MEDICINE | Facility: CLINIC | Age: 51
End: 2021-07-29

## 2021-08-03 ENCOUNTER — MYC MEDICAL ADVICE (OUTPATIENT)
Dept: INTERNAL MEDICINE | Facility: CLINIC | Age: 51
End: 2021-08-03

## 2021-08-03 NOTE — TELEPHONE ENCOUNTER
After reviewing the patient's records, I am unable to determine what she needs next.  I would recommend she follows up with Dr. Jackson upon his return next week.      I see no reference to steroids or pain medicines.      Please make what ever results she cannot see visible to her.      Isaías

## 2021-08-03 NOTE — TELEPHONE ENCOUNTER
Called pt and offered her PL appt.  She does NOT want to come in again, she wants answers from Manuel.  She knows he is out till 8/9. She wants this message to wait for him.  I told her I would send it back to his basket.  LAKIA

## 2021-08-04 ENCOUNTER — HOSPITAL ENCOUNTER (OUTPATIENT)
Dept: PHYSICAL THERAPY | Facility: CLINIC | Age: 51
Setting detail: THERAPIES SERIES
End: 2021-08-04
Attending: PHYSICAL MEDICINE & REHABILITATION
Payer: COMMERCIAL

## 2021-08-04 PROCEDURE — 97140 MANUAL THERAPY 1/> REGIONS: CPT | Mod: GP | Performed by: PHYSICAL THERAPIST

## 2021-08-04 PROCEDURE — 97110 THERAPEUTIC EXERCISES: CPT | Mod: GP | Performed by: PHYSICAL THERAPIST

## 2021-08-09 ENCOUNTER — HOSPITAL ENCOUNTER (OUTPATIENT)
Dept: PHYSICAL THERAPY | Facility: CLINIC | Age: 51
Setting detail: THERAPIES SERIES
End: 2021-08-09
Attending: PHYSICAL MEDICINE & REHABILITATION
Payer: COMMERCIAL

## 2021-08-09 PROCEDURE — 97140 MANUAL THERAPY 1/> REGIONS: CPT | Mod: GP | Performed by: PHYSICAL THERAPIST

## 2021-08-11 ENCOUNTER — HOSPITAL ENCOUNTER (OUTPATIENT)
Dept: PHYSICAL THERAPY | Facility: CLINIC | Age: 51
Setting detail: THERAPIES SERIES
End: 2021-08-11
Attending: INTERNAL MEDICINE
Payer: COMMERCIAL

## 2021-08-11 PROCEDURE — 97112 NEUROMUSCULAR REEDUCATION: CPT | Mod: GP | Performed by: PHYSICAL THERAPIST

## 2021-08-11 PROCEDURE — 97140 MANUAL THERAPY 1/> REGIONS: CPT | Mod: GP | Performed by: PHYSICAL THERAPIST

## 2021-08-16 ENCOUNTER — HOSPITAL ENCOUNTER (OUTPATIENT)
Dept: PHYSICAL THERAPY | Facility: CLINIC | Age: 51
Setting detail: THERAPIES SERIES
End: 2021-08-16
Attending: PHYSICAL MEDICINE & REHABILITATION
Payer: COMMERCIAL

## 2021-08-16 PROCEDURE — 97140 MANUAL THERAPY 1/> REGIONS: CPT | Mod: GP | Performed by: PHYSICAL THERAPIST

## 2021-08-23 ENCOUNTER — HOSPITAL ENCOUNTER (OUTPATIENT)
Dept: PHYSICAL THERAPY | Facility: CLINIC | Age: 51
Setting detail: THERAPIES SERIES
End: 2021-08-23
Attending: INTERNAL MEDICINE
Payer: COMMERCIAL

## 2021-08-23 PROCEDURE — 97140 MANUAL THERAPY 1/> REGIONS: CPT | Mod: GP | Performed by: PHYSICAL THERAPIST

## 2021-08-23 PROCEDURE — 97112 NEUROMUSCULAR REEDUCATION: CPT | Mod: GP | Performed by: PHYSICAL THERAPIST

## 2021-08-30 ENCOUNTER — HOSPITAL ENCOUNTER (OUTPATIENT)
Dept: PHYSICAL THERAPY | Facility: CLINIC | Age: 51
Setting detail: THERAPIES SERIES
End: 2021-08-30
Attending: INTERNAL MEDICINE
Payer: COMMERCIAL

## 2021-08-30 PROCEDURE — 97112 NEUROMUSCULAR REEDUCATION: CPT | Mod: GP | Performed by: PHYSICAL THERAPIST

## 2021-08-30 PROCEDURE — 97140 MANUAL THERAPY 1/> REGIONS: CPT | Mod: GP | Performed by: PHYSICAL THERAPIST

## 2021-09-02 ENCOUNTER — HOSPITAL ENCOUNTER (OUTPATIENT)
Dept: PHYSICAL THERAPY | Facility: CLINIC | Age: 51
Setting detail: THERAPIES SERIES
End: 2021-09-02
Attending: INTERNAL MEDICINE
Payer: COMMERCIAL

## 2021-09-02 PROCEDURE — 97530 THERAPEUTIC ACTIVITIES: CPT | Mod: GP | Performed by: PHYSICAL THERAPIST

## 2021-09-02 PROCEDURE — 97140 MANUAL THERAPY 1/> REGIONS: CPT | Mod: GP | Performed by: PHYSICAL THERAPIST

## 2021-09-03 ENCOUNTER — MYC MEDICAL ADVICE (OUTPATIENT)
Dept: DERMATOLOGY | Facility: CLINIC | Age: 51
End: 2021-09-03

## 2021-09-07 ENCOUNTER — HOSPITAL ENCOUNTER (OUTPATIENT)
Dept: PHYSICAL THERAPY | Facility: CLINIC | Age: 51
Setting detail: THERAPIES SERIES
End: 2021-09-07
Attending: INTERNAL MEDICINE
Payer: COMMERCIAL

## 2021-09-07 PROCEDURE — 97112 NEUROMUSCULAR REEDUCATION: CPT | Mod: GP | Performed by: PHYSICAL THERAPIST

## 2021-09-07 PROCEDURE — 97140 MANUAL THERAPY 1/> REGIONS: CPT | Mod: GP | Performed by: PHYSICAL THERAPIST

## 2021-09-08 DIAGNOSIS — M75.02 ADHESIVE CAPSULITIS OF LEFT SHOULDER: ICD-10-CM

## 2021-09-10 ENCOUNTER — MYC MEDICAL ADVICE (OUTPATIENT)
Dept: INTERNAL MEDICINE | Facility: CLINIC | Age: 51
End: 2021-09-10

## 2021-09-10 RX ORDER — MELOXICAM 15 MG/1
15 TABLET ORAL DAILY
Qty: 30 TABLET | Refills: 1 | Status: SHIPPED | OUTPATIENT
Start: 2021-09-10 | End: 2022-03-02

## 2021-09-10 NOTE — TELEPHONE ENCOUNTER
Prescription approved per [facility] Refill Protocol.    MYRNA LevyN, RN  [facility]

## 2021-09-10 NOTE — LETTER
[address] [address]  Phone: [phone]    September 10, 2021        Jayashree Johnson  [id number] [address] [place] [id number]          To whom it may concern:    RE: Jayashree Johnson    Jayashree Johnson should be able to complete the rehabilitation program.  She has a history of cervical fusion, left shoulder surgery on July 1.  However she has been doing physical therapy here.  She has no other medical issues which will limit her therapy.  She should be able to be a full participant.    Please contact me for questions or concerns.      Sincerely,        Aneudy Jackson MD

## 2021-09-13 ENCOUNTER — HOSPITAL ENCOUNTER (OUTPATIENT)
Dept: PHYSICAL THERAPY | Facility: CLINIC | Age: 51
Setting detail: THERAPIES SERIES
End: 2021-09-13
Attending: INTERNAL MEDICINE
Payer: COMMERCIAL

## 2021-09-13 PROCEDURE — 97140 MANUAL THERAPY 1/> REGIONS: CPT | Mod: GP | Performed by: PHYSICAL THERAPIST

## 2021-09-13 NOTE — DISCHARGE SUMMARY
Outpatient Physical Therapy Discharge Note     Patient: Jayashree Johnson  : 1970    Beginning/End Dates of Reporting Period:  10 sessions between 2021 and 2021    Referring Provider: Aneudy Jackson MD    Therapy Diagnosis: decreased shoulder ROM, decreased shoulder strength, and decreased activity tolerance.      Client Self Report: She is noting relief from her symptoms in neck and head with the myofascial release techniques. Feels her allergies are worse today and sinuses are congested. She has decided to go to [facility] for program for pain. She is leaving tomorrow.     Objective Measurements:  Objective Measure: SPADI  Details: not today  Objective Measure: Pain  Details: still have glove L hand, head and neck, sinuses      Goals:  Goal Identifier HEP   Goal Description Pt will demonstrate accurate compliance to HEP >4 days per wk to independently manage symptoms and progress function (This is progressing. She is completing her exercises)   Target Date 22   Date Met      Progress (detail required for progress note):       Goal Identifier Shoulder ROM   Goal Description Pt will demonstrate shoulder flexion to >120 deg to be able to reach onto overhead shelves in kitchen    Target Date 10/19/21   Date Met      Progress (detail required for progress note): She is not able to lift into cupboards at this time     Goal Identifier Shoulder ROM   Goal Description Pt will demonstrate shoulder ER to at least 50 deg to be able to reach back of head to style hair   Target Date 10/30/21   Date Met      Progress (detail required for progress note): Not tested today. Continues to hold her L arm in protected position     Goal Identifier Shoulder strength   Goal Description Pt will demonstrate shoulder flex, abd, IR, and ER strength of at least 4-/5 to be able to tolerate daily tasks   Target Date 22   Date Met      Progress (detail required for progress note): Not tested today     Goal Identifier  Shoulder function   Goal Description Pt will demonstrate the ability to complete >3 hours of computer work w/shoulder pain less than 4/10 to be able to tolerate computer work for her job   Target Date 12/19/21   Date Met      Progress (detail required for progress note): Still struggles with use of hands/arm for driving and computer work     Plan:  Discharge from therapy.    Discharge:    Reason for Discharge: Will be seen at [facility] for their program    Equipment Issued: none    Discharge Plan: Other services: [facility] Program.

## 2021-10-06 ENCOUNTER — MYC MEDICAL ADVICE (OUTPATIENT)
Dept: INTERNAL MEDICINE | Facility: CLINIC | Age: 51
End: 2021-10-06
Payer: COMMERCIAL

## 2021-10-06 ENCOUNTER — MYC MEDICAL ADVICE (OUTPATIENT)
Dept: INTERNAL MEDICINE | Facility: CLINIC | Age: 51
End: 2021-10-06

## 2021-10-06 DIAGNOSIS — J02.9 ACUTE PHARYNGITIS, UNSPECIFIED ETIOLOGY: Primary | ICD-10-CM

## 2021-10-07 NOTE — TELEPHONE ENCOUNTER
Is it possible to just get her a strep test on the float schedule? I ordered the test, please let her know.

## 2021-10-08 ENCOUNTER — ALLIED HEALTH/NURSE VISIT (OUTPATIENT)
Dept: FAMILY MEDICINE | Facility: CLINIC | Age: 51
End: 2021-10-08
Payer: COMMERCIAL

## 2021-10-08 DIAGNOSIS — J02.9 SORE THROAT: Primary | ICD-10-CM

## 2021-10-08 LAB
DEPRECATED S PYO AG THROAT QL EIA: NEGATIVE
GROUP A STREP BY PCR: NOT DETECTED

## 2021-10-08 PROCEDURE — U0005 INFEC AGEN DETEC AMPLI PROBE: HCPCS

## 2021-10-08 PROCEDURE — 99207 PR NO CHARGE NURSE ONLY: CPT

## 2021-10-08 PROCEDURE — U0003 INFECTIOUS AGENT DETECTION BY NUCLEIC ACID (DNA OR RNA); SEVERE ACUTE RESPIRATORY SYNDROME CORONAVIRUS 2 (SARS-COV-2) (CORONAVIRUS DISEASE [COVID-19]), AMPLIFIED PROBE TECHNIQUE, MAKING USE OF HIGH THROUGHPUT TECHNOLOGIES AS DESCRIBED BY CMS-2020-01-R: HCPCS

## 2021-10-08 PROCEDURE — 87651 STREP A DNA AMP PROBE: CPT | Performed by: INTERNAL MEDICINE

## 2021-10-08 NOTE — PROGRESS NOTES
Patient swabbed for strep and covid per orders of Dr. Jackson. Patient tolerated well.    Paulina Marques on 10/8/2021 at 10:18 AM

## 2021-10-09 ENCOUNTER — MYC MEDICAL ADVICE (OUTPATIENT)
Dept: INTERNAL MEDICINE | Facility: CLINIC | Age: 51
End: 2021-10-09

## 2021-10-09 LAB — SARS-COV-2 RNA RESP QL NAA+PROBE: NEGATIVE

## 2021-10-13 ENCOUNTER — MYC MEDICAL ADVICE (OUTPATIENT)
Dept: INTERNAL MEDICINE | Facility: CLINIC | Age: 51
End: 2021-10-13

## 2021-10-13 DIAGNOSIS — J06.9 UPPER RESPIRATORY TRACT INFECTION, UNSPECIFIED TYPE: Primary | ICD-10-CM

## 2021-10-13 RX ORDER — FLUCONAZOLE 150 MG/1
150 TABLET ORAL ONCE
Qty: 2 TABLET | Refills: 0 | Status: SHIPPED | OUTPATIENT
Start: 2021-10-13 | End: 2021-10-13

## 2021-10-13 RX ORDER — AMOXICILLIN 875 MG
875 TABLET ORAL 2 TIMES DAILY
Qty: 20 TABLET | Refills: 0 | Status: SHIPPED | OUTPATIENT
Start: 2021-10-13 | End: 2022-04-06
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
## 2021-10-20 ENCOUNTER — OFFICE VISIT (OUTPATIENT)
Dept: DERMATOLOGY | Facility: CLINIC | Age: 51
End: 2021-10-20
Payer: COMMERCIAL

## 2021-10-20 DIAGNOSIS — L82.1 SEBORRHEIC KERATOSIS: ICD-10-CM

## 2021-10-20 DIAGNOSIS — D22.9 MULTIPLE BENIGN NEVI: ICD-10-CM

## 2021-10-20 DIAGNOSIS — L57.8 SUN-DAMAGED SKIN: ICD-10-CM

## 2021-10-20 DIAGNOSIS — Z80.8 FAMILY HISTORY OF MELANOMA: ICD-10-CM

## 2021-10-20 DIAGNOSIS — L81.4 SOLAR LENTIGO: ICD-10-CM

## 2021-10-20 DIAGNOSIS — D18.01 CHERRY ANGIOMA: ICD-10-CM

## 2021-10-20 DIAGNOSIS — D48.5 NEOPLASM OF UNCERTAIN BEHAVIOR OF SKIN: Primary | ICD-10-CM

## 2021-10-20 DIAGNOSIS — Z85.828 HISTORY OF NONMELANOMA SKIN CANCER: ICD-10-CM

## 2021-10-20 PROCEDURE — 88305 TISSUE EXAM BY PATHOLOGIST: CPT | Performed by: DERMATOLOGY

## 2021-10-20 PROCEDURE — 99213 OFFICE O/P EST LOW 20 MIN: CPT | Mod: 25 | Performed by: DERMATOLOGY

## 2021-10-20 PROCEDURE — 11102 TANGNTL BX SKIN SINGLE LES: CPT | Performed by: DERMATOLOGY
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
## 2021-10-20 NOTE — NURSING NOTE
The following medication was given:     MEDICATION:  Lidocaine with epinephrine 1% [id number]  ROUTE: SQ  SITE: see procedure note  DOSE: 0.5cc  LOT #: [id number]  : Cesar  EXPIRATION DATE: 6/1/22  NDC#: 8925-3616-85  Was there drug waste? 1.5cc  Multi-dose vial: Yes    Ora Gallagher on 10/20/2021 at 12:01 PM

## 2021-10-20 NOTE — PATIENT INSTRUCTIONS

## 2021-10-20 NOTE — NURSING NOTE
Jayashree Johnson's goals for this visit include:   Chief Complaint   Patient presents with     Derm Problem     FBSC, hx of NMSC, spot on left thigh, spots on right leg, face       She requests these members of her care team be copied on today's visit information: no    PCP: Aneudy Jackson    Referring Provider:  No referring provider defined for this encounter.    LMP 03/17/2003 (Exact Date)     Do you need any medication refills at today's visit? No    Mariel Hughes LPN

## 2021-10-22 LAB
PATH REPORT.COMMENTS IMP SPEC: ABNORMAL
PATH REPORT.COMMENTS IMP SPEC: ABNORMAL
PATH REPORT.COMMENTS IMP SPEC: YES
PATH REPORT.FINAL DX SPEC: ABNORMAL
PATH REPORT.GROSS SPEC: ABNORMAL
PATH REPORT.MICROSCOPIC SPEC OTHER STN: ABNORMAL
PATH REPORT.RELEVANT HX SPEC: ABNORMAL

## 2021-10-23 ENCOUNTER — HEALTH MAINTENANCE LETTER (OUTPATIENT)
Age: 51
End: 2021-10-23

## 2021-10-25 ENCOUNTER — OFFICE VISIT (OUTPATIENT)
Dept: INTERNAL MEDICINE | Facility: CLINIC | Age: 51
End: 2021-10-25
Payer: COMMERCIAL

## 2021-10-25 ENCOUNTER — TELEPHONE (OUTPATIENT)
Dept: DERMATOLOGY | Facility: CLINIC | Age: 51
End: 2021-10-25

## 2021-10-25 VITALS
HEART RATE: 89 BPM | RESPIRATION RATE: 18 BRPM | OXYGEN SATURATION: 97 % | SYSTOLIC BLOOD PRESSURE: 122 MMHG | DIASTOLIC BLOOD PRESSURE: 62 MMHG | WEIGHT: 162.7 LBS | TEMPERATURE: 97.1 F | BODY MASS INDEX: 24.66 KG/M2

## 2021-10-25 DIAGNOSIS — C44.719 BASAL CELL CARCINOMA OF LEFT LOWER LEG: Primary | ICD-10-CM

## 2021-10-25 DIAGNOSIS — M25.512 LEFT SHOULDER PAIN, UNSPECIFIED CHRONICITY: ICD-10-CM

## 2021-10-25 DIAGNOSIS — G47.00 INSOMNIA, UNSPECIFIED TYPE: Primary | ICD-10-CM

## 2021-10-25 DIAGNOSIS — M54.2 NECK PAIN ON RIGHT SIDE: ICD-10-CM

## 2021-10-25 PROCEDURE — 99214 OFFICE O/P EST MOD 30 MIN: CPT | Performed by: INTERNAL MEDICINE

## 2021-10-25 RX ORDER — FLUOROURACIL 50 MG/G
CREAM TOPICAL DAILY
Qty: 40 G | Refills: 0 | Status: SHIPPED | OUTPATIENT
Start: 2021-10-25 | End: 2022-07-06

## 2021-10-25 RX ORDER — ZOLPIDEM TARTRATE 6.25 MG/1
TABLET, FILM COATED, EXTENDED RELEASE ORAL
COMMUNITY
Start: 2021-10-25 | End: 2023-05-08

## 2021-10-25 ASSESSMENT — PAIN SCALES - GENERAL: PAINLEVEL: MILD PAIN (3)

## 2021-10-25 NOTE — TELEPHONE ENCOUNTER
Spoke with Melissa on the phone and informed of results. Discussed how to use Efudex and sent educational handout via [employer]. Medication sent to [facility].      Maia Izquierdo RN
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
## 2021-11-02 DIAGNOSIS — G44.209 TENSION HEADACHE: Primary | ICD-10-CM

## 2021-11-03 RX ORDER — PROCHLORPERAZINE MALEATE 10 MG
TABLET ORAL
Qty: 10 TABLET | Refills: 0 | Status: SHIPPED | OUTPATIENT
Start: 2021-11-03 | End: 2022-02-01

## 2021-11-03 NOTE — TELEPHONE ENCOUNTER
Patient has been seen in the past 30 days., 10/25/21, Dr napier  Routing to PCP to advise.  LEYDI Alanis
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
## 2021-11-04 ENCOUNTER — VIRTUAL VISIT (OUTPATIENT)
Dept: DERMATOLOGY | Facility: CLINIC | Age: 51
End: 2021-11-04
Payer: COMMERCIAL

## 2021-11-04 ENCOUNTER — TELEPHONE (OUTPATIENT)
Dept: INTERNAL MEDICINE | Facility: CLINIC | Age: 51
End: 2021-11-04

## 2021-11-04 DIAGNOSIS — L30.9 DERMATITIS: Primary | ICD-10-CM

## 2021-11-04 DIAGNOSIS — L90.5 SCAR: ICD-10-CM

## 2021-11-04 PROCEDURE — 99214 OFFICE O/P EST MOD 30 MIN: CPT | Mod: 95 | Performed by: DERMATOLOGY

## 2021-11-04 RX ORDER — PIMECROLIMUS 10 MG/G
CREAM TOPICAL 2 TIMES DAILY
Qty: 60 G | Refills: 0 | Status: SHIPPED | OUTPATIENT
Start: 2021-11-04 | End: 2022-03-21

## 2021-11-04 RX ORDER — PIMECROLIMUS 10 MG/G
CREAM TOPICAL 2 TIMES DAILY
Qty: 60 G | Refills: 0 | Status: SHIPPED | OUTPATIENT
Start: 2021-11-04 | End: 2021-11-04

## 2021-11-04 NOTE — TELEPHONE ENCOUNTER
Central Prior Authorization Team   Phone: [phone]    PA Initiation    Medication: pimecrolimus 1% cream  Insurance Company:    Pharmacy Filling the Rx: [facility] - [place], [place] - [facility] [address]  Filling Pharmacy Phone: [phone]  Filling Pharmacy Fax: [phone]  Start Date: 11/4/2021

## 2021-11-04 NOTE — TELEPHONE ENCOUNTER
Prior Authorization Retail Medication Request    Medication/Dose: pimecrolimus 1% cream  ICD code (if different than what is on RX):     Previously Tried and Failed:     Rationale:       Insurance Name:  adrienne ahmadi  Insurance ID:  [id number]      Pharmacy Information (if different than what is on RX)  Name:     Phone:

## 2021-11-04 NOTE — PATIENT INSTRUCTIONS
[facility] Dermatology Visit    Thank you for allowing us to participate in your care. Your findings, instructions and follow-up plan are as follows:  Cetaphil redness line    When should I call my doctor?    If you are worsening or not improving, please, contact us or seek urgent care as noted below.     Who should I call with questions (adults)?    [facility] (adult and pediatric): [phone]    [facility] (adult): [phone]    For urgent needs outside of business hours call the [facility] at [phone] and ask for the dermatology resident on call    If this is a medical emergency and you are unable to reach an ER, Call 911    Who should I call with questions (pediatric)?  [facility]- Pediatric Dermatology  Dr. Patria Roque, Dr. Lilly Skinner, Dr. Stefani Figueroa, Shruti Retana, [place]  Dr. Trang Magallanes, Dr. Ina Richards & Dr. Richard Bangura  Non Urgent  Nurse Triage Line; [phone]- Latia and Netta HOLLEY Care Coordinators   Tri (/Complex ) [phone]    If you need a prescription refill, please contact your pharmacy. Refills are approved or denied by our physicians during normal business hours, Monday through Fridays  Per office policy, refills will not be granted if you have not been seen within the past year (or sooner depending on your child's condition).    Scheduling Information:  Pediatric Appointment Scheduling and Call Center [phone]  Radiology Scheduling- [phone]  Sedation Unit Scheduling- [phone]  [place] Scheduling- General [phone]; Pediatric Dermatology [phone]  Main  Services: [phone]  Burkinan: [phone]  Samoan: [phone]  Hmong/Sri Lankan/North Korean: [phone]  Preadmission Nursing Department Fax Number: [phone] (fax all pre-operative paperwork to this number)    For urgent matters arising  during evenings, weekends, or holidays that cannot wait for normal business hours please call [phone] and ask for the dermatology resident on call to be paged.
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
## 2021-11-05 RX ORDER — TACROLIMUS 1 MG/G
OINTMENT TOPICAL 2 TIMES DAILY
Qty: 30 G | Refills: 0 | Status: SHIPPED | OUTPATIENT
Start: 2021-11-05 | End: 2021-11-19

## 2021-11-05 NOTE — TELEPHONE ENCOUNTER
PRIOR AUTHORIZATION DENIED    Medication: pimecrolimus 1% cream-DENIED    Denial Date: 11/4/2021    Denial Rational: PATIENT MUST TRY/FAIL FORMULARY ALTERNATIVE - PROTOPIC.           Appeal Information:  IF PATIENT IS UNABLE TO TRY/FAIL ALTERNATIVE(S) PLEASE SUPPLY PA TEAM WITH A LETTER OF MEDICAL NECESSITY WITH CLINICAL REASON.

## 2021-11-08 ENCOUNTER — TELEPHONE (OUTPATIENT)
Dept: INTERNAL MEDICINE | Facility: CLINIC | Age: 51
End: 2021-11-08
Payer: COMMERCIAL

## 2021-11-12 ENCOUNTER — TELEPHONE (OUTPATIENT)
Dept: DERMATOLOGY | Facility: CLINIC | Age: 51
End: 2021-11-12
Payer: COMMERCIAL

## 2021-11-17 ENCOUNTER — MYC MEDICAL ADVICE (OUTPATIENT)
Dept: INTERNAL MEDICINE | Facility: CLINIC | Age: 51
End: 2021-11-17
Payer: COMMERCIAL

## 2021-11-19 ENCOUNTER — TELEPHONE (OUTPATIENT)
Dept: INTERNAL MEDICINE | Facility: CLINIC | Age: 51
End: 2021-11-19

## 2021-11-19 DIAGNOSIS — L30.9 DERMATITIS: ICD-10-CM

## 2021-11-19 RX ORDER — TACROLIMUS 1 MG/G
OINTMENT TOPICAL 2 TIMES DAILY
Qty: 30 G | Refills: 0 | Status: SHIPPED | OUTPATIENT
Start: 2021-11-19 | End: 2023-07-24

## 2021-11-19 RX ORDER — HYDROCORTISONE 25 MG/G
OINTMENT TOPICAL
Qty: 30 G | Refills: 1 | Status: SHIPPED | OUTPATIENT
Start: 2021-11-19 | End: 2023-05-10

## 2021-11-19 NOTE — TELEPHONE ENCOUNTER
Prior Authorization Retail Medication Request    Medication/Dose: PA tacrolimus   ICD code (if different than what is on RX):  Dermatitis (L30.9)  Previously Tried and Failed:  Unknown   Rationale:  PA required for either brand or generic     Insurance Name:  [facility]  Insurance ID:  [id number]

## 2021-11-22 NOTE — TELEPHONE ENCOUNTER
Central Prior Authorization Team   Phone: [phone]    PA Initiation    Medication: PA tacrolimus   Insurance Company:    Pharmacy Filling the Rx: [address]   Filling Pharmacy Phone: [phone]  Filling Pharmacy Fax: [phone]  Start Date: 11/22/2021

## 2021-11-23 ENCOUNTER — MYC MEDICAL ADVICE (OUTPATIENT)
Dept: INTERNAL MEDICINE | Facility: CLINIC | Age: 51
End: 2021-11-23
Payer: COMMERCIAL

## 2021-11-26 NOTE — TELEPHONE ENCOUNTER
Insurance states tacrolimus is denied.  Patient must try/fail protopic, but this also needs a PA.  I will submit for the brand protopic.    PA Initiation    Medication: tacrolimus  Insurance Company:    Pharmacy Filling the Rx: [facility] [address]   Filling Pharmacy Phone: [phone]  Filling Pharmacy Fax: [phone]  Start Date: 11/26/2021

## 2021-11-29 NOTE — TELEPHONE ENCOUNTER
Prior Authorization Approval    Authorization Effective Date: 11/1/2021  Authorization Expiration Date: 11/29/2022  Medication: protopic-APPROVED  Approved Dose/Quantity:    Reference #:     Insurance Company:    Expected CoPay:       CoPay Card Available:      Foundation Assistance Needed:    Which Pharmacy is filling the prescription (Not needed for infusion/clinic administered): [facility], [place] - [address] [facility]   Pharmacy Notified: Yes  Patient Notified: Yes  **Instructed pharmacy to notify patient when script is ready to /ship.**

## 2021-11-30 ENCOUNTER — MYC MEDICAL ADVICE (OUTPATIENT)
Dept: INTERNAL MEDICINE | Facility: CLINIC | Age: 51
End: 2021-11-30
Payer: COMMERCIAL

## 2021-11-30 DIAGNOSIS — G62.9 NEUROPATHY: Primary | ICD-10-CM

## 2021-12-02 ENCOUNTER — MEDICAL CORRESPONDENCE (OUTPATIENT)
Dept: HEALTH INFORMATION MANAGEMENT | Facility: CLINIC | Age: 51
End: 2021-12-02

## 2021-12-10 ENCOUNTER — LAB (OUTPATIENT)
Dept: LAB | Facility: CLINIC | Age: 51
End: 2021-12-10
Payer: COMMERCIAL

## 2021-12-10 DIAGNOSIS — G62.9 NEUROPATHY: ICD-10-CM

## 2021-12-10 LAB
ALBUMIN SERPL-MCNC: 3.7 G/DL (ref 3.4–5)
ALP SERPL-CCNC: 97 U/L (ref 40–150)
ALT SERPL W P-5'-P-CCNC: 17 U/L (ref 0–50)
ANION GAP SERPL CALCULATED.3IONS-SCNC: 8 MMOL/L (ref 3–14)
AST SERPL W P-5'-P-CCNC: 27 U/L (ref 0–45)
BASOPHILS # BLD AUTO: 0.1 10E3/UL (ref 0–0.2)
BASOPHILS NFR BLD AUTO: 1 %
BILIRUB SERPL-MCNC: 0.4 MG/DL (ref 0.2–1.3)
BUN SERPL-MCNC: 10 MG/DL (ref 7–30)
CALCIUM SERPL-MCNC: 8.4 MG/DL (ref 8.5–10.1)
CHLORIDE BLD-SCNC: 111 MMOL/L (ref 94–109)
CO2 SERPL-SCNC: 21 MMOL/L (ref 20–32)
CREAT SERPL-MCNC: 0.71 MG/DL (ref 0.52–1.04)
EOSINOPHIL # BLD AUTO: 0.1 10E3/UL (ref 0–0.7)
EOSINOPHIL NFR BLD AUTO: 1 %
ERYTHROCYTE [DISTWIDTH] IN BLOOD BY AUTOMATED COUNT: 13.2 % (ref 10–15)
GFR SERPL CREATININE-BSD FRML MDRD: >90 ML/MIN/1.73M2
GLUCOSE BLD-MCNC: 95 MG/DL (ref 70–99)
HBA1C MFR BLD: 5.6 % (ref 0–5.6)
HCT VFR BLD AUTO: 40.6 % (ref 35–47)
HGB BLD-MCNC: 13.8 G/DL (ref 11.7–15.7)
HOLD SPECIMEN: NORMAL
IMM GRANULOCYTES # BLD: 0 10E3/UL
IMM GRANULOCYTES NFR BLD: 0 %
LYMPHOCYTES # BLD AUTO: 2.5 10E3/UL (ref 0.8–5.3)
LYMPHOCYTES NFR BLD AUTO: 33 %
MCH RBC QN AUTO: 30.1 PG (ref 26.5–33)
MCHC RBC AUTO-ENTMCNC: 34 G/DL (ref 31.5–36.5)
MCV RBC AUTO: 89 FL (ref 78–100)
MONOCYTES # BLD AUTO: 0.5 10E3/UL (ref 0–1.3)
MONOCYTES NFR BLD AUTO: 6 %
NEUTROPHILS # BLD AUTO: 4.5 10E3/UL (ref 1.6–8.3)
NEUTROPHILS NFR BLD AUTO: 59 %
NRBC # BLD AUTO: 0 10E3/UL
NRBC BLD AUTO-RTO: 0 /100
PLATELET # BLD AUTO: 285 10E3/UL (ref 150–450)
POTASSIUM BLD-SCNC: 4.3 MMOL/L (ref 3.4–5.3)
PROT SERPL-MCNC: 7.4 G/DL (ref 6.8–8.8)
RBC # BLD AUTO: 4.59 10E6/UL (ref 3.8–5.2)
SODIUM SERPL-SCNC: 140 MMOL/L (ref 133–144)
WBC # BLD AUTO: 7.7 10E3/UL (ref 4–11)

## 2021-12-10 PROCEDURE — 80053 COMPREHEN METABOLIC PANEL: CPT

## 2021-12-10 PROCEDURE — 85025 COMPLETE CBC W/AUTO DIFF WBC: CPT

## 2021-12-10 PROCEDURE — 83036 HEMOGLOBIN GLYCOSYLATED A1C: CPT

## 2021-12-10 PROCEDURE — 36415 COLL VENOUS BLD VENIPUNCTURE: CPT

## 2021-12-13 ENCOUNTER — VIRTUAL VISIT (OUTPATIENT)
Dept: INTERNAL MEDICINE | Facility: CLINIC | Age: 51
End: 2021-12-13
Payer: COMMERCIAL

## 2021-12-13 DIAGNOSIS — G47.9 SLEEP DISTURBANCE: ICD-10-CM

## 2021-12-13 DIAGNOSIS — L30.9 DERMATITIS: ICD-10-CM

## 2021-12-13 DIAGNOSIS — G93.32 CHRONIC FATIGUE SYNDROME: Primary | ICD-10-CM

## 2021-12-13 DIAGNOSIS — K52.832 LYMPHOCYTIC COLITIS: Primary | ICD-10-CM

## 2021-12-13 DIAGNOSIS — E55.9 AVITAMINOSIS D: ICD-10-CM

## 2021-12-13 DIAGNOSIS — R19.7 DIARRHEA, UNSPECIFIED TYPE: ICD-10-CM

## 2021-12-13 DIAGNOSIS — N95.1 SYMPTOMATIC MENOPAUSAL OR FEMALE CLIMACTERIC STATES: ICD-10-CM

## 2021-12-13 DIAGNOSIS — R03.0 ELEVATED BLOOD PRESSURE READING WITHOUT DIAGNOSIS OF HYPERTENSION: ICD-10-CM

## 2021-12-13 DIAGNOSIS — M25.50 POLYARTHRALGIA: ICD-10-CM

## 2021-12-13 DIAGNOSIS — M25.50 PAIN IN JOINT, MULTIPLE SITES: ICD-10-CM

## 2021-12-13 DIAGNOSIS — M79.7 SCAPULOHUMERAL FIBROSITIS: ICD-10-CM

## 2021-12-13 PROCEDURE — 99214 OFFICE O/P EST MOD 30 MIN: CPT | Mod: 95 | Performed by: INTERNAL MEDICINE

## 2021-12-13 RX ORDER — BUDESONIDE 3 MG/1
CAPSULE, COATED PELLETS ORAL
Qty: 180 CAPSULE | Refills: 0 | Status: SHIPPED | OUTPATIENT
Start: 2021-12-13 | End: 2022-04-06
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
## 2021-12-14 ENCOUNTER — MYC MEDICAL ADVICE (OUTPATIENT)
Dept: INTERNAL MEDICINE | Facility: CLINIC | Age: 51
End: 2021-12-14
Payer: COMMERCIAL

## 2021-12-14 DIAGNOSIS — G93.32 CHRONIC FATIGUE SYNDROME: Primary | ICD-10-CM

## 2021-12-14 DIAGNOSIS — M79.7 SCAPULOHUMERAL FIBROSITIS: ICD-10-CM

## 2021-12-14 DIAGNOSIS — E55.9 AVITAMINOSIS D: ICD-10-CM

## 2021-12-14 DIAGNOSIS — M25.50 PAIN IN JOINT, MULTIPLE SITES: ICD-10-CM

## 2021-12-14 DIAGNOSIS — R03.0 ELEVATED BLOOD PRESSURE READING WITHOUT DIAGNOSIS OF HYPERTENSION: ICD-10-CM

## 2021-12-14 DIAGNOSIS — K52.832 LYMPHOCYTIC COLITIS: Primary | ICD-10-CM

## 2021-12-14 DIAGNOSIS — N95.1 SYMPTOMATIC MENOPAUSAL OR FEMALE CLIMACTERIC STATES: ICD-10-CM

## 2021-12-14 NOTE — TELEPHONE ENCOUNTER
FUTURE VISIT INFORMATION      FUTURE VISIT INFORMATION:    Date: 3.4.22    Time: 8:15    [place] [facility] [place]  REFERRAL INFORMATION:    Referring provider:  Dr. Kern    Referring providers clinic:  [facility]    Reason for visit/diagnosis  Dermatitis, Patch Testing consult, recds in epic per pt    RECORDS REQUESTED FROM:       Clinic name Comments Records Status Photos Status   [facility] 11.19.21, 11.4.21  Dr. Kern [facility]

## 2021-12-15 ENCOUNTER — LAB (OUTPATIENT)
Dept: LAB | Facility: CLINIC | Age: 51
End: 2021-12-15
Payer: COMMERCIAL

## 2021-12-15 DIAGNOSIS — N95.1 SYMPTOMATIC MENOPAUSAL OR FEMALE CLIMACTERIC STATES: ICD-10-CM

## 2021-12-15 DIAGNOSIS — M79.7 SCAPULOHUMERAL FIBROSITIS: ICD-10-CM

## 2021-12-15 DIAGNOSIS — R03.0 ELEVATED BLOOD PRESSURE READING WITHOUT DIAGNOSIS OF HYPERTENSION: ICD-10-CM

## 2021-12-15 DIAGNOSIS — G93.32 CHRONIC FATIGUE SYNDROME: ICD-10-CM

## 2021-12-15 DIAGNOSIS — E55.9 AVITAMINOSIS D: ICD-10-CM

## 2021-12-15 DIAGNOSIS — M25.50 PAIN IN JOINT, MULTIPLE SITES: ICD-10-CM

## 2021-12-15 LAB
ALBUMIN SERPL-MCNC: 3.8 G/DL (ref 3.4–5)
ALP SERPL-CCNC: 99 U/L (ref 40–150)
ALT SERPL W P-5'-P-CCNC: 16 U/L (ref 0–50)
ANION GAP SERPL CALCULATED.3IONS-SCNC: 8 MMOL/L (ref 3–14)
AST SERPL W P-5'-P-CCNC: 24 U/L (ref 0–45)
BASOPHILS # BLD AUTO: 0.1 10E3/UL (ref 0–0.2)
BASOPHILS NFR BLD AUTO: 1 %
BILIRUB SERPL-MCNC: 0.5 MG/DL (ref 0.2–1.3)
BUN SERPL-MCNC: 11 MG/DL (ref 7–30)
CALCIUM SERPL-MCNC: 9.1 MG/DL (ref 8.5–10.1)
CHLORIDE BLD-SCNC: 110 MMOL/L (ref 94–109)
CO2 SERPL-SCNC: 21 MMOL/L (ref 20–32)
CORTIS SERPL-MCNC: 11.8 UG/DL (ref 4–22)
CREAT SERPL-MCNC: 0.65 MG/DL (ref 0.52–1.04)
EOSINOPHIL # BLD AUTO: 0 10E3/UL (ref 0–0.7)
EOSINOPHIL NFR BLD AUTO: 0 %
ERYTHROCYTE [DISTWIDTH] IN BLOOD BY AUTOMATED COUNT: 12.9 % (ref 10–15)
ESTRADIOL SERPL-MCNC: 48 PG/ML
FERRITIN SERPL-MCNC: 47 NG/ML (ref 8–252)
FSH SERPL-ACNC: 3.9 IU/L
GFR SERPL CREATININE-BSD FRML MDRD: >90 ML/MIN/1.73M2
GLUCOSE BLD-MCNC: 104 MG/DL (ref 70–99)
HCT VFR BLD AUTO: 39.9 % (ref 35–47)
HGB BLD-MCNC: 13.5 G/DL (ref 11.7–15.7)
IMM GRANULOCYTES # BLD: 0 10E3/UL
IMM GRANULOCYTES NFR BLD: 0 %
LH SERPL-ACNC: 3 IU/L
LYMPHOCYTES # BLD AUTO: 2.4 10E3/UL (ref 0.8–5.3)
LYMPHOCYTES NFR BLD AUTO: 33 %
MCH RBC QN AUTO: 29.8 PG (ref 26.5–33)
MCHC RBC AUTO-ENTMCNC: 33.8 G/DL (ref 31.5–36.5)
MCV RBC AUTO: 88 FL (ref 78–100)
MONOCYTES # BLD AUTO: 0.5 10E3/UL (ref 0–1.3)
MONOCYTES NFR BLD AUTO: 7 %
NEUTROPHILS # BLD AUTO: 4.2 10E3/UL (ref 1.6–8.3)
NEUTROPHILS NFR BLD AUTO: 59 %
NRBC # BLD AUTO: 0 10E3/UL
NRBC BLD AUTO-RTO: 0 /100
PLATELET # BLD AUTO: 316 10E3/UL (ref 150–450)
POTASSIUM BLD-SCNC: 4 MMOL/L (ref 3.4–5.3)
PROGEST SERPL-MCNC: 2.6 NG/ML
PROT SERPL-MCNC: 7.4 G/DL (ref 6.8–8.8)
RBC # BLD AUTO: 4.53 10E6/UL (ref 3.8–5.2)
SHBG SERPL-SCNC: 45 NMOL/L (ref 30–135)
SHBG SERPL-SCNC: 45 NMOL/L (ref 30–135)
SODIUM SERPL-SCNC: 139 MMOL/L (ref 133–144)
T3FREE SERPL-MCNC: 2.4 PG/ML (ref 2.3–4.2)
T4 FREE SERPL-MCNC: 1.13 NG/DL (ref 0.76–1.46)
TSH SERPL DL<=0.005 MIU/L-ACNC: 0.81 MU/L (ref 0.4–4)
VIT B12 SERPL-MCNC: 254 PG/ML (ref 193–986)
WBC # BLD AUTO: 7.2 10E3/UL (ref 4–11)

## 2021-12-15 PROCEDURE — 80050 GENERAL HEALTH PANEL: CPT

## 2021-12-15 PROCEDURE — 86376 MICROSOMAL ANTIBODY EACH: CPT

## 2021-12-15 PROCEDURE — 36415 COLL VENOUS BLD VENIPUNCTURE: CPT

## 2021-12-15 PROCEDURE — 84270 ASSAY OF SEX HORMONE GLOBUL: CPT

## 2021-12-15 PROCEDURE — 82670 ASSAY OF TOTAL ESTRADIOL: CPT

## 2021-12-15 PROCEDURE — 84481 FREE ASSAY (FT-3): CPT

## 2021-12-15 PROCEDURE — 82627 DEHYDROEPIANDROSTERONE: CPT

## 2021-12-15 PROCEDURE — 84144 ASSAY OF PROGESTERONE: CPT

## 2021-12-15 PROCEDURE — 83001 ASSAY OF GONADOTROPIN (FSH): CPT

## 2021-12-15 PROCEDURE — 82728 ASSAY OF FERRITIN: CPT

## 2021-12-15 PROCEDURE — 82607 VITAMIN B-12: CPT

## 2021-12-15 PROCEDURE — 82306 VITAMIN D 25 HYDROXY: CPT

## 2021-12-15 PROCEDURE — 83002 ASSAY OF GONADOTROPIN (LH): CPT

## 2021-12-15 PROCEDURE — 84439 ASSAY OF FREE THYROXINE: CPT

## 2021-12-15 PROCEDURE — 84403 ASSAY OF TOTAL TESTOSTERONE: CPT

## 2021-12-15 PROCEDURE — 82533 TOTAL CORTISOL: CPT

## 2021-12-16 ENCOUNTER — TRANSCRIBE ORDERS (OUTPATIENT)
Dept: OTHER | Age: 51
End: 2021-12-16
Payer: COMMERCIAL

## 2021-12-16 DIAGNOSIS — M25.512 PAIN IN LEFT SHOULDER: Primary | ICD-10-CM

## 2021-12-16 LAB
DHEA-S SERPL-MCNC: 76 UG/DL (ref 35–430)
TESTOST FREE SERPL-MCNC: 0.22 NG/DL
TESTOST SERPL-MCNC: 10 NG/DL (ref 8–60)
TESTOST SERPL-MCNC: 15 NG/DL (ref 8–60)

## 2021-12-17 ENCOUNTER — TELEPHONE (OUTPATIENT)
Dept: GASTROENTEROLOGY | Facility: CLINIC | Age: 51
End: 2021-12-17
Payer: COMMERCIAL

## 2021-12-17 LAB — THYROPEROXIDASE AB SERPL-ACNC: <10 IU/ML

## 2021-12-17 NOTE — TELEPHONE ENCOUNTER
Screening Questions  1. Are you active on mychart? Y    2. What insurance is in the chart? BLUE PLUS    2.  Ordering/Referring Provider: Aneudy Jackson    3. BMI 25.1, If greater than 40 review exclusion criteria    4.  Respiratory Screening (If yes to any of the following Hospital setting only):     Do you use daily home oxygen? N   Do you have mod to severe Obstructive Sleep Apnea? N   Do you have Pulmonary Hypertension? N   Do you have UNCONTROLLED asthma? N    5. Have you had a heart or lung transplant (If yes, please review exclusion criteria) ? N    6. Are you currently on dialysis or have chronic kidney disease? N    7. Have you had a stroke or Transient ischemic attack (TIA) within 6 months? N    8. In the past 6 months, have you had any heart related issues including cardiomyopathy or heart attack? N                 If yes, did it require cardiac stenting or other implantable device?N      9. Do you have any implantable devices in your body (pacemaker, defib, LVAD)? N    10. Do you take nitroglycerin? If yes, how often? N    11. Are you currently taking any blood thinners? N    12. Are you a diabetic? Y -PRE DIABETIC     13. (Females) Are you currently pregnant? N  If yes, how many weeks?      15. Are you taking any prescription pain medications on a routine schedule? N If yes, MAC sedation.    16. Do you have any chemical dependencies such as alcohol, street drugs, or methadone? NIf yes, MAC sedation.    17. Do you have any history of post-traumatic stress syndrome, severe anxiety or history of psychosis? Y    18. Do you transfer independently? Y    19.  Do you have any issues with constipation? N    20. Preferred Pharmacy for Pre Prescription     Scheduling Details    Which Colonoscopy Prep was Sent?: MPREP  Procedure Scheduled: COLONOSCOPY - MG  Surgeon: VINICIUS  Date of Procedure: 01/28/2021 @ 8:30  Location:   Caller (Please ask for phone number if not scheduled by patient): Jayashree MURRAY  Alex        Sedation Type: CS  Conscious Sedation- Needs  for 6 hours after the procedure  MAC/General-Needs  for 24 hours after procedure    Pre-op Required at [facility] [place], [place] and OR for MAC sedation:   (if yes advise patient they will need a pre-op prior to procedure)      Is patient on blood thinners? - ASPRIN (If yes- inform patient to follow up with PCP or provider for follow up instructions)     Informed patient they will need an adult  Y  Cannot take any type of public or medical transportation alone    Pre-Procedure Covid test to be completed at [facility] or Externally: [facility]     Confirmed Nurse will call to complete assessment Y    Additional comments:

## 2021-12-18 ENCOUNTER — HEALTH MAINTENANCE LETTER (OUTPATIENT)
Age: 51
End: 2021-12-18

## 2021-12-20 LAB
DEPRECATED CALCIDIOL+CALCIFEROL SERPL-MC: <44 UG/L (ref 20–75)
VITAMIN D2 SERPL-MCNC: <5 UG/L
VITAMIN D3 SERPL-MCNC: 39 UG/L

## 2021-12-29 DIAGNOSIS — Z11.59 ENCOUNTER FOR SCREENING FOR OTHER VIRAL DISEASES: ICD-10-CM

## 2022-01-03 ENCOUNTER — TRANSCRIBE ORDERS (OUTPATIENT)
Dept: OTHER | Age: 52
End: 2022-01-03
Payer: COMMERCIAL

## 2022-01-03 ENCOUNTER — HOSPITAL ENCOUNTER (OUTPATIENT)
Dept: PHYSICAL THERAPY | Facility: CLINIC | Age: 52
Setting detail: THERAPIES SERIES
End: 2022-01-03
Attending: PHYSICAL MEDICINE & REHABILITATION
Payer: COMMERCIAL

## 2022-01-03 DIAGNOSIS — M25.512 PAIN IN LEFT SHOULDER: ICD-10-CM

## 2022-01-03 DIAGNOSIS — M25.512 LEFT SHOULDER PAIN: Primary | ICD-10-CM

## 2022-01-03 PROCEDURE — 97163 PT EVAL HIGH COMPLEX 45 MIN: CPT | Mod: GP | Performed by: PHYSICAL THERAPIST

## 2022-01-03 NOTE — PROGRESS NOTES
01/03/22 1000   General Information   Type of Visit Initial OP Ortho PT Evaluation   Start of Care Date 01/03/22   Referring Physician Dr. Garcia   Patient/Family Goals Statement To have no pain/burning, able to use a computer again, and normal movement and function in the left UE.    Orders Evaluate and Treat   Orders Comment posture principles, scapula stab, anterior shoulder stretching, adaptive equipment recommendations   Date of Order 12/16/21   Certification Required? Yes   Medical Diagnosis Pain in left shoulder   Surgical/Medical history reviewed Yes   Precautions/Limitations   (high pain left UE)   Body Part(s)   Body Part(s) Shoulder;Cervical Spine   Presentation and Etiology   Pertinent history of current problem (include personal factors and/or comorbidities that impact the POC) Patient was last seen in PT 9/13/21 for pain in the left UE, neck upper back. Since then did the chronic pain program at the [place] and it helped with the quality of life and routine but not with the pain. injection in the shoulder in Dec and did not help. Dr Garcia does feel she has adhesive capsulitis. Rotator cuff is healed. And  does not feel this is coming from the cerivcal. Today symptoms: burning/numbness in the left hand #5, shoulder loss of motion, pain in the upper arm #3, forearm pain #3-4, armpit pain #2, upper trap pain #3, loss of motion in the neck, B jaw pain, HA  every day avg #4 mostly at the base of the head and forehead. PMHX: cervical fusion C5-6 2017. The current dysfunction and pain started 12/20 when she was feeding cattle and was taking twine off a bail and the twine was frozen and would not give and was a jolt to the entire left arm. Then same day feeding the cow and was holding a bucket tried to pull it away from the animal and the arm and bucket flew up and back. (patient is right handed). 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 Has the animals yet and her son helps. See the new MRI in  care everywhere for shoulder, AC degen, bursitis, grade 2 chondro of the humeral head, acromian is grade 2 with spur. And signs of adhesive capsulitis.    Impairments A. Pain;B. Decreased WB tolerance;D. Decreased ROM;E. Decreased flexibility;F. Decreased strength and endurance;J. Burning;K. Numbness;L. Tingling;N. Headaches;Q. Dizziness   Functional Limitations perform required work activities;perform activities of daily living;perform desired leisure / sports activities   Onset date of current episode/exacerbation 12/03/20   Chronicity Chronic   Frequency of pain/symptoms A. Constant   Pain/symptoms exacerbated by   (any movement of the LUE, neck/head)   Pain/symptoms eased by   (rest and some of her home prog for chronic pain)   Progression of symptoms since onset: Improved   Current / Previous Interventions   Diagnostic Tests:   (see care everywhere from the [place])   Prior Level of Function   Prior Level of Function-Mobility independent prior to injury to the LUE   Current Level of Function   Patient role/employment history A. Employed   Living environment [place]   Fall Risk Screen   Fall screen completed by PT   Have you fallen 2 or more times in the past year? No   Have you fallen and had an injury in the past year? No   Is patient a fall risk? No   Abuse Screen (yes response referral indicated)   Feels Unsafe at Home or Work/School no   Feels Threatened by Someone no   Does Anyone Try to Keep You From Having Contact with Others or Doing Things Outside Your Home? no   Physical Signs of Abuse Present no   Cervical Spine   Observation Patient holds left UE close to her body at all times, with attempts of AROM of the left shoulder pt gets dizzy, light headed and nausea   Posture forward head and rounded shoulders   Cervical Flexion ROM 70%   Cervical Extension ROM 50%   Cervical Right Rotation ROM 70%   Cervical Left Rotation ROM 50%   Upper Trapezius Flexibility very tight left    Levator Scapula  Flexibility tightness left   Scalene Flexibility tightness left   Palpation pain and tightness throughout the neck and left shoulder   Shoulder Objective Findings   Side (if bilateral, select both right and left) Left   Shoulder ROM Comment capsular pattern and very painful, after the AROM pt nausea, light headed and dizzy   Scapulothoracic Rhythm poor   Shoulder Special Tests Comments capsular pattern and very painful   Left Shoulder Flexion AROM 70 degrees with hiking of the shoulder   Left Shoulder Abduction AROM 40 degrees hiking   Left Shoulder ER AROM 12 degrees at 55 degrees of abd   Left Shoulder IR AROM to L3-4   Left Shoulder Flexion Strength 3-   Left Shoulder Abduction Strength 2+   Left Shoulder ER Strength 3-   Left Shoulder IR Strength 3-   Planned Therapy Interventions   Planned Therapy Interventions joint mobilization;manual therapy;motor coordination training;neuromuscular re-education;ROM;strengthening;stretching   Planned Therapy Interventions Comment Pain treatment and home program   Planned Modality Interventions   Planned Modality Interventions Biofeedback;Ultrasound   Clinical Impression   Criteria for Skilled Therapeutic Interventions Met yes, treatment indicated   PT Diagnosis Adhesive capsulitis, chronic pain, cervical component, jaw pain.    Influenced by the following impairments chronic neck pain, AC degen, adhesive capsulitis, chronic pain   Functional limitations due to impairments very limited in all function of the left UE   Clinical Presentation Evolving/Changing   Clinical Presentation Rationale several issues, chronic pain   Clinical Decision Making (Complexity) High complexity   Therapy Frequency 2 times/Week   Predicted Duration of Therapy Intervention (days/wks) 90 days   Risk & Benefits of therapy have been explained Yes   Patient, Family & other staff in agreement with plan of care Yes   Clinical Impression Comments Patient is presenting with chronic neck and left UE pain.  Patient has a capsular pattern along with pain that is referring all the way to the hand. AROM is severely limited both in the shoulder and in the neck. With AROM of the shoulder and the neck pt gets light headed, nausea and dizzy.    Education Assessment   Preferred Learning Style Listening;Reading;Demonstration   Barriers to Learning No barriers   ORTHO GOALS   PT Ortho Eval Goals 1;2;3   Ortho Goal 1   Goal Identifier 1   Goal Description Patient is able to use the LUE for all ADLS, such as washing and curling her hair,    Target Date 04/02/22   Ortho Goal 2   Goal Identifier 2   Goal Description Patient has a 50% increase in over all function in the LUE as seen by a 50% decrease in the SPADI score    Target Date 04/02/22   Ortho Goal 3   Goal Identifier 3   Goal Description Patient has 50 degree increase in all AROM of the left shoulder without causing nausea, or light headedness.    Target Date 04/02/22   Total Evaluation Time   PT Eval, High Complexity Minutes (01631) 60   Therapy Certification   Certification date from 01/03/22   Certification date to 04/02/22   Medical Diagnosis Pain shoulder left   Thank you for the referral,            Rosetta Wheat PT

## 2022-01-03 NOTE — PROGRESS NOTES
[facility]    OUTPATIENT PHYSICAL THERAPY ORTHOPEDIC EVALUATION  PLAN OF TREATMENT FOR OUTPATIENT REHABILITATION  (COMPLETE FOR INITIAL CLAIMS ONLY)  Patient's Last Name, First Name, M.I.  YOB: 1970  Jayashree Johnson    Provider s Name:  [facility]   Medical Record No.  [id number]   Start of Care Date:  01/03/22   Onset Date:  12/03/20   Type:     _X__PT   ___OT   ___SLP Medical Diagnosis:  Pain shoulder left     PT Diagnosis:  Adhesive capsulitis, chronic pain, cervical component, jaw pain.    Visits from SOC:  1      _________________________________________________________________________________  Plan of Treatment/Functional Goals:  joint mobilization,manual therapy,motor coordination training,neuromuscular re-education,ROM,strengthening,stretching  Pain treatment and home program  Biofeedback,Ultrasound     Goals  Goal Identifier: 1  Goal Description: Patient is able to use the LUE for all ADLS, such as washing and curling her hair,   Target Date: 04/02/22    Goal Identifier: 2  Goal Description: Patient has a 50% increase in over all function in the LUE as seen by a 50% decrease in the SPADI score   Target Date: 04/02/22    Goal Identifier: 3  Goal Description: Patient has 50 degree increase in all AROM of the left shoulder without causing nausea, or light headedness.   Target Date: 04/02/22      Therapy Frequency:  2 times/Week  Predicted Duration of Therapy Intervention:  90 days    Rosetta Wheat, PT                 I CERTIFY THE NEED FOR THESE SERVICES FURNISHED UNDER        THIS PLAN OF TREATMENT AND WHILE UNDER MY CARE .             Physician Signature               Date    X_____________________________________________________                             Certification Date From:  01/03/22   Certification Date To:  04/02/22    Referring Provider:    Brault    Initial Assessment        See Epic Evaluation Start of Care Date: 01/03/22

## 2022-01-06 ENCOUNTER — HOSPITAL ENCOUNTER (OUTPATIENT)
Dept: PHYSICAL THERAPY | Facility: CLINIC | Age: 52
Setting detail: THERAPIES SERIES
End: 2022-01-06
Attending: PHYSICAL MEDICINE & REHABILITATION
Payer: COMMERCIAL

## 2022-01-06 PROCEDURE — 97110 THERAPEUTIC EXERCISES: CPT | Mod: GP | Performed by: PHYSICAL THERAPIST

## 2022-01-11 ENCOUNTER — MYC MEDICAL ADVICE (OUTPATIENT)
Dept: INTERNAL MEDICINE | Facility: CLINIC | Age: 52
End: 2022-01-11
Payer: COMMERCIAL

## 2022-01-11 DIAGNOSIS — G44.209 TENSION HEADACHE: ICD-10-CM

## 2022-01-11 DIAGNOSIS — G62.9 NEUROPATHY: Primary | ICD-10-CM

## 2022-01-12 NOTE — TELEPHONE ENCOUNTER
Appointment request form completed and faxed with recent clinic notes to [facility] , they will contact patient to set up appointment. Julia,

## 2022-01-13 ENCOUNTER — HOSPITAL ENCOUNTER (OUTPATIENT)
Dept: PHYSICAL THERAPY | Facility: CLINIC | Age: 52
Setting detail: THERAPIES SERIES
End: 2022-01-13
Attending: PHYSICAL MEDICINE & REHABILITATION
Payer: COMMERCIAL

## 2022-01-13 PROCEDURE — 97112 NEUROMUSCULAR REEDUCATION: CPT | Mod: GP | Performed by: PHYSICAL THERAPIST

## 2022-01-20 ENCOUNTER — HOSPITAL ENCOUNTER (OUTPATIENT)
Dept: PHYSICAL THERAPY | Facility: CLINIC | Age: 52
Setting detail: THERAPIES SERIES
End: 2022-01-20
Attending: PHYSICAL MEDICINE & REHABILITATION
Payer: COMMERCIAL

## 2022-01-20 ENCOUNTER — HOSPITAL ENCOUNTER (OUTPATIENT)
Dept: OCCUPATIONAL THERAPY | Facility: CLINIC | Age: 52
Setting detail: THERAPIES SERIES
End: 2022-01-20
Attending: PHYSICAL MEDICINE & REHABILITATION
Payer: COMMERCIAL

## 2022-01-20 PROCEDURE — 97140 MANUAL THERAPY 1/> REGIONS: CPT | Mod: GP | Performed by: PHYSICAL THERAPIST

## 2022-01-20 PROCEDURE — 97535 SELF CARE MNGMENT TRAINING: CPT | Mod: GO | Performed by: OCCUPATIONAL THERAPIST

## 2022-01-20 PROCEDURE — 97166 OT EVAL MOD COMPLEX 45 MIN: CPT | Mod: GO | Performed by: OCCUPATIONAL THERAPIST

## 2022-01-21 NOTE — PROGRESS NOTES
"Occupational Therapy Evaluation       01/20/22 1300   Quick Adds   Quick Adds Certification   Type of Visit Initial Outpatient Occupational Therapy Evaluation   General Information   Start Of Care Date 01/20/22   Referring Physician Patrick Garcia MD   Orders Evaluate and treat as indicated;Other   Other Orders AE for ADL, drive aides   Orders Date 01/03/22   Medical Diagnosis left shoulder pain M25.512   Onset of Illness/Injury or Date of Surgery 12/20/20   Precautions/Limitations   (full restriction)   Surgical/Medical History Reviewed Yes  (PMH; pain clinic, RTC repair)   Additional Occupational Profile Info/Pertinent History of Current Problem The pt is a 52 y/o female referred to OT to assist with adaptive equipment (AE) for ADLs, including drive aides.  Per [facility] chart review and PT eval notes ; \"Since then did the chronic pain program at the [facility] and it helped with the quality of life and routine but not with the pain. injection in the shoulder in Dec and did not help. Dr Garcia does feel she has adhesive capsulitis. Rotator cuff is healed. And  does not feel this is coming from the [facility]. \"   OT findings and discovery:  The patient feels she has lost use of her left hand/arm for daily activities , due to left shoulder surgery , ongoing pain and loss of motion.  The pt states inability to reach her car blinker shift bar with the left hand and inability to open or shut her car door.  She finds washing, styling and attempting to put her hair in a pony tail extemely difficult \"I don't even try anymore\".  Reported folding clothes difficult.  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  She stated would like assistance with a job pursuit; due to all jobs require \"2 arms\".    Patient minimal attempt to use her left arm for tasks per her report.  Overall decreased pain free functional use " "of the left arm and hand.   Comments/Observations Low mood and affect.  Appears gaurded.   Role/Living Environment   Current Community Support Family/friend caregiver   Patient role/Employment history Unemployed   Community/Avocational Activities Currently not working , would like a job pursue   Current Living Environment House   Number of Stairs to Enter Home walk out basement   Number of Stairs Within Home 14 steps ; main level including bedrooms; right side rail ascending and no bilateral rails for descending   Primary Bathroom Set Up/Equipment Shower stall;Tub  (whirl type , would like to take a bath for pain)   Prior Responsibilities - IADL Meal Preparation;Housekeeping;Laundry;Shopping;Yardwork;Medication management;Finances;Driving   Prior Level Comments [facility] 17/80     Role/Living Environment Comments Farm with beef cattle, dog/pet care and outside cats/care  Enjoys reading, gardening and animals.   Patient/family Goals Statement \"devices and ideas to assist with daily tasks/activities.\" \" find a paying job, I can do \" \"drive without complications\"   Pain   Patient currently in pain Yes   Pain location left shoulder and hand   Pain rating 7/10   Pain description Burning;Ache   Pain description comment incresaed pain with activity use and gripping   Fall Risk Screen   Fall screen completed by OT   Have you fallen 2 or more times in the past year? No   Have you fallen and had an injury in the past year? No   Is patient a fall risk? No   Fall screen comments PT is addressing balance issues.  Pt reports falling 1x this past year   Abuse Screen (yes response referral indicated)   Feels Unsafe at Home or Work/School no   Feels Threatened by Someone no   Does Anyone Try to Keep You From Having Contact with Others or Doing Things Outside Your Home? no   Physical Signs of Abuse Present no   Cognitive Status Examination   Orientation Orientation to person, place and time   Executive Function Initiation " impaired;Planning ability impaired;Cognitive flexibility impaired   Posture   Posture Comments Left UE held tight to the body with hand on lap for most of the OT session   Range of Motion (ROM)   ROM Comments AROM Left shoulder flexion est.  70 degrees, shoulder horizontal abduction est. 30 degrees, elbow flexion WNL, elbow extension est. -10 degrees. wrist and forearm WNL all planes of movement.  Hand WNL all planes.   Hand Strength   Hand Dominance Right   Left Hand  (pounds)   (trace)   Right Hand  (pounds)   (42#)   Left Lateral Pinch (pounds)   (4#)   Right Lateral Pinch (pounds)   (16.5#)   Left Three Point Pinch (pounds)   (3.5#)   Right Three Point Pinch (pounds)   (12#)   Coordination   Functional Limitations Decreased speed;Fine motor ADL performance impaired;Impaired ability to perform bilateral tasks;Object transport impaired;Reach to targets impaired  (left UE/hand)   Coordination Comments Pt reports dropping items within the day.   Bathing   Level of [place] - Bathing independent   Upper Body Dressing   Level of [place]: Dress Upper Body independent   Lower Body Dressing   Level of [place]: Dress Lower Body minimum assist (75% patients effort)   Toileting   Level of [place]: Toilet independent   Grooming   Level of [place]: Grooming maximum assist (25% patients effort)   Eating/Self-Feeding   Level of [place]: Eating moderate assist (50% patients effort)   Activity Tolerance   Activity Tolerance fair   Planned Therapy Interventions   Planned Therapy Interventions ADL training;IADL training;Neuromuscular re-education;ROM;Self care/Home management;Strengthening;Stretching;Therapeutic activities   Adult OT Eval Goals   OT Eval Goals (Adult) 1;2;3;4;5;6    OT Goal 1   Goal Identifier pain/positioning sleep, car and activities   Goal Description Melissa will consistently rank left UE pain no more then 2/10 on a 0-10 pain scale, during sleep, seated at home or while driving  in a vehicle; with use of proper positioning and adaptations.   Target Date 04/20/22    OT Goal 2   Goal Identifier Modified Driving with AE   Goal Description Melissa will increase functional use for driving a vehicle with use of adaptive aides as deemed appropriate and having increased tolerance to functional use of the lower arm by 75%.   Target Date 04/20/22    OT Goal 3   Goal Identifier Modified groom and hygiene with AE   Goal Description Melissa will be able to complete IND hair grooming and styling daily, with use of adaptive aides as deemed appropriate and modifications.   Target Date 04/20/22   OT Goal 4   Goal Identifier Modified meal prep/clean up with AE   Goal Description Melissa will be IND preparing meals daily, using adaptive aides as deemed appropriate; for increased functional use of the left UE/hand.   Target Date 04/20/22   OT Goal 5   Goal Identifier Modified Yardwork, farm, care of animals, drive lawn tractor    Goal Description Melissa will be able to complete yardwork and animal care by 75% , using adaptive aides and modifications.   Target Date 04/20/22    OT Goal 6   Goal Identifier Job pursuits   Goal Description Melissa will identify up to 3 employment jobs she could apply for , within her decreeased functional use of the Left arm.   Target Date 04/20/22   Clinical Impression   Criteria for Skilled Therapeutic Interventions Met Yes, treatment indicated   OT Diagnosis Decreased functional use of the left UE for BADL/IADLs.   Influenced by the following impairments Left shoulder/arm pain, decreased AROM and stength, decreased emotional regulation   Assessment of Occupational Performance 5 or more Performance Deficits   Identified Performance Deficits grooming, hygiene, dressing, meal prep/clean up, home mgmt, yardwork/gardening, care of animals, work and driving   Clinical Decision Making (Complexity) Moderate complexity   Therapy Frequency 2x week   Predicted Duration of Therapy Intervention (days/wks)  3 months   Risks and Benefits of Treatment have been explained. Yes   Patient, Family & other staff in agreement with plan of care Yes   Education Assessment   Barriers To Learning Emotional   Preferred Learning Style Listening;Demonstration   Therapy Certification   Certification date from 01/20/22   Certification date to 04/20/22   Total Evaluation Time   OT Eval, Moderate Complexity Minutes (98327) 30       Thank you for referring Melissa  To OT services to assist with improve function for BADL/IADLs.    If you have any questions or concerns, please contact me at [phone].    Maia Haile MA, OTR/[facility]  [email or web address]

## 2022-01-21 NOTE — PROGRESS NOTES
[facility]          OUTPATIENT OCCUPATIONAL THERAPY  EVALUATION  PLAN OF TREATMENT FOR OUTPATIENT REHABILITATION  (COMPLETE FOR INITIAL CLAIMS ONLY)  Patient's Last Name, First Name, M.I.  YOB: 1970  Melissa Johnsonara  TREVOR                        Provider's Name  [facility] Medical Record No.  [id number]                               Onset Date:     12/20/20   Start of Care Date:     01/20/22   Type:     ___PT   _X_OT   ___SLP Medical Diagnosis:     left shoulder pain M25.512                          OT Diagnosis:     Decreased functional use of the left UE for BADL/IADLs. Visits from SOC:  1   _________________________________________________________________________________  Plan of Treatment/Functional Goals:  ADL training,IADL training,Neuromuscular re-education,ROM,Self care/Home management,Strengthening,Stretching,Therapeutic activities             Goals  Goal Identifier: pain/positioning sleep, car and activities  Goal Description: Melissa will consistently rank left UE pain no more then 2/10 on a 0-10 pain scale, during sleep, seated at home or while driving in a vehicle; with use of proper positioning and adaptations.  Target Date: 04/20/22     Goal Identifier: Modified Driving with AE  Goal Description: Melissa will increase functional use for driving a vehicle with use of adaptive aides as deemed appropriate and having increased tolerance to functional use of the lower arm by 75%.  Target Date: 04/20/22     Goal Identifier: Modified groom and hygiene with AE  Goal Description: Melissa will be able to complete IND hair grooming and styling daily, with use of adaptive aides as deemed appropriate and modifications.  Target Date: 04/20/22     Goal Identifier: Modified meal prep/clean up with AE  Goal Description: Melissa will be IND preparing meals daily, using adaptive aides as  deemed appropriate; for increased functional use of the left UE/hand.  Target Date: 04/20/22     Goal Identifier: Modified Yardwork, farm, care of animals, drive lawn tractor   Goal Description: Melissa will be able to complete yardwork and animal care by 75% , using adaptive aides and modifications.  Target Date: 04/20/22     Goal Identifier: Job pursuits  Goal Description: Melissa will identify up to 3 employment jobs she could apply for , within her decreeased functional use of the Left arm.  Target Date: 04/20/22           Therapy Frequency: 2x week     Predicted Duration of Therapy Intervention (days/wks): 3 months  Maia Haile, OT          I CERTIFY THE NEED FOR THESE SERVICES FURNISHED UNDER        THIS PLAN OF TREATMENT AND WHILE UNDER MY CARE .             Physician Signature               Date    X_____________________________________________________                     ,    Certification date from: 01/20/22, Certification date to: 04/20/22               Referring Physician: Patrick Garcia MD     Initial Assessment        See Epic Evaluation      Start Of Care Date: 01/20/22    Thank you for referring Melissa  To OT services to assist with improve function for BADL/IADLs.    If you have any questions or concerns, please contact me at [phone].    Maia Haile MA, OTR/[facility]  [email or web address]

## 2022-01-24 ENCOUNTER — HOSPITAL ENCOUNTER (OUTPATIENT)
Dept: PHYSICAL THERAPY | Facility: CLINIC | Age: 52
Setting detail: THERAPIES SERIES
End: 2022-01-24
Attending: PHYSICAL MEDICINE & REHABILITATION
Payer: COMMERCIAL

## 2022-01-24 ENCOUNTER — MYC MEDICAL ADVICE (OUTPATIENT)
Dept: OBGYN | Facility: CLINIC | Age: 52
End: 2022-01-24

## 2022-01-24 ENCOUNTER — LAB (OUTPATIENT)
Dept: LAB | Facility: CLINIC | Age: 52
End: 2022-01-24
Payer: COMMERCIAL

## 2022-01-24 DIAGNOSIS — Z11.59 ENCOUNTER FOR SCREENING FOR OTHER VIRAL DISEASES: ICD-10-CM

## 2022-01-24 DIAGNOSIS — Z80.3 FAMILY HISTORY OF BREAST CANCER IN SISTER: Primary | ICD-10-CM

## 2022-01-24 PROCEDURE — U0005 INFEC AGEN DETEC AMPLI PROBE: HCPCS

## 2022-01-24 PROCEDURE — 97140 MANUAL THERAPY 1/> REGIONS: CPT | Mod: GP | Performed by: PHYSICAL THERAPIST

## 2022-01-24 PROCEDURE — U0003 INFECTIOUS AGENT DETECTION BY NUCLEIC ACID (DNA OR RNA); SEVERE ACUTE RESPIRATORY SYNDROME CORONAVIRUS 2 (SARS-COV-2) (CORONAVIRUS DISEASE [COVID-19]), AMPLIFIED PROBE TECHNIQUE, MAKING USE OF HIGH THROUGHPUT TECHNOLOGIES AS DESCRIBED BY CMS-2020-01-R: HCPCS

## 2022-01-25 LAB — SARS-COV-2 RNA RESP QL NAA+PROBE: NEGATIVE

## 2022-01-27 ENCOUNTER — HOSPITAL ENCOUNTER (OUTPATIENT)
Dept: PHYSICAL THERAPY | Facility: CLINIC | Age: 52
Setting detail: THERAPIES SERIES
End: 2022-01-27
Attending: PHYSICAL MEDICINE & REHABILITATION
Payer: COMMERCIAL

## 2022-01-27 PROCEDURE — 97112 NEUROMUSCULAR REEDUCATION: CPT | Mod: GP | Performed by: PHYSICAL THERAPIST

## 2022-01-28 ENCOUNTER — HOSPITAL ENCOUNTER (OUTPATIENT)
Facility: AMBULATORY SURGERY CENTER | Age: 52
Discharge: HOME | End: 2022-01-28
Attending: INTERNAL MEDICINE | Admitting: INTERNAL MEDICINE
Payer: COMMERCIAL

## 2022-01-28 VITALS
DIASTOLIC BLOOD PRESSURE: 59 MMHG | OXYGEN SATURATION: 97 % | TEMPERATURE: 98.2 F | RESPIRATION RATE: 16 BRPM | SYSTOLIC BLOOD PRESSURE: 116 MMHG

## 2022-01-28 PROCEDURE — 45385 COLONOSCOPY W/LESION REMOVAL: CPT

## 2022-01-28 PROCEDURE — G8918 PT W/O PREOP ORDER IV AB PRO: HCPCS

## 2022-01-28 PROCEDURE — G8907 PT DOC NO EVENTS ON DISCHARG: HCPCS

## 2022-01-28 RX ORDER — FENTANYL CITRATE 50 UG/ML
INJECTION, SOLUTION INTRAMUSCULAR; INTRAVENOUS PRN
Status: DISCONTINUED | OUTPATIENT
Start: 2022-01-28 | End: 2022-01-28 | Stop reason: HOSPADM

## 2022-01-28 RX ORDER — NALOXONE HYDROCHLORIDE 0.4 MG/ML
0.2 INJECTION, SOLUTION INTRAMUSCULAR; INTRAVENOUS; SUBCUTANEOUS
Status: DISCONTINUED | OUTPATIENT
Start: 2022-01-28 | End: 2022-01-29 | Stop reason: HOSPADM

## 2022-01-28 RX ORDER — PROCHLORPERAZINE MALEATE 10 MG
10 TABLET ORAL EVERY 6 HOURS PRN
Status: DISCONTINUED | OUTPATIENT
Start: 2022-01-28 | End: 2022-01-29 | Stop reason: HOSPADM

## 2022-01-28 RX ORDER — NALOXONE HYDROCHLORIDE 0.4 MG/ML
0.4 INJECTION, SOLUTION INTRAMUSCULAR; INTRAVENOUS; SUBCUTANEOUS
Status: DISCONTINUED | OUTPATIENT
Start: 2022-01-28 | End: 2022-01-29 | Stop reason: HOSPADM

## 2022-01-28 RX ORDER — ONDANSETRON 4 MG/1
4 TABLET, ORALLY DISINTEGRATING ORAL EVERY 6 HOURS PRN
Status: DISCONTINUED | OUTPATIENT
Start: 2022-01-28 | End: 2022-01-29 | Stop reason: HOSPADM

## 2022-01-28 RX ORDER — LIDOCAINE 40 MG/G
CREAM TOPICAL
Status: DISCONTINUED | OUTPATIENT
Start: 2022-01-28 | End: 2022-01-29 | Stop reason: HOSPADM

## 2022-01-28 RX ORDER — ONDANSETRON 2 MG/ML
4 INJECTION INTRAMUSCULAR; INTRAVENOUS
Status: DISCONTINUED | OUTPATIENT
Start: 2022-01-28 | End: 2022-01-29 | Stop reason: HOSPADM

## 2022-01-28 RX ORDER — ONDANSETRON 2 MG/ML
4 INJECTION INTRAMUSCULAR; INTRAVENOUS EVERY 6 HOURS PRN
Status: DISCONTINUED | OUTPATIENT
Start: 2022-01-28 | End: 2022-01-29 | Stop reason: HOSPADM

## 2022-01-28 RX ORDER — FLUMAZENIL 0.1 MG/ML
0.2 INJECTION, SOLUTION INTRAVENOUS
Status: ACTIVE | OUTPATIENT
Start: 2022-01-28 | End: 2022-01-28

## 2022-01-28 NOTE — H&P
[facility] Anesthesia Pre-op History and Physical    Jayashree Johnson MRN# [id number]   Age: 51 year old YOB: 1970      Date of Surgery: 1/28/2022     Date of Exam 1/28/2022         Primary care provider: Aneudy Jackson         Chief Complaint and/or Reason for Procedure:     Alternating diarrhea and constipation, history of lymphocytic colitis         Active problem list:     Patient Active Problem List    Diagnosis Date Noted     Family history of breast cancer in sister 09/19/2012     Priority: High     12/20/2012. Genetic  w subsequent NEGATIVE BRCA I and BRCA II gene testing.       Moderate major depression, single episode (H) 09/19/2012     Priority: High     Sleep disturbance -- chronic. 09/19/2012     Priority: High     Nontraumatic incomplete tear of left rotator cuff 01/20/2021     Priority: Medium     Adhesive capsulitis of left shoulder 01/20/2021     Priority: Medium     Rotator cuff syndrome, left 12/11/2020     Priority: Medium     Acute pain of right shoulder 12/11/2020     Priority: Medium     PTSD (post-traumatic stress disorder) 09/25/2020     Priority: Medium     S/P cervical spinal fusion 05/30/2017     Priority: Medium     Nasal obstruction 10/02/2015     Priority: Medium     Polyarthralgia 06/25/2014     Priority: Medium     Vitamin D deficiency 06/19/2014     Priority: Medium     Cellulitis of nose, external 06/17/2014     Priority: Medium     Myalgia and myositis 06/16/2014     Priority: Medium     Problem list name updated by automated process. Provider to review       Microscopic colitis 08/16/2013     Priority: Medium     Ovarian cyst 05/21/2013     Priority: Medium     Actinic keratosis 05/13/2013     Priority: Medium     SK (seborrheic keratosis) 05/13/2013     Priority: Medium     Pruritus 05/13/2013     Priority: Medium     CARDIOVASCULAR SCREENING; LDL GOAL LESS THAN 100 10/31/2010     Priority: Medium     Hypoglycemia 06/12/1996     Priority: Medium      Gestational diabetes mellitus, antepartum / HX 03/28/1995     Priority: Medium     Resolved with delivery.              Medications (include herbals and vitamins):   Any Plavix use in the last 7 days? No     Current Outpatient Medications   Medication Sig     ALPRAZolam (XANAX PO) Take 0.125-0.25 mg by mouth nightly as needed for sleep      budesonide (ENTOCORT EC) 3 MG EC capsule 3 a day for 30 days, then 2 a day for 30 days then 1 a day for 30 days     cetirizine (ZYRTEC) 10 MG tablet Take 1 tablet (10 mg) by mouth daily     Cholecalciferol (VITAMIN D-3 PO) Take 1 capsule by mouth daily     Cyanocobalamin (VITAMIN B-12 PO) Take 1 tablet by mouth daily     Digestive Enzymes (DIGESTIVE ENZYME PO) Take 2 capsules by mouth 2 times daily     loratadine (CLARITIN) 10 MG tablet Take 1 tablet (10 mg) by mouth daily     meloxicam (MOBIC) 15 MG tablet Take 1 tablet (15 mg) by mouth daily     metoprolol succinate ER (TOPROL-XL) 25 MG 24 hr tablet Take 2 tablets (50 mg) by mouth daily     Multiple Vitamin (MULTI-VITAMIN DAILY PO) Take 1 tablet by mouth daily     prochlorperazine (COMPAZINE) 10 MG tablet TAKE 1 TABLET (10 MG) BY MOUTH EVERY 6 HOURS AS NEEDED FOR NAUSEA OR VOMITING     vortioxetine (TRINTELLIX) 5 MG tablet Take 1 tablet (5 mg) by mouth daily     zolpidem ER (AMBIEN CR) 6.25 MG CR tablet Take by mouth nightly as needed for sleep     amoxicillin (AMOXIL) 875 MG tablet Take 1 tablet (875 mg) by mouth 2 times daily (Patient not taking: Reported on 12/13/2021)     azelastine (ASTELIN) 0.1 % nasal spray Spray 1 spray into both nostrils 2 times daily     fluorouracil (EFUDEX) 5 % external cream Apply topically daily For 6 weeks     fluticasone (FLONASE) 50 MCG/ACT nasal spray USE ONE SPRAY IN EACH NOSTRIL EVERY DAY AS NEEDED FOR RHINITIS OR ALLERGIES     hydrocortisone 2.5 % ointment Apply twice daily for 2 weeks.     pimecrolimus (ELIDEL) 1 % external cream Apply topically 2 times daily     Polyethyl Glycol-Propyl  Glycol (SYSTANE OP) Place 1-2 drops into both eyes daily as needed     SUMAtriptan (IMITREX) 50 MG tablet Take 1 tablet (50 mg) by mouth at onset of headache for migraine (Patient not taking: Reported on 12/13/2021)     tacrolimus (PROTOPIC) 0.1 % external ointment Apply topically 2 times daily Apply twice daily. Keep in fridge to avoid any stinging     Current Facility-Administered Medications   Medication     lidocaine (LMX4) kit     lidocaine 1 % 0.1-1 mL     ondansetron (ZOFRAN) injection 4 mg     sodium chloride (PF) 0.9% PF flush 3 mL     sodium chloride (PF) 0.9% PF flush 3 mL     triamcinolone (KENALOG-40) injection 40 mg             Allergies:      Allergies   Allergen Reactions     No Known Drug Allergies      Allergy to Latex? No  Allergy to tape?   No  Intolerances:             Physical Exam:   All vitals have been reviewed  Patient Vitals for the past 8 hrs:   BP Temp Temp src Resp SpO2   01/28/22 0851 (!) 149/74 98.2  F (36.8  C) Temporal 16 99 %     No intake/output data recorded.  Lungs:   No increased work of breathing, good air exchange, clear to auscultation bilaterally, no crackles or wheezing     Cardiovascular:   normal S1 and S2             Lab / Radiology Results:            Anesthetic risk and/or ASA classification:   2    Bria Hernandes DO

## 2022-01-31 LAB — COLONOSCOPY: NORMAL

## 2022-02-01 LAB
PATH REPORT.COMMENTS IMP SPEC: NORMAL
PATH REPORT.COMMENTS IMP SPEC: NORMAL
PATH REPORT.FINAL DX SPEC: NORMAL
PATH REPORT.GROSS SPEC: NORMAL
PATH REPORT.MICROSCOPIC SPEC OTHER STN: NORMAL
PATH REPORT.RELEVANT HX SPEC: NORMAL
PHOTO IMAGE: NORMAL

## 2022-02-01 PROCEDURE — 88305 TISSUE EXAM BY PATHOLOGIST: CPT | Performed by: PATHOLOGY

## 2022-02-02 ENCOUNTER — MYC MEDICAL ADVICE (OUTPATIENT)
Dept: INTERNAL MEDICINE | Facility: CLINIC | Age: 52
End: 2022-02-02
Payer: COMMERCIAL

## 2022-02-02 DIAGNOSIS — R68.84 JAW PAIN: Primary | ICD-10-CM

## 2022-02-02 NOTE — TELEPHONE ENCOUNTER
Appointment request form completed and faxed with recent clinic notes to [facility] , they will contact patient to set up appointment. Julia,

## 2022-02-07 DIAGNOSIS — G44.209 TENSION HEADACHE: ICD-10-CM

## 2022-02-08 RX ORDER — PROCHLORPERAZINE MALEATE 10 MG
TABLET ORAL
Qty: 10 TABLET | Refills: 1 | Status: SHIPPED | OUTPATIENT
Start: 2022-02-08 | End: 2022-02-17

## 2022-02-12 ENCOUNTER — HEALTH MAINTENANCE LETTER (OUTPATIENT)
Age: 52
End: 2022-02-12

## 2022-02-16 DIAGNOSIS — G44.209 TENSION HEADACHE: ICD-10-CM

## 2022-02-17 RX ORDER — PROCHLORPERAZINE MALEATE 10 MG
TABLET ORAL
Qty: 10 TABLET | Refills: 1 | Status: SHIPPED | OUTPATIENT
Start: 2022-02-17 | End: 2023-12-22

## 2022-03-01 ENCOUNTER — TELEPHONE (OUTPATIENT)
Dept: DERMATOLOGY | Facility: CLINIC | Age: 52
End: 2022-03-01
Payer: COMMERCIAL

## 2022-03-01 DIAGNOSIS — M75.02 ADHESIVE CAPSULITIS OF LEFT SHOULDER: ICD-10-CM

## 2022-03-01 DIAGNOSIS — C44.719 BASAL CELL CARCINOMA OF LEFT LOWER LEG: ICD-10-CM

## 2022-03-02 RX ORDER — MELOXICAM 15 MG/1
TABLET ORAL
Qty: 30 TABLET | Refills: 1 | Status: SHIPPED | OUTPATIENT
Start: 2022-03-02 | End: 2022-08-09

## 2022-03-02 NOTE — TELEPHONE ENCOUNTER
Routing refill request to provider for review/approval because:  Drug interaction warning    MYRNA LevyN, RN  [facility]

## 2022-03-04 ENCOUNTER — PRE VISIT (OUTPATIENT)
Dept: ALLERGY | Facility: CLINIC | Age: 52
End: 2022-03-04

## 2022-03-07 NOTE — TELEPHONE ENCOUNTER
fluorouracil (EFUDEX) 5 % external cream  Last Written Prescription Date:  10/25/21  Last Fill Quantity: 40g,   # refills: 0  Last Office Visit : 11/4/21  Future Office visit:  5/5/22    Routing refill request to provider for review/approval because:  per order x 6 wks.

## 2022-03-08 NOTE — TELEPHONE ENCOUNTER
No, this is probably an autorefill request from the pharmacy. Patietn should not need refills.    Jyoti Quintanilla MD    Department of Dermatology  [facility]: Phone: [phone], Fax:[phone]  [facility]: Phone: [phone], Fax: [phone]

## 2022-03-08 NOTE — TELEPHONE ENCOUNTER
Hi Dr. Kern and Dr. Quintanilla,     I'm not seeing any mention of long-term continued efudex plan for this patient.  Can you let me know if you would like me to send a refill?     Thanks!  Dari

## 2022-03-09 RX ORDER — FLUOROURACIL 50 MG/G
CREAM TOPICAL DAILY
Qty: 40 G | Refills: 0 | OUTPATIENT
Start: 2022-03-09

## 2022-03-09 NOTE — TELEPHONE ENCOUNTER
I received a refill request for efudex as the resident on call.  I reviewed the patient's chart and most recent labs.  The patient was last seen on in Fall 2021 by Dr. Kern and Dr. Quintanilla, she has been using efudex to treat BCC on leg.  As this was a limited treatment for 6 weeks duration, this refill request is denied.  Patient should discuss the appropriateness of further refills of this medication with Dr. Kern/Socorro at upcoming appointment.  CC'd Dr. Kern/Socorro as JORGE.      Rosa Maldonado MD

## 2022-03-14 NOTE — TELEPHONE ENCOUNTER
FUTURE VISIT INFORMATION      FUTURE VISIT INFORMATION:    Date: 3/22/2022    Time: 830am    Location: [facility]  REFERRAL INFORMATION:    Referring provider:  Dr. Jackson    Referring providers clinic:  [facility]     Reason for visit/diagnosis  Headaches     RECORDS REQUESTED FROM:       Clinic name Comments Records Status Imaging Status   Internal Dr. Jackson-7/28/2021, 2/2/2022(Mychart Message)    MR Brain-7/15/2016    MR Cervical Spine-1/13/2021, 1/23/2019 Epic PACS

## 2022-03-14 NOTE — TELEPHONE ENCOUNTER
Please offer phone visit. Pt requesting efudex reflils and we want to verify she is using correctly and no new issues. Great if she can send photos

## 2022-03-15 ENCOUNTER — TELEPHONE (OUTPATIENT)
Dept: DERMATOLOGY | Facility: CLINIC | Age: 52
End: 2022-03-15
Payer: COMMERCIAL

## 2022-03-15 NOTE — TELEPHONE ENCOUNTER
3/15/22 per message below, I called patient and LVM for her to call and make a phone visit appt with Dr. Kern, to see how medication is going and if she could send pictures that would be preferable.  See Note below.    Sherice Bullock Procedure   Neurology & Neurosurgery Specialties  [facility]   [phone]        Natalie Kern MD  [facility] Dermatology Adult [place] 16 hours ago (5:23 PM)     RF       Please offer phone visit. Pt requesting efudex reflils and we want to verify she is using correctly and no new issues. Great if she can send photos         Documentation      Rosa Maldonado MD routed conversation to Jyoti Quintanilla MD; Natalie Kern MD 6 days ago     Rosa Maldonado MD 6 days ago     SB       I received a refill request for efudex as the resident on call.  I reviewed the patient's chart and most recent labs.  The patient was last seen on in Fall 2021 by Dr. Kern and Dr. Quintanilla, she has been using efudex to treat BCC on leg.  As this was a limited treatment for 6 weeks duration, this refill request is denied.  Patient should discuss the appropriateness of further refills of this medication with Dr. Kern/Socorro at upcoming appointment.  CC'd Dr. Kern/Socorro as JORGE.       Rosa Maldonado MD          Documentation

## 2022-03-16 NOTE — TELEPHONE ENCOUNTER
3/16 Called and spoke to patient. She is currently scheduled for telephone visit.     Maureen whitfield Procedure   Orthopedics, Podiatry, Sports Medicine, ENT/Eye Specialties  [facility]   [phone]

## 2022-03-21 ENCOUNTER — VIRTUAL VISIT (OUTPATIENT)
Dept: DERMATOLOGY | Facility: CLINIC | Age: 52
End: 2022-03-21
Payer: COMMERCIAL

## 2022-03-21 DIAGNOSIS — L98.9 SKIN LESION: Primary | ICD-10-CM

## 2022-03-21 PROCEDURE — 99213 OFFICE O/P EST LOW 20 MIN: CPT | Mod: 95 | Performed by: DERMATOLOGY
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
## 2022-03-21 NOTE — PATIENT INSTRUCTIONS
[facility] Dermatology Visit    Thank you for allowing us to participate in your care. Your findings, instructions and follow-up plan are as follows:   see us or  Dr. Mellissa Jarquin  [facility]    Alternate hydrocortisone with the tacrolimus ointment. Keep tacrolimus in fridge. Okay to use twice daily. Alternate them weekly    It cosmetics SPF 50 foundation at [employer]   La roche posay sunscreen at [employer]      [employer]lios SPF 50 Gentle Lotion Mineral Sunscreen             When should I call my doctor?    If you are worsening or not improving, please, contact us or seek urgent care as noted below.     Who should I call with questions (adults)?    [facility] (adult and pediatric): [phone]    [facility] (adult): [phone]    For urgent needs outside of business hours call the [facility] at [phone] and ask for the dermatology resident on call    If this is a medical emergency and you are unable to reach an ER, Call [phone]    Who should I call with questions (pediatric)?  [facility]- Pediatric Dermatology  Dr. Patria Roque, Dr. Lilly Skinner, Dr. Stefani Figueroa, Shruti Retana, PA  Dr. Trang Magallanes, Dr. Ina Richards & Dr. Richard Bangura  Non Urgent  Nurse Triage Line; [phone]- Latia and Netta HOLLEY Care Coordinators   Tri (/Complex ) [phone]    If you need a prescription refill, please contact your pharmacy. Refills are approved or denied by our physicians during normal business hours, Monday through Fridays  Per office policy, refills will not be granted if you have not been seen within the past year (or sooner depending on your child's condition).    Scheduling Information:  Pediatric Appointment Scheduling and Call Center [phone]  Radiology Scheduling- [phone]  Sedation Unit Scheduling- [phone]  [place] Scheduling-  General [phone]; Pediatric Dermatology [phone]  Main  Services: [phone]  Urdu: [phone]  Citizen of the [place]: [phone]  Hmong/Román/Mohawk: [phone]  Preadmission Nursing Department Fax Number: [phone] (fax all pre-operative paperwork to this number)    For urgent matters arising during evenings, weekends, or holidays that cannot wait for normal business hours please call [phone] and ask for the dermatology resident on call to be paged.

## 2022-03-21 NOTE — CONFIDENTIAL NOTE
Chief Complaint   Patient presents with     RECHECK     Med refill, Efudex     Teledermatology Nurse Call Patients:     Are you in the [facility] at the time of the encounter? yes    Today's visit will be billed to you and your insurance.    A teledermatology visit is not as thorough as an in-person visit and the quality of the photograph sent may not be of the same quality as that taken by the dermatology clinic.    No changes in allergies or medications since last visit.    CHANDLER Grajeda

## 2022-03-22 ENCOUNTER — PRE VISIT (OUTPATIENT)
Dept: NEUROLOGY | Facility: CLINIC | Age: 52
End: 2022-03-22

## 2022-03-29 DIAGNOSIS — J34.89 NASAL OBSTRUCTION: ICD-10-CM

## 2022-03-29 RX ORDER — FLUTICASONE PROPIONATE 50 MCG
SPRAY, SUSPENSION (ML) NASAL
Qty: 16 G | Refills: 11 | Status: SHIPPED | OUTPATIENT
Start: 2022-03-29 | End: 2023-04-21

## 2022-04-06 ENCOUNTER — MYC MEDICAL ADVICE (OUTPATIENT)
Dept: INTERNAL MEDICINE | Facility: CLINIC | Age: 52
End: 2022-04-06

## 2022-04-06 ENCOUNTER — VIRTUAL VISIT (OUTPATIENT)
Dept: INTERNAL MEDICINE | Facility: CLINIC | Age: 52
End: 2022-04-06
Payer: COMMERCIAL

## 2022-04-06 DIAGNOSIS — M25.512 ACUTE PAIN OF LEFT SHOULDER: ICD-10-CM

## 2022-04-06 DIAGNOSIS — M75.02 ADHESIVE CAPSULITIS OF LEFT SHOULDER: Primary | ICD-10-CM

## 2022-04-06 DIAGNOSIS — F33.1 MODERATE EPISODE OF RECURRENT MAJOR DEPRESSIVE DISORDER (H): ICD-10-CM

## 2022-04-06 PROCEDURE — 99214 OFFICE O/P EST MOD 30 MIN: CPT | Mod: 95 | Performed by: INTERNAL MEDICINE

## 2022-04-06 RX ORDER — CYCLOBENZAPRINE HCL 5 MG
5 TABLET ORAL 3 TIMES DAILY PRN
Qty: 30 TABLET | Refills: 1 | Status: SHIPPED | OUTPATIENT
Start: 2022-04-06 | End: 2022-07-11

## 2022-04-06 ASSESSMENT — PATIENT HEALTH QUESTIONNAIRE - PHQ9: SUM OF ALL RESPONSES TO PHQ QUESTIONS 1-9: 17

## 2022-04-06 NOTE — PROGRESS NOTES
Melissa is a 51 year old who is being evaluated via a billable video visit.      How would you like to obtain your AVS? MyChart  If the video visit is dropped, the invitation should be resent by: Send to e-mail at: [email or web address]  Will anyone else be joining your video visit? No      Video Start Time: 3:28 PM    Assessment & Plan   Problem List Items Addressed This Visit     Adhesive capsulitis of left shoulder - Primary    Relevant Medications    cyclobenzaprine (FLEXERIL) 5 MG tablet    Other Relevant Orders    Orthopedic  Referral      Other Visit Diagnoses     Acute pain of left shoulder        Relevant Medications    cyclobenzaprine (FLEXERIL) 5 MG tablet    Moderate episode of recurrent major depressive disorder (H)        Relevant Orders    Adult Mental Health  Referral         Patient who has multiple things going on.  She has history of fibromyalgia, GI disturbances, depression.  Her for a long time.  A couple requests.    Adhesive capsulitis of left shoulder, no repeat surgery to treat the capsulitis and then radiation discouraged for adhesions.  We will send her to Ortho Dr Winnie Cristina for second opinion.    Depression and abnormal neuropsych test, plan to be due to PTSD and depression.  Patient is planning on moving he needs a new motional support animal.  She has 2 dogs continue to have that she has multiple medical illnesses that they support.  We will write a letter today.  Seems to make.  We will also refer to a new psychiatrist to try to help her.           No follow-ups on file.    Aneudy Jackson MD  [facility]   Melissa is a 51 year old who presents for the following health issues     HPI     Chief Complaint   Patient presents with     Video Visit     Letter for DONY and med refills     Needs a letter for an emotional support animal.  Planning on moving and needs letter to keep her dogs.      [place] last week for shoulder surgery to  release capsule and then radiation.    She was getting muscle relaxors from sports med, cyclobenzaprine and needs some of that.     Saw neurology and neuropsych testing. Depression and PTSD affected her testing. Suppose to have an MRI.  Had done already and stable volume loss since 2016.     Saw sleep to help with ptsd and nightmares, got Praszosin.    Psychiatry and was going to [facility] but has had lack of phone call returns, appts cancelled.          Review of Systems         Objective           Vitals:  No vitals were obtained today due to virtual visit.    Physical Exam   GENERAL: Healthy, alert and no distress  EYES: Eyes grossly normal to inspection.  No discharge or erythema, or obvious scleral/conjunctival abnormalities.  RESP: No audible wheeze, cough, or visible cyanosis.  No visible retractions or increased work of breathing.    SKIN: Visible skin clear. No significant rash, abnormal pigmentation or lesions.  NEURO: Cranial nerves grossly intact.  Mentation and speech appropriate for age.  PSYCH: Mentation appears normal, affect normal/bright, judgement and insight intact, normal speech and appearance well-groomed.                Video-Visit Details    Type of service:  Video Visit    Video End Time:3:52 PM    Originating Location (pt. Location): Home    Distant Location (provider location):  [facility]     Platform used for Video Visit: [employer]

## 2022-04-06 NOTE — LETTER
[address] [address]  Phone: [phone]    April 6, 2022        Jayashree Johnson  [address] [address] [place] [address]          To whom it may concern:    RE: Jayashree Johnson    Please allow her to have her Emotional Support Animals. She has had a history of medical illnesses and diagnosis that her dogs support and help keep stable.  She will do better by having these two animals living with her.      Please contact me for questions or concerns.      Sincerely,        Aneudy Jackson MD

## 2022-04-07 ENCOUNTER — MYC MEDICAL ADVICE (OUTPATIENT)
Dept: INTERNAL MEDICINE | Facility: CLINIC | Age: 52
End: 2022-04-07
Payer: COMMERCIAL

## 2022-04-07 ENCOUNTER — MYC MEDICAL ADVICE (OUTPATIENT)
Dept: OBGYN | Facility: CLINIC | Age: 52
End: 2022-04-07
Payer: COMMERCIAL

## 2022-04-07 DIAGNOSIS — B00.9 HSV (HERPES SIMPLEX VIRUS) INFECTION: Primary | ICD-10-CM

## 2022-04-07 RX ORDER — VALACYCLOVIR HYDROCHLORIDE 500 MG/1
1000 TABLET, FILM COATED ORAL DAILY
Qty: 90 TABLET | Refills: 3 | Status: SHIPPED | OUTPATIENT
Start: 2022-04-07 | End: 2023-07-24

## 2022-04-07 NOTE — TELEPHONE ENCOUNTER
Received request for refill of valacyclovir. Rx discontinued. Patient confirms she is in need of this medication. She has annual scheduled with Dr. Dumont in May. Pending for approval.

## 2022-04-08 NOTE — TELEPHONE ENCOUNTER
DIAGNOSIS: Adhesive capsulitis of left shoulder   APPOINTMENT DATE: 04/25/2022   NOTES STATUS DETAILS   OFFICE NOTE from referring provider N/A    OFFICE NOTE from other specialist Internal/Care everywhere 03/28/2022 Dr Murillo [facility]  01/27/2022 physical therapy Romulo Garcia [facility]   DISCHARGE SUMMARY from hospital N/A    DISCHARGE REPORT from the ER N/A    OPERATIVE REPORT Care Everywhere 07/01/2021 ID ARTHROSCOPY SHLDR DX W/WO BX    ARTHROSCOPY SHOULDER    MEDICATION LIST N/A    EMG (for Spine) N/A    IMPLANT RECORD/STICKER N/A    LABS     CBC/DIFF N/A    CULTURES N/A    INJECTIONS DONE IN RADIOLOGY N/A    MRI Received 12/20/2021 LFT shoulder   CT SCAN N/A    XRAYS (IMAGES & REPORTS) Received/In process 12/20/2021 LFT shoulder  03/28/2022 LFT hand, wrist   TUMOR     PATHOLOGY  Slides & report N/A      Images in PACS  Dottie Brantley on 4/20/2022 at 3:09 PM

## 2022-04-20 NOTE — TELEPHONE ENCOUNTER
Missing left shoulder MR and XR from 12/20/21 and left hand XR from 3/28/22.   Discussed with Pt and that I don't think her 2nd opinion will be very thorough without this up to date imaging. She is also driving from [place] and would prefer not to drive down if we don't have all of her records. I advised that we would let her know by Friday if we don't have this imaging and we would look at rescheduling her appointment.    Jarrod Thacker, RN

## 2022-04-25 ENCOUNTER — PRE VISIT (OUTPATIENT)
Dept: ORTHOPEDICS | Facility: CLINIC | Age: 52
End: 2022-04-25
Payer: COMMERCIAL

## 2022-04-25 ENCOUNTER — OFFICE VISIT (OUTPATIENT)
Dept: ORTHOPEDICS | Facility: CLINIC | Age: 52
End: 2022-04-25
Payer: COMMERCIAL

## 2022-04-25 VITALS — BODY MASS INDEX: 26.62 KG/M2 | WEIGHT: 169.6 LBS | HEIGHT: 67 IN

## 2022-04-25 DIAGNOSIS — M75.02 ADHESIVE CAPSULITIS OF LEFT SHOULDER: ICD-10-CM

## 2022-04-25 PROCEDURE — 99214 OFFICE O/P EST MOD 30 MIN: CPT | Performed by: ORTHOPAEDIC SURGERY
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
## 2022-04-25 NOTE — NURSING NOTE
"Reason For Visit:   Chief Complaint   Patient presents with     Consult     Left shoulder pain and left hand pain; Torn rotator cuff in December 2020; adhesive capsulitis release in July 2021 with worsening numbness and tingling. Has hx of bulging disc and neck fusion.        PCP: Aneudy Jackson  Ref: PCP    ?  No  Occupation Realtor and farmer.  Currently working? Yes.  Work status?  Part-time.- would like to do full-time but limited due to shoulder  Date of injury: December 2020  Type of injury: Taking twin off of frozen eulalia.  Date of surgery: July 1, 2021- adhesive capsulitis release with Ibarra; no recent cortisone injection  Smoker: No  Request smoking cessation information: No    Right hand dominant    SANE score  Affected shoulder: Left  Right shoulder SANE: 100  Left shoulder SANE: 40    Ht 1.702 m (5' 7\")   Wt 76.9 kg (169 lb 9.6 oz)   LMP 03/17/2003 (Exact Date)   BMI 26.56 kg/m      Bri Marshall ATC  "
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
## 2022-04-26 ENCOUNTER — MYC MEDICAL ADVICE (OUTPATIENT)
Dept: NEUROSURGERY | Facility: CLINIC | Age: 52
End: 2022-04-26
Payer: COMMERCIAL

## 2022-04-26 NOTE — TELEPHONE ENCOUNTER
"Patient sent [email or web address] message stating \"painful, numb hand a year and a half later\"-    Last cervical MRI: 1/13/21    Is to see Orthopa[facility] surgery [facility] med soon for second opinion > delayed r/t them getting her imaging    .Last Visit: Last OV: 3/15/21 virtual visit c William Albert PA-C  S/P:  -C5-6 ACDF done in 2018  -CERVICAL 6-7 EPIDURAL INJECTION 3/25/21: no relief     Next Visit: NA    Name of Provider:  William Albert PA-C    Assessment:   Patient has old rotator cuff of L) shoulder and adhesive capsulitis    Tight to Right side of neck, L hand numb (least amount in thumb) > doctors saying it is not coming from her shoulder per pt, going on 1.5 years with it. Comes and goes, more constant now.      Pain down LUE. Patient takes mm relaxer's at HS. ibuprofen during day PRN. Uses a lot of ice and heat. Topical Voltaren gel. Temporary relief with pain measures.     A lot of  issues. Left hand is weaker, cant  or type with it by the end of the day.     Recommendation given:     Pt given phone number to follow-up with william in clinic to further assess s/s and determine if new imaging is warranted     Action needed from provider: NA              "

## 2022-05-04 ENCOUNTER — ANCILLARY PROCEDURE (OUTPATIENT)
Dept: ULTRASOUND IMAGING | Facility: CLINIC | Age: 52
End: 2022-05-04
Attending: OBSTETRICS & GYNECOLOGY
Payer: COMMERCIAL

## 2022-05-04 ENCOUNTER — OFFICE VISIT (OUTPATIENT)
Dept: OBGYN | Facility: CLINIC | Age: 52
End: 2022-05-04
Attending: OBSTETRICS & GYNECOLOGY
Payer: COMMERCIAL

## 2022-05-04 VITALS
SYSTOLIC BLOOD PRESSURE: 127 MMHG | HEIGHT: 67 IN | BODY MASS INDEX: 26.13 KG/M2 | WEIGHT: 166.5 LBS | DIASTOLIC BLOOD PRESSURE: 81 MMHG | HEART RATE: 72 BPM

## 2022-05-04 DIAGNOSIS — Z00.00 ANNUAL PHYSICAL EXAM: Primary | ICD-10-CM

## 2022-05-04 DIAGNOSIS — Z80.3 FAMILY HISTORY OF BREAST CANCER IN SISTER: ICD-10-CM

## 2022-05-04 PROCEDURE — G0463 HOSPITAL OUTPT CLINIC VISIT: HCPCS | Mod: 25 | Performed by: OBSTETRICS & GYNECOLOGY

## 2022-05-04 PROCEDURE — 76830 TRANSVAGINAL US NON-OB: CPT | Mod: 26 | Performed by: OBSTETRICS & GYNECOLOGY

## 2022-05-04 PROCEDURE — 99396 PREV VISIT EST AGE 40-64: CPT | Performed by: OBSTETRICS & GYNECOLOGY

## 2022-05-04 PROCEDURE — 76856 US EXAM PELVIC COMPLETE: CPT | Mod: 26 | Performed by: OBSTETRICS & GYNECOLOGY

## 2022-05-04 PROCEDURE — 76856 US EXAM PELVIC COMPLETE: CPT
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
## 2022-05-12 NOTE — PROGRESS NOTES
"[facility]    Outpatient Physical Therapy Discharge Note  Patient: Jayashree Johnson  : 1970    Beginning/End Dates of Reporting Period:  1/3/22 to 22    Referring Provider:     Therapy Diagnosis: pain left shoulder     Client Self Report: She thought the last session with Olivia was very helpful for her L shoulder - \"i needed it\". She is using corn in bucket, ball rolling and massaging for L arm sensitivity.     Objective Measurements:  Objective Measure: SPADI  Details: not today  Objective Measure: Improvements noted  Details: L shoulder felt better  Objective Measure: Laterality with cards  Details: missed 2 hand cards at home. She is 100% with 5-R and 5 -L shoulder pictures with slow time. She is asked to continue with hand cards and turn them for more challenge. Consider looking at magazines and identifying 10 L arms and then rotating magazine 90 degrees until back to start. issued shoulder cards for home.      Goals:  Not known if met pt never returned    Plan:  Discharge from therapy.    Discharge:    Reason for Discharge: Patient has failed to schedule further appointments.      Discharge Plan: Patient to continue home program.    Thank you for the referral,            Rosetta Wheat PT    "

## 2022-05-16 DIAGNOSIS — I10 ESSENTIAL HYPERTENSION: ICD-10-CM

## 2022-05-17 RX ORDER — METOPROLOL SUCCINATE 25 MG/1
TABLET, EXTENDED RELEASE ORAL
Qty: 60 TABLET | Refills: 10 | Status: SHIPPED | OUTPATIENT
Start: 2022-05-17

## 2022-05-17 NOTE — TELEPHONE ENCOUNTER
Pending Prescriptions:                       Disp   Refills    metoprolol succinate ER (TOPROL XL) 25 MG *60 tab*10       Sig: TAKE TWO TABLETS BY MOUTH ONCE DAILY        Routing refill request to provider for review/approval because:  A break in medication  Last prescribed 12/10/20.     Butch Darnell, MYRNAN, RN, PHN  [facility] RN for [facility] [employer] [place]  May 17, 2022

## 2022-05-27 NOTE — NURSING NOTE
The following medication was given:     MEDICATION:  Lidocaine with epinephrine 1% [id number]  ROUTE: SQ  SITE: see procedure note  DOSE: 3.5cc  LOT #: -[id number]  : Hospira  EXPIRATION DATE: 02/2021  NDC#: 9968-4135-73   Was there drug waste? 2.5cc  Multi-dose vial: Yes    Abigail Casillas LPN  September 9, 2020    Vaseline and pressure dressing applied to Mohs site on right medial eyebrow.  Wound care instructions reviewed with patient and AVS provided.  Patient verbalized understanding. No further questions or concerns at this time.    Abigail Casillas LPN     27-May-2022 12:25

## 2022-06-29 ENCOUNTER — MYC MEDICAL ADVICE (OUTPATIENT)
Dept: INTERNAL MEDICINE | Facility: CLINIC | Age: 52
End: 2022-06-29

## 2022-06-29 DIAGNOSIS — R42 LIGHTHEADEDNESS: ICD-10-CM

## 2022-06-29 DIAGNOSIS — I10 ESSENTIAL HYPERTENSION: Primary | ICD-10-CM

## 2022-07-06 ENCOUNTER — OFFICE VISIT (OUTPATIENT)
Dept: DERMATOLOGY | Facility: CLINIC | Age: 52
End: 2022-07-06
Payer: COMMERCIAL

## 2022-07-06 VITALS
SYSTOLIC BLOOD PRESSURE: 126 MMHG | HEART RATE: 73 BPM | OXYGEN SATURATION: 99 % | TEMPERATURE: 98 F | DIASTOLIC BLOOD PRESSURE: 80 MMHG

## 2022-07-06 DIAGNOSIS — L72.0 MILIA: ICD-10-CM

## 2022-07-06 DIAGNOSIS — D18.01 CHERRY ANGIOMA: ICD-10-CM

## 2022-07-06 DIAGNOSIS — Z85.828 HISTORY OF NONMELANOMA SKIN CANCER: Primary | ICD-10-CM

## 2022-07-06 DIAGNOSIS — C44.719 BASAL CELL CARCINOMA OF LEFT LOWER LEG: ICD-10-CM

## 2022-07-06 DIAGNOSIS — B00.9 HSV (HERPES SIMPLEX VIRUS) INFECTION: ICD-10-CM

## 2022-07-06 DIAGNOSIS — L30.4 INTERTRIGO: ICD-10-CM

## 2022-07-06 DIAGNOSIS — L82.1 SEBORRHEIC KERATOSES: ICD-10-CM

## 2022-07-06 PROCEDURE — 87798 DETECT AGENT NOS DNA AMP: CPT | Performed by: DERMATOLOGY

## 2022-07-06 PROCEDURE — 99214 OFFICE O/P EST MOD 30 MIN: CPT | Performed by: DERMATOLOGY

## 2022-07-06 PROCEDURE — 87529 HSV DNA AMP PROBE: CPT | Performed by: DERMATOLOGY

## 2022-07-06 RX ORDER — FLUOROURACIL 50 MG/G
CREAM TOPICAL DAILY
Qty: 40 G | Refills: 3 | Status: SHIPPED | OUTPATIENT
Start: 2022-07-06 | End: 2024-09-04

## 2022-07-06 RX ORDER — VALACYCLOVIR HYDROCHLORIDE 1 G/1
2000 TABLET, FILM COATED ORAL 2 TIMES DAILY
Qty: 4 TABLET | Refills: 0 | Status: SHIPPED | OUTPATIENT
Start: 2022-07-06 | End: 2023-03-10

## 2022-07-06 RX ORDER — TRIAMCINOLONE ACETONIDE 1 MG/G
OINTMENT TOPICAL 2 TIMES DAILY
Qty: 80 G | Refills: 3 | Status: SHIPPED | OUTPATIENT
Start: 2022-07-06 | End: 2023-03-31

## 2022-07-06 RX ORDER — ACYCLOVIR 200 MG/1
1000 CAPSULE ORAL 2 TIMES DAILY
Refills: 0 | Status: CANCELLED | OUTPATIENT
Start: 2022-07-06

## 2022-07-06 NOTE — NURSING NOTE
Jayashree Johnson's goals for this visit include:   Chief Complaint   Patient presents with     Derm Problem     Full body skin check per Dr. Kern. Spot of concern on left thigh, left armpit, lower lip, upper right thigh. Personal history of NMSC. Family history of skin cancer in mother and father.        She requests these members of her care team be copied on today's visit information:     PCP: Aneudy Jackson    Referring Provider:  No referring provider defined for this encounter.    /80 (BP Location: Left arm, Patient Position: Sitting, Cuff Size: Adult Regular)   Pulse 73   Temp 98  F (36.7  C) (Oral)   LMP 03/17/2003 (Exact Date)   SpO2 99%     Do you need any medication refills at today's visit? Chapis Gale, [facility]
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
## 2022-07-06 NOTE — PATIENT INSTRUCTIONS
Efudex Treatment - for the left anterior thigh  Today, you are being prescribed Fluorouracil (Efudex) a topical cream used for the treatment of Actinic Keratosis (AKs).  The medication is working to eliminate the unhealthy cells.  This treatment may be unattractive and somewhat uncomfortable.  Your treatment will last daily for 6 weeks on the left anterior thigh.  You may experience some mild discomfort while being treated.  You will want to stop any other creams such as glycolic acid products, retin A, Tazorac, etc. to the area. You may use bland makeup/cover-up as long as it doesn't sting or cause you discomfort.  Apply the cream at night as your physician recommends. Use a cotton-tipped applicator, or use gloves if applying it with your fingertips. If applied with unprotected fingertips, it is important to wash your hands well after you apply this medicine.   Keep this medication away from pets.  We recommend avoiding excessive sun exposure to the treated area  You may use moisturizing creams over bothersome areas such as Vanicream or Cetaphil cream if the reaction becomes too bothersome. Please, call the clinic if this occurs.   Potential Side Effects  Your treated areas may be unsightly during therapy.  This will improve slowly following the discontinuation of therapy.   During the first week of application, mild inflammation may occur.   During the following weeks, redness, and swelling may occur with some crusting and burning.   Lesions resolve as the skin exfoliates.   Over 1 to 2 weeks, new skin grows into the treatment area.  Keep this medication away from pets  Specific side effects that usually do not require medical attention (report to your doctor or health care professional if they continue or are bothersome) include:  Red or dark-colored skin   Mild erosion (loss of upper layer of skin)   Mild eye irritation including burning, itching, sensitivity, stinging, or watering   Increased sensitivity of  the skin to sun and ultraviolet light   Pain and burning of the affected area   Dryness, scaling or swelling of the affected area   Skin rash, itching of the affected area   Tenderness   If you have severe pain, please, call the clinic immediately and indicate that you have pain.  Ask for a call from the RN.     Who should I call with questions?  [facility]: [phone]  [facility]: [phone]  For urgent needs outside of business hours call the [facility] at [phone] and ask for the dermatology resident on call
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
## 2022-07-08 ENCOUNTER — MYC MEDICAL ADVICE (OUTPATIENT)
Dept: DERMATOLOGY | Facility: CLINIC | Age: 52
End: 2022-07-08

## 2022-07-08 DIAGNOSIS — M25.512 ACUTE PAIN OF LEFT SHOULDER: ICD-10-CM

## 2022-07-08 LAB — VZV DNA SPEC QL NAA+PROBE: NEGATIVE

## 2022-07-08 NOTE — TELEPHONE ENCOUNTER
Lab called to report that they were able to add the HSV lab down at the . No further action is needed per lab..Adelaide Eden RN

## 2022-07-08 NOTE — TELEPHONE ENCOUNTER
Placed a call to the lab to check on the status of the HSV PCR. Diana in the lab will call [facility] to inquire about adding on the HSV PCR to be processed from the same swab that was used to process the VZS PCR.

## 2022-07-08 NOTE — TELEPHONE ENCOUNTER
Can we check on the status of the HSV PCR? It says ordered but not in process. The VZV PCR (shingles) was negative. Thanks!

## 2022-07-09 LAB
HSV1 DNA SPEC QL NAA+PROBE: NOT DETECTED
HSV2 DNA SPEC QL NAA+PROBE: NOT DETECTED

## 2022-07-11 RX ORDER — CYCLOBENZAPRINE HCL 5 MG
TABLET ORAL
Qty: 30 TABLET | Refills: 1 | Status: SHIPPED | OUTPATIENT
Start: 2022-07-11 | End: 2022-08-29

## 2022-07-11 NOTE — TELEPHONE ENCOUNTER
Thanks Jyoti! Yes, I'd like to add her on as a double book this Wednesday at  for reevaluation and possible diagnostic punch biopsy. Thank you!

## 2022-07-11 NOTE — TELEPHONE ENCOUNTER
Called patient and discussed. DDx cyst vs intertrigo vs lichen planus less likely Paget's or SCC. Will have patient come in Wed for reevaluation with possible diagnostic biopsy.

## 2022-07-11 NOTE — TELEPHONE ENCOUNTER
Flexeril      Last Written Prescription Date:  4/6/2022  Last Fill Quantity: 30,   # refills: 1  Last Office Visit: 4/6/2022  Future Office visit:    Next 5 appointments (look out 90 days)      Aug 24, 2022  8:15 AM  (Arrive by 8:00 AM)  Return Visit with Richard Raines MD  [facility] ([facility]) [address] [place] [id number] [phone]             Routing refill request to provider for review/approval because:  Drug not on the [facility], [facility] or [facility] refill protocol or controlled substance    Rafiq Raines RN

## 2022-07-13 ENCOUNTER — OFFICE VISIT (OUTPATIENT)
Dept: DERMATOLOGY | Facility: CLINIC | Age: 52
End: 2022-07-13
Payer: COMMERCIAL

## 2022-07-13 VITALS — HEART RATE: 69 BPM | DIASTOLIC BLOOD PRESSURE: 83 MMHG | SYSTOLIC BLOOD PRESSURE: 137 MMHG

## 2022-07-13 DIAGNOSIS — L30.9 DERMATITIS: Primary | ICD-10-CM

## 2022-07-13 PROCEDURE — 99213 OFFICE O/P EST LOW 20 MIN: CPT | Performed by: DERMATOLOGY

## 2022-07-13 RX ORDER — HYDROCORTISONE 25 MG/G
OINTMENT TOPICAL 2 TIMES DAILY
Qty: 20 G | Refills: 3 | Status: SHIPPED | OUTPATIENT
Start: 2022-07-13 | End: 2023-03-10

## 2022-07-13 RX ORDER — MUPIROCIN 20 MG/G
OINTMENT TOPICAL 2 TIMES DAILY
Qty: 15 G | Refills: 3 | Status: SHIPPED | OUTPATIENT
Start: 2022-07-13 | End: 2023-07-24
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
## 2022-07-13 NOTE — PATIENT INSTRUCTIONS
Apply mupirocin 2-3 times daily to the affected areas. After applying mupirocin, apply hydrocortisone twice daily.    Send an update via [employer] in 2-3 weeks to ensure this is improving.

## 2022-07-13 NOTE — NURSING NOTE
Jayashree Johnson's goals for this visit include:   Chief Complaint   Patient presents with     Derm Problem     She requests these members of her care team be copied on today's visit information:     PCP: Aneudy Jackson    Referring Provider:  No referring provider defined for this encounter.    /83 (BP Location: Left arm, Patient Position: Sitting, Cuff Size: Adult Regular)   Pulse 69   LMP 03/17/2003 (Exact Date)     Do you need any medication refills at today's visit? No  Chloe Martin, FLACO on 7/13/2022 at 12:05 PM

## 2022-07-15 ENCOUNTER — HOSPITAL ENCOUNTER (OUTPATIENT)
Dept: CARDIOLOGY | Facility: CLINIC | Age: 52
Discharge: HOME | End: 2022-07-15
Attending: INTERNAL MEDICINE | Admitting: INTERNAL MEDICINE
Payer: COMMERCIAL

## 2022-07-15 DIAGNOSIS — I10 ESSENTIAL HYPERTENSION: ICD-10-CM

## 2022-07-15 DIAGNOSIS — R42 LIGHTHEADEDNESS: ICD-10-CM

## 2022-07-15 LAB — LVEF ECHO: NORMAL

## 2022-07-15 PROCEDURE — 93306 TTE W/DOPPLER COMPLETE: CPT

## 2022-07-15 PROCEDURE — 93306 TTE W/DOPPLER COMPLETE: CPT | Mod: 26 | Performed by: INTERNAL MEDICINE

## 2022-07-19 ENCOUNTER — MYC MEDICAL ADVICE (OUTPATIENT)
Dept: INTERNAL MEDICINE | Facility: CLINIC | Age: 52
End: 2022-07-19

## 2022-07-21 ENCOUNTER — MYC MEDICAL ADVICE (OUTPATIENT)
Dept: DERMATOLOGY | Facility: CLINIC | Age: 52
End: 2022-07-21

## 2022-07-21 DIAGNOSIS — B37.2 CANDIDAL INTERTRIGO: Primary | ICD-10-CM

## 2022-07-21 RX ORDER — NYSTATIN 100000 U/G
OINTMENT TOPICAL 2 TIMES DAILY
Qty: 90 G | Refills: 3 | Status: SHIPPED | OUTPATIENT
Start: 2022-07-21 | End: 2022-07-23

## 2022-07-23 RX ORDER — NYSTATIN 100000 U/G
OINTMENT TOPICAL 2 TIMES DAILY
Qty: 90 G | Refills: 3 | Status: SHIPPED | OUTPATIENT
Start: 2022-07-23 | End: 2023-07-24

## 2022-08-02 NOTE — TELEPHONE ENCOUNTER
Called patient and notified him that his nitroglycerin prescription had been refilled.  Nette Courtney LPN........................8/2/2022  11:18 AM     See message thread-just JORGE Ochoa, RN

## 2022-08-09 ENCOUNTER — MYC MEDICAL ADVICE (OUTPATIENT)
Dept: INTERNAL MEDICINE | Facility: CLINIC | Age: 52
End: 2022-08-09

## 2022-08-09 DIAGNOSIS — M75.02 ADHESIVE CAPSULITIS OF LEFT SHOULDER: ICD-10-CM

## 2022-08-09 DIAGNOSIS — J30.89 CHRONIC NONSEASONAL ALLERGIC RHINITIS DUE TO POLLEN: ICD-10-CM

## 2022-08-10 RX ORDER — MELOXICAM 15 MG/1
15 TABLET ORAL DAILY
Qty: 30 TABLET | Refills: 0 | Status: SHIPPED | OUTPATIENT
Start: 2022-08-10 | End: 2022-11-07

## 2022-08-10 RX ORDER — CETIRIZINE HYDROCHLORIDE 10 MG/1
10 TABLET ORAL DAILY
Qty: 90 TABLET | Refills: 3 | Status: SHIPPED | OUTPATIENT
Start: 2022-08-10

## 2022-08-10 NOTE — TELEPHONE ENCOUNTER
Pending Prescriptions:                       Disp   Refills    meloxicam (MOBIC) 15 MG tablet             30 tab*1        Sig: Take 1 tablet (15 mg) by mouth daily    Signed Prescriptions:                        Disp   Refills    cetirizine (ZYRTEC) 10 MG tablet           90 tab*3        Sig: Take 1 tablet (10 mg) by mouth daily  Authorizing Provider: ELMA RHOADES  Ordering User: JAE CUENCA refill request to provider for review/approval because:  Drug interaction warning    High    Drug-Drug: vortioxetine and meloxicam  Toxic effects may be increased with concurrent administration of NSAIDs and Selective Serotonin Reuptake Inhibitors. The risk of upper gastrointestinal bleeding may be increased. Patients taking both drugs concurrently should be educated about the signs and symptoms of GI bleeding.

## 2022-08-11 RX ORDER — LORATADINE 10 MG/1
1 TABLET ORAL DAILY
Qty: 90 TABLET | Refills: 0 | Status: SHIPPED | OUTPATIENT
Start: 2022-08-11 | End: 2022-11-11

## 2022-08-29 DIAGNOSIS — M25.512 ACUTE PAIN OF LEFT SHOULDER: ICD-10-CM

## 2022-08-29 RX ORDER — CYCLOBENZAPRINE HCL 5 MG
TABLET ORAL
Qty: 30 TABLET | Refills: 1 | Status: SHIPPED | OUTPATIENT
Start: 2022-08-29 | End: 2022-10-10

## 2022-08-29 NOTE — TELEPHONE ENCOUNTER
Requested Prescriptions   Pending Prescriptions Disp Refills    cyclobenzaprine (FLEXERIL) 5 MG tablet 30 tablet 1        There is no refill protocol information for this order           Luz Marina Schaeffer, MYRNAN, RN

## 2022-10-06 DIAGNOSIS — M25.512 ACUTE PAIN OF LEFT SHOULDER: ICD-10-CM

## 2022-10-09 ENCOUNTER — HEALTH MAINTENANCE LETTER (OUTPATIENT)
Age: 52
End: 2022-10-09

## 2022-10-09 NOTE — TELEPHONE ENCOUNTER
Routing refill request to provider for review/approval because:  Drug not on the [facility] refill protocol      Rafiq Raines RN

## 2022-10-10 DIAGNOSIS — M25.512 ACUTE PAIN OF LEFT SHOULDER: ICD-10-CM

## 2022-10-10 RX ORDER — CYCLOBENZAPRINE HCL 5 MG
TABLET ORAL
Qty: 30 TABLET | Refills: 1 | Status: SHIPPED | OUTPATIENT
Start: 2022-10-10 | End: 2023-01-24

## 2022-10-11 RX ORDER — CYCLOBENZAPRINE HCL 5 MG
TABLET ORAL
Qty: 30 TABLET | Refills: 1 | OUTPATIENT
Start: 2022-10-11

## 2022-10-21 ENCOUNTER — MYC MEDICAL ADVICE (OUTPATIENT)
Dept: OBGYN | Facility: CLINIC | Age: 52
End: 2022-10-21

## 2022-10-21 DIAGNOSIS — M85.80 OSTEOPENIA: ICD-10-CM

## 2022-10-21 DIAGNOSIS — Z80.3 FAMILY HISTORY OF BREAST CANCER IN SISTER: Primary | ICD-10-CM

## 2022-10-31 NOTE — TELEPHONE ENCOUNTER
Put in orders for this patients Dexa scan and 3D mammogram. Patient was last in for her annual with Dr. Dumont on 5-4-22.

## 2022-11-08 DIAGNOSIS — J30.89 CHRONIC NONSEASONAL ALLERGIC RHINITIS DUE TO POLLEN: ICD-10-CM

## 2022-11-08 RX ORDER — LORATADINE 10 MG/1
1 TABLET ORAL DAILY
Qty: 90 TABLET | Refills: 0 | Status: CANCELLED | OUTPATIENT
Start: 2022-11-08

## 2022-11-11 RX ORDER — LORATADINE 10 MG/1
1 TABLET ORAL DAILY
Qty: 90 TABLET | Refills: 3 | Status: SHIPPED | OUTPATIENT
Start: 2022-11-11 | End: 2023-11-03

## 2022-11-16 ENCOUNTER — MYC MEDICAL ADVICE (OUTPATIENT)
Dept: OBGYN | Facility: CLINIC | Age: 52
End: 2022-11-16

## 2022-11-16 DIAGNOSIS — N64.4 BREAST PAIN: ICD-10-CM

## 2022-11-16 DIAGNOSIS — M79.622 AXILLARY PAIN, LEFT: ICD-10-CM

## 2022-11-16 DIAGNOSIS — M53.3 PAIN IN THE COCCYX: Primary | ICD-10-CM

## 2022-11-16 DIAGNOSIS — R52 PAIN: ICD-10-CM

## 2022-11-18 NOTE — TELEPHONE ENCOUNTER
"Called patient and reached voicemail. Let patient know we have ordered diagnostic bilateral mammogram and a DEXA scan, but need to know which side she is having pain on so we can order her an ultrasound. Left patient message to call clinic back. Also sent patient MyChart message.     Per Dr. Dumont: \"Can you order diagnostic mammo (bilateral) and breast ultrasound on the painful side?   OK to order DEXA \"  "

## 2022-11-23 DIAGNOSIS — N64.4 BREAST PAIN: Primary | ICD-10-CM

## 2022-12-01 ENCOUNTER — MYC MEDICAL ADVICE (OUTPATIENT)
Dept: INTERNAL MEDICINE | Facility: CLINIC | Age: 52
End: 2022-12-01

## 2022-12-19 ENCOUNTER — HOSPITAL ENCOUNTER (OUTPATIENT)
Dept: BONE DENSITY | Facility: CLINIC | Age: 52
Discharge: HOME | End: 2022-12-19
Attending: OBSTETRICS & GYNECOLOGY
Payer: COMMERCIAL

## 2022-12-19 ENCOUNTER — HOSPITAL ENCOUNTER (OUTPATIENT)
Dept: MAMMOGRAPHY | Facility: CLINIC | Age: 52
Discharge: HOME | End: 2022-12-19
Attending: OBSTETRICS & GYNECOLOGY
Payer: COMMERCIAL

## 2022-12-19 DIAGNOSIS — Z78.0 POST-MENOPAUSAL: ICD-10-CM

## 2022-12-19 DIAGNOSIS — M53.3 PAIN IN THE COCCYX: ICD-10-CM

## 2022-12-19 DIAGNOSIS — N64.4 BREAST PAIN: ICD-10-CM

## 2022-12-19 PROCEDURE — 77062 BREAST TOMOSYNTHESIS BI: CPT

## 2022-12-19 PROCEDURE — 77080 DXA BONE DENSITY AXIAL: CPT

## 2023-01-02 ENCOUNTER — TELEPHONE (OUTPATIENT)
Dept: NEUROSURGERY | Facility: CLINIC | Age: 53
End: 2023-01-02

## 2023-01-02 NOTE — TELEPHONE ENCOUNTER
Patient would like a call back. She is asking if she can have her 'scans' ordered prior to her appointment on 1/13/23 so she can have them done before she is seen in clinic. Please call her back at [phone]. Thank you~

## 2023-01-04 ENCOUNTER — TELEPHONE (OUTPATIENT)
Dept: INTERNAL MEDICINE | Facility: CLINIC | Age: 53
End: 2023-01-04

## 2023-01-04 NOTE — TELEPHONE ENCOUNTER
I did offer Melissa an appointment on Friday. She states she has a cyst or fissure and in the past she has been able to treat them with soaking and warm packs but she feels that this one is infected and draining. And wonders if she needs an antibiotic. She is questioning if this can be a virtual appointment?

## 2023-01-04 NOTE — TELEPHONE ENCOUNTER
FYI - Status Update    Who is Calling: patient    Update: PT is refusing to give reason why she needs to be seen. Wants to be seen in a couple of days 1/6/23 at 1:40PM check in time    Does caller want a call/response back: Yes     Could we send this information to you in [employer] or would you prefer to receive a phone call?:   Patient would prefer a phone call   Okay to leave a detailed message?: Yes at Cell number on file:    Telephone Information:   Mobile [phone]

## 2023-01-06 ENCOUNTER — OFFICE VISIT (OUTPATIENT)
Dept: INTERNAL MEDICINE | Facility: CLINIC | Age: 53
End: 2023-01-06
Payer: COMMERCIAL

## 2023-01-06 VITALS
RESPIRATION RATE: 10 BRPM | OXYGEN SATURATION: 97 % | HEART RATE: 80 BPM | DIASTOLIC BLOOD PRESSURE: 78 MMHG | WEIGHT: 165 LBS | SYSTOLIC BLOOD PRESSURE: 128 MMHG | TEMPERATURE: 100 F | BODY MASS INDEX: 25.84 KG/M2

## 2023-01-06 DIAGNOSIS — K61.2 ABSCESS OF ANAL AND RECTAL REGIONS: Primary | ICD-10-CM

## 2023-01-06 PROCEDURE — 99213 OFFICE O/P EST LOW 20 MIN: CPT | Performed by: INTERNAL MEDICINE

## 2023-01-06 RX ORDER — FLUCONAZOLE 150 MG/1
150 TABLET ORAL
Qty: 3 TABLET | Refills: 0 | Status: SHIPPED | OUTPATIENT
Start: 2023-01-06 | End: 2023-01-13

## 2023-01-06 RX ORDER — CEPHALEXIN 500 MG/1
500 CAPSULE ORAL 4 TIMES DAILY
Qty: 40 CAPSULE | Refills: 0 | Status: SHIPPED | OUTPATIENT
Start: 2023-01-06 | End: 2023-07-24

## 2023-01-06 ASSESSMENT — PATIENT HEALTH QUESTIONNAIRE - PHQ9
SUM OF ALL RESPONSES TO PHQ QUESTIONS 1-9: 13
SUM OF ALL RESPONSES TO PHQ QUESTIONS 1-9: 13
10. IF YOU CHECKED OFF ANY PROBLEMS, HOW DIFFICULT HAVE THESE PROBLEMS MADE IT FOR YOU TO DO YOUR WORK, TAKE CARE OF THINGS AT HOME, OR GET ALONG WITH OTHER PEOPLE: SOMEWHAT DIFFICULT

## 2023-01-06 NOTE — PROGRESS NOTES
"  Assessment & Plan   Problem List Items Addressed This Visit    None  Visit Diagnoses     Abscess of anal and rectal regions    -  Primary    Relevant Medications    cephALEXin (KEFLEX) 500 MG capsule    fluconazole (DIFLUCAN) 150 MG tablet           Cellulitis and abscess around the anal rectal region.  Area has drained on its own for her previously.  He continues to be tender and probably infected.  There is serosanguineous drainage but no pus at this time.  There is not a good area to [place] therefore recommended she go on antibiotics Keflex 4 times a day, sitz bath's and let us know if is not getting better.  If not improving would refer to general surgery for incision and drainage.         BMI:   Estimated body mass index is 25.84 kg/m  as calculated from the following:    Height as of 5/4/22: 1.702 m (5' 7\").    Weight as of this encounter: 74.8 kg (165 lb).           No follow-ups on file.    Aneudy Jackson MD  [facility] DAVID Torres is a 52 year old, presenting for the following health issues:  No chief complaint on file.      History of Present Illness       Back Pain:  She presents for follow up of back pain. Patient's back pain is a chronic problem.  Location of back pain:  Left lower back, right middle of back, left middle of back, right side of neck, left side of neck, left shoulder, right buttock, left buttock and left side of waist  Description of back pain: burning, dull ache, shooting and stabbing  Back pain spreads: right buttocks, left buttocks, left shoulder, right side of neck and left side of neck    Since patient first noticed back pain, pain is: gradually worsening  Does back pain interfere with her job:  Yes      She eats 2-3 servings of fruits and vegetables daily.She consumes 0 sweetened beverage(s) daily.She exercises with enough effort to increase her heart rate 10 to 19 minutes per day.  She exercises with enough effort to increase her heart rate 4 days " per week.   She is taking medications regularly.    Today's PHQ-9         PHQ-9 Total Score: 13    PHQ-9 Q9 Thoughts of better off dead/self-harm past 2 weeks :   Not at all    How difficult have these problems made it for you to do your work, take care of things at home, or get along with other people: Somewhat difficult     Has a infection cyst at the left buttock area.  Started 2-3 weeks ago.  Just happened.  Drained on its own and then came back with more swelling pain.  Now has a fever, feels more spread out.      Had a pilonidal cyst removed in 2007.     Review of Systems         Objective    /78   Pulse 80   Temp 100  F (37.8  C)   Resp 10   Wt 74.8 kg (165 lb)   LMP 03/17/2003 (Exact Date)   SpO2 97%   BMI 25.84 kg/m    There is no height or weight on file to calculate BMI.  Physical Exam   No acute distress  Rectal exam performed with Maura Santizo present.  Left side of the rectum does have mild swelling in the buttock region there is slight induration but no obvious area for drainage.  Slight serosanguineous drainage today and tender to touch

## 2023-01-09 ENCOUNTER — MYC MEDICAL ADVICE (OUTPATIENT)
Dept: INTERNAL MEDICINE | Facility: CLINIC | Age: 53
End: 2023-01-09

## 2023-01-09 DIAGNOSIS — K61.2 ABSCESS OF ANAL AND RECTAL REGIONS: Primary | ICD-10-CM

## 2023-01-11 NOTE — TELEPHONE ENCOUNTER
Please see mychart message.    Patient is having continued problems with the abcess by her anal/rectal area.    Per your last note:  If not improving would refer to general surgery for incision and drainage.    Please advise on next steps.

## 2023-01-13 ENCOUNTER — OFFICE VISIT (OUTPATIENT)
Dept: SURGERY | Facility: CLINIC | Age: 53
End: 2023-01-13
Payer: COMMERCIAL

## 2023-01-13 ENCOUNTER — OFFICE VISIT (OUTPATIENT)
Dept: NEUROSURGERY | Facility: CLINIC | Age: 53
End: 2023-01-13
Payer: COMMERCIAL

## 2023-01-13 VITALS
SYSTOLIC BLOOD PRESSURE: 130 MMHG | HEIGHT: 67 IN | DIASTOLIC BLOOD PRESSURE: 84 MMHG | TEMPERATURE: 98.3 F | BODY MASS INDEX: 25.74 KG/M2 | WEIGHT: 164 LBS

## 2023-01-13 VITALS
DIASTOLIC BLOOD PRESSURE: 84 MMHG | TEMPERATURE: 98.3 F | SYSTOLIC BLOOD PRESSURE: 130 MMHG | HEIGHT: 67 IN | BODY MASS INDEX: 25.74 KG/M2 | WEIGHT: 164 LBS

## 2023-01-13 DIAGNOSIS — K61.2 ABSCESS OF ANAL AND RECTAL REGIONS: ICD-10-CM

## 2023-01-13 DIAGNOSIS — M54.16 LUMBAR RADICULOPATHY: ICD-10-CM

## 2023-01-13 DIAGNOSIS — M54.12 CERVICAL RADICULOPATHY: ICD-10-CM

## 2023-01-13 DIAGNOSIS — N90.89 PERINEAL MASS IN FEMALE: Primary | ICD-10-CM

## 2023-01-13 DIAGNOSIS — Z98.1 S/P CERVICAL SPINAL FUSION: Primary | ICD-10-CM

## 2023-01-13 PROCEDURE — 46050 I&D PERIANAL ABSCESS SUPFC: CPT | Performed by: SURGERY

## 2023-01-13 PROCEDURE — 99214 OFFICE O/P EST MOD 30 MIN: CPT | Performed by: PHYSICIAN ASSISTANT

## 2023-01-13 PROCEDURE — 99203 OFFICE O/P NEW LOW 30 MIN: CPT | Mod: 25 | Performed by: SURGERY

## 2023-01-13 PROCEDURE — 88305 TISSUE EXAM BY PATHOLOGIST: CPT | Performed by: PATHOLOGY

## 2023-01-13 ASSESSMENT — PAIN SCALES - GENERAL: PAINLEVEL: MODERATE PAIN (4)
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
## 2023-01-13 NOTE — PROGRESS NOTES
"Jayashree Johnson is a 52 year old female who presents for:  Chief Complaint   Patient presents with     Numbness     Cervical and Lumbar Pain and left numbness ongoing for about 2 years now         Initial Vitals:  /84   Temp 98.3  F (36.8  C) (Temporal)   Ht 5' 7\" (1.702 m)   Wt 164 lb (74.4 kg)   LMP 03/17/2003 (Exact Date)   BMI 25.69 kg/m   Estimated body mass index is 25.69 kg/m  as calculated from the following:    Height as of this encounter: 5' 7\" (1.702 m).    Weight as of this encounter: 164 lb (74.4 kg).. Body surface area is 1.88 meters squared. BP completed using cuff size: regular  Moderate Pain (4)    Nursing Comments:     Ana Rosa Gaspar    "
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
## 2023-01-17 LAB
PATH REPORT.COMMENTS IMP SPEC: NORMAL
PATH REPORT.FINAL DX SPEC: NORMAL
PATH REPORT.GROSS SPEC: NORMAL
PATH REPORT.MICROSCOPIC SPEC OTHER STN: NORMAL
PATH REPORT.RELEVANT HX SPEC: NORMAL

## 2023-01-20 DIAGNOSIS — M25.512 ACUTE PAIN OF LEFT SHOULDER: ICD-10-CM

## 2023-01-24 ENCOUNTER — MYC MEDICAL ADVICE (OUTPATIENT)
Dept: NEUROSURGERY | Facility: CLINIC | Age: 53
End: 2023-01-24
Payer: COMMERCIAL

## 2023-01-24 ENCOUNTER — HOSPITAL ENCOUNTER (OUTPATIENT)
Dept: MRI IMAGING | Facility: CLINIC | Age: 53
Discharge: HOME | End: 2023-01-24
Attending: PHYSICIAN ASSISTANT
Payer: COMMERCIAL

## 2023-01-24 DIAGNOSIS — Z98.1 S/P CERVICAL SPINAL FUSION: ICD-10-CM

## 2023-01-24 DIAGNOSIS — M54.16 LUMBAR RADICULOPATHY: ICD-10-CM

## 2023-01-24 DIAGNOSIS — M54.12 CERVICAL RADICULOPATHY: ICD-10-CM

## 2023-01-24 PROCEDURE — 72141 MRI NECK SPINE W/O DYE: CPT

## 2023-01-24 PROCEDURE — 72148 MRI LUMBAR SPINE W/O DYE: CPT

## 2023-01-24 RX ORDER — CYCLOBENZAPRINE HCL 5 MG
TABLET ORAL
Qty: 30 TABLET | Refills: 1 | Status: SHIPPED | OUTPATIENT
Start: 2023-01-24 | End: 2023-04-21

## 2023-01-24 NOTE — TELEPHONE ENCOUNTER
Pending Prescriptions:                       Disp   Refills    cyclobenzaprine (FLEXERIL) 5 MG tablet [Ph*30 tab*1        Sig: TAKE ONE TABLET BY MOUTH THREE TIMES A DAY AS NEEDED      Routing refill request to provider for review/approval because:  Drug not on the G refill protocol     Elisa Corrigan RN on 1/24/2023 at 8:18 AM

## 2023-02-01 DIAGNOSIS — M75.02 ADHESIVE CAPSULITIS OF LEFT SHOULDER: ICD-10-CM

## 2023-02-02 NOTE — TELEPHONE ENCOUNTER
"Requested Prescriptions   Pending Prescriptions Disp Refills    meloxicam (MOBIC) 15 MG tablet [Pharmacy Med Name: MELOXICAM 15MG TABS] 30 tablet 1     Sig: TAKE ONE TABLET BY MOUTH ONCE DAILY       NSAID Medications Failed - 2/1/2023  5:19 PM        Failed - Normal ALT on file in past 12 months     Recent Labs   Lab Test 12/15/21  1024   ALT 16             Failed - Normal AST on file in past 12 months     Recent Labs   Lab Test 12/15/21  1024   AST 24             Failed - Normal CBC on file in past 12 months     Recent Labs   Lab Test 12/15/21  1024   WBC 7.2   RBC 4.53   HGB 13.5   HCT 39.9                    Failed - Normal serum creatinine on file in past 12 months     Recent Labs   Lab Test 12/15/21  1024   CR 0.65       Ok to refill medication if creatinine is low          Passed - Blood pressure under 140/90 in past 12 months     BP Readings from Last 3 Encounters:   01/13/23 130/84   01/13/23 130/84   01/06/23 128/78                 Passed - Recent (12 mo) or future (30 days) visit within the authorizing provider's specialty     Patient has had an office visit with the authorizing provider or a provider within the authorizing providers department within the previous 12 mos or has a future within next 30 days. See \"Patient Info\" tab in inbasket, or \"Choose Columns\" in Meds & Orders section of the refill encounter.              Passed - Patient is age 6-64 years        Passed - Medication is active on med list        Passed - No active pregnancy on record        Passed - No positive pregnancy test in past 12 months               " - - -

## 2023-02-03 RX ORDER — MELOXICAM 15 MG/1
TABLET ORAL
Qty: 30 TABLET | Refills: 1 | Status: SHIPPED | OUTPATIENT
Start: 2023-02-03 | End: 2023-07-24

## 2023-03-10 ENCOUNTER — OFFICE VISIT (OUTPATIENT)
Dept: OPHTHALMOLOGY | Facility: CLINIC | Age: 53
End: 2023-03-10
Payer: COMMERCIAL

## 2023-03-10 DIAGNOSIS — H52.03 HYPEROPIA OF BOTH EYES WITH ASTIGMATISM AND PRESBYOPIA: Primary | ICD-10-CM

## 2023-03-10 DIAGNOSIS — H04.123 DRY EYE SYNDROME OF BOTH EYES: ICD-10-CM

## 2023-03-10 DIAGNOSIS — H52.203 HYPEROPIA OF BOTH EYES WITH ASTIGMATISM AND PRESBYOPIA: Primary | ICD-10-CM

## 2023-03-10 DIAGNOSIS — G43.109 MIGRAINE WITH AURA AND WITHOUT STATUS MIGRAINOSUS, NOT INTRACTABLE: ICD-10-CM

## 2023-03-10 DIAGNOSIS — H52.4 HYPEROPIA OF BOTH EYES WITH ASTIGMATISM AND PRESBYOPIA: Primary | ICD-10-CM

## 2023-03-10 DIAGNOSIS — H25.813 COMBINED FORMS OF AGE-RELATED CATARACT OF BOTH EYES: ICD-10-CM

## 2023-03-10 PROCEDURE — 92015 DETERMINE REFRACTIVE STATE: CPT | Performed by: OPHTHALMOLOGY

## 2023-03-10 PROCEDURE — 99203 OFFICE O/P NEW LOW 30 MIN: CPT | Performed by: OPHTHALMOLOGY

## 2023-03-10 ASSESSMENT — CONF VISUAL FIELD
OD_SUPERIOR_TEMPORAL_RESTRICTION: 0
OD_INFERIOR_NASAL_RESTRICTION: 0
OS_SUPERIOR_NASAL_RESTRICTION: 0
OS_NORMAL: 1
OD_NORMAL: 1
OD_SUPERIOR_NASAL_RESTRICTION: 0
OS_INFERIOR_NASAL_RESTRICTION: 0
OD_INFERIOR_TEMPORAL_RESTRICTION: 0
OS_SUPERIOR_TEMPORAL_RESTRICTION: 0
OS_INFERIOR_TEMPORAL_RESTRICTION: 0

## 2023-03-10 ASSESSMENT — REFRACTION_MANIFEST
OS_SPHERE: +1.25
OS_CYLINDER: +0.25
OS_AXIS: 125
OD_SPHERE: +1.00
OS_ADD: +2.50
OD_ADD: +2.50
OD_AXIS: 065
OD_CYLINDER: +0.25

## 2023-03-10 ASSESSMENT — TONOMETRY
OD_IOP_MMHG: 16
IOP_METHOD: ICARE
OS_IOP_MMHG: 15

## 2023-03-10 ASSESSMENT — VISUAL ACUITY
OS_SC+: +2
OD_PH_SC: 20/25
OD_SC+: -2
METHOD: SNELLEN - LINEAR
OS_PH_SC: 20/25
OD_PH_SC+: +2
OD_SC: 20/30
OS_PH_SC+: +2
OS_SC: 20/50

## 2023-03-10 ASSESSMENT — SLIT LAMP EXAM - LIDS
COMMENTS: 2+ DERMATOCHALASIS
COMMENTS: 2+ DERMATOCHALASIS

## 2023-03-10 ASSESSMENT — CUP TO DISC RATIO
OD_RATIO: 0.1
OS_RATIO: 0.1

## 2023-03-10 NOTE — NURSING NOTE
Chief Complaints and History of Present Illnesses   Patient presents with     COMPREHENSIVE EYE EXAM       Chief Complaint(s) and History of Present Illness(es)     COMPREHENSIVE EYE EXAM           Comments    She is here for Complete eye exam. Last eye exam 1 year ago at [facility].   She complains of a lot of vision issues. Has trouble seeing in the distance during the day, but much worse at night. She notes glare, and spots in her vision. This has been going on for many years. She was never prescribed glasses for distance. Wears OTC readers only.  She also complains of migraines for many years with auras.  She notes her eyes are tearing, right eye more than left.   She is using Systane 2+ x a day.   She complains of floaters often. No flashes of lights.                  MARVIN Raymundo  9:14 AM 03/10/2023

## 2023-03-10 NOTE — PROGRESS NOTES
HPI    She is here for Complete eye exam. Last eye exam 1 year ago at [facility].   She complains of a lot of vision issues. Has trouble seeing in the distance during the day, but much worse at night. She notes glare, and spots in her vision. This has been going on for many years. She was never prescribed glasses for distance. Wears OTC readers only.  She also complains of migraines for many years with auras > 5 years. Occurs several times weekly. Takes sumatriptan once weekly.   She notes her eyes are tearing, right eye more than left.   She is using Systane 2+ x a day.   She complains of floaters often. No flashes of lights.     Mom with ?Central retinal artery occlusion left eye    Last edited by Riley Freed MD on 3/10/2023  9:40 AM.         Review of systems for the eyes was negative other than the pertinent positives/negatives listed in the HPI.      Assessment & Plan    HPI:  Jayashree Johnson is a 52 year old female with history of migraine, MDD, sleep disturbance, presents for exam. She wore glasses as a child but stopped at 16. Noting increased difficulty seeing street signs    Uses systane 3-4x daily.     POHx: refractive error  PMHx:  migraine, MDD, sleep disturbance  Current Medications: ALPRAZolam (XANAX PO), Take 0.125-0.25 mg by mouth nightly as needed for sleep   azelastine (ASTELIN) 0.1 % nasal spray, Spray 1 spray into both nostrils 2 times daily  cephALEXin (KEFLEX) 500 MG capsule, Take 1 capsule (500 mg) by mouth 4 times daily  cetirizine (ZYRTEC) 10 MG tablet, Take 1 tablet (10 mg) by mouth daily  Cholecalciferol (VITAMIN D-3 PO), Take 1 capsule by mouth daily  Cyanocobalamin (VITAMIN B-12 PO), Take 1 tablet by mouth daily  cyclobenzaprine (FLEXERIL) 5 MG tablet, TAKE ONE TABLET BY MOUTH THREE TIMES A DAY AS NEEDED  Digestive Enzymes (DIGESTIVE ENZYME PO), Take 2 capsules by mouth 2 times daily  fluorouracil (EFUDEX) 5 % external cream, Apply topically daily For 6 weeks  fluticasone  (FLONASE) 50 MCG/ACT nasal spray, USE ONE SPRAY IN EACH NOSTRIL EVERY DAY AS NEEDED FOR RHINITIS OR ALLERGIES  hydrocortisone 2.5 % ointment, Apply topically 2 times daily  hydrocortisone 2.5 % ointment, Apply twice daily for 2 weeks.  loratadine (CLARITIN) 10 MG tablet, Take 1 tablet (10 mg) by mouth daily  meloxicam (MOBIC) 15 MG tablet, TAKE ONE TABLET BY MOUTH ONCE DAILY  metoprolol succinate ER (TOPROL XL) 25 MG 24 hr tablet, TAKE TWO TABLETS BY MOUTH ONCE DAILY  Multiple Vitamin (MULTI-VITAMIN DAILY PO), Take 1 tablet by mouth daily  mupirocin (BACTROBAN) 2 % external ointment, Apply topically 2 times daily  nystatin (MYCOSTATIN) 546965 UNIT/GM external ointment, Apply topically 2 times daily  Polyethyl Glycol-Propyl Glycol (SYSTANE OP), Place 1-2 drops into both eyes daily as needed  prochlorperazine (COMPAZINE) 10 MG tablet, TAKE 1 TABLET (10 MG) BY MOUTH EVERY 6 HOURS AS NEEDED FOR NAUSEA OR VOMITING  SUMAtriptan (IMITREX) 50 MG tablet, Take 1 tablet (50 mg) by mouth at onset of headache for migraine  tacrolimus (PROTOPIC) 0.1 % external ointment, Apply topically 2 times daily Apply twice daily. Keep in fridge to avoid any stinging  triamcinolone (KENALOG) 0.1 % external ointment, Apply topically 2 times daily  valACYclovir (VALTREX) 1000 mg tablet, Take 2 tablets (2,000 mg) by mouth 2 times daily for 1 day  valACYclovir (VALTREX) 500 MG tablet, Take 2 tablets (1,000 mg) by mouth daily  vortioxetine (TRINTELLIX) 5 MG tablet, Take 1 tablet (5 mg) by mouth daily  zolpidem ER (AMBIEN CR) 6.25 MG CR tablet, Take by mouth nightly as needed for sleep    triamcinolone (KENALOG-40) injection 40 mg      FHx: mom with ?CRAO  PSHx: denies history of ocular surgeries       Current Eye Medications:  Systane 3-4x daily    Assessment & Plan:  (H52.03,  H52.203,  H52.4) Hyperopia of both eyes with astigmatism and presbyopia    Patient has minimal change in hyperopia but a copy of today's glasses prescription was given.   The patient may wish to update the glasses if the lenses are scratched or the frames are too small.  Presbyopia is difficulty seeing up close and is treated with bifocals or over the counter reading glasses  Discussed standard bifocal    (G43.109) Migraine with aura and without status migrainosus, not intractable  Headache history was carefully reviewed. The structure of the eye from front to back was found to be completely healthy. This was discussed with the patient. There was no optic nerve swelling seen on this exam.   Discussed FL41 filter  Follows with [facility]  Takes sumatriptan weekly and metoprolol    (H04.123) Dry eye syndrome of both eyes  Continue AT four times a day      (H25.813) Combined forms of age-related cataract of both eyes  Mild cataracts are present and may account for some of the patient's visual complaints. No treatment currently recommended. The patient will monitor for vision changes and contact us with any decrease in vision. Recheck in one year.         Return in about 3 months (around 6/10/2023) for Follow Up, v/t/glasses check.        Riley Freed MD     Attending Physician Attestation:  Complete documentation of historical and exam elements from today's encounter can be found in the full encounter summary report (not reduplicated in this progress note).  I personally obtained the chief complaint(s) and history of present illness.  I confirmed and edited as necessary the review of systems, past medical/surgical history, family history, social history, and examination findings as documented by others; and I examined the patient myself.  I personally reviewed the relevant tests, images, and reports as documented above.  I formulated and edited as necessary the assessment and plan and discussed the findings and management plan with the patient and family. - Riley Freed MD

## 2023-04-20 DIAGNOSIS — J34.89 NASAL OBSTRUCTION: ICD-10-CM

## 2023-04-20 DIAGNOSIS — M25.512 ACUTE PAIN OF LEFT SHOULDER: ICD-10-CM

## 2023-04-21 RX ORDER — FLUTICASONE PROPIONATE 50 MCG
SPRAY, SUSPENSION (ML) NASAL
Qty: 16 G | Refills: 11 | Status: SHIPPED | OUTPATIENT
Start: 2023-04-21

## 2023-04-21 RX ORDER — CYCLOBENZAPRINE HCL 5 MG
TABLET ORAL
Qty: 30 TABLET | Refills: 1 | Status: SHIPPED | OUTPATIENT
Start: 2023-04-21 | End: 2023-06-08

## 2023-04-24 DIAGNOSIS — M25.50 PAIN IN JOINT, MULTIPLE SITES: ICD-10-CM

## 2023-04-24 DIAGNOSIS — R41.9 LOSS OF ALERTNESS: Primary | ICD-10-CM

## 2023-04-24 DIAGNOSIS — M75.20 BICIPITAL TENOSYNOVITIS: ICD-10-CM

## 2023-04-24 DIAGNOSIS — E55.9 AVITAMINOSIS D: ICD-10-CM

## 2023-04-24 DIAGNOSIS — M79.7 SCAPULOHUMERAL FIBROSITIS: ICD-10-CM

## 2023-04-24 DIAGNOSIS — E72.12 TETRAHYDROFOLATE METHYLTRANSFERASE DEFICIENCY (H): ICD-10-CM

## 2023-04-24 DIAGNOSIS — R53.82 CHRONIC FATIGUE: ICD-10-CM

## 2023-04-24 DIAGNOSIS — Z78.0 MENOPAUSE: ICD-10-CM

## 2023-04-24 DIAGNOSIS — R53.83 FATIGUE: ICD-10-CM

## 2023-04-24 DIAGNOSIS — G44.1 VASCULAR HEADACHE: ICD-10-CM

## 2023-05-03 ENCOUNTER — PATIENT OUTREACH (OUTPATIENT)
Dept: CARE COORDINATION | Facility: CLINIC | Age: 53
End: 2023-05-03
Payer: COMMERCIAL

## 2023-05-04 ENCOUNTER — LAB (OUTPATIENT)
Dept: LAB | Facility: CLINIC | Age: 53
End: 2023-05-04
Payer: COMMERCIAL

## 2023-05-04 DIAGNOSIS — E72.12 TETRAHYDROFOLATE METHYLTRANSFERASE DEFICIENCY (H): ICD-10-CM

## 2023-05-04 DIAGNOSIS — M79.7 SCAPULOHUMERAL FIBROSITIS: ICD-10-CM

## 2023-05-04 DIAGNOSIS — R41.9 LOSS OF ALERTNESS: ICD-10-CM

## 2023-05-04 DIAGNOSIS — M25.50 PAIN IN JOINT, MULTIPLE SITES: ICD-10-CM

## 2023-05-04 DIAGNOSIS — E55.9 AVITAMINOSIS D: ICD-10-CM

## 2023-05-04 DIAGNOSIS — M75.20 BICIPITAL TENOSYNOVITIS: ICD-10-CM

## 2023-05-04 DIAGNOSIS — R53.83 FATIGUE: ICD-10-CM

## 2023-05-04 DIAGNOSIS — Z78.0 MENOPAUSE: ICD-10-CM

## 2023-05-04 DIAGNOSIS — R53.82 CHRONIC FATIGUE: ICD-10-CM

## 2023-05-04 DIAGNOSIS — G44.1 VASCULAR HEADACHE: ICD-10-CM

## 2023-05-04 LAB
BASOPHILS # BLD AUTO: 0.1 10E3/UL (ref 0–0.2)
BASOPHILS NFR BLD AUTO: 1 %
CORTIS SERPL-MCNC: 8.4 UG/DL
EOSINOPHIL # BLD AUTO: 0 10E3/UL (ref 0–0.7)
EOSINOPHIL NFR BLD AUTO: 1 %
ERYTHROCYTE [DISTWIDTH] IN BLOOD BY AUTOMATED COUNT: 13 % (ref 10–15)
ESTRADIOL SERPL-MCNC: 39 PG/ML
FERRITIN SERPL-MCNC: 90 NG/ML (ref 11–328)
FSH SERPL IRP2-ACNC: 37.3 MIU/ML
HCT VFR BLD AUTO: 39.8 % (ref 35–47)
HGB BLD-MCNC: 13.1 G/DL (ref 11.7–15.7)
IMM GRANULOCYTES # BLD: 0 10E3/UL
IMM GRANULOCYTES NFR BLD: 1 %
LH SERPL-ACNC: 41 MIU/ML
LYMPHOCYTES # BLD AUTO: 2.6 10E3/UL (ref 0.8–5.3)
LYMPHOCYTES NFR BLD AUTO: 31 %
MCH RBC QN AUTO: 29.9 PG (ref 26.5–33)
MCHC RBC AUTO-ENTMCNC: 32.9 G/DL (ref 31.5–36.5)
MCV RBC AUTO: 91 FL (ref 78–100)
MONOCYTES # BLD AUTO: 0.6 10E3/UL (ref 0–1.3)
MONOCYTES NFR BLD AUTO: 7 %
NEUTROPHILS # BLD AUTO: 5 10E3/UL (ref 1.6–8.3)
NEUTROPHILS NFR BLD AUTO: 59 %
NRBC # BLD AUTO: 0 10E3/UL
NRBC BLD AUTO-RTO: 0 /100
PLATELET # BLD AUTO: 309 10E3/UL (ref 150–450)
PROGEST SERPL-MCNC: 0.2 NG/ML
RBC # BLD AUTO: 4.38 10E6/UL (ref 3.8–5.2)
T3FREE SERPL-MCNC: 2.6 PG/ML (ref 2–4.4)
T4 FREE SERPL-MCNC: 1.23 NG/DL (ref 0.9–1.7)
TSH SERPL DL<=0.005 MIU/L-ACNC: 0.87 UIU/ML (ref 0.3–4.2)
VIT B12 SERPL-MCNC: 258 PG/ML (ref 232–1245)
WBC # BLD AUTO: 8.4 10E3/UL (ref 4–11)

## 2023-05-04 PROCEDURE — 86376 MICROSOMAL ANTIBODY EACH: CPT

## 2023-05-04 PROCEDURE — 83001 ASSAY OF GONADOTROPIN (FSH): CPT

## 2023-05-04 PROCEDURE — 82533 TOTAL CORTISOL: CPT

## 2023-05-04 PROCEDURE — 82670 ASSAY OF TOTAL ESTRADIOL: CPT

## 2023-05-04 PROCEDURE — 84144 ASSAY OF PROGESTERONE: CPT

## 2023-05-04 PROCEDURE — 82306 VITAMIN D 25 HYDROXY: CPT

## 2023-05-04 PROCEDURE — 82607 VITAMIN B-12: CPT

## 2023-05-04 PROCEDURE — 84403 ASSAY OF TOTAL TESTOSTERONE: CPT

## 2023-05-04 PROCEDURE — 82627 DEHYDROEPIANDROSTERONE: CPT

## 2023-05-04 PROCEDURE — 84439 ASSAY OF FREE THYROXINE: CPT

## 2023-05-04 PROCEDURE — 83002 ASSAY OF GONADOTROPIN (LH): CPT

## 2023-05-04 PROCEDURE — 85025 COMPLETE CBC W/AUTO DIFF WBC: CPT

## 2023-05-04 PROCEDURE — 84443 ASSAY THYROID STIM HORMONE: CPT

## 2023-05-04 PROCEDURE — 84270 ASSAY OF SEX HORMONE GLOBUL: CPT

## 2023-05-04 PROCEDURE — 82728 ASSAY OF FERRITIN: CPT

## 2023-05-04 PROCEDURE — 36415 COLL VENOUS BLD VENIPUNCTURE: CPT

## 2023-05-04 PROCEDURE — 84481 FREE ASSAY (FT-3): CPT

## 2023-05-05 LAB
DHEA-S SERPL-MCNC: 103 UG/DL (ref 35–430)
SHBG SERPL-SCNC: 28 NMOL/L (ref 30–135)
THYROPEROXIDASE AB SERPL-ACNC: <10 IU/ML

## 2023-05-07 LAB
DEPRECATED CALCIDIOL+CALCIFEROL SERPL-MC: <35 UG/L (ref 20–75)
TESTOST FREE SERPL-MCNC: 0.35 NG/DL
TESTOST SERPL-MCNC: 18 NG/DL (ref 8–60)
VITAMIN D2 SERPL-MCNC: <5 UG/L
VITAMIN D3 SERPL-MCNC: 30 UG/L

## 2023-05-08 ENCOUNTER — MYC REFILL (OUTPATIENT)
Dept: INTERNAL MEDICINE | Facility: CLINIC | Age: 53
End: 2023-05-08
Payer: COMMERCIAL

## 2023-05-08 ENCOUNTER — MYC MEDICAL ADVICE (OUTPATIENT)
Dept: INTERNAL MEDICINE | Facility: CLINIC | Age: 53
End: 2023-05-08
Payer: COMMERCIAL

## 2023-05-08 DIAGNOSIS — G47.00 INSOMNIA, UNSPECIFIED TYPE: ICD-10-CM

## 2023-05-08 RX ORDER — ZOLPIDEM TARTRATE 6.25 MG/1
6.25 TABLET, FILM COATED, EXTENDED RELEASE ORAL
Qty: 30 TABLET | Refills: 0 | Status: SHIPPED | OUTPATIENT
Start: 2023-05-08 | End: 2023-06-07

## 2023-05-08 NOTE — TELEPHONE ENCOUNTER
Requested Prescriptions   Pending Prescriptions Disp Refills    zolpidem ER (AMBIEN CR) 6.25 MG CR tablet       Sig: Take by mouth nightly as needed for sleep       There is no refill protocol information for this order           Zonia Cordoba RN

## 2023-05-10 ENCOUNTER — OFFICE VISIT (OUTPATIENT)
Dept: DERMATOLOGY | Facility: CLINIC | Age: 53
End: 2023-05-10
Payer: COMMERCIAL

## 2023-05-10 DIAGNOSIS — L82.1 SEBORRHEIC KERATOSES: ICD-10-CM

## 2023-05-10 DIAGNOSIS — L82.0 SEBORRHEIC KERATOSIS, INFLAMED: ICD-10-CM

## 2023-05-10 DIAGNOSIS — D18.01 CHERRY ANGIOMA: ICD-10-CM

## 2023-05-10 DIAGNOSIS — D22.9 MULTIPLE BENIGN NEVI: ICD-10-CM

## 2023-05-10 DIAGNOSIS — C44.719 BASAL CELL CARCINOMA (BCC) OF LEFT LOWER LEG: ICD-10-CM

## 2023-05-10 DIAGNOSIS — L21.9 DERMATITIS, SEBORRHEIC: Primary | ICD-10-CM

## 2023-05-10 DIAGNOSIS — L81.4 SOLAR LENTIGO: ICD-10-CM

## 2023-05-10 DIAGNOSIS — L30.9 DERMATITIS: ICD-10-CM

## 2023-05-10 PROCEDURE — 17262 DSTRJ MAL LES T/A/L 1.1-2.0: CPT | Performed by: DERMATOLOGY

## 2023-05-10 PROCEDURE — 17110 DESTRUCTION B9 LES UP TO 14: CPT | Mod: XS | Performed by: DERMATOLOGY

## 2023-05-10 PROCEDURE — 99213 OFFICE O/P EST LOW 20 MIN: CPT | Mod: 25 | Performed by: DERMATOLOGY

## 2023-05-10 RX ORDER — KETOCONAZOLE 20 MG/G
CREAM TOPICAL 2 TIMES DAILY
Qty: 60 G | Refills: 3 | Status: SHIPPED | OUTPATIENT
Start: 2023-05-10 | End: 2023-07-24

## 2023-05-10 RX ORDER — HYDROCORTISONE 25 MG/G
OINTMENT TOPICAL
Qty: 30 G | Refills: 4 | Status: SHIPPED | OUTPATIENT
Start: 2023-05-10

## 2023-05-10 ASSESSMENT — PAIN SCALES - GENERAL: PAINLEVEL: MILD PAIN (2)

## 2023-05-10 NOTE — NURSING NOTE
Jayashree Johnson's goals for this visit include:   Chief Complaint   Patient presents with     Skin Check     FBSE. Area of concern: face, right arm, left leg, rash under left arm, lower right leg. History of BCC and SCC.        She requests these members of her care team be copied on today's visit information:       PCP: Aneudy Jackson    Referring Provider:  No referring provider defined for this encounter.    LMP 03/17/2003 (Exact Date)     Do you need any medication refills at today's visit? No     Torri Campbell CMA on 5/10/2023 at 10:22 AM
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
## 2023-05-10 NOTE — NURSING NOTE
The following medication was given:     MEDICATION:  Lidocaine with epinephrine 1%  ROUTE: [place]  SITE: see procedure note  DOSE: see procedure note  LOT #: [id number]  : [employer]  EXPIRATION DATE: 09/2024  NDC#: 67734-024-42    Was there drug waste? no  Multi-dose vial: Yes    Kena Mosley LPN  March 15, 2023

## 2023-05-10 NOTE — PROCEDURES
Dermatology Procedure Note: Electrodesiccation and Curettage      Dermatology Problem List:  Last skin check 3/10/21, recommended next in 12 months  1. Hx of NMSC  -- BCC, left anterior thigh, bx 10/21/2021, treated with Efudex, recurrence noted 22, reinitiated Efudex daily x 6 weeks  - SCCis, right medial eyebrow, s/p Mohs and linear repair 20  - BCC, left shin, s/p MMS 2020  - SCC, vulva, s/p excision   2. Actinic keratosis  -s/p cryotherapy  3. Milia/calcified EICs on genital skin  4. Family history of melanoma  5. 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 Lost son to soft tissue sarcoma. Has beef cattle.  Family History: There is a family history of skin cancer in the patient's father, possibly melanoma. It was on his nose.   Her mother also has a history of melanoma. She is unsure if her mother  from this  ____________________________________________      PREOPERATIVE DIAGNOSIS: BCC    POSTOPERATIVE DIAGNOSIS: same    LOCATION: left anterior thigh    SIZE: 0.6 cm x 1.0 cm     Treatment options including electrodessiccation and curettage (ED and C), excision and topicals were reviewed.  The expected cure rates, healing times and anticipated scars of each option were discussed and the patient elects to proceed with ED and C.     The risks and benefits of the procedure were described to the patient. These include but are not limited to bleeding, infection, scar, incomplete removal, and recurrence. Written informed consent was obtained. Time-out was performed. The above site was cleansed with and injected with 1% lidocaine with epinephrine. Once anesthesia was obtained, the site was prepped with Chlorhexidine and rinsed with sterile saline. The lesion was curetted with  in 3 directions with a 1-2 mm margin and this was followed by electrodessication.  This process was repeated three times. The defect measured 1.0 cm x  1.2 cm. Vaseline and a bandage were applied to the wound. The patient tolerated the procedure well and was given post care instructions.    Clinical Follow-up: 6 months    Dr. Richard Raines staffed the patient.     Staff Involved:  Scribe/Staff    Scribe Disclosure:   I, Boni Ramos, am serving as a scribe to document services personally performed by this physician, Dr. Richard Raines, based on data collection and the provider's statements to me.          Provider Disclosure:   The documentation recorded by the scribe accurately reflects the services I personally performed and the decisions made by me.    Richard Raines MD   of Dermatology  Department of Dermatology  [facility] [place]

## 2023-05-10 NOTE — PATIENT INSTRUCTIONS
Wound Care:  Electrodesiccation and Curettage (ED&C)    I will experience scar, altered skin color, bleeding, swelling, pain, crusting and redness. I may experience altered sensation. Risks are excessive bleeding, infection, muscle weakness, thick (hypertrophic or keloidal) scar, and recurrence. A second procedure may be recommended to obtain the best cosmetic or functional result.    What is electrodesiccation and curettage?  Scraping off tissue (curettage)  Destroy tissue using electric current or cautery (electrodessication)    How do I perform wound care?  Keep dressing in place for 24 hours.  Remove prior to showering  Wash gently with mild soap and water.  Pat dry  Put on a thick layer of Vaseline on the wound using a cotton-swab   Cover the wound with a bandage to protect from dust and tight clothing  Perform wound care once daily until the center of the wound has a pinked over appearance  During wound care, do not allow the area to dry out or form a scab  **If a build up of crust develops, soak a cotton swab in hydrogen peroxide to remove the crust    What do I need?  **Hydrogen peroxide    Cotton-swabs   Vaseline or petroleum jelly   Band-AidsTM or dressing supplies as needed     When should I call the doctor?  [facility]: [phone]  [facility]: [phone]  For urgent needs outside of business hours call the [facility] at [phone] and ask for the dermatology resident on call         Cryotherapy    What is it?  Use of a very cold liquid, such as liquid nitrogen, to freeze and destroy abnormal skin cells that need to be removed    What should I expect?  Tenderness and redness  A small blister that might grow and fill with dark purple blood. There may be crusting.  More than one treatment may be needed if the lesions do not go away.    How do I care for the treated area?  Gently wash the area with your hands when bathing.  Use  a thin layer of Vaseline to help with healing. You may use a Band-Aid.   The area should heal within 7-10 days and may leave behind a pink or lighter color.   Do not use an antibiotic or Neosporin ointment.   You may take acetaminophen (Tylenol) for pain.     Call your doctor if you have:  Severe pain  Signs of infection (warmth, redness, cloudy yellow drainage, and or a bad smell)  Questions or concerns    Who should I call with questions?      [facility]: [phone]      [facility]: [phone]      For urgent needs outside of business hours call the [facility] at [phone] and ask for the dermatology resident on call         Patient Education     Checking for Skin Cancer  You can find cancer early by checking your skin each month. There are 3 kinds of skin cancer. They are melanoma, basal cell carcinoma, and squamous cell carcinoma. Doing monthly skin checks is the best way to find new marks or skin changes. Follow the instructions below for checking your skin.   The ABCDEs of checking moles for melanoma   Check your moles or growths for signs of melanoma using ABCDE:   Asymmetry: the sides of the mole or growth don t match  Border: the edges are ragged, notched, or blurred  Color: the color within the mole or growth varies  Diameter: the mole or growth is larger than 6 mm (size of a pencil eraser)  Evolving: the size, shape, or color of the mole or growth is changing (evolving is not shown in the images below)    Checking for other types of skin cancer  Basal cell carcinoma or squamous cell carcinoma have symptoms such as:     A spot or mole that looks different from all other marks on your skin  Changes in how an area feels, such as itching, tenderness, or pain  Changes in the skin's surface, such as oozing, bleeding, or scaliness  A sore that does not heal  New swelling or redness beyond the border of a mole    Who s at risk?  Anyone  can get skin cancer. But you are at greater risk if you have:   Fair skin, light-colored hair, or light-colored eyes  Many moles or abnormal moles on your skin  A history of sunburns from sunlight or tanning beds  A family history of skin cancer  A history of exposure to radiation or chemicals  A weakened immune system  If you have had skin cancer in the past, you are at risk for recurring skin cancer.   How to check your skin  Do your monthly skin checkups in front of a full-length mirror. Check all parts of your body, including your:   Head (ears, face, neck, and scalp)  Torso (front, back, and sides)  Arms (tops, undersides, upper, and lower armpits)  Hands (palms, backs, and fingers, including under the nails)  Buttocks and genitals  Legs (front, back, and sides)  Feet (tops, soles, toes, including under the nails, and between toes)  If you have a lot of moles, take digital photos of them each month. Make sure to take photos both up close and from a distance. These can help you see if any moles change over time.   Most skin changes are not cancer. But if you see any changes in your skin, call your doctor right away. Only he or she can diagnose a problem. If you have skin cancer, seeing your doctor can be the first step toward getting the treatment that could save your life.   [employer] last reviewed this educational content on 4/1/2019 2000-2020 [employer]. [address] [place] [address]. All rights reserved. This information is not intended as a substitute for professional medical care. Always follow your healthcare professional's instructions.       When should I call my doctor?  If you are worsening or not improving, please, contact us or seek urgent care as noted below.     Who should I call with questions (adults)?  [facility] (adult and pediatric): [phone]  [facility] (adult): [phone]  For urgent needs  outside of business hours call the [facility] at [phone] and ask for the dermatology resident on call to be paged  If this is a medical emergency and you are unable to reach an ER, Call [phone]    Who should I call with questions (pediatric)?  [facility]- Pediatric Dermatology  Dr. Patria Roque, Dr. Lilly Skinner, Dr. Stefani Figueroa, NAVA Glynn, Dr. Trang Magallanes, Dr. Ina Richards & Dr. Richard Bangura  Non-urgent nurse triage line; [phone]- Latia and Netta RN Care Coordinators   Tri (/Complex ) [phone]    If you need a prescription refill, please contact your pharmacy. Refills are approved or denied by our Physicians during normal business hours, Monday through Fridays  Per office policy, refills will not be granted if you have not been seen within the past year (or sooner depending on your child's condition)    Scheduling Information:  Pediatric Appointment Scheduling and Call Center [phone]  Radiology Scheduling- [phone]  Sedation Unit Scheduling- [phone]  [place] Scheduling- General [phone]; Pediatric Dermatology [phone]  Main  Services: [phone]  Dominican: [phone]  Macanese: [phone]  Hmong/Román/Anguillan: [phone]  Preadmission Nursing Department Fax Number: [phone] (Fax all pre-operative paperwork to this number)    For urgent matters arising during evenings, weekends, or holidays that cannot wait for normal business hours please call [phone] and ask for the dermatology resident on call to be paged.

## 2023-05-11 NOTE — TELEPHONE ENCOUNTER
Left message at home number, [employer] message sent also.  Elisa Corrigan RN on 5/11/2023 at 11:32 AM

## 2023-05-12 ENCOUNTER — MYC MEDICAL ADVICE (OUTPATIENT)
Dept: DERMATOLOGY | Facility: CLINIC | Age: 53
End: 2023-05-12
Payer: COMMERCIAL

## 2023-05-17 ENCOUNTER — PATIENT OUTREACH (OUTPATIENT)
Dept: CARE COORDINATION | Facility: CLINIC | Age: 53
End: 2023-05-17
Payer: COMMERCIAL

## 2023-06-07 DIAGNOSIS — G47.00 INSOMNIA, UNSPECIFIED TYPE: ICD-10-CM

## 2023-06-07 RX ORDER — ZOLPIDEM TARTRATE 6.25 MG/1
TABLET, FILM COATED, EXTENDED RELEASE ORAL
Qty: 30 TABLET | Refills: 0 | Status: SHIPPED | OUTPATIENT
Start: 2023-06-07 | End: 2023-07-12

## 2023-06-08 ENCOUNTER — MYC MEDICAL ADVICE (OUTPATIENT)
Dept: INTERNAL MEDICINE | Facility: CLINIC | Age: 53
End: 2023-06-08
Payer: COMMERCIAL

## 2023-06-08 DIAGNOSIS — M25.512 ACUTE PAIN OF LEFT SHOULDER: ICD-10-CM

## 2023-06-08 RX ORDER — CYCLOBENZAPRINE HCL 5 MG
5 TABLET ORAL 3 TIMES DAILY PRN
Qty: 30 TABLET | Refills: 1 | Status: SHIPPED | OUTPATIENT
Start: 2023-06-08 | End: 2023-09-07

## 2023-06-10 ENCOUNTER — HEALTH MAINTENANCE LETTER (OUTPATIENT)
Age: 53
End: 2023-06-10

## 2023-06-29 DIAGNOSIS — B00.9 HSV (HERPES SIMPLEX VIRUS) INFECTION: Primary | ICD-10-CM

## 2023-06-29 RX ORDER — VALACYCLOVIR HYDROCHLORIDE 500 MG/1
500 TABLET, FILM COATED ORAL 2 TIMES DAILY
Qty: 14 TABLET | Refills: 0 | Status: SHIPPED | OUTPATIENT
Start: 2023-06-29 | End: 2024-08-27

## 2023-06-29 NOTE — TELEPHONE ENCOUNTER
Received refill request for valacyclovir.  Last filled 6/2022    Called Melissa, who states that she uses this as episodic treatment, and is currently having an outbreak.    Refilled for episodic use

## 2023-07-12 ENCOUNTER — MYC MEDICAL ADVICE (OUTPATIENT)
Dept: INTERNAL MEDICINE | Facility: CLINIC | Age: 53
End: 2023-07-12
Payer: COMMERCIAL

## 2023-07-12 DIAGNOSIS — G47.00 INSOMNIA, UNSPECIFIED TYPE: ICD-10-CM

## 2023-07-12 RX ORDER — ZOLPIDEM TARTRATE 6.25 MG/1
TABLET, FILM COATED, EXTENDED RELEASE ORAL
Qty: 30 TABLET | Refills: 0 | Status: SHIPPED | OUTPATIENT
Start: 2023-07-12 | End: 2023-07-13

## 2023-07-13 ENCOUNTER — TELEPHONE (OUTPATIENT)
Dept: INTERNAL MEDICINE | Facility: CLINIC | Age: 53
End: 2023-07-13

## 2023-07-13 DIAGNOSIS — G47.00 INSOMNIA, UNSPECIFIED TYPE: ICD-10-CM

## 2023-07-13 RX ORDER — ZOLPIDEM TARTRATE 6.25 MG/1
6.25 TABLET, FILM COATED, EXTENDED RELEASE ORAL
Qty: 30 TABLET | Refills: 0 | Status: SHIPPED | OUTPATIENT
Start: 2023-07-13

## 2023-07-13 NOTE — TELEPHONE ENCOUNTER
Eileen Jackson,    The rx you sent for zolpidem to our specialty/mail order pharmacy yesterday will not get to patient in time ( she is out). She is requesting rx be sent here so she can get it locally. Due to [place] rules we can not transfer the rx from specialty, so we would need a new order.    Thank you for your assistance,    Hunter Fernandez, PharmD on behalf of   [facility]  [phone]

## 2023-07-24 ENCOUNTER — TELEPHONE (OUTPATIENT)
Dept: INTERNAL MEDICINE | Facility: CLINIC | Age: 53
End: 2023-07-24

## 2023-07-24 ENCOUNTER — OFFICE VISIT (OUTPATIENT)
Dept: INTERNAL MEDICINE | Facility: CLINIC | Age: 53
End: 2023-07-24
Payer: COMMERCIAL

## 2023-07-24 VITALS
SYSTOLIC BLOOD PRESSURE: 122 MMHG | BODY MASS INDEX: 25.43 KG/M2 | HEIGHT: 67 IN | RESPIRATION RATE: 16 BRPM | OXYGEN SATURATION: 97 % | WEIGHT: 162 LBS | DIASTOLIC BLOOD PRESSURE: 84 MMHG | HEART RATE: 68 BPM | TEMPERATURE: 98.5 F

## 2023-07-24 DIAGNOSIS — F33.1 MODERATE EPISODE OF RECURRENT MAJOR DEPRESSIVE DISORDER (H): ICD-10-CM

## 2023-07-24 DIAGNOSIS — G89.29 CHRONIC LEFT SHOULDER PAIN: ICD-10-CM

## 2023-07-24 DIAGNOSIS — M25.512 CHRONIC LEFT SHOULDER PAIN: ICD-10-CM

## 2023-07-24 DIAGNOSIS — R53.83 FATIGUE, UNSPECIFIED TYPE: ICD-10-CM

## 2023-07-24 DIAGNOSIS — R79.89 LOW VITAMIN B12 LEVEL: ICD-10-CM

## 2023-07-24 DIAGNOSIS — M25.512 CHRONIC LEFT SHOULDER PAIN: Primary | ICD-10-CM

## 2023-07-24 DIAGNOSIS — G89.29 CHRONIC LEFT SHOULDER PAIN: Primary | ICD-10-CM

## 2023-07-24 PROCEDURE — 96372 THER/PROPH/DIAG INJ SC/IM: CPT | Performed by: INTERNAL MEDICINE

## 2023-07-24 PROCEDURE — 99214 OFFICE O/P EST MOD 30 MIN: CPT | Mod: 25 | Performed by: INTERNAL MEDICINE

## 2023-07-24 RX ORDER — OXYCODONE AND ACETAMINOPHEN 5; 325 MG/1; MG/1
1 TABLET ORAL EVERY 6 HOURS PRN
Qty: 12 TABLET | Refills: 0 | Status: SHIPPED | OUTPATIENT
Start: 2023-07-24 | End: 2023-07-24

## 2023-07-24 RX ORDER — OXYCODONE AND ACETAMINOPHEN 5; 325 MG/1; MG/1
1 TABLET ORAL EVERY 6 HOURS PRN
Qty: 12 TABLET | Refills: 0 | Status: SHIPPED | OUTPATIENT
Start: 2023-07-24 | End: 2023-12-06

## 2023-07-24 RX ORDER — FLUOXETINE 40 MG/1
40 CAPSULE ORAL DAILY
COMMUNITY
Start: 2023-07-24

## 2023-07-24 RX ORDER — CYANOCOBALAMIN 1000 UG/ML
1000 INJECTION, SOLUTION INTRAMUSCULAR; SUBCUTANEOUS
Status: DISCONTINUED | OUTPATIENT
Start: 2023-07-24 | End: 2023-12-07

## 2023-07-24 RX ADMIN — CYANOCOBALAMIN 1000 MCG: 1000 INJECTION, SOLUTION INTRAMUSCULAR; SUBCUTANEOUS at 13:38

## 2023-07-24 ASSESSMENT — ENCOUNTER SYMPTOMS
BACK PAIN: 1
HEADACHES: 1

## 2023-07-24 ASSESSMENT — PATIENT HEALTH QUESTIONNAIRE - PHQ9
10. IF YOU CHECKED OFF ANY PROBLEMS, HOW DIFFICULT HAVE THESE PROBLEMS MADE IT FOR YOU TO DO YOUR WORK, TAKE CARE OF THINGS AT HOME, OR GET ALONG WITH OTHER PEOPLE: VERY DIFFICULT
SUM OF ALL RESPONSES TO PHQ QUESTIONS 1-9: 15
SUM OF ALL RESPONSES TO PHQ QUESTIONS 1-9: 15

## 2023-07-24 NOTE — PROGRESS NOTES
"  Assessment & Plan     Chronic left shoulder pain  Chronic left shoulder pain she is not sleeping well, trying Flexeril but we will give her some Percocet to see if this helps she knows to be careful with the Percocet especially with her sleeping medications.  Do not overuse.  This is only as needed medication.  - oxyCODONE-acetaminophen (PERCOCET) 5-325 MG tablet; Take 1 tablet by mouth every 6 hours as needed for pain    Fatigue, unspecified type  Fatigue unclear reason why her labs were normal with thyroid vitamin D she is in menopause we did talk about those symptoms.  We will try a B12 shot.  She will see sleep medicine later in the fall to help work on her sleep pattern.    Low vitamin B12 level  B12 level we will try an injection she still at 250 even though she is on oral supplements.  - cyanocobalamin injection 1,000 mcg    Moderate episode of recurrent major depressive disorder (H)  Depression she sees [facility] her Prozac is a 40 mg still has her mind racing at night she may need an increased dose.  We will leave up to the psychiatry team.      30 minutes spent with the patient discussing her outside labs, symptoms and coming up with the plan.         BMI:   Estimated body mass index is 25.37 kg/m  as calculated from the following:    Height as of this encounter: 1.702 m (5' 7\").    Weight as of this encounter: 73.5 kg (162 lb).           Aneudy Jackson MD  [facility] DAVID Torres is a 53 year old, presenting for the following health issues:  Back Pain, Headache, and Hypertension      7/24/2023     1:01 PM   Additional Questions   Roomed by Maura Santizo     Back Pain   Associated symptoms include headaches.   Headache     History of Present Illness       Back Pain:  She presents for follow up of back pain. Patient's back pain is a recurring problem.  Location of back pain:  Right lower back, left lower back, left middle of back, left upper back, left " "shoulder, right buttock, left buttock, right hip and left hip  Description of back pain: dull ache, fullness, shooting and stabbing  Back pain spreads: left thigh, left shoulder, right side of neck and left side of neck    Since patient first noticed back pain, pain is: gradually worsening  Does back pain interfere with her job:  Yes       Hypertension: She presents for follow up of hypertension.  She does check blood pressure  regularly outside of the clinic. Outside blood pressures have been over 140/90. She follows a low salt diet.     Headaches:   Since the patient's last clinic visit, headaches are: worsened  The patient is getting headaches:  1 a  She is able to do normal daily activities when she has a migraine.  The patient is taking the following rescue/relief medications:  Sumatriptan (Imitrex)   Patient states \"I get only a small amount of relief\" from the rescue/relief medications.   The patient is taking the following medications to prevent migraines:  Other  In the past 4 weeks, the patient has gone to an Urgent Care or Emergency Room 0 times times due to headaches.    She eats 4 or more servings of fruits and vegetables daily.She consumes 0 sweetened beverage(s) daily.She exercises with enough effort to increase her heart rate 10 to 19 minutes per day.  She exercises with enough effort to increase her heart rate 6 days per week.   She is taking medications regularly.       Feeling bad, fatigue, sleepy.      Uses zolpidem daily, usually works but sometimes can't sleep all night.  Will be seeing sleep.     B12 is low normal and on oral supplement.       Review of Systems   Musculoskeletal:  Positive for back pain.   Neurological:  Positive for headaches.          Objective    /84   Pulse 68   Temp 98.5  F (36.9  C) (Temporal)   Resp 16   Ht 1.702 m (5' 7\")   Wt 73.5 kg (162 lb)   LMP 03/17/2003 (Exact Date)   SpO2 97%   BMI 25.37 kg/m    Body mass index is 25.37 kg/m .  Physical Exam "   NAD, quite alert and happy today

## 2023-07-24 NOTE — TELEPHONE ENCOUNTER
Pt Oxycodone script did not come over successfully - hung up on payer response thing. Can provider please release and send . Pt Is waiting

## 2023-07-24 NOTE — PROGRESS NOTES
Clinic Administered Medication Documentation        Patient was given Vitamin B12. Prior to medication administration, verified patient's identity using patient s name and date of birth. Please see MAR and medication order for additional information. Patient instructed to remain in clinic for 15 minutes and report any adverse reaction to staff immediately.    Vial/Syringe: Single dose vial. Was entire vial of medication used? Yes

## 2023-09-07 DIAGNOSIS — M25.512 ACUTE PAIN OF LEFT SHOULDER: ICD-10-CM

## 2023-09-07 RX ORDER — CYCLOBENZAPRINE HCL 5 MG
5 TABLET ORAL 3 TIMES DAILY PRN
Qty: 30 TABLET | Refills: 1 | Status: SHIPPED | OUTPATIENT
Start: 2023-09-07 | End: 2023-12-22

## 2023-11-03 DIAGNOSIS — J30.89 CHRONIC NONSEASONAL ALLERGIC RHINITIS DUE TO POLLEN: ICD-10-CM

## 2023-11-03 RX ORDER — LORATADINE 10 MG/1
1 TABLET ORAL DAILY
Qty: 90 TABLET | Refills: 2 | Status: SHIPPED | OUTPATIENT
Start: 2023-11-03 | End: 2024-08-27

## 2023-11-15 ENCOUNTER — OFFICE VISIT (OUTPATIENT)
Dept: DERMATOLOGY | Facility: CLINIC | Age: 53
End: 2023-11-15
Payer: COMMERCIAL

## 2023-11-15 DIAGNOSIS — Z85.828 HISTORY OF SKIN CANCER: Primary | ICD-10-CM

## 2023-11-15 DIAGNOSIS — D18.01 CHERRY ANGIOMA: ICD-10-CM

## 2023-11-15 DIAGNOSIS — L81.4 SOLAR LENTIGO: ICD-10-CM

## 2023-11-15 DIAGNOSIS — L73.2 HIDRADENITIS SUPPURATIVA: ICD-10-CM

## 2023-11-15 DIAGNOSIS — L82.1 SEBORRHEIC KERATOSIS: ICD-10-CM

## 2023-11-15 DIAGNOSIS — L21.9 DERMATITIS, SEBORRHEIC: ICD-10-CM

## 2023-11-15 DIAGNOSIS — D22.9 MULTIPLE MELANOCYTIC NEVI: ICD-10-CM

## 2023-11-15 DIAGNOSIS — L82.0 SEBORRHEIC KERATOSES, INFLAMED: ICD-10-CM

## 2023-11-15 PROCEDURE — 17110 DESTRUCTION B9 LES UP TO 14: CPT | Performed by: DERMATOLOGY

## 2023-11-15 PROCEDURE — 99213 OFFICE O/P EST LOW 20 MIN: CPT | Mod: 25 | Performed by: DERMATOLOGY

## 2023-11-15 RX ORDER — CEFDINIR 300 MG/1
CAPSULE ORAL
COMMUNITY
Start: 2023-11-07 | End: 2023-12-06

## 2023-11-15 RX ORDER — CLINDAMYCIN PHOSPHATE 10 UG/ML
LOTION TOPICAL 2 TIMES DAILY
Qty: 60 ML | Refills: 11 | Status: SHIPPED | OUTPATIENT
Start: 2023-11-15 | End: 2024-09-04

## 2023-11-15 RX ORDER — CLINDAMYCIN PHOSPHATE 10 MG/G
GEL TOPICAL 2 TIMES DAILY
Status: CANCELLED | OUTPATIENT
Start: 2023-11-15

## 2023-11-15 RX ORDER — BETAMETHASONE DIPROPIONATE 0.5 MG/ML
LOTION, AUGMENTED TOPICAL DAILY
Qty: 60 ML | Refills: 11 | Status: SHIPPED | OUTPATIENT
Start: 2023-11-15

## 2023-11-15 ASSESSMENT — PAIN SCALES - GENERAL: PAINLEVEL: MILD PAIN (3)
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
## 2023-11-15 NOTE — NURSING NOTE
Dermatology Rooming Note    Jayashree Johnson's goals for this visit include:   Chief Complaint   Patient presents with    Skin Check     FBSC - hx of NMSC, patient reports spots on face will not go away. They also some new spots of concern on forehead. Patient has a spot of concern on vulva, asking for possible referral.        Mary Arreguin
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
## 2023-12-06 ENCOUNTER — VIRTUAL VISIT (OUTPATIENT)
Dept: INTERNAL MEDICINE | Facility: CLINIC | Age: 53
End: 2023-12-06
Payer: COMMERCIAL

## 2023-12-06 DIAGNOSIS — R05.2 SUBACUTE COUGH: ICD-10-CM

## 2023-12-06 DIAGNOSIS — J20.9 ACUTE BRONCHITIS, UNSPECIFIED ORGANISM: Primary | ICD-10-CM

## 2023-12-06 DIAGNOSIS — E53.8 VITAMIN B12 DEFICIENCY (NON ANEMIC): ICD-10-CM

## 2023-12-06 DIAGNOSIS — R53.83 FATIGUE, UNSPECIFIED TYPE: ICD-10-CM

## 2023-12-06 PROCEDURE — 99213 OFFICE O/P EST LOW 20 MIN: CPT | Mod: VID | Performed by: INTERNAL MEDICINE

## 2023-12-06 RX ORDER — AZITHROMYCIN 250 MG/1
TABLET, FILM COATED ORAL
Qty: 6 TABLET | Refills: 0 | Status: SHIPPED | OUTPATIENT
Start: 2023-12-06 | End: 2024-09-04

## 2023-12-06 RX ORDER — ALBUTEROL SULFATE 90 UG/1
2 AEROSOL, METERED RESPIRATORY (INHALATION) EVERY 6 HOURS PRN
Qty: 18 G | Refills: 3 | Status: SHIPPED | OUTPATIENT
Start: 2023-12-06

## 2023-12-06 RX ORDER — CYANOCOBALAMIN 1000 UG/ML
1000 INJECTION, SOLUTION INTRAMUSCULAR; SUBCUTANEOUS
Status: ACTIVE | OUTPATIENT
Start: 2023-12-06 | End: 2024-11-30

## 2023-12-06 NOTE — PROGRESS NOTES
"Melissa is a 53 year old who is being evaluated via a billable video visit.        Instructions Relayed to Patient by Virtual Roomer:     Patient is active on [employer]:   Relayed following to patient: \"It looks like you are active on [employer], are you able to join the visit this way? If not, do you need us to send you a link now or would you like your provider to send a link via text or email when they are ready to initiate the visit?\"    Reminded patient to ensure they were logged on to virtual visit by arrival time listed. Documented in appointment notes if patient had flexibility to initiate visit sooner than arrival time. If pediatric virtual visit, ensured pediatric patient along with parent/guardian will be present for video visit.     Patient offered the website [email or web address] and/or phone number to [employer] Help line: [phone]   How would you like to obtain your AVS? [employer]  If the video visit is dropped, the invitation should be resent by: Send to e-mail at: [email or web address]  Will anyone else be joining your video visit? No          Assessment & Plan   Problem List Items Addressed This Visit    None  Visit Diagnoses       Acute bronchitis, unspecified organism    -  Primary    Relevant Medications    azithromycin (ZITHROMAX) 250 MG tablet    albuterol (PROAIR HFA/PROVENTIL HFA/VENTOLIN HFA) 108 (90 Base) MCG/ACT inhaler    Subacute cough        Relevant Medications    azithromycin (ZITHROMAX) 250 MG tablet    albuterol (PROAIR HFA/PROVENTIL HFA/VENTOLIN HFA) 108 (90 Base) MCG/ACT inhaler    Fatigue, unspecified type        Relevant Medications    cyanocobalamin injection 1,000 mcg (Start on 12/6/2023  3:30 PM)    Vitamin B12 deficiency (non anemic)        Relevant Medications    cyanocobalamin injection 1,000 mcg (Start on 12/6/2023  3:30 PM)           Patient with bronchitis, did not get better with the first treatment.  Continues to have symptoms.  Will treat her for bronchitis " "with azithromycin.  She is told about side effects and how to take it.  Will also try her with some albuterol inhaler to try to open up her lungs and get her breathing better.    Fatigue and B12 deficiency she did receive 1 B12 shot back in July but we will try to get her monthly regiment and get her 1 tomorrow to help her energy.             BMI:   Estimated body mass index is 25.37 kg/m  as calculated from the following:    Height as of 7/24/23: 1.702 m (5' 7\").    Weight as of 7/24/23: 73.5 kg (162 lb).           Aneudy Jackson MD  [facility] DAVID Torres is a 53 year old, presenting for the following health issues:  Pharyngitis and URI (Cold Like Symptoms )        12/6/2023     2:32 PM   Additional Questions   Roomed by Alan BILLINGSLEY     Acute Illness  Acute illness concerns: Cold and Flu like symptoms   Onset/Duration: ~1 month   Symptoms:  Fever: YES  Chills/Sweats: YES  Headache (location?): YES  Sinus Pressure: No  Conjunctivitis:  No  Ear Pain: no  Rhinorrhea: No  Congestion: YES  Sore Throat: No  Cough: YES-productive of green sputum  Wheeze: No  Decreased Appetite: No  Nausea: No  Vomiting: No  Diarrhea: No  Dysuria/Freq.: No  Dysuria or Hematuria: No  Fatigue/Achiness: No  Sick/Strep Exposure: No  Tonsillitis   Therapies tried and outcome:  anitbiotics - ran out     Nov 7th urgent care, told tonsillitis and 10 days of antibiotics.  No strep test. Got better then worse again.  Burning chest, coughing the last few days.  Headaches, fever.  Two negative covid tests, 1 in Nov and 1 before Thanksgiving.    Has flonase. Nyquil.       Review of Systems         Objective           Vitals:  No vitals were obtained today due to virtual visit.    Physical Exam   GENERAL: sick   EYES: Eyes grossly normal to inspection.  No discharge or erythema, or obvious scleral/conjunctival abnormalities.  RESP: No audible wheeze, cough, or visible cyanosis.  No visible retractions or " increased work of breathing.    SKIN: Visible skin clear. No significant rash, abnormal pigmentation or lesions.  NEURO: Cranial nerves grossly intact.  Mentation and speech appropriate for age.  PSYCH: Mentation appears normal, affect normal/bright, judgement and insight intact, normal speech and appearance well-groomed.                Video-Visit Details    Type of service:  Video Visit   Video Start Time:  3:18  Video End Time:3:28 PM    Originating Location (pt. Location): Home    Distant Location (provider location):  On-site  Platform used for Video Visit: Belkys

## 2023-12-07 ENCOUNTER — ALLIED HEALTH/NURSE VISIT (OUTPATIENT)
Dept: FAMILY MEDICINE | Facility: CLINIC | Age: 53
End: 2023-12-07
Payer: COMMERCIAL

## 2023-12-07 DIAGNOSIS — L29.9 PRURITUS: Primary | ICD-10-CM

## 2023-12-07 PROCEDURE — 96372 THER/PROPH/DIAG INJ SC/IM: CPT | Performed by: INTERNAL MEDICINE

## 2023-12-07 PROCEDURE — 99207 PR NO CHARGE NURSE ONLY: CPT

## 2023-12-07 RX ADMIN — CYANOCOBALAMIN 1000 MCG: 1000 INJECTION, SOLUTION INTRAMUSCULAR; SUBCUTANEOUS at 10:18

## 2023-12-12 ENCOUNTER — MYC MEDICAL ADVICE (OUTPATIENT)
Dept: INTERNAL MEDICINE | Facility: CLINIC | Age: 53
End: 2023-12-12
Payer: COMMERCIAL

## 2023-12-13 ENCOUNTER — OFFICE VISIT (OUTPATIENT)
Dept: FAMILY MEDICINE | Facility: CLINIC | Age: 53
End: 2023-12-13
Payer: COMMERCIAL

## 2023-12-13 VITALS
HEART RATE: 78 BPM | HEIGHT: 67 IN | WEIGHT: 168 LBS | SYSTOLIC BLOOD PRESSURE: 126 MMHG | TEMPERATURE: 97.3 F | BODY MASS INDEX: 26.37 KG/M2 | DIASTOLIC BLOOD PRESSURE: 84 MMHG | OXYGEN SATURATION: 99 % | RESPIRATION RATE: 20 BRPM

## 2023-12-13 DIAGNOSIS — J06.9 URI WITH COUGH AND CONGESTION: Primary | ICD-10-CM

## 2023-12-13 DIAGNOSIS — J40 BRONCHITIS: ICD-10-CM

## 2023-12-13 PROCEDURE — 99213 OFFICE O/P EST LOW 20 MIN: CPT | Performed by: NURSE PRACTITIONER

## 2023-12-13 RX ORDER — AZITHROMYCIN 250 MG/1
TABLET, FILM COATED ORAL
Qty: 6 TABLET | Refills: 0 | Status: SHIPPED | OUTPATIENT
Start: 2023-12-13 | End: 2023-12-18

## 2023-12-13 RX ORDER — FLUCONAZOLE 150 MG/1
150 TABLET ORAL ONCE
Qty: 1 TABLET | Refills: 0 | Status: SHIPPED | OUTPATIENT
Start: 2023-12-13 | End: 2023-12-13

## 2023-12-13 ASSESSMENT — ENCOUNTER SYMPTOMS: COUGH: 1

## 2023-12-13 ASSESSMENT — PATIENT HEALTH QUESTIONNAIRE - PHQ9
10. IF YOU CHECKED OFF ANY PROBLEMS, HOW DIFFICULT HAVE THESE PROBLEMS MADE IT FOR YOU TO DO YOUR WORK, TAKE CARE OF THINGS AT HOME, OR GET ALONG WITH OTHER PEOPLE: SOMEWHAT DIFFICULT
SUM OF ALL RESPONSES TO PHQ QUESTIONS 1-9: 15
SUM OF ALL RESPONSES TO PHQ QUESTIONS 1-9: 15

## 2023-12-13 ASSESSMENT — PAIN SCALES - GENERAL: PAINLEVEL: MILD PAIN (2)

## 2023-12-13 NOTE — TELEPHONE ENCOUNTER
Patient was seen on 12/6/23. She has gotten a little better because she is able to cough things up.      Appointment made.  Elisa Corrigan RN on 12/13/2023 at 10:34 AM

## 2023-12-13 NOTE — COMMUNITY RESOURCES LIST (PATIENT PREFERRED LANGUAGE)
12/13/2023   [facility]  N/A  Heidirir bri um þennan úrræðalista eða frekari umönnunarþarfir, vinsamlegast hafðu samband við hedeyviugæslustöðina þína eða emersonunarstserenity.  Phone: [phone]   Email: N/A   Address: [address] [place] [address]   Hours: N/A        Fjwenceslaoeliza       Húsaleigu- og greiðsluaðstoð  1  [facility] - Neyðaraðsto[address] í marquisenæðcristal - Húsaleigu- og amandaiðsluaðstoð [place]: 17.18 mílur      Í eigin persónu   [address] [place] [address]  Tungumál: Trish Haler: Carolyn - Maryamudayohana 06:00 - 18:30  [place]: Varun   Phone: [phone] Email: [email or web address] Website: [email or web address]     Aðstoð við amandaiðslu gagnsemi  2  [facility] - Orkuaðstoðaráætlun [place]: 17.18 mílur      Sími/sýnd   [address] [place] [address]  Tungumál: Trish Carrasquillo: Carolyn - Nitudayohana 06:00 - 18:30  Gjö[place]: Varun   Phone: [phone] Email: [email or web address] Website: [email or web address]          Matbrian og lilianaæalyson       Matarbúr  3  [place] svæannelbrett [place]: 6.22 mílur      [address]   [address] [place] [address]  Tungumál: Trish Carrasquillo: Dejaudayohana 12:00 - 16:00  Gjöld: Varun   Phone: [phone] Email: [email or web address] Website: [email or web address]     4  [place] navarrete [place]: 9.97 mílur      [place]   [address] [place] [address]  Tungumál: Trish Carrasquillo: Laugardalex 08:00 - 10:30  Karis: Varun   Phone: [phone]     SNAP umsóknaraðstoð  5  [facility] - SNAP umsókn aðstoð Fj[place]: 17.18 mílur      Í kermit olvera, Sími/sýnd   [address] [address]  Tungumál: Trish Carrasquillo: Aleksdaguedgar - Föstudag 06:00 - 18:30  Jason Bond   Phone: [phone] Email: [email or web address]  Website: [email or web address]          Nola hartman lækninga  6  [facility] læknavaktsenthil [place]: 19.06 mílur      Í eigin persónu   [address] [place] [address]  Tungumál: Trish Haler: Carolyn Farnciscoudayohana 04:00 - 18:00  Gjöld: Catherine Lou   Phone: [phone] Email: [email or web address]          Mikilvæ[address] númer og vefsíður       [address]   911  [address]   311  Eitureftirlit   [phone]  Lamarvístanforvarlamar pennylína   [phone] (TALK)  Kaveh rust [facility] börnum   [phone] (4-A-Child)  Kaveh siuisoriddhi   [phone] (HOPE)  [employer] Runa[employer]eline   [phone] (RUNAWAY)  [employer] Talkline   [phone]  Willieuefnatilvísun   [phone] (HELP)

## 2023-12-13 NOTE — PROGRESS NOTES
Assessment & Plan     URI with cough and congestion    Bronchitis    - fluconazole (DIFLUCAN) 150 MG tablet; Take 1 tablet (150 mg) by mouth once for 1 dose  - azithromycin (ZITHROMAX) 250 MG tablet; Take 2 tablets (500 mg) by mouth daily for 1 day, THEN 1 tablet (250 mg) daily for 4 days.    Slight improvement of symptoms with antibiotic and albuterol inhaler prescribed by PCP. Discussed additional 5 days of Azithromycin for her symptoms if they do not improve. Given her ongoing symptoms I do suspect there is a viral component to the cause of her symptoms. I did offer chest x-ray to further evaluate symptoms, however with normal lung sounds and further discussion patient decided to wait with this for now. We discussed supportive cares, and continued use of the Albuterol as needed. If her symptoms are worsening or fail to improve over the next several days, she will follow-up. Diflucan sent to pharmacy for vaginal yeast infection symptoms following antibiotic use.     Supportive cares are encouraged, increased fluid intake and rest.   Guaifenesin (Mucinex) 1,000mg Twice daily as needed for mucus/congestion  Fluticasone (Flonase) 2 puffs in each nostril once daily   Dextromethorphan 30mg up to 4 times daily as needed for cough.   Cetirizine (Zyrtec) 10mg daily   Saline nasal spray or irrigation twice daily (Simply Saline)   Tylenol (1,000mg every 6-8 hours) or Ibuprofen (600mg every 6-8 hours) as needed for fever or pain     Follow up if symptoms fail to improve or worsen, or if shortness of breath or chest pain develop.     I explained my diagnostic considerations and recommendations to the patient, who voiced understanding and agreement with the assessment and treatment plan. All questions were answered to patient's apparent satisfaction. We discussed potential side effects of any prescribed or recommended therapies, as well as expectations for response to treatments and importance of lifestyle measures that may  "improve symptoms. Patient was advised to contact our office if there are new symptoms or no improvement or worsening of conditions or symptoms.                 BMI:   Estimated body mass index is 26.31 kg/m  as calculated from the following:    Height as of this encounter: 1.702 m (5' 7\").    Weight as of this encounter: 76.2 kg (168 lb).           Gabriela Arteaga NP  [facility] DAVID Torres is a 53 year old, presenting for the following health issues:  Follow Up and Cough      Cough     Jayashree Johnson is a 53 year old female patient who presents to the clinic today for evaluation of 1 month worth of coughing. She was seen in Urgent Care at the beginning of November, and was started on Ceftin for 10 days. She did helpful, however after Thanksgiving she developed the cough, headaches, congestion, and body aches. She was started on Azithromycin x5 days. She completed this yesterday. She was also given an albuterol inhaler to use, she was using this a few times per day. She felt this \"burns\" in her chest. She was able to cough up more yellow/green mucus with this. She continues to have a cough, specifically when trying to talk. She has noted both wheezing and shortness of breath, although this has improved. She denies any chest pain.     Associated symptoms:  Fever/Chills: Temp-99  Rhinorrhea: Yes  Nasal congestion: Yes  Ear pain: Improved  Sinus Pressure: Yes  Teeth pain: No  Headache: Yes    Patient Active Problem List   Diagnosis    CARDIOVASCULAR SCREENING; LDL GOAL LESS THAN 100    Gestational diabetes mellitus, antepartum / HX    Hypoglycemia    Family history of breast cancer in sister    Moderate major depression, single episode (H)    Sleep disturbance -- chronic.    Actinic keratosis    SK (seborrheic keratosis)    Pruritus    Ovarian cyst    Microscopic colitis    Myalgia and myositis    Cellulitis of nose, external    Vitamin D deficiency    Polyarthralgia    Nasal obstruction " "   S/P cervical spinal fusion    PTSD (post-traumatic stress disorder)    Rotator cuff syndrome, left    Acute pain of right shoulder    Nontraumatic incomplete tear of left rotator cuff    Adhesive capsulitis of left shoulder     Current Outpatient Medications   Medication    albuterol (PROAIR HFA/PROVENTIL HFA/VENTOLIN HFA) 108 (90 Base) MCG/ACT inhaler    ALPRAZolam (XANAX PO)    augmented betamethasone dipropionate (DIPROLENE) 0.05 % external lotion    azelastine (ASTELIN) 0.1 % nasal spray    benzoyl peroxide 5 % external liquid    cetirizine (ZYRTEC) 10 MG tablet    Cholecalciferol (VITAMIN D-3 PO)    Cyanocobalamin (VITAMIN B-12 PO)    cyclobenzaprine (FLEXERIL) 5 MG tablet    Digestive Enzymes (DIGESTIVE ENZYME PO)    FLUoxetine (PROZAC) 40 MG capsule    fluticasone (FLONASE) 50 MCG/ACT nasal spray    hydrocortisone 2.5 % ointment    loratadine (CLARITIN) 10 MG tablet    metoprolol succinate ER (TOPROL XL) 25 MG 24 hr tablet    Multiple Vitamin (MULTI-VITAMIN DAILY PO)    Polyethyl Glycol-Propyl Glycol (SYSTANE OP)    SUMAtriptan (IMITREX) 50 MG tablet    zolpidem ER (AMBIEN CR) 6.25 MG CR tablet    azithromycin (ZITHROMAX) 250 MG tablet    clindamycin (CLEOCIN T) 1 % external lotion    fluorouracil (EFUDEX) 5 % external cream    prochlorperazine (COMPAZINE) 10 MG tablet    valACYclovir (VALTREX) 500 MG tablet     Current Facility-Administered Medications   Medication    cyanocobalamin injection 1,000 mcg    triamcinolone (KENALOG-40) injection 40 mg       Review of Systems   Respiratory:  Positive for cough.             Objective    /84   Pulse 78   Temp 97.3  F (36.3  C) (Temporal)   Resp 20   Ht 1.702 m (5' 7\")   Wt 76.2 kg (168 lb)   LMP 03/17/2003 (Exact Date)   SpO2 99%   BMI 26.31 kg/m    Body mass index is 26.31 kg/m .  Physical Exam  Vitals reviewed.   Constitutional:       General: She is not in acute distress.     Appearance: Normal appearance.   HENT:      Head: Normocephalic and " atraumatic.      Right Ear: Tympanic membrane normal.      Left Ear: Tympanic membrane normal.      Nose: Congestion and rhinorrhea present.      Mouth/Throat:      Mouth: Mucous membranes are moist.      Pharynx: Oropharynx is clear. No posterior oropharyngeal erythema.   Eyes:      Extraocular Movements: Extraocular movements intact.      Conjunctiva/sclera: Conjunctivae normal.   Cardiovascular:      Rate and Rhythm: Normal rate and regular rhythm.      Heart sounds: Normal heart sounds.   Pulmonary:      Effort: Pulmonary effort is normal.      Breath sounds: Normal breath sounds.   Musculoskeletal:      Cervical back: Neck supple. No tenderness.   Lymphadenopathy:      Cervical: No cervical adenopathy.   Skin:     General: Skin is warm and dry.   Neurological:      Mental Status: She is alert and oriented to person, place, and time. Mental status is at baseline.   Psychiatric:         Mood and Affect: Mood normal.         Behavior: Behavior normal.

## 2023-12-13 NOTE — COMMUNITY RESOURCES LIST (ENGLISH)
12/13/2023   [facility]  N/A  For questions about this resource list or additional care needs, please contact your primary care clinic or care manager.  Phone: [phone]   Email: N/A   Address: [address] [place] [id number]   Hours: N/A        Financial Stability       Rent and mortgage payment assistance  1  [facility] Office - Emergency Housing Assistance - Rent and mortgage payment assistance Distance: 17.18 miles      In-Person   [address] [place] [address]  Language: English  Hours: Mon - Fri 6:00 AM - 6:30 PM  Fees: Free   Phone: (559) 188-8757 Email: cora@[employer] Website: http://www.[employer]     Utility payment assistance  2  [facility] Office - Energy Assistance Program Distance: 17.18 miles      Phone/Virtual   [address] [place] [address]  Language: English  Hours: Mon - Fri 6:00 AM - 6:30 PM  Fees: Free   Phone: (885) 872-8290 Email: [employer] Website: http://www.[employer]          Food and Nutrition       Food pantry  3  [employer] Distance: 6.22 miles      Pickup   [address] [place] [address]  Language: English  Hours: Thu 12:00 PM - 4:00 PM  Fees: Free   Phone: (238) 778-7163 Email: cristina@[email or web address] Website: [email or web address]     4  [place] [employer] Distance: 9.97 miles      Pickup   [address] [place] [address]  Language: English  Hours: Sat 8:00 AM - 10:30 AM  Fees: Free   Phone: (421) 102-4067     SNAP application assistance  5  [facility] Distance: 17.18 miles      In-Person, Phone/Virtual   [address] [place] [address]  Language: English  Hours: Mon - Fri 6:00 AM - 6:30 PM  Fees: Free   Phone: (201) 318-8673 Email: cora@[employer] Website: http://www.[facility].org          Transportation       Transportation to medical  appointments  6  [employer] Distance: 19.06 miles      In-Person   [address] [place] [address]  Language: English  Hours: Mon - Fri 4:00 AM - 6:00 PM  Fees: Insurance, Self Pay   Phone: (358) 138-5728 Email: jcarlos@[employer]          Important Numbers & Websites       Emergency Services   911  [facility] Services   311  Poison Control   (357) 428-4703  Suicide Prevention Lifeline   (170) 329-9057 (TALK)  Child Abuse Hotline   (542) 174-6086 (4-A-Child)  Sexual Assault Hotline   (183) 418-1547 (HOPE)  National Runaway Safeline   (446) 475-1803 (RUNAWAY)  All-Options Talkline   (777) 695-2767  Substance Abuse Referral   (974) 867-8715 (HELP)

## 2023-12-19 ENCOUNTER — HOSPITAL ENCOUNTER (OUTPATIENT)
Dept: MAMMOGRAPHY | Facility: CLINIC | Age: 53
Discharge: HOME | End: 2023-12-19
Attending: INTERNAL MEDICINE | Admitting: INTERNAL MEDICINE
Payer: COMMERCIAL

## 2023-12-19 DIAGNOSIS — Z12.31 VISIT FOR SCREENING MAMMOGRAM: ICD-10-CM

## 2023-12-19 PROCEDURE — 77067 SCR MAMMO BI INCL CAD: CPT

## 2023-12-22 DIAGNOSIS — M25.512 ACUTE PAIN OF LEFT SHOULDER: ICD-10-CM

## 2023-12-22 DIAGNOSIS — G44.209 TENSION HEADACHE: ICD-10-CM

## 2023-12-22 RX ORDER — PROCHLORPERAZINE MALEATE 10 MG
TABLET ORAL
Qty: 10 TABLET | Refills: 1 | Status: SHIPPED | OUTPATIENT
Start: 2023-12-22

## 2023-12-22 RX ORDER — CYCLOBENZAPRINE HCL 5 MG
5 TABLET ORAL 3 TIMES DAILY PRN
Qty: 30 TABLET | Refills: 1 | Status: SHIPPED | OUTPATIENT
Start: 2023-12-22 | End: 2024-09-24

## 2024-01-09 ENCOUNTER — ALLIED HEALTH/NURSE VISIT (OUTPATIENT)
Dept: FAMILY MEDICINE | Facility: CLINIC | Age: 54
End: 2024-01-09
Payer: COMMERCIAL

## 2024-01-09 DIAGNOSIS — L29.9 PRURITUS: Primary | ICD-10-CM

## 2024-01-09 PROCEDURE — 96372 THER/PROPH/DIAG INJ SC/IM: CPT | Performed by: INTERNAL MEDICINE

## 2024-01-09 PROCEDURE — 99207 PR NO CHARGE NURSE ONLY: CPT

## 2024-01-09 RX ADMIN — CYANOCOBALAMIN 1000 MCG: 1000 INJECTION, SOLUTION INTRAMUSCULAR; SUBCUTANEOUS at 13:15

## 2024-01-22 ENCOUNTER — MYC MEDICAL ADVICE (OUTPATIENT)
Dept: FAMILY MEDICINE | Facility: CLINIC | Age: 54
End: 2024-01-22
Payer: COMMERCIAL

## 2024-01-23 ENCOUNTER — E-VISIT (OUTPATIENT)
Dept: FAMILY MEDICINE | Facility: CLINIC | Age: 54
End: 2024-01-23
Payer: COMMERCIAL

## 2024-01-23 DIAGNOSIS — B37.31 YEAST INFECTION OF THE VAGINA: Primary | ICD-10-CM

## 2024-01-23 PROCEDURE — 99421 OL DIG E/M SVC 5-10 MIN: CPT | Performed by: INTERNAL MEDICINE

## 2024-01-23 RX ORDER — FLUCONAZOLE 150 MG/1
150 TABLET ORAL
Qty: 3 TABLET | Refills: 0 | Status: SHIPPED | OUTPATIENT
Start: 2024-01-23 | End: 2024-01-30

## 2024-01-26 ENCOUNTER — OFFICE VISIT (OUTPATIENT)
Dept: URGENT CARE | Facility: URGENT CARE | Age: 54
End: 2024-01-26
Payer: COMMERCIAL

## 2024-01-26 VITALS
RESPIRATION RATE: 16 BRPM | TEMPERATURE: 97.8 F | OXYGEN SATURATION: 98 % | BODY MASS INDEX: 27.06 KG/M2 | DIASTOLIC BLOOD PRESSURE: 77 MMHG | WEIGHT: 172.8 LBS | SYSTOLIC BLOOD PRESSURE: 124 MMHG | HEART RATE: 65 BPM

## 2024-01-26 DIAGNOSIS — R30.0 DYSURIA: ICD-10-CM

## 2024-01-26 DIAGNOSIS — N89.8 VAGINAL DISCHARGE: Primary | ICD-10-CM

## 2024-01-26 LAB
ALBUMIN UR-MCNC: NEGATIVE MG/DL
APPEARANCE UR: CLEAR
BILIRUB UR QL STRIP: NEGATIVE
CLUE CELLS: NORMAL
COLOR UR AUTO: YELLOW
GLUCOSE UR STRIP-MCNC: NEGATIVE MG/DL
HGB UR QL STRIP: NEGATIVE
KETONES UR STRIP-MCNC: NEGATIVE MG/DL
LEUKOCYTE ESTERASE UR QL STRIP: NEGATIVE
NITRATE UR QL: NEGATIVE
PH UR STRIP: 6.5 [PH] (ref 5–7)
SP GR UR STRIP: 1.01 (ref 1–1.03)
TRICHOMONAS, WET PREP: NORMAL
UROBILINOGEN UR STRIP-ACNC: 0.2 E.U./DL
WBC'S/HIGH POWER FIELD, WET PREP: NORMAL
YEAST, WET PREP: NORMAL

## 2024-01-26 PROCEDURE — 81003 URINALYSIS AUTO W/O SCOPE: CPT

## 2024-01-26 PROCEDURE — 99213 OFFICE O/P EST LOW 20 MIN: CPT

## 2024-01-26 PROCEDURE — 87210 SMEAR WET MOUNT SALINE/INK: CPT

## 2024-01-26 RX ORDER — FLUCONAZOLE 150 MG/1
150 TABLET ORAL
Qty: 3 TABLET | Refills: 0 | Status: SHIPPED | OUTPATIENT
Start: 2024-01-26 | End: 2024-02-02

## 2024-01-26 ASSESSMENT — ENCOUNTER SYMPTOMS
DYSURIA: 1
FLANK PAIN: 0
DIFFICULTY URINATING: 0
CONSTITUTIONAL NEGATIVE: 1
HEMATURIA: 0
FREQUENCY: 1

## 2024-01-26 ASSESSMENT — PAIN SCALES - GENERAL: PAINLEVEL: MILD PAIN (2)

## 2024-01-26 NOTE — PATIENT INSTRUCTIONS
You urinalysis is negative for infection. I suspect a vaginal yeast infection even though wet prep is negative today - recent monistat may have interfered with accuracy. I have re-sent the diflucan - take 150 mg diflucan every 3 days, for a total of 3 doses. If no improvement or worsening please follow up with primary or OBGYN.
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
## 2024-02-07 DIAGNOSIS — N95.1 SYMPTOMATIC MENOPAUSAL OR FEMALE CLIMACTERIC STATES: ICD-10-CM

## 2024-02-07 DIAGNOSIS — Z12.72 SMEAR, VAGINAL, AS PART OF ROUTINE GYNECOLOGICAL EXAMINATION: Primary | ICD-10-CM

## 2024-02-07 DIAGNOSIS — E07.9 DISEASE OF THYROID GLAND: ICD-10-CM

## 2024-02-07 DIAGNOSIS — M25.50 PAIN IN JOINT, MULTIPLE SITES: ICD-10-CM

## 2024-02-07 DIAGNOSIS — Z86.39 PERSONAL HISTORY OF NUTRITIONAL DEFICIENCY: ICD-10-CM

## 2024-02-07 DIAGNOSIS — R41.9 LOSS OF ALERTNESS: ICD-10-CM

## 2024-02-07 DIAGNOSIS — Z01.419 SMEAR, VAGINAL, AS PART OF ROUTINE GYNECOLOGICAL EXAMINATION: Primary | ICD-10-CM

## 2024-02-19 ENCOUNTER — ALLIED HEALTH/NURSE VISIT (OUTPATIENT)
Dept: FAMILY MEDICINE | Facility: CLINIC | Age: 54
End: 2024-02-19
Payer: COMMERCIAL

## 2024-02-19 DIAGNOSIS — L29.9 PRURITUS: Primary | ICD-10-CM

## 2024-02-19 PROCEDURE — 99207 PR NO CHARGE NURSE ONLY: CPT

## 2024-02-19 PROCEDURE — 96372 THER/PROPH/DIAG INJ SC/IM: CPT | Performed by: INTERNAL MEDICINE

## 2024-02-19 RX ADMIN — CYANOCOBALAMIN 1000 MCG: 1000 INJECTION, SOLUTION INTRAMUSCULAR; SUBCUTANEOUS at 14:12

## 2024-03-19 ENCOUNTER — ALLIED HEALTH/NURSE VISIT (OUTPATIENT)
Dept: FAMILY MEDICINE | Facility: CLINIC | Age: 54
End: 2024-03-19
Payer: COMMERCIAL

## 2024-03-19 DIAGNOSIS — L29.9 PRURITUS: Primary | ICD-10-CM

## 2024-03-19 PROCEDURE — 96372 THER/PROPH/DIAG INJ SC/IM: CPT | Performed by: INTERNAL MEDICINE

## 2024-03-19 PROCEDURE — 99207 PR NO CHARGE NURSE ONLY: CPT

## 2024-03-19 RX ADMIN — CYANOCOBALAMIN 1000 MCG: 1000 INJECTION, SOLUTION INTRAMUSCULAR; SUBCUTANEOUS at 10:12

## 2024-03-20 DIAGNOSIS — M79.7 PRIMARY FIBROSITIS: ICD-10-CM

## 2024-03-20 DIAGNOSIS — Z01.419 SMEAR, VAGINAL, AS PART OF ROUTINE GYNECOLOGICAL EXAMINATION: ICD-10-CM

## 2024-03-20 DIAGNOSIS — R41.9 LOSS OF ALERTNESS: ICD-10-CM

## 2024-03-20 DIAGNOSIS — N95.1 SYMPTOMATIC MENOPAUSAL OR FEMALE CLIMACTERIC STATES: ICD-10-CM

## 2024-03-20 DIAGNOSIS — Z86.39 PERSONAL HISTORY OF NUTRITIONAL DEFICIENCY: ICD-10-CM

## 2024-03-20 DIAGNOSIS — E53.8 BIOTIN-(PROPIONYL-COA-CARBOXYLASE) LIGASE DEFICIENCY: Primary | ICD-10-CM

## 2024-03-20 DIAGNOSIS — M25.50 PAIN IN JOINT, MULTIPLE SITES: ICD-10-CM

## 2024-03-20 DIAGNOSIS — Z12.72 SMEAR, VAGINAL, AS PART OF ROUTINE GYNECOLOGICAL EXAMINATION: ICD-10-CM

## 2024-03-20 DIAGNOSIS — R53.83 FATIGUE: ICD-10-CM

## 2024-04-08 ENCOUNTER — LAB (OUTPATIENT)
Dept: LAB | Facility: CLINIC | Age: 54
End: 2024-04-08
Payer: COMMERCIAL

## 2024-04-08 DIAGNOSIS — R41.9 LOSS OF ALERTNESS: ICD-10-CM

## 2024-04-08 DIAGNOSIS — E07.9 DISEASE OF THYROID GLAND: ICD-10-CM

## 2024-04-08 DIAGNOSIS — N95.1 SYMPTOMATIC MENOPAUSAL OR FEMALE CLIMACTERIC STATES: ICD-10-CM

## 2024-04-08 DIAGNOSIS — M79.7 FIBROSITIS: ICD-10-CM

## 2024-04-08 DIAGNOSIS — E55.9 VITAMIN D DEFICIENCY: ICD-10-CM

## 2024-04-08 DIAGNOSIS — M25.50 PAIN IN JOINT, MULTIPLE SITES: ICD-10-CM

## 2024-04-08 DIAGNOSIS — Z01.419 SMEAR, VAGINAL, AS PART OF ROUTINE GYNECOLOGICAL EXAMINATION: ICD-10-CM

## 2024-04-08 DIAGNOSIS — N95.1 MENOPAUSAL STATE: ICD-10-CM

## 2024-04-08 DIAGNOSIS — Z86.39 PERSONAL HISTORY OF NUTRITIONAL DEFICIENCY: ICD-10-CM

## 2024-04-08 DIAGNOSIS — Z12.72 SMEAR, VAGINAL, AS PART OF ROUTINE GYNECOLOGICAL EXAMINATION: ICD-10-CM

## 2024-04-08 DIAGNOSIS — R53.83 FATIGUE: ICD-10-CM

## 2024-04-08 DIAGNOSIS — Z01.419 WELL WOMAN EXAM: ICD-10-CM

## 2024-04-08 LAB
BASOPHILS # BLD AUTO: 0.1 10E3/UL (ref 0–0.2)
BASOPHILS NFR BLD AUTO: 1 %
EOSINOPHIL # BLD AUTO: 0 10E3/UL (ref 0–0.7)
EOSINOPHIL NFR BLD AUTO: 0 %
ERYTHROCYTE [DISTWIDTH] IN BLOOD BY AUTOMATED COUNT: 13.4 % (ref 10–15)
FERRITIN SERPL-MCNC: 63 NG/ML (ref 11–328)
HCT VFR BLD AUTO: 40.2 % (ref 35–47)
HGB BLD-MCNC: 13.7 G/DL (ref 11.7–15.7)
IMM GRANULOCYTES # BLD: 0.1 10E3/UL
IMM GRANULOCYTES NFR BLD: 1 %
LYMPHOCYTES # BLD AUTO: 2.7 10E3/UL (ref 0.8–5.3)
LYMPHOCYTES NFR BLD AUTO: 26 %
MCH RBC QN AUTO: 30.9 PG (ref 26.5–33)
MCHC RBC AUTO-ENTMCNC: 34.1 G/DL (ref 31.5–36.5)
MCV RBC AUTO: 91 FL (ref 78–100)
MONOCYTES # BLD AUTO: 0.5 10E3/UL (ref 0–1.3)
MONOCYTES NFR BLD AUTO: 5 %
NEUTROPHILS # BLD AUTO: 6.9 10E3/UL (ref 1.6–8.3)
NEUTROPHILS NFR BLD AUTO: 67 %
NRBC # BLD AUTO: 0 10E3/UL
NRBC BLD AUTO-RTO: 0 /100
PLATELET # BLD AUTO: 327 10E3/UL (ref 150–450)
RBC # BLD AUTO: 4.44 10E6/UL (ref 3.8–5.2)
WBC # BLD AUTO: 10.2 10E3/UL (ref 4–11)

## 2024-04-08 PROCEDURE — 84439 ASSAY OF FREE THYROXINE: CPT

## 2024-04-08 PROCEDURE — 83001 ASSAY OF GONADOTROPIN (FSH): CPT

## 2024-04-08 PROCEDURE — 82607 VITAMIN B-12: CPT

## 2024-04-08 PROCEDURE — 84146 ASSAY OF PROLACTIN: CPT

## 2024-04-08 PROCEDURE — 82306 VITAMIN D 25 HYDROXY: CPT

## 2024-04-08 PROCEDURE — 36415 COLL VENOUS BLD VENIPUNCTURE: CPT

## 2024-04-08 PROCEDURE — 82728 ASSAY OF FERRITIN: CPT

## 2024-04-08 PROCEDURE — 84481 FREE ASSAY (FT-3): CPT

## 2024-04-08 PROCEDURE — 85025 COMPLETE CBC W/AUTO DIFF WBC: CPT

## 2024-04-08 PROCEDURE — 84443 ASSAY THYROID STIM HORMONE: CPT

## 2024-04-09 DIAGNOSIS — E07.9 DISEASE OF THYROID GLAND: ICD-10-CM

## 2024-04-09 DIAGNOSIS — M25.50 PAIN IN JOINT, MULTIPLE SITES: ICD-10-CM

## 2024-04-09 DIAGNOSIS — R53.83 FATIGUE: ICD-10-CM

## 2024-04-09 DIAGNOSIS — R41.9 LOSS OF ALERTNESS: ICD-10-CM

## 2024-04-09 DIAGNOSIS — M79.7 FIBROSITIS: ICD-10-CM

## 2024-04-09 DIAGNOSIS — E55.9 VITAMIN D DEFICIENCY: ICD-10-CM

## 2024-04-09 DIAGNOSIS — Z01.419 WELL WOMAN EXAM: Primary | ICD-10-CM

## 2024-04-09 DIAGNOSIS — N95.1 MENOPAUSAL STATE: ICD-10-CM

## 2024-04-09 LAB
FSH SERPL IRP2-ACNC: 2.9 MIU/ML
PROLACTIN SERPL 3RD IS-MCNC: 6 NG/ML (ref 5–23)
T3FREE SERPL-MCNC: 2.4 PG/ML (ref 2–4.4)
T4 FREE SERPL-MCNC: 1.09 NG/DL (ref 0.9–1.7)
TSH SERPL DL<=0.005 MIU/L-ACNC: 0.9 UIU/ML (ref 0.3–4.2)
VIT B12 SERPL-MCNC: 340 PG/ML (ref 232–1245)

## 2024-04-11 NOTE — PROGRESS NOTES
SUBJECTIVE:     History of Present Illness      Chief Complaint: Follow-up (8 week follow up appt after [facility] appt- last [facility] appt 02/29/24.  Spoke to saurav at [facility] 03/07/2024 patient is supposed to be on Eliquis and they will be prescribing it for him.  2 weeks ago patient got dizzy getting out of the truck and fell hurt left ankle, swelling.  Did not go to UC or ER)    HPI:  Patient is a 68 y.o. year old male who presents to clinic for health maintenance follow-up.  Patient reports that he gave did it is refill of Eliquis from cardiologist.  Doing well he did miss his  Last cardiologist appointment.    Review of Systems:    Review of Systems    12 point review of systems conducted, negative except as stated in the history of present illness. See HPI for details.     Previous History    Darlene Willams, DIVINAP  Review of patient's allergies indicates:  No Known Allergies    Past Medical History:   Diagnosis Date    Atrial flutter     CHF (congestive heart failure)     Coronary artery disease     Hyperlipidemia     Hypertension     Myocardial infarction     SOB (shortness of breath)     at times     Current Outpatient Medications   Medication Instructions    amiodarone (PACERONE) 200 mg, Oral, Daily    aspirin (ECOTRIN) 81 mg, Oral, Daily    atorvastatin (LIPITOR) 40 mg, Oral, Nightly    ELIQUIS 5 mg, Oral, 2 times daily    FEROSUL 325 mg (65 mg iron) Tab tablet TAKE ONE TABLET BY MOUTH DAILY    folic acid (FOLVITE) 1,000 mcg, Oral    furosemide (LASIX) 40 mg, Oral    metoprolol succinate (TOPROL-XL) 25 mg, Oral, 2 times daily    QUEtiapine (SEROQUEL) 200 mg, Oral, Nightly    sacubitriL-valsartan (ENTRESTO) 24-26 mg per tablet 1 tablet, Oral, 2 times daily     Past Surgical History:   Procedure Laterality Date    CATARACT EXTRACTION Bilateral     CORONARY ARTERY BYPASS GRAFT (CABG) N/A 4/3/2023    Procedure: CORONARY ARTERY BYPASS GRAFT (CABG);  Surgeon: Deuce Finley IV, MD;  Location: [facility];  Service:  I called the patient. She was unable to get protopic. Rescripted to local pharmacy    Hydrocortisone 2.5% added    2 weeks follow up    Patch tst referral    Reviewed efudex warnings.     Natalie Kern MD    Department of Dermatology  [facility]: Phone: [phone], Fax:[phone]  [facility]: Phone: [phone], Fax: [phone]       "Cardiovascular;  Laterality: N/A;  WITH LLAA //  ECHO NOTIFIED    LEFT HEART CATHETERIZATION N/A 03/15/2023    Procedure: CATHETERIZATION, HEART, LEFT;  Surgeon: Sreedhar Herrera MD;  Location: [facility] CATH LAB;  Service: Cardiology;  Laterality: N/A;  LHC via [facility]RA     Family History   Problem Relation Age of Onset    Heart attack Mother        Social History     Tobacco Use    Smoking status: Never     Passive exposure: Never    Smokeless tobacco: Never   Substance Use Topics    Alcohol use: Yes     Alcohol/week: 84.0 standard drinks of alcohol     Types: 84 Cans of beer per week     Comment: alcoholic, daily, beer    Drug use: Yes     Frequency: 3.0 times per week     Types: Hydrocodone        Health Maintenance      Health Maintenance   Topic Date Due    TETANUS VACCINE  Never done    High Dose Statin  Never done    Colorectal Cancer Screening  Never done    Shingles Vaccine (1 of 2) Never done    Lipid Panel  01/18/2028    Hepatitis C Screening  Completed       OBJECTIVE:     Physical Exam      Vital Signs Reviewed   Visit Vitals  /74 (BP Location: Left arm, Patient Position: Sitting)   Pulse 84   Temp 98.7 °F (37.1 °C) (Oral)   Resp 18   Ht 6' 2" (1.88 m)   Wt 83.1 kg (183 lb 1.6 oz)   SpO2 99%   BMI 23.51 kg/m²       Physical Exam    Physical Exam:  General: Alert, well nourished, no acute distress, non-toxic appearing.   Eyes: Anicteric sclera, without conjunctival injection, normal lids, no purulent drainage, EOMs grossly intact.   Ears: No tragal tenderness. Tympanic membranes intact, pearly grey, without effusion or erythema and with a positive light reflex.   Mouth: Posterior pharynx without erythema. No exudate, ulcerations, or lesion. No tonsillar swelling.   Neck: Supple, full ROM, no rigidity, no cervical adenopathy.   Cardio: Normal rate and rhythm    Resp: Respirations even and unlabored, clear to auscultation bilaterally.   Abd: No ecchymosis or distension. Normal bowel sounds in all 4 " quadrants. No tenderness to palpation. No rebound tenderness or guarding. No CVA tenderness.   Skin: No rashes or open lesions noted.   MSK: No swelling. No abrasions or signs of trauma. Ambulating without assistance.   Neuro: Alert,oriented No focal deficits noted. Facial expressions even.   Psych: Cooperative, Normal affect      Procedures    Procedures     Labs     Results for orders placed or performed during the hospital encounter of 02/21/24   Comprehensive metabolic panel   Result Value Ref Range    Sodium Level 132 (L) 136 - 145 mmol/L    Potassium Level 4.5 3.5 - 5.1 mmol/L    Chloride 102 98 - 107 mmol/L    Carbon Dioxide 19 (L) 23 - 31 mmol/L    Glucose Level 111 82 - 115 mg/dL    Blood Urea Nitrogen 57.8 (H) 8.4 - 25.7 mg/dL    Creatinine 2.32 (H) 0.73 - 1.18 mg/dL    Calcium Level Total 8.4 (L) 8.8 - 10.0 mg/dL    Protein Total 6.4 5.8 - 7.6 gm/dL    Albumin Level 3.1 (L) 3.4 - 4.8 g/dL    Globulin 3.3 2.4 - 3.5 gm/dL    Albumin/Globulin Ratio 0.9 (L) 1.1 - 2.0 ratio    Bilirubin Total 1.2 <=1.5 mg/dL    Alkaline Phosphatase 59 40 - 150 unit/L    Alanine Aminotransferase 9 0 - 55 unit/L    Aspartate Aminotransferase 14 5 - 34 unit/L    eGFR 29 mls/min/1.73/m2   Drug Screen, Urine   Result Value Ref Range    Amphetamines, Urine Negative Negative    Barbituates, Urine Negative Negative    Benzodiazepine, Urine Negative Negative    Cannabinoids, Urine Negative Negative    Cocaine, Urine Negative Negative    Opiates, Urine Positive (A) Negative    Phencyclidine, Urine Negative Negative    pH, Urine 5.5 3.0 - 11.0    Specific Gravity, Urine Auto 1.020 1.001 - 1.035   Ethanol   Result Value Ref Range    Ethanol Level 62.0 (H) <=10.0 mg/dL   CBC with Differential   Result Value Ref Range    WBC 5.80 4.50 - 11.50 x10(3)/mcL    RBC 3.01 (L) 4.70 - 6.10 x10(6)/mcL    Hgb 8.9 (L) 14.0 - 18.0 g/dL    Hct 28.7 (L) 42.0 - 52.0 %    MCV 95.3 (H) 80.0 - 94.0 fL    MCH 29.6 27.0 - 31.0 pg    MCHC 31.0 (L) 33.0 - 36.0  g/dL    RDW 17.3 (H) 11.5 - 17.0 %    Platelet 109 (L) 130 - 400 x10(3)/mcL    MPV 11.5 (H) 7.4 - 10.4 fL    Neut % 66.5 %    Lymph % 20.2 %    Mono % 8.1 %    Eos % 4.7 %    Basophil % 0.3 %    Lymph # 1.17 0.6 - 4.6 x10(3)/mcL    Neut # 3.86 2.1 - 9.2 x10(3)/mcL    Mono # 0.47 0.1 - 1.3 x10(3)/mcL    Eos # 0.27 0 - 0.9 x10(3)/mcL    Baso # 0.02 <=0.2 x10(3)/mcL    IG# 0.01 0 - 0.04 x10(3)/mcL    IG% 0.2 %   COVID/FLU A&B PCR   Result Value Ref Range    Influenza A PCR Not Detected Not Detected    Influenza B PCR Not Detected Not Detected    SARS-CoV-2 PCR Not Detected Not Detected, Negative   Troponin ISTAT   Result Value Ref Range    POC Cardiac Troponin I 0.02 0.00 - 0.08 ng/mL    Sample unknown    CK   Result Value Ref Range    Creatine Kinase 28 (L) 30 - 200 U/L   Basic Metabolic Panel   Result Value Ref Range    Sodium Level 139 136 - 145 mmol/L    Potassium Level 4.5 3.5 - 5.1 mmol/L    Chloride 109 (H) 98 - 107 mmol/L    Carbon Dioxide 21 (L) 23 - 31 mmol/L    Glucose Level 94 82 - 115 mg/dL    Blood Urea Nitrogen 49.5 (H) 8.4 - 25.7 mg/dL    Creatinine 1.62 (H) 0.73 - 1.18 mg/dL    BUN/Creatinine Ratio 31     Calcium Level Total 8.1 (L) 8.8 - 10.0 mg/dL    Anion Gap 9.0 mEq/L    eGFR 45 mls/min/1.73/m2   Magnesium   Result Value Ref Range    Magnesium Level 2.30 1.60 - 2.60 mg/dL   Ethanol   Result Value Ref Range    Ethanol Level <10.0 <=10.0 mg/dL   CBC with Differential   Result Value Ref Range    WBC 3.59 (L) 4.50 - 11.50 x10(3)/mcL    RBC 2.86 (L) 4.70 - 6.10 x10(6)/mcL    Hgb 8.4 (L) 14.0 - 18.0 g/dL    Hct 27.9 (L) 42.0 - 52.0 %    MCV 97.6 (H) 80.0 - 94.0 fL    MCH 29.4 27.0 - 31.0 pg    MCHC 30.1 (L) 33.0 - 36.0 g/dL    RDW 17.4 (H) 11.5 - 17.0 %    Platelet 86 (L) 130 - 400 x10(3)/mcL    MPV 11.8 (H) 7.4 - 10.4 fL    Neut % 55.7 %    Lymph % 27.0 %    Mono % 11.1 %    Eos % 5.6 %    Basophil % 0.3 %    Lymph # 0.97 0.6 - 4.6 x10(3)/mcL    Neut # 2.00 (L) 2.1 - 9.2 x10(3)/mcL    Mono # 0.40 0.1 -  1.3 x10(3)/mcL    Eos # 0.20 0 - 0.9 x10(3)/mcL    Baso # 0.01 <=0.2 x10(3)/mcL    IG# 0.01 0 - 0.04 x10(3)/mcL    IG% 0.3 %   Comprehensive Metabolic Panel   Result Value Ref Range    Sodium Level 138 136 - 145 mmol/L    Potassium Level 4.6 3.5 - 5.1 mmol/L    Chloride 109 (H) 98 - 107 mmol/L    Carbon Dioxide 19 (L) 23 - 31 mmol/L    Glucose Level 88 82 - 115 mg/dL    Blood Urea Nitrogen 36.9 (H) 8.4 - 25.7 mg/dL    Creatinine 1.23 (H) 0.73 - 1.18 mg/dL    Calcium Level Total 8.3 (L) 8.8 - 10.0 mg/dL    Protein Total 5.9 5.8 - 7.6 gm/dL    Albumin Level 2.8 (L) 3.4 - 4.8 g/dL    Globulin 3.1 2.4 - 3.5 gm/dL    Albumin/Globulin Ratio 0.9 (L) 1.1 - 2.0 ratio    Bilirubin Total 1.0 <=1.5 mg/dL    Alkaline Phosphatase 65 40 - 150 unit/L    Alanine Aminotransferase 10 0 - 55 unit/L    Aspartate Aminotransferase 14 5 - 34 unit/L    eGFR >60 mls/min/1.73/m2   Magnesium   Result Value Ref Range    Magnesium Level 2.20 1.60 - 2.60 mg/dL   Phosphorus   Result Value Ref Range    Phosphorus Level 3.1 2.3 - 4.7 mg/dL   CBC with Differential   Result Value Ref Range    WBC 3.33 (L) 4.50 - 11.50 x10(3)/mcL    RBC 3.01 (L) 4.70 - 6.10 x10(6)/mcL    Hgb 8.9 (L) 14.0 - 18.0 g/dL    Hct 28.9 (L) 42.0 - 52.0 %    MCV 96.0 (H) 80.0 - 94.0 fL    MCH 29.6 27.0 - 31.0 pg    MCHC 30.8 (L) 33.0 - 36.0 g/dL    RDW 17.2 (H) 11.5 - 17.0 %    Platelet 98 (L) 130 - 400 x10(3)/mcL    MPV 10.9 (H) 7.4 - 10.4 fL    Neut % 49.6 %    Lymph % 29.7 %    Mono % 13.8 %    Eos % 6.3 %    Basophil % 0.3 %    Lymph # 0.99 0.6 - 4.6 x10(3)/mcL    Neut # 1.65 (L) 2.1 - 9.2 x10(3)/mcL    Mono # 0.46 0.1 - 1.3 x10(3)/mcL    Eos # 0.21 0 - 0.9 x10(3)/mcL    Baso # 0.01 <=0.2 x10(3)/mcL    IG# 0.01 0 - 0.04 x10(3)/mcL    IG% 0.3 %   Comprehensive Metabolic Panel   Result Value Ref Range    Sodium Level 140 136 - 145 mmol/L    Potassium Level 4.3 3.5 - 5.1 mmol/L    Chloride 110 (H) 98 - 107 mmol/L    Carbon Dioxide 23 23 - 31 mmol/L    Glucose Level 100 82 - 115  mg/dL    Blood Urea Nitrogen 23.7 8.4 - 25.7 mg/dL    Creatinine 1.18 0.73 - 1.18 mg/dL    Calcium Level Total 8.1 (L) 8.8 - 10.0 mg/dL    Protein Total 5.5 (L) 5.8 - 7.6 gm/dL    Albumin Level 2.6 (L) 3.4 - 4.8 g/dL    Globulin 2.9 2.4 - 3.5 gm/dL    Albumin/Globulin Ratio 0.9 (L) 1.1 - 2.0 ratio    Bilirubin Total 0.8 <=1.5 mg/dL    Alkaline Phosphatase 57 40 - 150 unit/L    Alanine Aminotransferase 9 0 - 55 unit/L    Aspartate Aminotransferase 14 5 - 34 unit/L    eGFR >60 mls/min/1.73/m2   Magnesium   Result Value Ref Range    Magnesium Level 2.00 1.60 - 2.60 mg/dL   Phosphorus   Result Value Ref Range    Phosphorus Level 2.9 2.3 - 4.7 mg/dL   CBC with Differential   Result Value Ref Range    WBC 2.92 (L) 4.50 - 11.50 x10(3)/mcL    RBC 2.93 (L) 4.70 - 6.10 x10(6)/mcL    Hgb 8.6 (L) 14.0 - 18.0 g/dL    Hct 28.7 (L) 42.0 - 52.0 %    MCV 98.0 (H) 80.0 - 94.0 fL    MCH 29.4 27.0 - 31.0 pg    MCHC 30.0 (L) 33.0 - 36.0 g/dL    RDW 17.2 (H) 11.5 - 17.0 %    Platelet 99 (L) 130 - 400 x10(3)/mcL    MPV 11.0 (H) 7.4 - 10.4 fL    Neut % 40.1 %    Lymph % 36.0 %    Mono % 15.4 %    Eos % 7.5 %    Basophil % 0.3 %    Lymph # 1.05 0.6 - 4.6 x10(3)/mcL    Neut # 1.17 (L) 2.1 - 9.2 x10(3)/mcL    Mono # 0.45 0.1 - 1.3 x10(3)/mcL    Eos # 0.22 0 - 0.9 x10(3)/mcL    Baso # 0.01 <=0.2 x10(3)/mcL    IG# 0.02 0 - 0.04 x10(3)/mcL    IG% 0.7 %   EKG 12-lead   Result Value Ref Range    QRS Duration 98 ms    OHS QTC Calculation 430 ms   Echo   Result Value Ref Range    BSA 2.04 m2    EF 55 %    Est. RA pres 15 mmHg       Chemistry:  Lab Results   Component Value Date     02/24/2024    K 4.3 02/24/2024    CHLORIDE 110 (H) 02/24/2024    BUN 23.7 02/24/2024    CREATININE 1.18 02/24/2024    EGFRNORACEVR >60 02/24/2024    GLUCOSE 100 02/24/2024    CALCIUM 8.1 (L) 02/24/2024    ALKPHOS 57 02/24/2024    LABPROT 5.5 (L) 02/24/2024    ALBUMIN 2.6 (L) 02/24/2024    AST 14 02/24/2024    ALT 9 02/24/2024    MG 2.00 02/24/2024    PHOS 2.9  02/24/2024    [id number] 19.2 (L) 01/18/2023    TSH 3.312 06/12/2023    PSA 1.25 01/18/2023        Lab Results   Component Value Date    HGBA1C 5.3 01/18/2023        Hematology:  Lab Results   Component Value Date    WBC 2.92 (L) 02/24/2024    HGB 8.6 (L) 02/24/2024    HCT 28.7 (L) 02/24/2024    PLT 99 (L) 02/24/2024       Lipid Panel:  Lab Results   Component Value Date    CHOL 189 01/18/2023    HDL 84 (H) 01/18/2023    LDL 95.00 01/18/2023    TRIG 49 01/18/2023    TOTALCHOLEST 2 01/18/2023        Urine:  Lab Results   Component Value Date    COLORUA Yellow 12/06/2023    APPEARANCEUA Clear 12/06/2023    SGUA 1.020 12/06/2023    PHUA 5.5 12/06/2023    PROTEINUA Trace (A) 12/06/2023    GLUCOSEUA Negative 12/06/2023    KETONESUA Negative 12/06/2023    BLOODUA Negative 12/06/2023    NITRITESUA Negative 12/06/2023    LEUKOCYTESUR Negative 12/06/2023    RBCUA None Seen 12/06/2023    WBCUA None Seen 12/06/2023    BACTERIA None Seen 12/06/2023    CREATRANDUR 18.2 (L) 01/18/2023         Assessment            ICD-10-CM ICD-9-CM   1. History of substance abuse  F19.11 305.93   2. Acute on chronic diastolic (congestive) heart failure  I50.33 428.33     428.0       Plan       1. History of substance abuse    2. Acute on chronic diastolic (congestive) heart failure  Continue Entresto Lasix and amiodarone per Cardiology      Medication List with Changes/Refills   Current Medications    AMIODARONE (PACERONE) 200 MG TAB    Take 1 tablet (200 mg total) by mouth once daily.    ASPIRIN (ECOTRIN) 81 MG EC TABLET    Take 1 tablet (81 mg total) by mouth once daily.    ATORVASTATIN (LIPITOR) 40 MG TABLET    Take 1 tablet (40 mg total) by mouth every evening.    ELIQUIS 5 MG TAB    Take 5 mg by mouth 2 (two) times daily.    FEROSUL 325 MG (65 MG IRON) TAB TABLET    TAKE ONE TABLET BY MOUTH DAILY    FOLIC ACID (FOLVITE) 1 MG TABLET    TAKE ONE TABLET BY MOUTH DAILY    FUROSEMIDE (LASIX) 40 MG TABLET    TAKE ONE TABLET BY MOUTH DAILY     METOPROLOL SUCCINATE (TOPROL-XL) 25 MG 24 HR TABLET    Take 1 tablet (25 mg total) by mouth 2 (two) times daily.    QUETIAPINE (SEROQUEL) 100 MG TAB    Take 2 tablets (200 mg total) by mouth every evening.    SACUBITRIL-VALSARTAN (ENTRESTO) 24-26 MG PER TABLET    Take 1 tablet by mouth 2 (two) times daily.       No follow-ups on file.   No follow-ups on file. In addition to their scheduled follow up, the patient has also been instructed to follow up on as needed basis.   No future appointments.

## 2024-04-15 LAB
DEPRECATED CALCIDIOL+CALCIFEROL SERPL-MC: <37 UG/L (ref 20–75)
VITAMIN D2 SERPL-MCNC: <5 UG/L
VITAMIN D3 SERPL-MCNC: 32 UG/L

## 2024-04-18 DIAGNOSIS — Z11.4 SCREENING FOR HIV (HUMAN IMMUNODEFICIENCY VIRUS): Primary | ICD-10-CM

## 2024-04-18 DIAGNOSIS — M25.50 PAIN IN JOINT, MULTIPLE SITES: ICD-10-CM

## 2024-04-18 DIAGNOSIS — N95.1 SYMPTOMATIC MENOPAUSAL OR FEMALE CLIMACTERIC STATES: ICD-10-CM

## 2024-04-18 DIAGNOSIS — R41.9 LOSS OF ALERTNESS: ICD-10-CM

## 2024-04-18 DIAGNOSIS — M79.7 SCAPULOHUMERAL FIBROSITIS: ICD-10-CM

## 2024-04-23 ENCOUNTER — ALLIED HEALTH/NURSE VISIT (OUTPATIENT)
Dept: FAMILY MEDICINE | Facility: CLINIC | Age: 54
End: 2024-04-23
Payer: COMMERCIAL

## 2024-04-23 ENCOUNTER — LAB (OUTPATIENT)
Dept: LAB | Facility: CLINIC | Age: 54
End: 2024-04-23
Payer: COMMERCIAL

## 2024-04-23 DIAGNOSIS — E55.9 VITAMIN D DEFICIENCY: Primary | ICD-10-CM

## 2024-04-23 DIAGNOSIS — R41.9 LOSS OF ALERTNESS: ICD-10-CM

## 2024-04-23 DIAGNOSIS — M79.7 SCAPULOHUMERAL FIBROSITIS: ICD-10-CM

## 2024-04-23 DIAGNOSIS — M25.50 PAIN IN JOINT, MULTIPLE SITES: ICD-10-CM

## 2024-04-23 DIAGNOSIS — N95.1 SYMPTOMATIC MENOPAUSAL OR FEMALE CLIMACTERIC STATES: ICD-10-CM

## 2024-04-23 DIAGNOSIS — Z11.4 SCREENING FOR HIV (HUMAN IMMUNODEFICIENCY VIRUS): ICD-10-CM

## 2024-04-23 PROCEDURE — 36415 COLL VENOUS BLD VENIPUNCTURE: CPT

## 2024-04-23 PROCEDURE — 84270 ASSAY OF SEX HORMONE GLOBUL: CPT

## 2024-04-23 PROCEDURE — 96372 THER/PROPH/DIAG INJ SC/IM: CPT | Performed by: INTERNAL MEDICINE

## 2024-04-23 PROCEDURE — 87389 HIV-1 AG W/HIV-1&-2 AB AG IA: CPT

## 2024-04-23 PROCEDURE — 99207 PR NO CHARGE NURSE ONLY: CPT

## 2024-04-23 RX ADMIN — CYANOCOBALAMIN 1000 MCG: 1000 INJECTION, SOLUTION INTRAMUSCULAR; SUBCUTANEOUS at 10:08

## 2024-04-24 LAB
HIV 1+2 AB+HIV1 P24 AG SERPL QL IA: NONREACTIVE
SHBG SERPL-SCNC: 36 NMOL/L (ref 30–135)

## 2024-07-02 ENCOUNTER — PATIENT OUTREACH (OUTPATIENT)
Dept: CARE COORDINATION | Facility: CLINIC | Age: 54
End: 2024-07-02
Payer: COMMERCIAL

## 2024-07-16 ENCOUNTER — MYC MEDICAL ADVICE (OUTPATIENT)
Dept: DERMATOLOGY | Facility: CLINIC | Age: 54
End: 2024-07-16
Payer: COMMERCIAL

## 2024-07-29 ENCOUNTER — OFFICE VISIT (OUTPATIENT)
Dept: DERMATOLOGY | Facility: CLINIC | Age: 54
End: 2024-07-29
Attending: DERMATOLOGY
Payer: COMMERCIAL

## 2024-07-29 DIAGNOSIS — L81.4 SOLAR LENTIGO: ICD-10-CM

## 2024-07-29 DIAGNOSIS — L21.9 DERMATITIS, SEBORRHEIC: ICD-10-CM

## 2024-07-29 DIAGNOSIS — D22.9 MULTIPLE MELANOCYTIC NEVI: ICD-10-CM

## 2024-07-29 DIAGNOSIS — Z85.828 HISTORY OF NONMELANOMA SKIN CANCER: ICD-10-CM

## 2024-07-29 DIAGNOSIS — L82.1 SEBORRHEIC KERATOSIS: ICD-10-CM

## 2024-07-29 DIAGNOSIS — L82.0 INFLAMED SEBORRHEIC KERATOSIS: ICD-10-CM

## 2024-07-29 DIAGNOSIS — D18.01 CHERRY ANGIOMA: ICD-10-CM

## 2024-07-29 DIAGNOSIS — L57.0 ACTINIC KERATOSIS: Primary | ICD-10-CM

## 2024-07-29 PROCEDURE — 99213 OFFICE O/P EST LOW 20 MIN: CPT | Mod: 25 | Performed by: DERMATOLOGY

## 2024-07-29 PROCEDURE — 17000 DESTRUCT PREMALG LESION: CPT | Mod: XS | Performed by: DERMATOLOGY

## 2024-07-29 PROCEDURE — 17110 DESTRUCTION B9 LES UP TO 14: CPT | Performed by: DERMATOLOGY

## 2024-07-29 ASSESSMENT — PAIN SCALES - GENERAL: PAINLEVEL: NO PAIN (0)
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
## 2024-07-29 NOTE — NURSING NOTE
Jayashree Johnson's goals for this visit include:   Chief Complaint   Patient presents with    Skin Check     FBSE, HX of NMSC, spots on left upper lip, left upper thigh, back x multiple, and forehead. Lotion for itchiness.     She requests these members of her care team be copied on today's visit information: yes    PCP: Aneudy [facility]    Referring Provider:  Richard Raines MD  [address] [place] [id number]    [facility] 03/17/2003 (Exact Date)     Do you need any medication refills at today's visit? no    Serjio Rust, [facility]
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
## 2024-08-03 ENCOUNTER — HEALTH MAINTENANCE LETTER (OUTPATIENT)
Age: 54
End: 2024-08-03

## 2024-08-27 DIAGNOSIS — B00.9 HSV (HERPES SIMPLEX VIRUS) INFECTION: ICD-10-CM

## 2024-08-27 DIAGNOSIS — J30.89 CHRONIC NONSEASONAL ALLERGIC RHINITIS DUE TO POLLEN: ICD-10-CM

## 2024-08-27 RX ORDER — LORATADINE 10 MG/1
1 TABLET ORAL DAILY
Qty: 90 TABLET | Refills: 1 | Status: SHIPPED | OUTPATIENT
Start: 2024-08-27

## 2024-08-27 RX ORDER — VALACYCLOVIR HYDROCHLORIDE 500 MG/1
500 TABLET, FILM COATED ORAL 2 TIMES DAILY
Qty: 14 TABLET | Refills: 0 | Status: SHIPPED | OUTPATIENT
Start: 2024-08-27 | End: 2024-09-04

## 2024-09-03 ENCOUNTER — MYC MEDICAL ADVICE (OUTPATIENT)
Dept: INTERNAL MEDICINE | Facility: CLINIC | Age: 54
End: 2024-09-03
Payer: COMMERCIAL

## 2024-09-04 ENCOUNTER — OFFICE VISIT (OUTPATIENT)
Dept: INTERNAL MEDICINE | Facility: CLINIC | Age: 54
End: 2024-09-04
Payer: COMMERCIAL

## 2024-09-04 ENCOUNTER — MYC MEDICAL ADVICE (OUTPATIENT)
Dept: INTERNAL MEDICINE | Facility: CLINIC | Age: 54
End: 2024-09-04

## 2024-09-04 VITALS
SYSTOLIC BLOOD PRESSURE: 123 MMHG | WEIGHT: 161.4 LBS | BODY MASS INDEX: 25.33 KG/M2 | RESPIRATION RATE: 14 BRPM | OXYGEN SATURATION: 98 % | HEIGHT: 67 IN | TEMPERATURE: 98.5 F | HEART RATE: 67 BPM | DIASTOLIC BLOOD PRESSURE: 76 MMHG

## 2024-09-04 DIAGNOSIS — J06.9 VIRAL UPPER RESPIRATORY TRACT INFECTION: Primary | ICD-10-CM

## 2024-09-04 LAB
DEPRECATED S PYO AG THROAT QL EIA: NEGATIVE
GROUP A STREP BY PCR: NOT DETECTED

## 2024-09-04 PROCEDURE — 87651 STREP A DNA AMP PROBE: CPT | Performed by: INTERNAL MEDICINE

## 2024-09-04 PROCEDURE — 99213 OFFICE O/P EST LOW 20 MIN: CPT | Performed by: INTERNAL MEDICINE

## 2024-09-04 PROCEDURE — 87635 SARS-COV-2 COVID-19 AMP PRB: CPT | Performed by: INTERNAL MEDICINE

## 2024-09-04 ASSESSMENT — PATIENT HEALTH QUESTIONNAIRE - PHQ9
SUM OF ALL RESPONSES TO PHQ QUESTIONS 1-9: 14
SUM OF ALL RESPONSES TO PHQ QUESTIONS 1-9: 14

## 2024-09-04 ASSESSMENT — ENCOUNTER SYMPTOMS
TROUBLE SWALLOWING: 1
SORE THROAT: 1

## 2024-09-04 ASSESSMENT — PAIN SCALES - GENERAL: PAINLEVEL: MILD PAIN (2)

## 2024-09-04 NOTE — PROGRESS NOTES
"      Josias Torres is a 54 year old, presenting for the following health issues:  Pharyngitis      9/4/2024     1:27 PM   Additional Questions   Roomed by Julia BRADFORD     History of Present Illness       Headaches:   Since the patient's last clinic visit, headaches are: no change  The patient is getting headaches:  Depends  She is not able to do normal daily activities when she has a migraine.  The patient is taking the following rescue/relief medications:  Ibuprofen (Advil, Motrin), Tylenol, Excedrin and sumatriptan (Imitrex)   Patient states \"The relief is inconsistent\" from the rescue/relief medications.   The patient is taking the following medications to prevent migraines:  No medications to prevent migraines  In the past 4 weeks, the patient has gone to an Urgent Care or Emergency Room 0 times times due to headaches.    Reason for visit:  Sore throat body aches no voice fever sweats    She eats 4 or more servings of fruits and vegetables daily.She consumes 0 sweetened beverage(s) daily.She exercises with enough effort to increase her heart rate 20 to 29 minutes per day.  She exercises with enough effort to increase her heart rate 5 days per week.   She is taking medications regularly.                EMR reviewed including:             Complaint, History of Chief Complaint, Corresponding Review of Systems, and Complaint Specific Physical Examination.    #1   Upper respiratory symptoms.  Sore throat, headache.  Increasing malaise.  Notes \"fevers\" and has has 100.0 degrees (\"I have a normal body temperature of 97, it is a family trait\").  Taking food and fluid.  Denies cough or excess sputum production.  Has history of \"Aftab-Barr infection\".  Recently concluded a course of antiviral therapy.  Recently seen at walk-in clinic for strep and COVID testing was negative.  Patient has concerns that her hyoid bone is improperly located following anterior cervical fusion.       Exam:   LUNGS: clear bilaterally, " "airflow is brisk, no intercostal retraction or stridor is noted. No coughing is noted during visit.   HEART:  regular without rubs, clicks, gallops, or murmurs. PMI is nondisplaced. Upstrokes are brisk. S1,S2 are heard.   ENT: Pharynx is non-erythemous, minimal PND, no significant nasal obstruction, TM's not red or retracted, hearing intact bilaterally. No carotid bruits are heard. No JVD seen. Thyroid is not nodular or enlarged.  No redness or inflammation of the throat.  Tonsils are atrophic.  Minimal nasal congestion noted.   Constitutional: The patient appears to be in no acute distress. The patient appears to be adequately hydrated. No acute respiratory or hemodynamic distress is noted at this time.        Patient has been interviewed, applicable history and applied review of systems have been performed.    Vital Signs:   /76   Pulse 67   Temp 98.5  F (36.9  C) (Temporal)   Resp 14   Ht 1.689 m (5' 6.5\")   Wt 73.2 kg (161 lb 6.4 oz)   LMP 03/17/2003 (Exact Date)   SpO2 98%   BMI 25.66 kg/m        Decision Making    Problem and Complexity     1. Viral upper respiratory tract infection  Will recheck COVID and strep as he is a frequently pulsing in the early.  Check CBC.  Look for elevated white count.  Recommend Tylenol and fluids.  It is, I see no indication for antibiotic therapy.      - Symptomatic COVID-19 Virus (Coronavirus) by PCR Nose; Future  - Streptococcus A Rapid Screen w/Reflex to PCR - Clinic Collect  - CBC with platelets; Future                                FOLLOW UP   I have asked the patient to make an appointment for followup with primary care provider in 1 week if not improved    Regarding routine vaccinations:  I have reviewed the patient's vaccination schedule and discussed the benefits of prophylactic vaccination in detail.  I recommend the patient contact their pharmacist for vaccinations.  Discussed that most insurance companies now favor reimbursement to the pharmacies and " it will financially behoove the patient to have vaccinations performed at their pharmacy.        I have carefully explained the diagnosis and treatment options to the patient.  The patient has displayed an understanding of the above, and all subsequent questions were answered.      DO MIGUEL Dotson    Portions of this note were produced using [employer]  Although every attempt at real-time proof reading has been made, occasional grammar/syntax errors may have been missed.

## 2024-09-04 NOTE — TELEPHONE ENCOUNTER
Patient has had a sore throat for 2 weeks.  Patient's temperature gets up to 101 on and off.  She has started to cough.    She states her whole neck is sore. She is able to move her neck around normally.  It hurts to eat, talk, or swallow.    Patient gets strep every year.    Appointment made for today.  Elisa Corrigan RN on 9/4/2024 at 10:38 AM

## 2024-09-05 ENCOUNTER — TELEPHONE (OUTPATIENT)
Dept: OBGYN | Facility: CLINIC | Age: 54
End: 2024-09-05
Payer: COMMERCIAL

## 2024-09-05 LAB — SARS-COV-2 RNA RESP QL NAA+PROBE: NEGATIVE

## 2024-09-05 NOTE — TELEPHONE ENCOUNTER
"Patient schedule request message routed to Triage RN pool \"Are you able to review and place orders if needed?\" Patient scheduled appt with Dr. Dumont for 10/31 \"Comments: Cancer screening hormone therapy talk\"   Patients questions: \"Do I need any tests done prior to the visits?\" \" In the past she has done an ultrasound on my ovaries and a bone scan\". Message routed to Dr. Dumont.   Last DEXA Dr. Dumont had ordered on 12/19/2022: DX HIP/PELVIS/SPINE \"bone density is \"low\" or osteopenia. Good news the bone density improved in your spine but slightly worsened in the hips\"    "

## 2024-09-10 NOTE — TELEPHONE ENCOUNTER
"Writer sent pt [employer] message with Dr. Jean-Baptiste response \"No orders needed before the visit. We will discuss anything during and I can order then\"   "

## 2024-09-24 DIAGNOSIS — M25.512 ACUTE PAIN OF LEFT SHOULDER: ICD-10-CM

## 2024-09-24 RX ORDER — CYCLOBENZAPRINE HCL 5 MG
5 TABLET ORAL 3 TIMES DAILY PRN
Qty: 30 TABLET | Refills: 1 | Status: SHIPPED | OUTPATIENT
Start: 2024-09-24

## 2024-10-30 ENCOUNTER — TRANSFERRED RECORDS (OUTPATIENT)
Dept: HEALTH INFORMATION MANAGEMENT | Facility: CLINIC | Age: 54
End: 2024-10-30
Payer: COMMERCIAL

## 2024-10-31 ENCOUNTER — VIRTUAL VISIT (OUTPATIENT)
Dept: OBGYN | Facility: CLINIC | Age: 54
End: 2024-10-31
Attending: OBSTETRICS & GYNECOLOGY
Payer: COMMERCIAL

## 2024-10-31 DIAGNOSIS — Z80.41 FAMILY HISTORY OF MALIGNANT NEOPLASM OF OVARY: Primary | ICD-10-CM

## 2024-10-31 PROCEDURE — 99213 OFFICE O/P EST LOW 20 MIN: CPT | Performed by: OBSTETRICS & GYNECOLOGY

## 2024-10-31 NOTE — LETTER
10/31/2024       RE: Jayashree Johnson  [address] [facility] [address]     Dear Colleague,    Thank you for referring your patient, Jayashree Johnson, to the [facility] [place] [facility]. Please see a copy of my visit note below.    55 yo  was scheduled for an annual exam today but due to weather changed to phone visit.   She has not been seen for a couple of years due to helping with her 2 new granddaughters aged 2mo and 2y!    Things she has been thinking about and HCM checklist:  Her sister  breast cancer and they were both tested BRCA neg 7-8 years ago  Dx with osteopenia  and was unable to see Dr. Veliz  Has been seeing an alternative provider and started but discontinued compounded progesterone 25mg oral. Unsure if she wants to pursue this  Overall up about 20 lbs in these years -- lost 5-6 this year  Colonoscopy- unsure if needs it in 3 or 5 years was done   Needs repeat pelvic ultrasound for ovarian monitoring   Due for pap    With extensive chart review on the day following the day of service author cannot find documentation of her sister's breast cancer tumor details (eg P or E receptor positive)    Messages sent to Dr. Ju Veliz about osteopenia consult and whether we should repeat DEXA before that visit.   Messages out to teams re: colonoscopy.   Messages out to genetics re: repeat consultation or other testing should be considered      Telemedicine Visit: The patient's condition can be safely assessed and treated in telemedicine encounter.      Reason for Telemedicine Visit: Patient has requested telehealth visit COVID 19    Originating Site (Patient Location): Patient's home    Distant Site (Provider Location): [facility]: [facility]    Consent:  The patient/guardian has verbally consented to: the potential risks and benefits of telemedicine versus in person care; bill my insurance or make  self-payment for services provided; and responsibility for payment of non-covered services.     Mode of Communication:  Phone    As the provider I attest to compliance with applicable laws and regulations related to telemedicine.  Time spent in chart review and discussion on the day of service was 22 mins    A/P:  RTC for ultrasound and pap  Will update patient on other items when answers are known    Lisa Dumont MD              Again, thank you for allowing me to participate in the care of your patient.      Sincerely,    Lisa Dumont MD

## 2024-11-01 NOTE — PROGRESS NOTES
53 yo  was scheduled for an annual exam today but due to weather changed to phone visit.   She has not been seen for a couple of years due to helping with her 2 new granddaughters aged 2mo and 2y!    Things she has been thinking about and HCM checklist:  Her sister  breast cancer and they were both tested BRCA neg 7-8 years ago  Dx with osteopenia  and was unable to see . Jose Alfredo  Has been seeing an alternative provider and started but discontinued compounded progesterone 25mg oral. Unsure if she wants to pursue this  Overall up about 20 lbs in these years -- lost 5-6 this year  Colonoscopy- unsure if needs it in 3 or 5 years was done   Needs repeat pelvic ultrasound for ovarian monitoring   Due for pap    With extensive chart review on the day following the day of service author cannot find documentation of her sister's breast cancer tumor details (eg P or E receptor positive)    Messages sent to Dr. Ju Veliz about osteopenia consult and whether we should repeat DEXA before that visit.   Messages out to teams re: colonoscopy.   Messages out to genetics re: repeat consultation or other testing should be considered      Telemedicine Visit: The patient's condition can be safely assessed and treated in telemedicine encounter.      Reason for Telemedicine Visit: Patient has requested telehealth visit COVID 19    Originating Site (Patient Location): Patient's home    Distant Site (Provider Location): [facility]: [facility]    Consent:  The patient/guardian has verbally consented to: the potential risks and benefits of telemedicine versus in person care; bill my insurance or make self-payment for services provided; and responsibility for payment of non-covered services.     Mode of Communication:  Phone    As the provider I attest to compliance with applicable laws and regulations related to telemedicine.  Time spent in chart review and discussion on the day of service was 22 mins    A/P:  RTC  for ultrasound and pap  Will update patient on other items when answers are known    Lisa Dumont MD

## 2024-11-05 DIAGNOSIS — M85.80 OSTEOPENIA, UNSPECIFIED LOCATION: Primary | ICD-10-CM

## 2024-11-05 DIAGNOSIS — D12.8 TUBULAR ADENOMA POLYP OF RECTUM: ICD-10-CM

## 2024-11-06 ENCOUNTER — TELEPHONE (OUTPATIENT)
Dept: OBGYN | Facility: CLINIC | Age: 54
End: 2024-11-06
Payer: COMMERCIAL

## 2024-11-06 NOTE — TELEPHONE ENCOUNTER
"----- Message from Luz Marina HUBBARD sent at 11/6/2024 11:05 AM CST -----  Regarding: Appt. with Tim Nichols can someone reach out to this patient and help her schedule a \"Pap only\" visit with Dr. Dumont. OK to overbook where appropriate on 12/12 or 12/13    Thank you     Milla  "

## 2024-11-07 ENCOUNTER — HOSPITAL ENCOUNTER (OUTPATIENT)
Dept: ULTRASOUND IMAGING | Facility: CLINIC | Age: 54
Discharge: HOME | End: 2024-11-07
Attending: OBSTETRICS & GYNECOLOGY
Payer: COMMERCIAL

## 2024-11-07 ENCOUNTER — HOSPITAL ENCOUNTER (OUTPATIENT)
Dept: BONE DENSITY | Facility: CLINIC | Age: 54
Discharge: HOME | End: 2024-11-07
Attending: OBSTETRICS & GYNECOLOGY
Payer: COMMERCIAL

## 2024-11-07 DIAGNOSIS — M85.80 OSTEOPENIA, UNSPECIFIED LOCATION: ICD-10-CM

## 2024-11-07 DIAGNOSIS — Z80.41 FAMILY HISTORY OF MALIGNANT NEOPLASM OF OVARY: ICD-10-CM

## 2024-11-07 PROCEDURE — 77080 DXA BONE DENSITY AXIAL: CPT

## 2024-11-07 PROCEDURE — 76856 US EXAM PELVIC COMPLETE: CPT

## 2024-11-12 ENCOUNTER — PATIENT OUTREACH (OUTPATIENT)
Dept: CARE COORDINATION | Facility: CLINIC | Age: 54
End: 2024-11-12
Payer: COMMERCIAL

## 2024-11-12 ENCOUNTER — PATIENT OUTREACH (OUTPATIENT)
Dept: ONCOLOGY | Facility: CLINIC | Age: 54
End: 2024-11-12

## 2024-11-12 ENCOUNTER — VIRTUAL VISIT (OUTPATIENT)
Dept: OBGYN | Facility: CLINIC | Age: 54
End: 2024-11-12
Attending: OBSTETRICS & GYNECOLOGY
Payer: COMMERCIAL

## 2024-11-12 DIAGNOSIS — Z80.3 FAMILY HISTORY OF BREAST CANCER IN SISTER: Primary | ICD-10-CM

## 2024-11-12 NOTE — LETTER
11/12/2024       RE: Jayashree Johnson  [id number] [facility] [address]     Dear Colleague,    Thank you for referring your patient, Jayashree Johnson, to the [facility] [place] at [facility]. Please see a copy of my visit note below.    TC to review the following updates, findings, and recommendations:    Genetics- per team member they do recommend updated testing (order placed)  DEXA- osteopenia will see Dr. Veliz   Progesterone - will hold and decide, was not super helpful when tried it  Colonoscopy due 7 years from 2022!  Pelvic ultrasound - wnl  Pap - due; will schedule    All of the above discussed.       Telemedicine Visit: The patient's condition can be safely assessed and treated in telemedicine encounter.      Reason for Telemedicine Visit: Patient has requested telehealth visit COVID 19    Originating Site (Patient Location): Patient's home    Distant Site (Provider Location): [facility]: [facility]    Consent:  The patient/guardian has verbally consented to: the potential risks and benefits of telemedicine versus in person care; bill my insurance or make self-payment for services provided; and responsibility for payment of non-covered services.     Mode of Communication:  Phone    As the provider I attest to compliance with applicable laws and regulations related to telemedicine.    Time spent in phone call and chart review on the day of service is 14 mins  Lisa Dumont MD        Again, thank you for allowing me to participate in the care of your patient.      Sincerely,    Lisa Dumont MD

## 2024-11-12 NOTE — PROGRESS NOTES
New Patient Oncology Nurse Navigator Note     Referring provider:     Lisa Dumont MD        Referring Clinic/Organization: [facility]      Referred to (specialty:) Genetic Counseling     Requested provider (if applicable): NA     Date Referral Received: 2024     Evaluation for:  Z80.3 (ICD-10-CM) - Family history of breast cancer in sister   Her sister  breast cancer and they were both tested BRCA neg 7-8 years ago.          Records Location: See Bookmarked material     Records Needed: NA     Additional testing needed prior to consult: NA    Payor: BLUE PLUS / Plan: BLUE [employer]US ADVANTAGE [place] / Product Type: HMO /     2024  Referral received and reviewed.   Sent to NPS to schedule.     Bonita NORRISN, RN, OCN  Oncology Nurse Navigator   [facility]  Cancer Care Service Line   New Patient Hem/Onc Scheduling / Referrals: [phone] (fax: [phone] )

## 2024-11-12 NOTE — PROGRESS NOTES
TC to review the following updates, findings, and recommendations:    Genetics- per team member they do recommend updated testing (order placed)  DEXA- osteopenia will see Dr. Veliz   Progesterone - will hold and decide, was not super helpful when tried it  Colonoscopy due 7 years from 2022!  Pelvic ultrasound - wnl  Pap - due; will schedule    All of the above discussed.       Telemedicine Visit: The patient's condition can be safely assessed and treated in telemedicine encounter.      Reason for Telemedicine Visit: Patient has requested telehealth visit COVID 19    Originating Site (Patient Location): Patient's home    Distant Site (Provider Location): [facility]: [facility]    Consent:  The patient/guardian has verbally consented to: the potential risks and benefits of telemedicine versus in person care; bill my insurance or make self-payment for services provided; and responsibility for payment of non-covered services.     Mode of Communication:  Phone    As the provider I attest to compliance with applicable laws and regulations related to telemedicine.    Time spent in phone call and chart review on the day of service is 14 mins  Lisa Dumont MD

## 2024-11-19 ENCOUNTER — PATIENT OUTREACH (OUTPATIENT)
Dept: CARE COORDINATION | Facility: CLINIC | Age: 54
End: 2024-11-19
Payer: COMMERCIAL

## 2024-12-13 NOTE — PROGRESS NOTES
ASSESSMENT:  This is a 54-year-old postmenopausal who has osteopenia by T-scores.  She has had some loss at the spine but her hips have remained stable.  She has minimal risk factors and risk of fracture is low.  We did talk about calcium and vitamin D, and increasing her weightbearing exercise.  She has had height loss so I did suggest getting a VFA to see if she has any evidence of compression fractures.  Will get blood work to check her bone metabolism.  Otherwise I would suggest calcium vitamin D and exercise and repeat the DEXA in 2 to 3 years.    Her hip pain is most likely degenerative joint disease.  Her exam is is not remarkable.  Will get an x-ray and I encouraged her to start physical therapy to help with this symptom.  Depending on how she does she may need a sports medicine or orthopedic referral.    PLAN:  Blood work today   Hip xray today  Start physical therapy for your hips  Patient should take 1200mg of calcium/day in divided doses (food and pills combined) and vitamin D3 1000IU/day.  Calcium is best in food  With calcium pills you only absorb 5-600mg/day  Physical exercise 30 min/daily - weight bearing    Get the vertebral fracture assessment - a DXA of your entire spine    DXA in 2 yrs same place   See me in 1-2 yrs.      Thank you for allowing me to participate in the care of your patient.  Please do not hesitate to call with questions or concerns.    Sincerely,  Ju Veliz MD, PhD  CC Lisa Dumont MD         Jayashree is a  54 year old female post menopausal] [GR2, P2] that presents today for osteoporosis consult.   Referring Physician: Referred Lisa Dumont MD   HPI     Have you ever had a bone density test? Yes  Where [facility]   When = 11/2024  Lumbar Spine: L1-L4: BMD: 1.341 g/cm2. T-score: 1.3. Z-score: 2.0.   -RIGHT Hip Total: BMD: 0.914 g/cm2. T-score: -0.7. Z-score: -0.2.  -RIGHT Hip Femoral neck: BMD: 0.882 g/cm2. T-score: -1.1. Z-score:  "-0.2.  -LEFT Hip Total: BMD: 0.903 g/cm2. T-score: -0.8. Z-score: -0.3.  -LEFT Hip Femoral neck: BMD: 0.895 g/cm2. T-score: -1.0. Z-score: -0.1.  3.2% decrease in the spine  0.6%  decrease in the hip     Have you received any x-ray dye or contrast in the last ten days? No   How many servings of dairy products do you consume per day? 1-2 Type: almond  milk - cheese   Do you take a multi-vitamin daily? Yes  Do you take a vitamin D supplement? Yes  1000IU/day   Do you take a calcium supplement daily? 1/day ? Dose   Do you take a supplement containing strontium? No  Are you exposed to natural sunlight at least 20 minutes three times a week? No  Have you broken any bones during your adult life? Yes. Details: just toes   How tall were you at age 23? 67\"  Have you gone through menopause? Yes  Did your menopause occur before age 43? No  Have you taken hormone therapy? No  Ho Hx of radiation treatment     Social History   reports that she has never smoked. She has never used smokeless tobacco. She reports that she does not drink alcohol and does not use drugs.  Do you smoke cigarettes? No  Do you exercise? Yes. Details: walking 5-10,000 steps/day -  weight   Do you drink alcohol? Minimal - on holidays     Medication History  Have you taken any of the following medications?   Blood thinner (Coumadin or Heparin): No   Chemotherapy (ex: Lupron, Arimidex): No   Depo Provera: compounded progesterone for a year    Anti-Seizure (ex: Dilantin, Depakote): jose maria pentin on and off    Steroids (ex: Prednisone, Cortisone): intermittent for MSK  problems -    Thyroid (ex: Synthroid): No  Have you used any of the following medications?   Actonel (Risedronate): No   Aredia (Pamidronate): No   Boniva (Ibindronate): No   Didronil (Etidronate): No   Evista (Raloxifene): No   Fosamax (Alendronate): No   Forteo (Parathyroid hormone) injections: No   HCTZ (Thiazide): No   Calcitonin nasal spray: No   Reclast or Zometa (Zolendronate): No   Prolia " (Denosumab): No   HT: took progesterone for about 1 yr   Current Outpatient Medications   Medication Sig Dispense Refill    albuterol (PROAIR HFA/PROVENTIL HFA/VENTOLIN HFA) 108 (90 Base) MCG/ACT inhaler Inhale 2 puffs into the lungs every 6 hours as needed 18 g 3    ALPRAZolam (XANAX PO) Take 0.125-0.25 mg by mouth nightly as needed for sleep       augmented betamethasone dipropionate (DIPROLENE) 0.05 % external lotion Apply topically daily For scalp 60 mL 11    cetirizine (ZYRTEC) 10 MG tablet Take 1 tablet (10 mg) by mouth daily 90 tablet 3    Cholecalciferol (VITAMIN D-3 PO) Take 1 capsule by mouth daily      Cyanocobalamin (VITAMIN B-12 PO) Take 1 tablet by mouth daily      cyclobenzaprine (FLEXERIL) 5 MG tablet TAKE 1 TABLET BY MOUTH 3 TIMES DAILY AS NEEDED 30 tablet 1    Digestive Enzymes (DIGESTIVE ENZYME PO) Take 2 capsules by mouth 2 times daily      FLUoxetine (PROZAC) 40 MG capsule Take 1 capsule (40 mg) by mouth daily      fluticasone (FLONASE) 50 MCG/ACT nasal spray USE ONE SPRAY IN EACH NOSTRIL EVERY DAY AS NEEDED FOR RHINITIS OR ALLERGIES 16 g 11    hydrocortisone 2.5 % ointment Apply twice daily for 2 weeks. 30 g 4    loratadine (CLARITIN) 10 MG tablet Take 1 tablet (10 mg) by mouth daily. 90 tablet 1    metoprolol succinate ER (TOPROL XL) 25 MG 24 hr tablet TAKE TWO TABLETS BY MOUTH ONCE DAILY 60 tablet 10    Multiple Vitamin (MULTI-VITAMIN DAILY PO) Take 1 tablet by mouth daily      Polyethyl Glycol-Propyl Glycol (SYSTANE OP) Place 1-2 drops into both eyes daily as needed      prochlorperazine (COMPAZINE) 10 MG tablet TAKE 1 TABLET (10 MG) BY MOUTH EVERY 6 HOURS AS NEEDED FOR NAUSEA OR VOMITING 10 tablet 1    SUMAtriptan (IMITREX) 50 MG tablet Take 1 tablet (50 mg) by mouth at onset of headache for migraine 8 tablet 6    zolpidem ER (AMBIEN CR) 6.25 MG CR tablet Take 1 tablet (6.25 mg) by mouth every evening as needed for sleep 30 tablet 0          Allergies   Allergen Reactions    Grass Extracts  [Gramineae Pollens] Other (See Comments)     Stuffy nose, congestion, burning eyes    No Known Drug Allergy     Pollen Extract        Past Medical History  Do you have or have you had any of the following?   Celiac sprue (wheat intolerance):glutein intolerance    Chronic low back problems (ex: scohosis, arthritis): intermittent sciatica   Diabetes mellitus: gestational diabetes    High blood calcium level: No   Hip or spine injury: No   History of an eating disorder: No   History of gastric bypass: No   Hyperparathyroidism: No   Inflammatory bowel disease (ex: Crohn's, ulcerative colitis): hx of colitis    Kidney disease: No   Kidney stones: No   Liver disease: No   Lupus: No   Overactive thyroid gland: No   Rhuematoid arthritis: No    Have you had any of the following?   Hysterectomy: Yes   Ovaries removed: No   Breast cancer: No   Family history of breast cancer: Yes. Details: 2 sisters and MGM     Family History   Problem Relation Age of Onset    Pancreatic Cancer Brother 46        Nonsmoker,  at 47    Cardiovascular Mother         CHF, AAA    Melanoma Mother 87    Anxiety Disorder Mother     Esophageal Cancer Brother 46         at 66; hx of smoking    Alcoholism Brother     Breast Cancer Sister 55        mastectomy    Anxiety Disorder Sister     Cerebrovascular Disease Father     Heart Disease Father     Anxiety Disorder Sister     Breast Cancer Maternal Grandmother 47         at 50    Breast Cancer Brother 67    Anxiety Disorder Brother     Substance Abuse Brother     Alcoholism Brother     Colon Cancer Paternal Uncle         two paternal uncles, both >50    Colon Cancer Cousin 40        paternal cousin;  in 40s    Bone Cancer Cousin 68        paternal cousin    Lung Cancer Cousin         paternal cousin    Breast Cancer Paternal Aunt         two paternal aunts, postmenopausal in both cases     Is there a family history of osteoporosis? Yes. Details: mom and dad  Did either parent have a hip  "fracture? Yes    ROS:  General: none  Head/Eyes: none  Ears/Nose/Throat: none  Cardiovascular: none  Respiratory: none  Gastrointestinal: none  Breast: none  Genitourinary: none  Sexual Function: none  Musculoskeletal: hip pain - bilateral -   Skin: none  Neurological: none  Mental Health: none  Endocrine: none    Clinic Measurements  Vitals: LMP 03/17/2003 (Exact Date)  /84   Pulse 64   Ht 1.689 m (5' 6.5\")   Wt 76.9 kg (169 lb 9.6 oz)   LMP 03/17/2003 (Exact Date)   BMI 26.96 kg/m      BMI= Body mass index is 26.96 kg/m .    Physical exam  Constitutional: Well appearing woman in no acute distress.   Psychological: .  Neck: No thyroidmegaly.   Cardiovascular: regular rate and rhythm, normal S1 and S2, no murmurs, rubs or gallops,   Respiratory: clear to auscultation, no wheezes or crackles, normal breath sounds.  Gastrointestinal: positive bowel sounds, nontender, no hepatosplenomegaly, no masses. No guarding or rebound.  Spine: Straight, not tender, Flexion adequate, Extension adequate, Lateral movement adequate, Rotational movement adequate  Musculoskeletal: no edema and motor strength is equal in the upper and lower extremities  limited ROM  - hips good flexion and extension - good ER and IR - and adduction    Skin: no concerning lesions, no jaundice.  Neurological: cranial nerves intact, normal strength, reflexes at patella and biceps normal, normal gait, no tremor.     LAB  Vertebra; Fracture Assessment: ordered   Dexa Scan: 11/2024       FRAX Assessment Tool: 19.9% for 10 risk Major Osteoporotic, 0.7% for 10 risk of Hip Fracture]  Risk Factors: age - PM, +FHx - parent had Fx hip, T scores   Ju Veliz MD, PhD    "

## 2024-12-16 ENCOUNTER — LAB (OUTPATIENT)
Dept: LAB | Facility: CLINIC | Age: 54
End: 2024-12-16
Attending: FAMILY MEDICINE
Payer: COMMERCIAL

## 2024-12-16 ENCOUNTER — OFFICE VISIT (OUTPATIENT)
Dept: FAMILY MEDICINE | Facility: CLINIC | Age: 54
End: 2024-12-16
Attending: FAMILY MEDICINE
Payer: COMMERCIAL

## 2024-12-16 VITALS
HEART RATE: 64 BPM | BODY MASS INDEX: 26.62 KG/M2 | HEIGHT: 67 IN | WEIGHT: 169.6 LBS | SYSTOLIC BLOOD PRESSURE: 131 MMHG | DIASTOLIC BLOOD PRESSURE: 84 MMHG

## 2024-12-16 DIAGNOSIS — M25.551 BILATERAL HIP PAIN: ICD-10-CM

## 2024-12-16 DIAGNOSIS — M85.89 OSTEOPENIA OF MULTIPLE SITES: ICD-10-CM

## 2024-12-16 DIAGNOSIS — M25.552 BILATERAL HIP PAIN: ICD-10-CM

## 2024-12-16 DIAGNOSIS — R29.890 LOSS OF HEIGHT: Primary | ICD-10-CM

## 2024-12-16 DIAGNOSIS — J06.9 VIRAL UPPER RESPIRATORY TRACT INFECTION: ICD-10-CM

## 2024-12-16 LAB
ANION GAP SERPL CALCULATED.3IONS-SCNC: 10 MMOL/L (ref 7–15)
BUN SERPL-MCNC: 11.1 MG/DL (ref 6–20)
CALCIUM SERPL-MCNC: 9.3 MG/DL (ref 8.8–10.4)
CHLORIDE SERPL-SCNC: 103 MMOL/L (ref 98–107)
CREAT SERPL-MCNC: 0.74 MG/DL (ref 0.51–0.95)
EGFRCR SERPLBLD CKD-EPI 2021: >90 ML/MIN/1.73M2
ERYTHROCYTE [DISTWIDTH] IN BLOOD BY AUTOMATED COUNT: 13.4 % (ref 10–15)
GLUCOSE SERPL-MCNC: 94 MG/DL (ref 70–99)
HCO3 SERPL-SCNC: 26 MMOL/L (ref 22–29)
HCT VFR BLD AUTO: 39.5 % (ref 35–47)
HGB BLD-MCNC: 13.7 G/DL (ref 11.7–15.7)
MAGNESIUM SERPL-MCNC: 2.1 MG/DL (ref 1.7–2.3)
MCH RBC QN AUTO: 31.5 PG (ref 26.5–33)
MCHC RBC AUTO-ENTMCNC: 34.7 G/DL (ref 31.5–36.5)
MCV RBC AUTO: 91 FL (ref 78–100)
PHOSPHATE SERPL-MCNC: 2.8 MG/DL (ref 2.5–4.5)
PLATELET # BLD AUTO: 339 10E3/UL (ref 150–450)
POTASSIUM SERPL-SCNC: 4.3 MMOL/L (ref 3.4–5.3)
PTH-INTACT SERPL-MCNC: 59 PG/ML (ref 15–65)
RBC # BLD AUTO: 4.35 10E6/UL (ref 3.8–5.2)
SODIUM SERPL-SCNC: 139 MMOL/L (ref 135–145)
TSH SERPL DL<=0.005 MIU/L-ACNC: 0.9 UIU/ML (ref 0.3–4.2)
VIT D+METAB SERPL-MCNC: 29 NG/ML (ref 20–50)
WBC # BLD AUTO: 7.7 10E3/UL (ref 4–11)

## 2024-12-16 PROCEDURE — 83970 ASSAY OF PARATHORMONE: CPT

## 2024-12-16 PROCEDURE — 36415 COLL VENOUS BLD VENIPUNCTURE: CPT | Performed by: FAMILY MEDICINE

## 2024-12-16 PROCEDURE — 84100 ASSAY OF PHOSPHORUS: CPT

## 2024-12-16 PROCEDURE — 82306 VITAMIN D 25 HYDROXY: CPT

## 2024-12-16 PROCEDURE — 85041 AUTOMATED RBC COUNT: CPT

## 2024-12-16 PROCEDURE — 85018 HEMOGLOBIN: CPT

## 2024-12-16 PROCEDURE — 83735 ASSAY OF MAGNESIUM: CPT

## 2024-12-16 PROCEDURE — 84080 ASSAY ALKALINE PHOSPHATASES: CPT | Performed by: FAMILY MEDICINE

## 2024-12-16 PROCEDURE — 80048 BASIC METABOLIC PNL TOTAL CA: CPT

## 2024-12-16 PROCEDURE — 84443 ASSAY THYROID STIM HORMONE: CPT

## 2024-12-16 PROCEDURE — G0463 HOSPITAL OUTPT CLINIC VISIT: HCPCS | Performed by: FAMILY MEDICINE

## 2024-12-16 NOTE — PATIENT INSTRUCTIONS
Blood work today   Hip xray today  Start physical therapy for your hips  Patient should take 1200mg of calcium/day in divided doses (food and pills combined) and vitamin D3 1000IU/day.  Calcium is best in food  With calcium pills you only absorb 5-600mg/day  Physical exercise 30 min/daily - weight bearing    Get the vertebral fracture assessment - a DXA of your entire spine    DXA in 2 yrs same place   See me in 1-2 yrs.                      Thank you for trusting us with your care!   Please be aware, if you are on Mychart, you may see your results prior to your providers review. If labs are abnormal, we will call or message you on [employer] with a follow up plan.    If you need to contact us for questions about:  Symptoms, Scheduling & Medical Questions; Non-urgent (2-3 day response) [employer] message, Urgent (needing response today) [phone] (if after 3:30pm next day response)   Prescriptions: Please call your Pharmacy   Billing: [facility] [phone] or [facility]:[phone]

## 2024-12-17 ENCOUNTER — PATIENT OUTREACH (OUTPATIENT)
Dept: CARE COORDINATION | Facility: CLINIC | Age: 54
End: 2024-12-17
Payer: COMMERCIAL

## 2024-12-17 LAB — ALP BONE SERPL-MCNC: 11.9 UG/L

## 2025-01-14 ENCOUNTER — MYC MEDICAL ADVICE (OUTPATIENT)
Dept: OPHTHALMOLOGY | Facility: CLINIC | Age: 55
End: 2025-01-14
Payer: COMMERCIAL

## 2025-01-14 NOTE — TELEPHONE ENCOUNTER
Spoke to pt at 1130    Worsening dry eye symptoms over months and hoping for appt soon for evaluation at  eye clinic    Pt using frequent systane eye drops    Offered next Tuesday at 1245 in [facility] time slot at [facility] and pt accepts.    Pt aware of date/time/location at [facility] and seemed satisfied with scheduling.    Chava Martinez RN 11:38 AM 01/14/25

## 2025-01-16 ENCOUNTER — ANCILLARY PROCEDURE (OUTPATIENT)
Dept: BONE DENSITY | Facility: CLINIC | Age: 55
End: 2025-01-16
Attending: FAMILY MEDICINE
Payer: COMMERCIAL

## 2025-01-16 ENCOUNTER — ANCILLARY PROCEDURE (OUTPATIENT)
Dept: MAMMOGRAPHY | Facility: CLINIC | Age: 55
End: 2025-01-16
Attending: FAMILY MEDICINE
Payer: COMMERCIAL

## 2025-01-16 DIAGNOSIS — Z12.31 VISIT FOR SCREENING MAMMOGRAM: ICD-10-CM

## 2025-01-16 DIAGNOSIS — R29.890 LOSS OF HEIGHT: ICD-10-CM

## 2025-01-16 PROCEDURE — 77086 VRT FRACTURE ASSMT VIA DXA: CPT | Performed by: RADIOLOGY

## 2025-01-16 PROCEDURE — 77063 BREAST TOMOSYNTHESIS BI: CPT | Mod: GC | Performed by: STUDENT IN AN ORGANIZED HEALTH CARE EDUCATION/TRAINING PROGRAM

## 2025-01-16 PROCEDURE — 77067 SCR MAMMO BI INCL CAD: CPT | Mod: GC | Performed by: STUDENT IN AN ORGANIZED HEALTH CARE EDUCATION/TRAINING PROGRAM

## 2025-01-20 ENCOUNTER — PATIENT OUTREACH (OUTPATIENT)
Dept: CARE COORDINATION | Facility: CLINIC | Age: 55
End: 2025-01-20
Payer: COMMERCIAL

## 2025-01-28 ENCOUNTER — HOSPITAL ENCOUNTER (OUTPATIENT)
Dept: GENERAL RADIOLOGY | Facility: CLINIC | Age: 55
Discharge: HOME | End: 2025-01-28
Attending: FAMILY MEDICINE
Payer: COMMERCIAL

## 2025-01-28 DIAGNOSIS — M25.552 BILATERAL HIP PAIN: ICD-10-CM

## 2025-01-28 DIAGNOSIS — M25.551 BILATERAL HIP PAIN: ICD-10-CM

## 2025-01-28 PROCEDURE — 73522 X-RAY EXAM HIPS BI 3-4 VIEWS: CPT

## 2025-02-04 ENCOUNTER — TRANSFERRED RECORDS (OUTPATIENT)
Dept: HEALTH INFORMATION MANAGEMENT | Facility: CLINIC | Age: 55
End: 2025-02-04
Payer: COMMERCIAL

## 2025-02-26 DIAGNOSIS — J30.89 CHRONIC NONSEASONAL ALLERGIC RHINITIS DUE TO POLLEN: ICD-10-CM

## 2025-02-26 RX ORDER — LORATADINE 10 MG/1
1 TABLET ORAL DAILY
Qty: 90 TABLET | Refills: 2 | Status: SHIPPED | OUTPATIENT
Start: 2025-02-26

## 2025-04-30 ENCOUNTER — OFFICE VISIT (OUTPATIENT)
Dept: DERMATOLOGY | Facility: CLINIC | Age: 55
End: 2025-04-30
Attending: DERMATOLOGY
Payer: COMMERCIAL

## 2025-04-30 DIAGNOSIS — L81.4 SOLAR LENTIGO: ICD-10-CM

## 2025-04-30 DIAGNOSIS — L82.0 INFLAMED SEBORRHEIC KERATOSIS: ICD-10-CM

## 2025-04-30 DIAGNOSIS — L73.2 HIDRADENITIS SUPPURATIVA: Primary | ICD-10-CM

## 2025-04-30 DIAGNOSIS — D18.01 CHERRY ANGIOMA: ICD-10-CM

## 2025-04-30 DIAGNOSIS — D22.9 MULTIPLE MELANOCYTIC NEVI: ICD-10-CM

## 2025-04-30 DIAGNOSIS — L82.1 SEBORRHEIC KERATOSIS: ICD-10-CM

## 2025-04-30 RX ORDER — MUPIROCIN 20 MG/G
OINTMENT TOPICAL 2 TIMES DAILY
Qty: 30 G | Refills: 11 | Status: SHIPPED | OUTPATIENT
Start: 2025-04-30
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
## 2025-04-30 NOTE — NURSING NOTE
Jayashree Johnson's goals for this visit include:   Chief Complaint   Patient presents with    Skin Check     Pt here for a FBSC. One on the inner L calf by the knee, crusty forehead, upper right thigh near the groin area       She requests these members of her care team be copied on today's visit information:     PCP: Aneudy Jackson    Referring Provider:  Richard Raines MD  [address] [place] [id number]    [facility] 03/17/2003 (Exact Date)     Do you need any medication refills at today's visit?     Nimisha Mahajan LPN on 4/30/2025 at 9:48 AM
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
## 2025-05-06 ENCOUNTER — VIRTUAL VISIT (OUTPATIENT)
Dept: ONCOLOGY | Facility: CLINIC | Age: 55
End: 2025-05-06
Attending: OBSTETRICS & GYNECOLOGY
Payer: COMMERCIAL

## 2025-05-06 DIAGNOSIS — Z80.0 FAMILY HISTORY OF PANCREATIC CANCER: ICD-10-CM

## 2025-05-06 DIAGNOSIS — C44.92 SQUAMOUS CELL SKIN CANCER: ICD-10-CM

## 2025-05-06 DIAGNOSIS — C44.91 SKIN CANCER, BASAL CELL: ICD-10-CM

## 2025-05-06 DIAGNOSIS — Z80.0 FAMILY HISTORY OF THROAT CANCER: ICD-10-CM

## 2025-05-06 DIAGNOSIS — Z80.3 FAMILY HISTORY OF MALIGNANT NEOPLASM OF BREAST: Primary | ICD-10-CM

## 2025-05-06 DIAGNOSIS — Z80.8 FAMILY HISTORY OF MELANOMA: ICD-10-CM

## 2025-05-06 DIAGNOSIS — Z80.8 FAMILY HISTORY OF MALIGNANT NEOPLASM OF OTHER ORGANS OR SYSTEMS: ICD-10-CM

## 2025-05-06 DIAGNOSIS — Z80.0 FAMILY HISTORY OF COLON CANCER: ICD-10-CM

## 2025-05-06 PROCEDURE — 96041 GENETIC COUNSELING SVC EA 30: CPT | Mod: GT,95 | Performed by: GENETIC COUNSELOR, MS

## 2025-05-06 NOTE — LETTER
Cancer Risk Management  Program     Mailing Address  Cancer Risk Management Program  [address]  [place] [address]    New patient appointments  [phone]    Cindy 3, 2025    Jayashree Johnson  [address] [address] [id number]      Dear Melissa,    It was a pleasure talking with you via your virtual genetic counseling visit on 5-6-2025.  Below is a copy of the progress note from that recent visit through the Cancer Risk Management Program.  If you have any additional questions, please feel free to contact me.    Cancer Risk Management Program Genetic Counseling Note    5/6/2025    Referring Provider:  Dr. Lisa Dumont    Presenting Information:   I had a video visit with Jayashree Johnson (Barb) today for genetic counseling through the Cancer Risk Management Program, in order to discuss her personal history of skin cancer and her family history of breast, pancreatic, colon, and other cancer.  She presents today to review this history, cancer screening recommendations, and available genetic testing options.    Personal History:  Melissa is a 54 year old female. She reports a personal history of a few skin cancers: a squamous cell skin cancer on her face and two basal cell skin cancers (left shin and left thigh). In other medical history, Melissa reports being previously diagnosed with symptoms consistent with chronic fatigue syndrome and/or fibromyalgia; she reports also being diagnosed with sleep apnea. She is also noted in her medical records to have been diagnosed with osteopenia and degenerative hip disease. She reports that she has been previously diagnosed with colitis, for which she now follows a gluten-free diet. Melissa reports a history of having undergone a hysterectomy at age 36, but her ovaries were left intact.      With respect to her cancer screening history, Melissa reports a past history of multiple vulvar/cervical abnormalities, for which she has had various procedures.  (See gynecology records in the chart.) Melissa's most recent mammogram was in 2025, which was negative. She reports that a few years ago, she had a callback for additional imaging, but no breast biopsy was needed.  Melissa underwent a colonoscopy in , which noted one small tubular adenoma; a follow-up colonoscopy was recommended for 7 years from that time. Melissa does report having had a previous colonoscopy and upper endoscopy several years ago, but she did not recall any abnormalities on those procedures.     Melissa did under genetic testing for the BRCA1 and BRCA2 gene in , which was negative; this was a sequencing test that only looked for a few rearrangements. (We did talk about that genetic testing techniques and options have changed significantly since that time.)    Family History: (Please see scanned pedigree for detailed family history information)  As noted above, Melissa reports multiple previous skin cancers (squamous cell and basal cell skin cancers).  Melissa reports that she has two sisters with a history of breast cancer: one was diagnosed with unilateral breast cancer at age 60 (for which she underwent a bilateral mastectomy) and the other was diagnosed with bilateral breast cancer at age 55 (for which she underwent surgery and chemotherapy). Melissa thinks that the latter sister may have had some genetic testing done, but the results of that testing were not shared with Melissa, and this sister is now .  Melissa reports that one of her brothers was diagnosed with pancreatic cancer at age 46 and, unfortunately,  of complications of that cancer at age 47.  Melissa states that she has a brother that was diagnosed with throat cancer at age 47 (and that this individual  in his 60's of unrelated causes).    Maternal Family History:  Melissa reports that her mother had a history of multiple different skin cancers, which she reports were treated with excisions.  Melissa states that her maternal grandmother  "was diagnosed with breast cancer at age 49 and, unfortunately,  of complications of that cancer at age 50.  Melissa reports that one of her maternal aunts was diagnosed with a \"female cancer\"  in her 50s; she thought that this may have been ovarian cancer, but she wasn't sure.    Paternal Family History:  Melissa reports that her father was also diagnosed with a few skin cancers.  Melissa reports that one of her paternal aunts was diagnosed with colon cancer in her mid-50s, for which she underwent chemotherapy and surgery. (She is no longer living.)  Melissa states that one of her paternal uncles was diagnosed with colon cancer in his mid-50s. (She reports that he is also no longer living.)  Melissa reports that three of her paternal first cousins that have  of complications of cancer: one of bone cancer and two of unknown cancers. She states that one of these cousins  in her 30s of her cancer.    Melissa is not aware of other hereditary cancer genetic testing in the family besides what was noted above.  Information about genetic ancestry was collected, as specific genetic variants may be more common in certain ethnic backgrounds. Melissa reports that her mother's family is of Luxembourger/Greenlandic-Luxembourger/Azerbaijani ancestry and that her father's family is of [place] Islander/French ancestry. There is no known Ashkenazi Yazidism ancestry on either side of her family. There is no reported consanguinity.    Discussion:  We reviewed the features of sporadic, familial, and hereditary cancers. In looking at Melissa's family history, it is possible that a cancer susceptibility gene mutation is present due to her family history of multiple individuals with cancer, which has affected multiple generations of the family. For example, we talked about that Melissa has 4 close relatives with breast cancer, one of which was of early onset (her maternal grandmother) and one of which was bilateral (her sister). Both early onset breast cancers and bilateral " breast cancers are more likely to be related to a hereditary cancer risk factor. In addition, Melissa reports a family history of pancreatic cancer, and we talked about that there are multiple hereditary cancer genes that can link breast and pancreatic cancer in some families. If Melissa's maternal cousin did have ovarian cancer, this could also be related to the family history of breast cancer, as there is also several genes that can link breast and ovarian cancer in some families.    .  We discussed the natural history and genetics of hereditary cancer syndromes in general. A detailed handout regarding some of the information we discussed was provided to Melissa after of our appointment today and can be found in the after visit summary. Topics included: inheritance pattern, cancer risks, cancer screening recommendations, and also risks, benefits and limitations of testing.    Based on her family history, Melissa meets current National Comprehensive Cancer Network (NCCN) criteria for genetic testing of high-penetrance breast cancer susceptibility genes (including BRCA1, BRCA2, CDH1, PALB2, PTEN, and TP53) because of her family history of at least three close relatives with breast cancer (two sisters, a maternal grandmother, and a paternal aunt), one of which was bilateral (sister) and one of which was of early onset (grandmother). This is also supported by her brother's pancreatic cancer and possibly her maternal cousin (if she really had ovarian cancer).    We discussed that there are additional genes besides those listed above that could cause increased risk for breast, pancreatic, and other cancer. As many of these genes present with overlapping features in a family and accurate cancer risk cannot always be established based upon the pedigree analysis alone, it would be reasonable for Melissa to consider panel genetic testing to analyze multiple genes at once.    Melissa stated that she was interested in pursuing genetic testing  through a panel of genes associated with hereditary cancer syndromes, and so we reviewed the various panel options and their potential benefits and limitations.  After this conversation, Melissa decided that she was interested in the Common Hereditary Cancers genetic testing panel (including the hereditary skin cancer panel) through [facility].     The Common Hereditary Cancers genetic testing panel through [facility] includes analysis for 48 genes associated with cancers of the breast, ovary, uterus, prostate and gastrointestinal system: APC, TYRONE, AXIN2, BAP1, BARD1, BMPR1A, BRCA1, BRCA2, BRIP1, CDH1, CDK4, CDKN2A (p14ARF and i89SEQ6f), CHEK2, CTNNA1, DICER1, EPCAM, FH, GREM1, HOXB13, KIT, MBD4, MEN1, MLH1, MSH2, MSH3, MSH6, MUTYH, NF1, NTHL1, PALB2, PDGFRA, PMS2, POLD1, POLE, PTEN, RAD51C, RAD51D, SDHA, SDHB, SDHC, SDHD, SMAD4, SMARCA4, STK11, TP53, TSC1, TSC2, and VHL. The Hereditary Skin Cancer genetic panel would add on the following genes: BLM, MITF, POT1, PTCH1, FLCN, MC1R, PTCH2     We discussed that many of these genes are associated with specific hereditary cancer syndromes and published management guidelines: Hereditary Breast and Ovarian Cancer syndrome (BRCA1, BRCA2), Suarez syndrome (MLH1, MSH2, MSH6, PMS2, EPCAM), Familial Adenomatous Polyposis (APC), Hereditary Diffuse Gastric Cancer (CDH1), Familial Atypical Multiple Mole Melanoma syndrome (CDK4, CDKN2A), Multiple Endocrine Neoplasia type 1 (MEN1), Juvenile Polyposis syndrome (BMPR1A, SMAD4), Cowden syndrome (PTEN), Li Fraumeni syndrome (TP53), Hereditary Paraganglioma and Pheochromocytoma syndrome (SDHA, SDHB, SDHC, SDHD), Peutz-Jeghers syndrome (STK11), MUTYH Associated Polyposis (MUTYH), Tuberous sclerosis complex (TSC1, TSC2), Von Hippel-Lindau disease (VHL), and Neurofibromatosis type 1 (NF1). The TYRONE, AXIN2, BRIP1, CHEK2, GREM1, MSH3, NBN, NTHL1, PALB2, POLD1, POLE, RAD51C, and RAD51D genes are associated with increased cancer risk and have published  management guidelines for certain cancers. The remaining genes are associated with increased cancer risk and may allow us to make medical recommendations when mutations are identified.       Medical Management: For Melissa, we reviewed that the information from genetic testing may determine:  additional cancer screening for which Melissa may qualify (eg. mammogram and breast MRI, more frequent colonoscopies, more frequent dermatologic exams, etc.),  options for risk-reducing surgeries Melissa could consider, if indicated/desired (eg. bilateral mastectomy, surgery to remove her ovaries, etc.),    and targeted therapies for Melissa, if she were to develop certain cancers in the future (eg. immunotherapy for individuals with Suarez syndrome, PARP inhibitors for HBOC syndrome, etc.).     These recommendations and possible targeted therapies will be discussed in detail once genetic testing is completed.     Plan:  1) Today, Melissa decided to proceed with the Common Hereditary Cancers genetic testing panel (including the hereditary skin cancer panel) through [facility].  Therefore, consent was reviewed and verbally obtained for this testing.    2) We reviewed the options for sample collection. Melissa prefers a saliva kit, and so this will be sent to her. Test results are expected to be available within 4-6 weeks.  3) Melissa will either have a telephone visit or video visit to discuss the results.  Additional recommendations about screening will be made at that time.    Hina Alfonso MS, [facility]  Genetic Counselor  Cancer Risk Management Program

## 2025-05-06 NOTE — LETTER
5/6/2025      Jayashree Johnson  [id number] [address] [place] [id number]      Dear Colleague,    Thank you for referring your patient, Jayashree Johnson, to the [facility]. Please see a copy of my visit note below.    Cancer Risk Management Program Genetic Counseling Note    5/6/2025    Referring Provider:  Dr. Lisa Dumont    Presenting Information:   I had a video visit with Jayashree Johnson (Barb) today for genetic counseling through the Cancer Risk Management Program, in order to discuss her personal history of skin cancer and her family history of breast, pancreatic, colon, and other cancer.  She presents today to review this history, cancer screening recommendations, and available genetic testing options.    Personal History:  Melissa is a 54 year old female. She reports a personal history of a few skin cancers: a squamous cell skin cancer on her face and two basal cell skin cancers (left shin and left thigh). In other medical history, Melissa reports being previously diagnosed with symptoms consistent with chronic fatigue syndrome and/or fibromyalgia; she reports also being diagnosed with sleep apnea. She is also noted in her medical records to have been diagnosed with osteopenia and degenerative hip disease. She reports that she has been previously diagnosed with colitis, for which she now follows a gluten-free diet. Melissa reports a history of having undergone a hysterectomy at age 36, but her ovaries were left intact.      With respect to her cancer screening history, Melissa reports a past history of multiple vulvar/cervical abnormalities, for which she has had various procedures. (See gynecology records in the chart.) Melissa's most recent mammogram was in January 2025, which was negative. She reports that a few years ago, she had a callback for additional imaging, but no breast biopsy was needed.  Melissa underwent a colonoscopy in 2022, which noted one small tubular adenoma; a follow-up colonoscopy  "was recommended for 7 years from that time. Melissa does report having had a previous colonoscopy and upper endoscopy several years ago, but she did not recall any abnormalities on those procedures.     Melissa did under genetic testing for the BRCA1 and BRCA2 gene in , which was negative; this was a sequencing test that only looked for a few rearrangements. (We did talk about that genetic testing techniques and options have changed significantly since that time.)    Family History: (Please see scanned pedigree for detailed family history information)  As noted above, Melissa reports multiple previous skin cancers (squamous cell and basal cell skin cancers).  Melissa reports that she has two sisters with a history of breast cancer: one was diagnosed with unilateral breast cancer at age 60 (for which she underwent a bilateral mastectomy) and the other was diagnosed with bilateral breast cancer at age 55 (for which she underwent surgery and chemotherapy). Melissa thinks that the latter sister may have had some genetic testing done, but the results of that testing were not shared with Melissa, and this sister is now .  Melissa reports that one of her brothers was diagnosed with pancreatic cancer at age 46 and, unfortunately,  of complications of that cancer at age 47.  Melissa states that she has a brother that was diagnosed with throat cancer at age 47 (and that this individual  in his 60's of unrelated causes).    Maternal Family History:  Melissa reports that her mother had a history of multiple different skin cancers, which she reports were treated with excisions.  Melissa states that her maternal grandmother was diagnosed with breast cancer at age 49 and, unfortunately,  of complications of that cancer at age 50.  Melissa reports that one of her maternal aunts was diagnosed with a \"female cancer\"  in her 50s; she thought that this may have been ovarian cancer, but she wasn't sure.    Paternal Family History:  Melissa reports " that her father was also diagnosed with a few skin cancers.  Melissa reports that one of her paternal aunts was diagnosed with colon cancer in her mid-50s, for which she underwent chemotherapy and surgery. (She is no longer living.)  Melissa states that one of her paternal uncles was diagnosed with colon cancer in his mid-50s. (She reports that he is also no longer living.)  Melissa reports that three of her paternal first cousins that have  of complications of cancer: one of bone cancer and two of unknown cancers. She states that one of these cousins  in her 30s of her cancer.    Melissa is not aware of other hereditary cancer genetic testing in the family besides what was noted above.  Information about genetic ancestry was collected, as specific genetic variants may be more common in certain ethnic backgrounds. Melissa reports that her mother's family is of [place]/Maltese-[place]/Albanian ancestry and that her father's family is of Djiboutian/Thai ancestry. There is no known Ashkenazi Restoration ancestry on either side of her family. There is no reported consanguinity.    Discussion:  We reviewed the features of sporadic, familial, and hereditary cancers. In looking at Melissa's family history, it is possible that a cancer susceptibility gene mutation is present due to her family history of multiple individuals with cancer, which has affected multiple generations of the family. For example, we talked about that Melissa has 4 close relatives with breast cancer, one of which was of early onset (her maternal grandmother) and one of which was bilateral (her sister). Both early onset breast cancers and bilateral breast cancers are more likely to be related to a hereditary cancer risk factor. In addition, Melissa reports a family history of pancreatic cancer, and we talked about that there are multiple hereditary cancer genes that can link breast and pancreatic cancer in some families. If Melissa's maternal cousin did have ovarian cancer,  this could also be related to the family history of breast cancer, as there is also several genes that can link breast and ovarian cancer in some families.    .  We discussed the natural history and genetics of hereditary cancer syndromes in general. A detailed handout regarding some of the information we discussed was provided to Melissa after of our appointment today and can be found in the after visit summary. Topics included: inheritance pattern, cancer risks, cancer screening recommendations, and also risks, benefits and limitations of testing.    Based on her family history, Melissa meets current National Comprehensive Cancer Network (NCCN) criteria for genetic testing of high-penetrance breast cancer susceptibility genes (including BRCA1, BRCA2, CDH1, PALB2, PTEN, and TP53) because of her family history of at least three close relatives with breast cancer (two sisters, a maternal grandmother, and a paternal aunt), one of which was bilateral (sister) and one of which was of early onset (grandmother). This is also supported by her brother's pancreatic cancer and possibly her maternal cousin (if she really had ovarian cancer).    We discussed that there are additional genes besides those listed above that could cause increased risk for breast, pancreatic, and other cancer. As many of these genes present with overlapping features in a family and accurate cancer risk cannot always be established based upon the pedigree analysis alone, it would be reasonable for Melissa to consider panel genetic testing to analyze multiple genes at once.    Melissa stated that she was interested in pursuing genetic testing through a panel of genes associated with hereditary cancer syndromes, and so we reviewed the various panel options and their potential benefits and limitations.  After this conversation, Melissa decided that she was interested in the Common Hereditary Cancers genetic testing panel (including the hereditary skin cancer panel)  through [facility].     The Common Hereditary Cancers genetic testing panel through [facility] includes analysis for 48 genes associated with cancers of the breast, ovary, uterus, prostate and gastrointestinal system: APC, TYRONE, AXIN2, BAP1, BARD1, BMPR1A, BRCA1, BRCA2, BRIP1, CDH1, CDK4, CDKN2A (p14ARF and e25GAV2x), CHEK2, CTNNA1, DICER1, EPCAM, FH, GREM1, HOXB13, KIT, MBD4, MEN1, MLH1, MSH2, MSH3, MSH6, MUTYH, NF1, NTHL1, PALB2, PDGFRA, PMS2, POLD1, POLE, PTEN, RAD51C, RAD51D, SDHA, SDHB, SDHC, SDHD, SMAD4, SMARCA4, STK11, TP53, TSC1, TSC2, and VHL. The Hereditary Skin Cancer genetic panel would add on the following genes: BLM, MITF, POT1, PTCH1, FLCN, MC1R, PTCH2     We discussed that many of these genes are associated with specific hereditary cancer syndromes and published management guidelines: Hereditary Breast and Ovarian Cancer syndrome (BRCA1, BRCA2), Suarez syndrome (MLH1, MSH2, MSH6, PMS2, EPCAM), Familial Adenomatous Polyposis (APC), Hereditary Diffuse Gastric Cancer (CDH1), Familial Atypical Multiple Mole Melanoma syndrome (CDK4, CDKN2A), Multiple Endocrine Neoplasia type 1 (MEN1), Juvenile Polyposis syndrome (BMPR1A, SMAD4), Cowden syndrome (PTEN), Li Fraumeni syndrome (TP53), Hereditary Paraganglioma and Pheochromocytoma syndrome (SDHA, SDHB, SDHC, SDHD), Peutz-Jeghers syndrome (STK11), MUTYH Associated Polyposis (MUTYH), Tuberous sclerosis complex (TSC1, TSC2), Von Hippel-Lindau disease (VHL), and Neurofibromatosis type 1 (NF1). The TYRONE, AXIN2, BRIP1, CHEK2, GREM1, MSH3, NBN, NTHL1, PALB2, POLD1, POLE, RAD51C, and RAD51D genes are associated with increased cancer risk and have published management guidelines for certain cancers. The remaining genes are associated with increased cancer risk and may allow us to make medical recommendations when mutations are identified.       Medical Management: For Melissa, we reviewed that the information from genetic testing may determine:  additional cancer screening for  which Melissa may qualify (eg. mammogram and breast MRI, more frequent colonoscopies, more frequent dermatologic exams, etc.),  options for risk-reducing surgeries Melissa could consider, if indicated/desired (eg. bilateral mastectomy, surgery to remove her ovaries, etc.),    and targeted therapies for Melissa, if she were to develop certain cancers in the future (eg. immunotherapy for individuals with Suarez syndrome, PARP inhibitors for HBOC syndrome, etc.).     These recommendations and possible targeted therapies will be discussed in detail once genetic testing is completed.     Plan:  1) Today, Melissa decided to proceed with the Common Hereditary Cancers genetic testing panel (including the hereditary skin cancer panel) through [facility].  Therefore, consent was reviewed and verbally obtained for this testing.    2) We reviewed the options for sample collection. Melissa prefers a saliva kit, and so this will be sent to her. Test results are expected to be available within 4-6 weeks.  3) Melissa will either have a telephone visit or video visit to discuss the results.  Additional recommendations about screening will be made at that time.    Hina Alfonso MS, [facility]  Genetic Counselor  Cancer Risk Management Program     I spent 67 minutes on the date of the encounter doing chart review, history and exam, documentation and further activities as noted above.    Virtual Visit Details    Type of service:  Video Visit   Video Start Time: 9AM  Video End Time:9:47AM    Originating Location (pt. Location): Home  Distant Location (provider location):  Off-site  Platform used for Video Visit: AmWell    Again, thank you for allowing me to participate in the care of your patient.        Sincerely,        Deborah Alfonso GC    Electronically signed

## 2025-05-06 NOTE — NURSING NOTE
Current patient location: [address] [place] [phone]      Is the patient currently in the state of [place]? YES    Visit mode:VIDEO    If the visit is dropped, the patient can be reconnected by: VIDEO VISIT: Send to e-mail at: [email or web address]    Will anyone else be joining the visit? NO  (If patient encounters technical issues they should call [phone]    How would you like to obtain your AVS? MyChart    Are changes needed to the allergy or medication list? N/A    Reason for visit: Consult      Lori ARTEAGA

## 2025-05-06 NOTE — PATIENT INSTRUCTIONS
Assessing Cancer Risk  Only about 5-10% of cancers are thought to be due to an inherited cancer susceptibility gene.    These families often have:  Several people with the same or related types of cancer  Cancers diagnosed at a young age (before age 50)  Individuals with more than one primary cancer  Multiple generations of the family affected with cancer    Genetic Testing  Genetic testing involves a blood or saliva test and will look at the genetic information in select genes for any harmful mutations that are associated with increased cancer risk.  If possible, it is recommended that the person(s) who has had cancer be tested before other family members.  That person will give us the most useful information about whether or not a specific gene is associated with the cancer in the family.     Results  There are three possible results of genetic testing:  Positive--a harmful mutation was identified  Negative--no mutation was identified  Variant of unknown significance--a variation in one of the genes was identified, but it is unclear how this impacts cancer risk in the family    Advantages and Disadvantages  There are advantages and disadvantages to genetic testing.    Advantages  May clarify your cancer risk  Can help you make medical decisions  May explain the cancers in your family  May give useful information to your family members (if you share your results)    Disadvantages  Possible negative emotional impact of learning about inherited cancer risk  Uncertainty in interpreting a negative test result in some situations  Possible genetic discrimination concerns (see below)    Inheritance   Mutations in most cancer risk genes are inherited in an autosomal dominant pattern.  This means that if a parent has a mutation, each of their children will have a 50% chance of inheriting that same mutation.  Therefore, each child would have a 50% chance of being at increased risk for developing cancer.                                               Image obtained from Genetics Home Reference, 2013     Genetic Information Nondiscrimination Act (LAURENCE)  [place] is a federal law that protects individuals from health insurance or employment discrimination based on a genetic test result alone.  Although rare, there are currently no legal protections in terms of life insurance, long term care, or disability insurances.  Visit the National Human Genome Research [place] at Genome.gov/53162051 to learn more.    Reducing Cancer Risk  If a harmful mutation is found in a cancer risk gene, there may be certain screening tests or preventative surgeries that can be offered. This information will be discussed after genetic testing is completed. If no mutations are found on genetic testing, screening is then recommended based on personal and/or family history of cancer.     Questions to Think About Regarding Genetic Testing  What effect will the test result have on me and my relationship with my family members if I have an inherited gene mutation?  If I don t have a gene mutation?  Should I share my test results, and how will my family react to this news, which may also affect them?  Are my children ready to learn new information that may one day affect their own health?    Resources  American Cancer Society (ACS) cancer.org   National Cancer [place] (NCI) cancer.gov     Please call us if you have any questions or concerns.   Cancer Risk Management Program [phone]-CANCER ([phone])  Fausto Tate, MS [facility] [phone]  Clarice Dewitt, MS, [facility] [phone]  Hina Alfonso, MS, [facility]  [email or web address]  Cherrie Pierce, MS, [facility]  [phone]  Lisa Pollack, MS, [facility]  [phone]  Nga Reyes, MS, [facility] [phone]  Ilene Whittaker, MS, [facility] [phone]

## 2025-05-06 NOTE — PROGRESS NOTES
5/6/2025    Referring Provider: ***    Presenting Information:   I met with Jayashree Johnson today for genetic counseling at the Cancer Risk Management Program at the *** to discuss her personal and family history of *** cancer.  She is here today to review this history, cancer screening recommendations, and available genetic testing options.    Personal History:  Jayashree is a 54 year old female. She was diagnosed with ***; treatment included ***  / does not have any personal history of cancer.    ***She had her first menstrual period at age ***, her first child at age ***, and is ***.  ***Jayashree has her ovaries, fallopian tubes and uterus in place, and she has had *** ovarian cancer screening to date. She reports that she *** hormone replacement therapy.      She has *** clinical breast exams and mammograms; her most recent mammogram in *** was ***. *** Jayashree began having colonoscopies at the age of ***/Jayashree has not had a colonoscopy. Her most recent colonoscopy in *** was *** and follow-up was recommended in ***. *** She does not regularly do any other cancer screening at this time. Jayashree reported *** tobacco use and *** alcohol use.    Family History: (Please see scanned pedigree for detailed family history information)  ***  ***  Her maternal ethnicity is ***. Her paternal ethnicity is ***.  There is no known Ashkenazi Faith ancestry on either side of her family. There is no reported consanguinity.    Discussion:  Jayashree's personal and family history of *** is suggestive of a hereditary cancer syndrome.  We reviewed the features of sporadic, familial, and hereditary cancers. In looking at Jayashree's family history, it is possible that a cancer susceptibility gene is present due to ***.  We discussed the natural history and genetics of ***. A detailed handout regarding *** and the information we discussed was provided to Jayashree at the end of our appointment today and can be found in the after visit summary.  Topics included: inheritance pattern, cancer risks, cancer screening recommendations, and also risks, benefits and limitations of testing.  ***  Based on her personal and family history, Jayashree meets current National Comprehensive Cancer Network (NCCN) criteria for genetic testing of ***.    We discussed that there are additional genes that could cause increased risk for *** cancer. As many of these genes present with overlapping features in a family and accurate cancer risk cannot always be established based upon the pedigree analysis alone, it would be reasonable for Jayashree to consider panel genetic testing to analyze multiple genes at once.  ***  Genetic testing is available for: ***.    Medical Management: For Jayashree, we reviewed that the information from genetic testing may determine:  ***surgery to treat Jayashree's active cancer diagnosis (i.e. lumpectomy versus bilateral mastectomy, partial versus total colectomy, etc.***),  additional cancer screening for which Jayashree may qualify (i.e. mammogram and breast MRI, more frequent colonoscopies, more frequent dermatologic exams, etc.***),  options for risk reducing surgeries Jayashree could consider (i.e. bilateral mastectomy, surgery to remove her ovaries and/or uterus, etc.***),    and targeted chemotherapies for Jayashree's *** active cancer, or ***if she were to develop certain cancers in the future (i.e. immunotherapy for individuals with Suarez syndrome, PARP inhibitors, etc.***).   These recommendations and possible targeted chemotherapies will be discussed in detail once genetic testing is completed.     Plan:  1) Today Jayashree elected to proceed with ***.  2) This information should be available in 4-6*** weeks.  3) Jayashree will return to clinic to discuss the results.    I spent *** minutes on the date of the encounter doing chart review, history and exam, documentation and further activities as noted above.    ***     Virtual Visit Details    Type of  "service:  Video Visit   Video Start Time: {video visit start/end time for provider to select:433908}  Video End Time:{video visit start/end time for provider to select:193058}    Originating Location (pt. Location): {video visit patient location:837973::\"Home\"}  {PROVIDER LOCATION On-site should be selected for visits conducted from your clinic location or adjoining [facility] hospital, academic office, or other nearby [facility] building. Off-site should be selected for all other provider locations, including home:805973}  Distant Location (provider location):  {virtual location provider:985846}  Platform used for Video Visit: {Virtual Visit Platforms:713155::\"LuckyCal\"}  " syndromes in general. A detailed handout regarding some of the information we discussed was provided to Melissa after of our appointment today and can be found in the after visit summary. Topics included: inheritance pattern, cancer risks, cancer screening recommendations, and also risks, benefits and limitations of testing.    Based on her family history, Melissa meets current National Comprehensive Cancer Network (NCCN) criteria for genetic testing of high-penetrance breast cancer susceptibility genes (including BRCA1, BRCA2, CDH1, PALB2, PTEN, and TP53) because of her family history of at least three close relatives with breast cancer (two sisters, a maternal grandmother, and a paternal aunt), one of which was bilateral (sister) and one of which was of early onset (grandmother). This is also supported by her brother's pancreatic cancer and possibly her maternal cousin (if she really had ovarian cancer).    We discussed that there are additional genes besides those listed above that could cause increased risk for breast, pancreatic, and other cancer. As many of these genes present with overlapping features in a family and accurate cancer risk cannot always be established based upon the pedigree analysis alone, it would be reasonable for Melissa to consider panel genetic testing to analyze multiple genes at once.    Melissa stated that she was interested in pursuing genetic testing through a panel of genes associated with hereditary cancer syndromes, and so we reviewed the various panel options and their potential benefits and limitations.  After this conversation, Melissa decided that she was interested in the Common Hereditary Cancers genetic testing panel (including the hereditary skin cancer panel) through [facility].     The Common Hereditary Cancers genetic testing panel through [facility] includes analysis for 48 genes associated with cancers of the breast, ovary, uterus, prostate and gastrointestinal system: APC, TYRONE, AXIN2,  BAP1, BARD1, BMPR1A, BRCA1, BRCA2, BRIP1, CDH1, CDK4, CDKN2A (p14ARF and l94QRT5c), CHEK2, CTNNA1, DICER1, EPCAM, FH, GREM1, HOXB13, KIT, MBD4, MEN1, MLH1, MSH2, MSH3, MSH6, MUTYH, NF1, NTHL1, PALB2, PDGFRA, PMS2, POLD1, POLE, PTEN, RAD51C, RAD51D, SDHA, SDHB, SDHC, SDHD, SMAD4, SMARCA4, STK11, TP53, TSC1, TSC2, and VHL. The Hereditary Skin Cancer genetic panel would add on the following genes: BLM, MITF, POT1, PTCH1, FLCN, MC1R, PTCH2     We discussed that many of these genes are associated with specific hereditary cancer syndromes and published management guidelines: Hereditary Breast and Ovarian Cancer syndrome (BRCA1, BRCA2), Suarez syndrome (MLH1, MSH2, MSH6, PMS2, EPCAM), Familial Adenomatous Polyposis (APC), Hereditary Diffuse Gastric Cancer (CDH1), Familial Atypical Multiple Mole Melanoma syndrome (CDK4, CDKN2A), Multiple Endocrine Neoplasia type 1 (MEN1), Juvenile Polyposis syndrome (BMPR1A, SMAD4), Cowden syndrome (PTEN), Li Fraumeni syndrome (TP53), Hereditary Paraganglioma and Pheochromocytoma syndrome (SDHA, SDHB, SDHC, SDHD), Peutz-Jeghers syndrome (STK11), MUTYH Associated Polyposis (MUTYH), Tuberous sclerosis complex (TSC1, TSC2), Von Hippel-Lindau disease (VHL), and Neurofibromatosis type 1 (NF1). The TYRONE, AXIN2, BRIP1, CHEK2, GREM1, MSH3, NBN, NTHL1, PALB2, POLD1, POLE, RAD51C, and RAD51D genes are associated with increased cancer risk and have published management guidelines for certain cancers. The remaining genes are associated with increased cancer risk and may allow us to make medical recommendations when mutations are identified.       Medical Management: For Melissa, we reviewed that the information from genetic testing may determine:  additional cancer screening for which Melissa may qualify (eg. mammogram and breast MRI, more frequent colonoscopies, more frequent dermatologic exams, etc.),  options for risk-reducing surgeries Melissa could consider, if indicated/desired (eg. bilateral mastectomy,  surgery to remove her ovaries, etc.),    and targeted therapies for Melissa, if she were to develop certain cancers in the future (eg. immunotherapy for individuals with Suarez syndrome, PARP inhibitors for HBOC syndrome, etc.).     These recommendations and possible targeted therapies will be discussed in detail once genetic testing is completed.     Plan:  1) Today, Melissa decided to proceed with the Common Hereditary Cancers genetic testing panel (including the hereditary skin cancer panel) through [facility].  Therefore, consent was reviewed and verbally obtained for this testing.    2) We reviewed the options for sample collection. Melissa prefers a saliva kit, and so this will be sent to her. Test results are expected to be available within 4-6 weeks.  3) Melissa will either have a telephone visit or video visit to discuss the results.  Additional recommendations about screening will be made at that time.    Hina Alfonso MS, [facility]  Genetic Counselor  Cancer Risk Management Program     I spent 67 minutes on the date of the encounter doing chart review, history and exam, documentation and further activities as noted above.    Virtual Visit Details    Type of service:  Video Visit   Video Start Time: 9AM  Video End Time:9:47AM    Originating Location (pt. Location): Home  Distant Location (provider location):  Off-site  Platform used for Video Visit: TodWell

## 2025-06-11 ENCOUNTER — VIRTUAL VISIT (OUTPATIENT)
Dept: ONCOLOGY | Facility: CLINIC | Age: 55
End: 2025-06-11
Attending: GENETIC COUNSELOR, MS
Payer: COMMERCIAL

## 2025-06-11 DIAGNOSIS — C44.92 SQUAMOUS CELL SKIN CANCER: ICD-10-CM

## 2025-06-11 DIAGNOSIS — Z80.8 FAMILY HISTORY OF MALIGNANT NEOPLASM OF OTHER ORGANS OR SYSTEMS: ICD-10-CM

## 2025-06-11 DIAGNOSIS — Z80.0 FAMILY HISTORY OF PANCREATIC CANCER: ICD-10-CM

## 2025-06-11 DIAGNOSIS — C44.91 SKIN CANCER, BASAL CELL: ICD-10-CM

## 2025-06-11 DIAGNOSIS — Z80.0 FAMILY HISTORY OF COLON CANCER: ICD-10-CM

## 2025-06-11 DIAGNOSIS — Z80.0 FAMILY HISTORY OF THROAT CANCER: ICD-10-CM

## 2025-06-11 DIAGNOSIS — Z80.8 FAMILY HISTORY OF MELANOMA: ICD-10-CM

## 2025-06-11 DIAGNOSIS — Z80.3 FAMILY HISTORY OF MALIGNANT NEOPLASM OF BREAST: Primary | ICD-10-CM

## 2025-06-11 PROCEDURE — 96041 GENETIC COUNSELING SVC EA 30: CPT | Mod: GT,95 | Performed by: GENETIC COUNSELOR, MS

## 2025-06-11 NOTE — LETTER
Cancer Risk Management  Program     Mailing Address  Cancer Risk Management Program  [address]  [place] [address]    New patient appointments  [phone]    June 23, 2025    Jayashree Johnson  [id number] [address] [address]      Dear Melissa,    It was a pleasure talking with you via your virtual genetic counseling visit on 6-.  Below is a copy of the progress note from that recent visit through the Cancer Risk Management Program.  If you have any additional questions, please feel free to contact me.    Cancer Risk Management Program Genetic Counseling Note    6/11/2025    Referring Provider: Dr. Lisa Dumont     Presenting Information:  Jayashree Johnson (Barb) had a return video visit through the Cancer Risk Management Program, in order to discuss her genetic testing results. She sent a saliva sample to the genetic testing lab [facility] last month; the Common Hereditary Cancers genetic testing panel (including the hereditary skin cancer panel) was ordered. This testing was done because of her personal history of skin cancer and her family history of breast, pancreatic, colon, and other cancer.    Genetic Testing Result: NEGATIVE  Melissa is negative for mutations in the following genes: APC, TYRONE, AXIN2, BAP1, BARD1, BLM, BMPR1A, BRCA1, BRCA2, BRIP1, CDH1, CDK4, CDKN2A, CHEK2, CTNNA1, DICER1, EPCAM, FH, FLCN, GREM1, HOXB13, KIT, MBD4, MEN1, MC1R, MITF, MLH1, MSH2, MSH3, MSH6, MUTYH, NF1, NTHL1, PALB2, PDGFRA, PMS2, POLD1, POLE, POT1, PTEN, PTCH1, PTCH2, RAD51C, RAD51D, RB1, SDHA, SDHB, SDHC, SDHD, SMAD4, SMARCA4, STK11, SUFU, TP53, TSC1, TSC2, WRN, and VHL.      Therefore, genetic testing did not detect an identifiable mutation associated with Hereditary Breast and Ovarian Cancer syndrome (BRCA1, BRCA2), Suarez syndrome (MLH1, MSH2, MSH6, PMS2, EPCAM), Familial Adenomatous Polyposis (APC), Hereditary Diffuse Gastric Cancer (CDH1), Familial Atypical Multiple Mole Melanoma  "syndrome (CDK4, CDKN2A), Hereditary Leiomyomatosis and Renal Cell Cancer(FH), Juvenile Polyposis syndrome (BMPR1A, SMAD4), Gorlin syndrome (SUFU, PTCH1, PTCH2), Ribeiro syndrome (WRN), Cowden syndrome (PTEN), Li Fraumeni syndrome (TP53), Peutz-Jeghers syndrome (STK11), MUTYH Associated Polyposis (MUTYH), Hereditary Paraganglioma and Pheochromocytoma syndrome (SDHA, SDHB, SDHC, SDHD), Tuberous sclerosis complex (TSC1, TSC2), Von Hippel-Lindau disease (VHL), Neurofibromatosis type 1 (NF1), and Multiple Endocrine Neoplasia type 1 (MEN1).      A copy of the test report can be found in the Laboratory tab, dated 5-, and named \"LABORATORY MISCELLANEOUS RESULT.\" The report is scanned in as a linked document.    Interpretation:  We discussed different possible explanations of this negative test result:   One explanation may be that there is a different gene or combination of genes and environment that are associated with the cancers in this family.  Another possible explanation may be that some of her relatives did have a mutation in one of these hereditary cancer genes, but she did not inherit it.  Another possible explanation could be that some (or all) of the cancers in her family were \"sporadic\" cancers that were not significantly related to a specific hereditary risk factor.  There is also a small possibility that there is a mutation in one of these genes, but the testing laboratory could not find it with their current testing methods.       Screening:  Based on this negative test result, it is important for Melissa to refer back to the family history for appropriate cancer screening:     We talked about today that since we do not have a genetic explanation for the breast cancers in Melissa's family, this negative genetic test result does not give us specific breast cancer risk information for Melissa personally.  In this circumstance, we talked about that a few different breast cancer risk models have been used to try to " "estimate a female's breast cancer risk, in order to determine which individuals should consider increased breast cancer surveillance. The most commonly used breast cancer risk model in this type of situation is the RAGHU breast cancer risk calculator based on the Tyrer-Cuzick risk model.  We discussed that this model provides breast cancer risk estimates based on family history, plus some additional risk factors such as breast density, previous breast biopsies, age of menarche/first child, and previous genetic testing results. The RAGHU risk calculator predicted around a 19% residual lifetime risk for breast cancer for Melissa. We talked about that this is an increase as compared to the \"average\" residual lifetime breast cancer risk for a 56 year old of 8%.    We talked about that when a female's estimated lifetime risk of breast cancer is 20% or higher based on this risk model, it is generally recommended that they have additional conversations about the options for increased breast cancer surveillance.  We discussed that based on the information we currently have about Melissa's medical history, her family history, and her genetic testing results, her estimated residual breast cancer risk does not reach that 20% risk.  As such, it would be recommended that Melissa continue with annual mammograms for her breast cancer (unless a breast concern arises for her in the future that suggests a need for increased screening).       Melissa did express some concern about the fact that her 19% estimated residual breast cancer risk is only slightly lower than the 20% cut-off.  We did talk about that there is continuing research being done with respect to new breast screening methodologies, and we briefly talked about some of the more recent breast imaging options, such as contrast-enhanced mammogram. We talked about that the recommendations for breast screening are continuing to evolve over time, and so Melissa should periodically check in " with myself, her primary care provider, and/or a breast specialist about if there are changes to breast cancer screening recommendations.    Other population cancer screening options, such as those recommended by the American Cancer Society and the National Comprehensive Cancer Network (NCCN), are also appropriate for Melissa and her family. Given her family history of skin cancer, regular skin exams through a dermatologist would be reasonable as part of this routine cancer screening.    These screening recommendations may change if there are changes to Melissa's personal and/or family history of cancer. Final screening recommendations should be made in consultation with each individual's primary care provider.     Inheritance:  We had previously reviewed the inheritance of mutations in hereditary cancer genes. We discussed that, for the genes that Melissa tested negative for, we would generally expect that Melissa did not pass on an identifiable mutation in these genes to her children. Mutations in these genes do not skip generations.      Additional Testing Considerations:  It is possible Melissa does carry a gene or combination of genes and environment that increase her  risk for cancer that was not identifiable through this particular genetic testing panel. Melissa is encouraged to contact me in the future if she wants to know if there is additional genetic testing that might be available/indicated for her then or if she wishes to readdress larger gene panel options in the future.      Although Melissa's genetic testing result was negative, other relatives may still carry a gene mutation associated with an increased risk for cancer. Genetic counseling is recommended for relatives with a personal history of cancer (or a close relatives with cancer) in order to discuss genetic testing options.  If any of these relatives do pursue genetic testing, Melissa is encouraged to contact me so that we may review the impact of their test results  on her.    Summary:  We do not have an explanation for Melissa's family history of cancer. While no genetic changes were identified, Melissa may still be at risk for certain cancers due to family history, environmental factors, or other genetic causes not identified by this test.  Because of that, it is important that she continue with cancer screening based on her personal and family history as discussed above.    Genetic testing is rapidly advancing, and new cancer susceptibility genes will most likely be identified in the future. Therefore, I encouraged Melissa to contact me regularly or if there are changes in her personal or family history. This may change how we assess her cancer risk, screening, and the testing we would offer.    Plan:  1. I will route a copy of Melissa's genetic testing results to her. I will also route a copy of this results note to her, along with a handout summarizing negative genetic test results (see after visit summary).    2. She plans to follow-up with her primary care provider, as previous recommended.     3. As noted above, Melissa's lifetime risk for developing breast cancer is predicted to be somewhat higher than the general population risk based on her personal and family medical history. Because of this, if Melissa is interesting in meeting with the Cancer Risk Management Program clinical nurse specialist (MIMI Coker, CNS) to talk in more detail about screening options/recommendations, she can call [phone] to make an appointment.    4. She should contact me periodically, or if her personal or family history of cancer changes, and she would like to know if there are additional screening or testing recommendations at that time.    If Melissa has any further questions, I encouraged her to contact me via [employer]ell or through email: [email or web address].     Hina Alfonso MS, [facility]  Genetic Counselor  Cancer Risk Management Program

## 2025-06-11 NOTE — PATIENT INSTRUCTIONS
Negative Genetic Test Result    Genetic Testing  Genetic testing involved a blood or saliva test which looked at the genetic information in select genes for variants associated with cancer risk.  This testing may have included analysis of a single gene due to a known variant in the family, multiple genes most associated with the cancers in a family, or an expanded panel of genes related to many types of cancers.     Results  There are several possible genetic test results, including:   Positive--a harmful mutation (also known as a  pathogenic  or  likely pathogenic  variant) was identified in a gene associated with increased cancer risk.  These risks, as well as medical management options, depend on the specific genetic variant identified.    Negative--no variants were identified in the genes analyzed   Variant of unknown significance--a variant was identified in one or more genes, though it is currently unclear how this impacts cancer risk in the family.  Genetic testing labs are working to collect evidence about these uncertain variants and may provide updates in the future.    What Does a Negative Genetic Test Result Mean?  A negative genetic test results means that no genetic changes (variants) were detected in the genes tested. While no inherited risk factors for cancer were identified, you and your family may be at risk for certain cancers due to family history, environmental factors, or other genetic causes not identified by this test.  It is also possible that your family may still be at risk of carrying a genetic risk factor which you did not inherit. Your genetic counselor can help interpret the result for you and your relatives.      It is important to note which genes were included in your test. A list of these genes can be found on your test result.    Screening Recommendations  A combination of personal and family history factors may inform cancer risk and medical management recommendations.   Population cancer screening options, such as those recommended by the American Cancer Society and the National Comprehensive Cancer Network (NCCN) are appropriate for many families at average risk for cancer.  However, earlier and/or more frequent screening may be recommended based on personal factors (lifestyle, exposures, medications, screening results), family history of cancer, and sometimes genetic factors.  These cancer risk management options should be discussed in more detail with an individual's medical providers.      Please call us if you have any questions or concerns.   Cancer Risk Management Program (Appointments: [phone])  Fausto Tate, MS [facility] [phone]  Clarice Dewitt, MS, [facility] [phone]  Hina Alfonso MS, [facility]  [email or web address]  Cherrie Pierce, MS, [facility]  [phone]  Lisa Pollack, MS, [facility]  [phone]  Nga Reyes, MS, [facility] [phone]  Ilene Whittaker, MS, [facility] [phone]

## 2025-06-11 NOTE — PROGRESS NOTES
"6/11/2025    Referring Provider: ***    Presenting Information:  Jayashree Johnson returned to the Cancer Risk Management Program at *** to discuss her genetic testing results. Her blood was drawn on ***.  *** test was ordered from [facility]/***the Molecular Diagnostics Laboratory at [facility]. This testing was done because of her personal and family history of ***.    Genetic Testing Result: NEGATIVE  Jayashree is negative for mutations in ***. No mutations were found in her *** gene.      ***A copy of the test report can be found in the Laboratory tab, dated ***, and named \"LABORATORY MISCELLANEOUS RESULT.\" The report is scanned in as a linked document.    ***A copy of the test report can be found in the Laboratory tab, dated ***, and named \"Next generation sequencing\".     Interpretation:  We discussed several different interpretations of this negative test result.    One explanation may be that there is a different gene or combination of genes and environment that are associated with the cancers in this family.  Another explanation may be that her *** did have a mutation in *** and she did not inherit it.  There is also a small possibility that there is a mutation in one of these genes, and the testing laboratory could not find it with their current testing methods.       Screening:  Based on this negative test result, it is important for Jayashree and her relatives to refer back to the family history for appropriate cancer screening.    ***      Inheritance:  We reviewed the autosomal dominant inheritance of mutations in these *** genes. We discussed that Jayashree cannot/did not pass on an identifiable mutation in these genes to her children based on this test result. Mutations in these genes do not skip generations.      Additional Testing Considerations:  ***It is possible Jayashree does carry a currently identifiable gene or combination of genes and environment that increase her risk for *** cancer. We again reviewed " the option of larger gene panels covering more moderate risk genes associated with *** cancer. Jayashree is encouraged to contact me if she wishes to readdress these larger gene panel options.     Although Jayashree's genetic testing result was negative, other relatives may still carry a gene mutation associated with *** cancer. Genetic counseling is recommended for *** to discuss genetic testing options. *** If any of these relatives do pursue genetic testing, Jayashree is encouraged to contact me so that we may review the impact of their test results on her.    Summary:  We do not have an explanation for Jayashree's *** cancer. While no genetic changes were identified, Jayashree may still be at risk for certain cancers due to family history, environmental factors, or other genetic causes not identified by this test.  Because of that, it is important that she continue with cancer screening based on her personal and family history as discussed above.    Genetic testing is rapidly advancing, and new cancer susceptibility genes will most likely be identified in the future. Therefore, I encouraged Jayashree to contact me regularly or if there are changes in her personal or family history. This may change how we assess her cancer risk, screening, and the testing we would offer.    Plan:  1.  I provided Jayashree with a copy of her test results today and a handout summarizing negative genetic test results (see after visit summary).  2. She plans to follow-up with ***.  3. She should contact me periodically, or if her personal or family history of cancer changes, and she would like to know if there are additional screening or testing recommendations at that time.    If Jayashree has any further questions, I encouraged her to contact me at ***.    I spent *** minutes on the date of the encounter doing chart review, history and exam, documentation and further activities as noted above.    ***       Virtual Visit Details    Type of service:  " Video Visit   Video Start Time: {video visit start/end time for provider to select:000885}  Video End Time:{video visit start/end time for provider to select:947337}    Originating Location (pt. Location): {video visit patient location:522760::\"Home\"}  {PROVIDER LOCATION On-site should be selected for visits conducted from your clinic location or adjoining [facility] hospital, academic office, or other nearby [facility] building. Off-site should be selected for all other provider locations, including home:365551}  Distant Location (provider location):  {virtual location provider:084307}  Platform used for Video Visit: {Virtual Visit Platforms:779854::\"RuckusWell\"}  " Melissa's personal and/or family history of cancer. Final screening recommendations should be made in consultation with each individual's primary care provider.     Inheritance:  We had previously reviewed the inheritance of mutations in hereditary cancer genes. We discussed that, for the genes that Melissa tested negative for, we would generally expect that Melissa did not pass on an identifiable mutation in these genes to her children. Mutations in these genes do not skip generations.      Additional Testing Considerations:  It is possible Melissa does carry a gene or combination of genes and environment that increase her  risk for cancer that was not identifiable through this particular genetic testing panel. Melissa is encouraged to contact me in the future if she wants to know if there is additional genetic testing that might be available/indicated for her then or if she wishes to readdress larger gene panel options in the future.      Although Melissa's genetic testing result was negative, other relatives may still carry a gene mutation associated with an increased risk for cancer. Genetic counseling is recommended for relatives with a personal history of cancer (or a close relatives with cancer) in order to discuss genetic testing options.  If any of these relatives do pursue genetic testing, Melissa is encouraged to contact me so that we may review the impact of their test results on her.    Summary:  We do not have an explanation for Melissa's family history of cancer. While no genetic changes were identified, Melissa may still be at risk for certain cancers due to family history, environmental factors, or other genetic causes not identified by this test.  Because of that, it is important that she continue with cancer screening based on her personal and family history as discussed above.    Genetic testing is rapidly advancing, and new cancer susceptibility genes will most likely be identified in the future. Therefore, I encouraged  Melissa to contact me regularly or if there are changes in her personal or family history. This may change how we assess her cancer risk, screening, and the testing we would offer.    Plan:  1. I will route a copy of Melissa's genetic testing results to her. I will also route a copy of this results note to her, along with a handout summarizing negative genetic test results (see after visit summary).    2. She plans to follow-up with her primary care provider, as previous recommended.     3. As noted above, Melissa's lifetime risk for developing breast cancer is predicted to be somewhat higher than the general population risk based on her personal and family medical history. Because of this, if Melissa is interesting in meeting with the Cancer Risk Management Program clinical nurse specialist (MIMI Coker, CNS) to talk in more detail about screening options/recommendations, she can call [phone] to make an appointment.    4. She should contact me periodically, or if her personal or family history of cancer changes, and she would like to know if there are additional screening or testing recommendations at that time.    If Melissa has any further questions, I encouraged her to contact me via Venyo or through email: [email or web address].     Hina Alfonso MS, [facility]  Genetic Counselor  Cancer Risk Management Program     I spent 37 minutes on the date of the encounter doing chart review, history and exam, documentation and further activities as noted above.    Virtual Visit Details    Type of service:  Video Visit   Video Start Time: 8:00AM  Video End Time:8:22AM    Originating Location (pt. Location): Home  Distant Location (provider location):  Off-site  Platform used for Video Visit: [employer]

## 2025-06-11 NOTE — NURSING NOTE
Current patient location: [address] [place] [id number]    Is the patient currently in the state of [place]? YES    Visit mode: VIDEO    If the visit is dropped, the patient can be reconnected by:VIDEO VISIT: Send to e-mail at: [email or web address]    Will anyone else be joining the visit? NO  (If patient encounters technical issues they should call [phone] :[phone])    Are changes needed to the allergy or medication list? N/A    Are refills needed on medications prescribed by this physician? NO    Rooming Documentation:  Questionnaire(s) not done per department protocol    Reason for visit: RECHECK    Jonathan ANTONYF     Notes Recorded by Chaparro Guerra MD on 9/14/2017 at 2:11 AM CDT  Diagnostic mammogram right breast  Order generated  ------    Notes Recorded by Chaparro Guerra MD on 9/14/2017 at 2:09 AM CDT  Impression   CONCLUSION:       BI-RADS CATEGORY:     Sally Wang

## 2025-06-11 NOTE — LETTER
6/11/2025      Jayashree Johnson  [address] [place] [id number]      Dear Colleague,    Thank you for referring your patient, Jayashree Johnson, to the [facility]. Please see a copy of my visit note below.    Cancer Risk Management Program Genetic Counseling Note    6/11/2025    Referring Provider: Dr. Lisa Dumont     Presenting Information:  Jayashree Johnson (Barb) had a return video visit through the Cancer Risk Management Program, in order to discuss her genetic testing results. She sent a saliva sample to the genetic testing lab [facility] last month; the Common Hereditary Cancers genetic testing panel (including the hereditary skin cancer panel) was ordered. This testing was done because of her personal history of skin cancer and her family history of breast, pancreatic, colon, and other cancer.    Genetic Testing Result: NEGATIVE  Melissa is negative for mutations in the following genes: APC, TYRONE, AXIN2, BAP1, BARD1, BLM, BMPR1A, BRCA1, BRCA2, BRIP1, CDH1, CDK4, CDKN2A, CHEK2, CTNNA1, DICER1, EPCAM, FH, FLCN, GREM1, HOXB13, KIT, MBD4, MEN1, MC1R, MITF, MLH1, MSH2, MSH3, MSH6, MUTYH, NF1, NTHL1, PALB2, PDGFRA, PMS2, POLD1, POLE, POT1, PTEN, PTCH1, PTCH2, RAD51C, RAD51D, RB1, SDHA, SDHB, SDHC, SDHD, SMAD4, SMARCA4, STK11, SUFU, TP53, TSC1, TSC2, WRN, and VHL.      Therefore, genetic testing did not detect an identifiable mutation associated with Hereditary Breast and Ovarian Cancer syndrome (BRCA1, BRCA2), Suarez syndrome (MLH1, MSH2, MSH6, PMS2, EPCAM), Familial Adenomatous Polyposis (APC), Hereditary Diffuse Gastric Cancer (CDH1), Familial Atypical Multiple Mole Melanoma syndrome (CDK4, CDKN2A), Hereditary Leiomyomatosis and Renal Cell Cancer(FH), Juvenile Polyposis syndrome (BMPR1A, SMAD4), Gorlin syndrome (SUFU, PTCH1, PTCH2), Ribeiro syndrome (WRN), Cowden syndrome (PTEN), Li Fraumeni syndrome (TP53), Peutz-Jeghers syndrome (STK11), MUTYH Associated Polyposis (MUTYH), Hereditary  "Paraganglioma and Pheochromocytoma syndrome (SDHA, SDHB, SDHC, SDHD), Tuberous sclerosis complex (TSC1, TSC2), Von Hippel-Lindau disease (VHL), Neurofibromatosis type 1 (NF1), and Multiple Endocrine Neoplasia type 1 (MEN1).      A copy of the test report can be found in the Laboratory tab, dated 5-, and named \"LABORATORY MISCELLANEOUS RESULT.\" The report is scanned in as a linked document.    Interpretation:  We discussed different possible explanations of this negative test result:   One explanation may be that there is a different gene or combination of genes and environment that are associated with the cancers in this family.  Another possible explanation may be that some of her relatives did have a mutation in one of these hereditary cancer genes, but she did not inherit it.  Another possible explanation could be that some (or all) of the cancers in her family were \"sporadic\" cancers that were not significantly related to a specific hereditary risk factor.  There is also a small possibility that there is a mutation in one of these genes, but the testing laboratory could not find it with their current testing methods.       Screening:  Based on this negative test result, it is important for Melissa to refer back to the family history for appropriate cancer screening:     We talked about today that since we do not have a genetic explanation for the breast cancers in Melissa's family, this negative genetic test result does not give us specific breast cancer risk information for Melissa personally.  In this circumstance, we talked about that a few different breast cancer risk models have been used to try to estimate a female's breast cancer risk, in order to determine which individuals should consider increased breast cancer surveillance. The most commonly used breast cancer risk model in this type of situation is the RAGHU breast cancer risk calculator based on the Tyrer-Cuzick risk model.  We discussed that this " "model provides breast cancer risk estimates based on family history, plus some additional risk factors such as breast density, previous breast biopsies, age of menarche/first child, and previous genetic testing results. The RAGHU risk calculator predicted around a 19% residual lifetime risk for breast cancer for Melissa. We talked about that this is an increase as compared to the \"average\" residual lifetime breast cancer risk for a 56 year old of 8%.    We talked about that when a female's estimated lifetime risk of breast cancer is 20% or higher based on this risk model, it is generally recommended that they have additional conversations about the options for increased breast cancer surveillance.  We discussed that based on the information we currently have about Melissa's medical history, her family history, and her genetic testing results, her estimated residual breast cancer risk does not reach that 20% risk.  As such, it would be recommended that Melissa continue with annual mammograms for her breast cancer (unless a breast concern arises for her in the future that suggests a need for increased screening).       Melissa did express some concern about the fact that her 19% estimated residual breast cancer risk is only slightly lower than the 20% cut-off.  We did talk about that there is continuing research being done with respect to new breast screening methodologies, and we briefly talked about some of the more recent breast imaging options, such as contrast-enhanced mammogram. We talked about that the recommendations for breast screening are continuing to evolve over time, and so Melissa should periodically check in with myself, her primary care provider, and/or a breast specialist about if there are changes to breast cancer screening recommendations.    Other population cancer screening options, such as those recommended by the American Cancer Society and the National Comprehensive Cancer Network (NCCN), are also appropriate " for Melissa and her family. Given her family history of skin cancer, regular skin exams through a dermatologist would be reasonable as part of this routine cancer screening.    These screening recommendations may change if there are changes to Melissa's personal and/or family history of cancer. Final screening recommendations should be made in consultation with each individual's primary care provider.     Inheritance:  We had previously reviewed the inheritance of mutations in hereditary cancer genes. We discussed that, for the genes that Melissa tested negative for, we would generally expect that Melissa did not pass on an identifiable mutation in these genes to her children. Mutations in these genes do not skip generations.      Additional Testing Considerations:  It is possible Melissa does carry a gene or combination of genes and environment that increase her  risk for cancer that was not identifiable through this particular genetic testing panel. Melissa is encouraged to contact me in the future if she wants to know if there is additional genetic testing that might be available/indicated for her then or if she wishes to readdress larger gene panel options in the future.      Although Melissa's genetic testing result was negative, other relatives may still carry a gene mutation associated with an increased risk for cancer. Genetic counseling is recommended for relatives with a personal history of cancer (or a close relatives with cancer) in order to discuss genetic testing options.  If any of these relatives do pursue genetic testing, Melissa is encouraged to contact me so that we may review the impact of their test results on her.    Summary:  We do not have an explanation for Melissa's family history of cancer. While no genetic changes were identified, Melissa may still be at risk for certain cancers due to family history, environmental factors, or other genetic causes not identified by this test.  Because of that, it is important that she  continue with cancer screening based on her personal and family history as discussed above.    Genetic testing is rapidly advancing, and new cancer susceptibility genes will most likely be identified in the future. Therefore, I encouraged Melissa to contact me regularly or if there are changes in her personal or family history. This may change how we assess her cancer risk, screening, and the testing we would offer.    Plan:  1. I will route a copy of Melissa's genetic testing results to her. I will also route a copy of this results note to her, along with a handout summarizing negative genetic test results (see after visit summary).    2. She plans to follow-up with her primary care provider, as previous recommended.     3. As noted above, Melissa's lifetime risk for developing breast cancer is predicted to be somewhat higher than the general population risk based on her personal and family medical history. Because of this, if Melissa is interesting in meeting with the Cancer Risk Management Program clinical nurse specialist (MIMI Coker, CNS) to talk in more detail about screening options/recommendations, she can call [phone] to make an appointment.    4. She should contact me periodically, or if her personal or family history of cancer changes, and she would like to know if there are additional screening or testing recommendations at that time.    If Melissa has any further questions, I encouraged her to contact me via [email or web address] or through email: [email or web address].     Hina Alfonso MS, [facility]  Genetic Counselor  Cancer Risk Management Program     I spent 37 minutes on the date of the encounter doing chart review, history and exam, documentation and further activities as noted above.    Virtual Visit Details    Type of service:  Video Visit   Video Start Time: 8:00AM  Video End Time:8:22AM    Originating Location (pt. Location): Home  Distant Location (provider location):  Off-site  Platform used for Video  Visit: AmWell    Again, thank you for allowing me to participate in the care of your patient.        Sincerely,        Deborah Alfonso GC    Electronically signed

## 2025-07-02 ENCOUNTER — PATIENT OUTREACH (OUTPATIENT)
Dept: CARE COORDINATION | Facility: CLINIC | Age: 55
End: 2025-07-02
Payer: COMMERCIAL

## 2025-08-16 ENCOUNTER — HEALTH MAINTENANCE LETTER (OUTPATIENT)
Age: 55
End: 2025-08-16

## (undated) DEVICE — ESU GROUND PAD UNIVERSAL W/O CORD

## (undated) DEVICE — SYR 05ML LL W/O NDL

## (undated) DEVICE — KIT ENDO FIRST STEP DISINFECTANT 200ML W/POUCH EP-4

## (undated) DEVICE — ESU CORD BIPOLAR 12' E0509

## (undated) DEVICE — TUBING SUCTION SOFT 20'X3/16" 0036570

## (undated) DEVICE — ADH LIQUID MASTISOL TOPICAL VIAL 2-3ML 0523-48

## (undated) DEVICE — ESU ELEC BLADE 2.75" COATED/INSULATED E1455

## (undated) DEVICE — PREP DURAPREP 06ML APL 8635

## (undated) DEVICE — GLOVE PROTEXIS BLUE W/NEU-THERA 8.5  2D73EB85

## (undated) DEVICE — NDL SPINAL 18GA 3.5" 405184

## (undated) DEVICE — SU MONOCRYL 4-0 P-3 18" UND Y494G

## (undated) DEVICE — STPL SKIN 35W APPOSE 8886803712

## (undated) DEVICE — PREP CHLORAPREP 26ML TINTED ORANGE  260815

## (undated) DEVICE — SYR 10ML LL W/O NDL

## (undated) DEVICE — PACK SPINE SM CUSTOM SNE15SSFSK

## (undated) DEVICE — GLOVE PROTEXIS W/NEU-THERA 7.5  2D73TE75

## (undated) DEVICE — TRAY SINGLE DOSE EPIDURAL ANESTHESIA

## (undated) DEVICE — DRSG TELFA ISLAND 4X8" 7541

## (undated) DEVICE — BLADE KNIFE SURG 15 371115

## (undated) DEVICE — SPONGE SURGIFOAM 100 1974

## (undated) DEVICE — DRAPE COVER C-ARM SEAMLESS SNAP-KAP 03-KP26 LATEX FREE

## (undated) DEVICE — NDL EPIDURAL TUOHY 20GA 3.5" LF DISP 183A12

## (undated) DEVICE — DRAPE MICROSCOPE EQUIP 48X120" 6130VL2

## (undated) DEVICE — SU SILK 2-0 FSL 18" 677G

## (undated) DEVICE — SU VICRYL 3-0 SH CR 8X18" J774

## (undated) DEVICE — SUCTION FRAZIER 12FR W/OBTURATOR 33120

## (undated) DEVICE — MANIFOLD NEPTUNE 4 PORT 700-20

## (undated) DEVICE — TUBING EXTENSION SET MICROBORE 21CM LL 6N8374

## (undated) DEVICE — PAD CHUX UNDERPAD 23X24" 7136

## (undated) DEVICE — SPONGE KITTNER 31001010

## (undated) DEVICE — LINEN TOWEL PACK X5 5464

## (undated) DEVICE — RX SURGIFLO HEMOSTATIC MATRIX W/THROMBIN 8ML NEXTGEN 2993

## (undated) DEVICE — DRSG STERI STRIP 1/2X4" R1547

## (undated) DEVICE — SYR 10ML PERFIX LL 332152

## (undated) DEVICE — DRAPE MAYO STAND 23X54 8337

## (undated) DEVICE — GLOVE PROTEXIS W/NEU-THERA 8.5  2D73TE85

## (undated) DEVICE — MIDAS REX DISSECTING TOOL  14MH30

## (undated) RX ORDER — FENTANYL CITRATE 50 UG/ML
INJECTION, SOLUTION INTRAMUSCULAR; INTRAVENOUS
Status: DISPENSED
Start: 2022-01-28

## (undated) RX ORDER — ONDANSETRON 2 MG/ML
INJECTION INTRAMUSCULAR; INTRAVENOUS
Status: DISPENSED
Start: 2021-03-25

## (undated) RX ORDER — HYDROMORPHONE HYDROCHLORIDE 1 MG/ML
INJECTION, SOLUTION INTRAMUSCULAR; INTRAVENOUS; SUBCUTANEOUS
Status: DISPENSED
Start: 2017-05-30

## (undated) RX ORDER — FENTANYL CITRATE 50 UG/ML
INJECTION, SOLUTION INTRAMUSCULAR; INTRAVENOUS
Status: DISPENSED
Start: 2017-05-30

## (undated) RX ORDER — BUPIVACAINE HYDROCHLORIDE AND EPINEPHRINE 5; 5 MG/ML; UG/ML
INJECTION, SOLUTION EPIDURAL; INTRACAUDAL; PERINEURAL
Status: DISPENSED
Start: 2017-05-30

## (undated) RX ORDER — SCOLOPAMINE TRANSDERMAL SYSTEM 1 MG/1
PATCH, EXTENDED RELEASE TRANSDERMAL
Status: DISPENSED
Start: 2017-05-30

## (undated) RX ORDER — PROPOFOL 10 MG/ML
INJECTION, EMULSION INTRAVENOUS
Status: DISPENSED
Start: 2017-05-30

## (undated) RX ORDER — ACETAMINOPHEN 325 MG/1
TABLET ORAL
Status: DISPENSED
Start: 2017-05-30

## (undated) RX ORDER — CEFAZOLIN SODIUM 2 G/100ML
INJECTION, SOLUTION INTRAVENOUS
Status: DISPENSED
Start: 2017-05-30

## (undated) RX ORDER — GABAPENTIN 300 MG/1
CAPSULE ORAL
Status: DISPENSED
Start: 2017-05-30

## (undated) RX ORDER — KETAMINE HCL IN 0.9 % NACL 20 MG/2 ML
SYRINGE (ML) INTRAVENOUS
Status: DISPENSED
Start: 2017-05-30